# Patient Record
Sex: FEMALE | Race: BLACK OR AFRICAN AMERICAN | Employment: UNEMPLOYED | URBAN - METROPOLITAN AREA
[De-identification: names, ages, dates, MRNs, and addresses within clinical notes are randomized per-mention and may not be internally consistent; named-entity substitution may affect disease eponyms.]

---

## 2020-02-25 ENCOUNTER — HOSPITAL ENCOUNTER (EMERGENCY)
Age: 55
Discharge: HOME | End: 2020-02-26
Attending: EMERGENCY MEDICINE
Payer: MEDICARE

## 2020-02-25 VITALS
BODY MASS INDEX: 27.47 KG/M2 | WEIGHT: 175 LBS | DIASTOLIC BLOOD PRESSURE: 77 MMHG | OXYGEN SATURATION: 99 % | RESPIRATION RATE: 18 BRPM | HEIGHT: 67 IN | TEMPERATURE: 97.9 F | SYSTOLIC BLOOD PRESSURE: 108 MMHG | HEART RATE: 69 BPM

## 2020-02-25 DIAGNOSIS — K64.4 EXTERNAL HEMORRHOID: Primary | ICD-10-CM

## 2020-02-25 PROCEDURE — 99283 EMERGENCY DEPT VISIT LOW MDM: CPT

## 2020-02-25 RX ORDER — HYDROCORTISONE 25 MG/G
CREAM TOPICAL 4 TIMES DAILY
Qty: 30 G | Refills: 0 | Status: SHIPPED | OUTPATIENT
Start: 2020-02-25

## 2020-02-25 RX ORDER — DOCUSATE SODIUM 100 MG/1
100 CAPSULE, LIQUID FILLED ORAL 2 TIMES DAILY
Qty: 60 CAP | Refills: 2 | Status: SHIPPED | OUTPATIENT
Start: 2020-02-25 | End: 2020-05-25

## 2020-02-26 NOTE — DISCHARGE INSTRUCTIONS
Patient Education        Hemorrhoids: Care Instructions  Your Care Instructions    Hemorrhoids are enlarged veins that develop in the anal canal. Bleeding during bowel movements, itching, swelling, and rectal pain are the most common symptoms. They can be uncomfortable at times, but hemorrhoids rarely are a serious problem. You can treat most hemorrhoids with simple changes to your diet and bowel habits. These changes include eating more fiber and not straining to pass stools. Most hemorrhoids do not need surgery or other treatment unless they are very large and painful or bleed a lot. Follow-up care is a key part of your treatment and safety. Be sure to make and go to all appointments, and call your doctor if you are having problems. It's also a good idea to know your test results and keep a list of the medicines you take. How can you care for yourself at home? · Sit in a few inches of warm water (sitz bath) 3 times a day and after bowel movements. The warm water helps with pain and itching. · Put ice on your anal area several times a day for 10 minutes at a time. Put a thin cloth between the ice and your skin. Follow this by placing a warm, wet towel on the area for another 10 to 20 minutes. · Take pain medicines exactly as directed. ? If the doctor gave you a prescription medicine for pain, take it as prescribed. ? If you are not taking a prescription pain medicine, ask your doctor if you can take an over-the-counter medicine. · Keep the anal area clean, but be gentle. Use water and a fragrance-free soap, such as [place], or use baby wipes or medicated pads, such as Tucks. · Wear cotton underwear and loose clothing to decrease moisture in the anal area. · Eat more fiber. Include foods such as whole-grain breads and cereals, raw vegetables, raw and dried fruits, and beans. · Drink plenty of fluids, enough so that your urine is light yellow or clear like water.  If you have kidney, heart, or liver disease and have to limit fluids, talk with your doctor before you increase the amount of fluids you drink. · Use a stool softener that contains bran or psyllium. You can save money by buying bran or psyllium (available in bulk at most health food stores) and sprinkling it on foods or stirring it into fruit juice. Or you can use a product such as Metamucil or Hydrocil. · Practice healthy bowel habits. ? Go to the bathroom as soon as you have the urge. ? Avoid straining to pass stools. Relax and give yourself time to let things happen naturally. ? Do not hold your breath while passing stools. ? Do not read while sitting on the toilet. Get off the toilet as soon as you have finished. · Take your medicines exactly as prescribed. Call your doctor if you think you are having a problem with your medicine. When should you call for help? Call 911 anytime you think you may need emergency care. For example, call if:    · You pass maroon or very bloody stools.    Call your doctor now or seek immediate medical care if:    · You have increased pain.     · You have increased bleeding.    Watch closely for changes in your health, and be sure to contact your doctor if:    · Your symptoms have not improved after 3 or 4 days. Where can you learn more? Go to http://pawan-claribel.info/. Enter F228 in the search box to learn more about \"Hemorrhoids: Care Instructions. \"  Current as of: November 7, 2018  Content Version: 12.2  © 4129-8329 [employer]. Care instructions adapted under license by [employer] (which disclaims liability or warranty for this information). If you have questions about a medical condition or this instruction, always ask your healthcare professional. [place] [address] any warranty or liability for your use of this information.

## 2020-02-26 NOTE — ED NOTES
Patient (s) was given copy of dc instructions and two paper script(s) and no electronic scripts. Patient (s) has verbalized understanding of instructions and script (s). Patient was given a current medication reconciliation form and verbalized understanding of their medications. Patient (s) has verbalized understanding of the importance of discussing medications with  his or her physician or clinic they will be following up with. Patient alert and oriented and in no acute distress. Patient offered wheelchair from treatment area to hospital entrance, patient accepted wheelchair. Patient left ED with self.

## 2020-02-26 NOTE — ED PROVIDER NOTES
EMERGENCY DEPARTMENT HISTORY AND PHYSICAL EXAM      Date: 2/25/2020  Patient Name: Amber Buckner    History of Presenting Illness     Chief Complaint   Patient presents with    Rectal Bleeding       History Provided By: Patient    HPI: Amber Buckner, 54 y.o. female with PMHx significant for hemorrhoidectomy, polyp removal, seizures, stroke. Patient presents to the emergency department with rectal pain and bright red blood bleeding. She does state that she has had a history of hemorrhoids and she is tried to use hemorrhoid cream today and has not seemed to help. She does state that the pain had an acute onset today and she denies any constipation however did have some last week. She has not had to have surgery for hemorrhoid removal in greater than 5 years and does not see a specialist still. She denies any abdominal pain, flank pain or any  symptoms. She does state that the blood was red instead of black. Please note for examination and HPI were completed I did introduce myself as the physician assistant. Please note that this dictation was completed with [employer], the computer voice recognition software. Quite often unanticipated grammatical, syntax, homophones, and other interpretive errors are inadvertently transcribed by the computer software. Please disregard these errors. Please excuse any errors that have escaped final proofreading. For further clarification on any chart please contact myself. Thank you. There are no other complaints, changes, or physical findings at this time. PCP: Unknown, Provider    No current facility-administered medications on file prior to encounter. Current Outpatient Medications on File Prior to Encounter   Medication Sig Dispense Refill    OTHER Reports taking medicaion for seizures.  phenylephrine 0.25 % suppository Insert 1 suppository into rectum two (2) times a day.  12 suppository 0       Past History     Past Medical History:  Past Medical History: Diagnosis Date    Seizures ([address]     Stroke [facility])        Past Surgical History:  History reviewed. No pertinent surgical history. Family History:  History reviewed. No pertinent family history. Social History:  Social History     Tobacco Use    Smoking status: Current Every Day Smoker     Packs/day: 0.50    Smokeless tobacco: Never Used   Substance Use Topics    Alcohol use: No    Drug use: No       Allergies: Allergies   Allergen Reactions    Bee Venom Protein (Honey Bee) Swelling    Peanut Butter Flavor [Flavoring Agent] Swelling         Review of Systems   Review of Systems   Gastrointestinal: Positive for anal bleeding and rectal pain. All other systems reviewed and are negative. Physical Exam   Physical Exam  Vitals signs and nursing note reviewed. Exam conducted with a chaperone present. Constitutional:       Appearance: She is well-developed. HENT:      Head: Normocephalic and atraumatic. Eyes:      Conjunctiva/sclera: Conjunctivae normal.      Pupils: Pupils are equal, round, and reactive to light. Neck:      Musculoskeletal: Normal range of motion and neck supple. Thyroid: No thyromegaly. Cardiovascular:      Rate and Rhythm: Normal rate and regular rhythm. Heart sounds: Normal heart sounds. No murmur. Pulmonary:      Effort: Pulmonary effort is normal. No respiratory distress. Breath sounds: Normal breath sounds. No stridor. No wheezing. Abdominal:      General: Bowel sounds are normal.      Palpations: Abdomen is soft. Tenderness: There is no abdominal tenderness. Genitourinary:     Comments: Rectal exam completed with JASS King. Patient does have obvious rectal hemorrhoid to the external rectum. Hemorrhoid is nonthrombosed and there are 2 hemorrhoids appreciated also nonthrombosed. No evidence of any perirectal abscess. Musculoskeletal: Normal range of motion. General: No tenderness.    Lymphadenopathy:      Cervical: No cervical adenopathy. Skin:     General: Skin is warm. Neurological:      Mental Status: She is alert and oriented to person, place, and time. Deep Tendon Reflexes: Reflexes are normal and symmetric. Psychiatric:         Judgment: Judgment normal.         Diagnostic Study Results     Labs -   No results found for this or any previous visit (from the past 12 hour(s)). Radiologic Studies -   No orders to display     CT Results  (Last 48 hours)    None        CXR Results  (Last 48 hours)    None            Medical Decision Making   I am the first provider for this patient. I reviewed the vital signs, available nursing notes, past medical history, past surgical history, family history and social history. Vital Signs-Reviewed the patient's vital signs. Patient Vitals for the past 12 hrs:   Temp Pulse Resp BP SpO2   02/25/20 2156 97.9 °F (36.6 °C) 69 18 108/77 99 %       Records Reviewed: Nursing Notes    Provider Notes (Medical Decision Making): At this time patient does have external hemorrhoid as appreciated. We will go ahead and give patient some medications for her symptoms at this time discharge patient in stable condition to follow-up with general surgery. As there is no thrombosed hemorrhoid that was appreciated she did not have any procedural intervention here in the emergency department. Also there is no evidence of any perirectal abscess. Will be instructed on sitz bath's, Anusol, Colace and at this time discharged in stable condition. ED Course:   Initial assessment performed. The patients presenting problems have been discussed, and they are in agreement with the care plan formulated and outlined with them. I have encouraged them to ask questions as they arise throughout their visit. Critical Care Time: None    Disposition:  Dispo     PLAN:  1.    Current Discharge Medication List      START taking these medications    Details   hydrocortisone (ANUSOL-HC) 2.5 % rectal cream Insert into rectum four (4) times daily. Qty: 30 g, Refills: 0      docusate sodium (COLACE) 100 mg capsule Take 1 Cap by mouth two (2) times a day for 90 days. Qty: 60 Cap, Refills: 2         CONTINUE these medications which have NOT CHANGED    Details   OTHER Reports taking medicaion for seizures. phenylephrine 0.25 % suppository Insert 1 suppository into rectum two (2) times a day. Qty: 12 suppository, Refills: 0           2. Follow-up Information     Follow up With Specialties Details Why Contact Info    [facility]    [address] [facility] [address]  [phone]    GENERAL SURGERY [facility] [address]    [address] [facility] [address]   [phone] [id number] [address] [facility] [address] [facility] [address] [address] [facility] [address]  [phone]        Return to ED if worse     Diagnosis     Clinical Impression:   1. External hemorrhoid          Please note that this dictation was completed with [employer], the computer voice recognition software. Quite often unanticipated grammatical, syntax, homophones, and other interpretive errors are inadvertently transcribed by the computer software. Please disregards these errors. Please excuse any errors that have escaped final proofreading. This note will not be viewable in [address]

## 2020-02-26 NOTE — ED TRIAGE NOTES
Pt reports rectal bleeding, rectal pain, vag pain, vag itch w/ urination, and lower abd pain. Pt reports the pain woke her from sleep 1 hr ago. Pt report clots of blood in her commode.

## 2021-04-21 ENCOUNTER — HOSPITAL ENCOUNTER (EMERGENCY)
Age: 56
Discharge: HOME | End: 2021-04-21
Attending: EMERGENCY MEDICINE | Admitting: EMERGENCY MEDICINE
Payer: MEDICARE

## 2021-04-21 ENCOUNTER — APPOINTMENT (OUTPATIENT)
Dept: GENERAL RADIOLOGY | Age: 56
End: 2021-04-21
Attending: EMERGENCY MEDICINE
Payer: MEDICARE

## 2021-04-21 VITALS
SYSTOLIC BLOOD PRESSURE: 135 MMHG | HEART RATE: 56 BPM | OXYGEN SATURATION: 98 % | RESPIRATION RATE: 12 BRPM | TEMPERATURE: 97.2 F | DIASTOLIC BLOOD PRESSURE: 80 MMHG

## 2021-04-21 DIAGNOSIS — S62.102G CLOSED FRACTURE OF LEFT WRIST WITH DELAYED HEALING, SUBSEQUENT ENCOUNTER: ICD-10-CM

## 2021-04-21 DIAGNOSIS — M79.602 ARM PAIN, DIFFUSE, LEFT: Primary | ICD-10-CM

## 2021-04-21 LAB
ALBUMIN SERPL-MCNC: 3.6 G/DL (ref 3.5–5)
ALBUMIN/GLOB SERPL: 0.9 {RATIO} (ref 1.1–2.2)
ALP SERPL-CCNC: 105 U/L (ref 45–117)
ALT SERPL-CCNC: 18 U/L (ref 12–78)
ANION GAP SERPL CALC-SCNC: 7 MMOL/L (ref 5–15)
AST SERPL-CCNC: 10 U/L (ref 15–37)
BASOPHILS # BLD: 0 K/UL (ref 0–0.1)
BASOPHILS NFR BLD: 1 % (ref 0–1)
BILIRUB SERPL-MCNC: 0.2 MG/DL (ref 0.2–1)
BUN SERPL-MCNC: 12 MG/DL (ref 6–20)
BUN/CREAT SERPL: 16 (ref 12–20)
CALCIUM SERPL-MCNC: 8.8 MG/DL (ref 8.5–10.1)
CHLORIDE SERPL-SCNC: 108 MMOL/L (ref 97–108)
CO2 SERPL-SCNC: 21 MMOL/L (ref 21–32)
CREAT SERPL-MCNC: 0.73 MG/DL (ref 0.55–1.02)
DIFFERENTIAL METHOD BLD: ABNORMAL
EOSINOPHIL # BLD: 0.6 K/UL (ref 0–0.4)
EOSINOPHIL NFR BLD: 11 % (ref 0–7)
ERYTHROCYTE [DISTWIDTH] IN BLOOD BY AUTOMATED COUNT: 13.5 % (ref 11.5–14.5)
GLOBULIN SER CALC-MCNC: 3.9 G/DL (ref 2–4)
GLUCOSE SERPL-MCNC: 132 MG/DL (ref 65–100)
HCT VFR BLD AUTO: 35.5 % (ref 35–47)
HGB BLD-MCNC: 11.2 G/DL (ref 11.5–16)
IMM GRANULOCYTES # BLD AUTO: 0 K/UL (ref 0–0.04)
IMM GRANULOCYTES NFR BLD AUTO: 0 % (ref 0–0.5)
LYMPHOCYTES # BLD: 2.3 K/UL (ref 0.8–3.5)
LYMPHOCYTES NFR BLD: 46 % (ref 12–49)
MCH RBC QN AUTO: 30.1 PG (ref 26–34)
MCHC RBC AUTO-ENTMCNC: 31.5 G/DL (ref 30–36.5)
MCV RBC AUTO: 95.4 FL (ref 80–99)
MONOCYTES # BLD: 0.4 K/UL (ref 0–1)
MONOCYTES NFR BLD: 7 % (ref 5–13)
NEUTS SEG # BLD: 1.7 K/UL (ref 1.8–8)
NEUTS SEG NFR BLD: 35 % (ref 32–75)
NRBC # BLD: 0 K/UL (ref 0–0.01)
NRBC BLD-RTO: 0 PER 100 WBC
PLATELET # BLD AUTO: 279 K/UL (ref 150–400)
PMV BLD AUTO: 9.9 FL (ref 8.9–12.9)
POTASSIUM SERPL-SCNC: 3.6 MMOL/L (ref 3.5–5.1)
PROT SERPL-MCNC: 7.5 G/DL (ref 6.4–8.2)
RBC # BLD AUTO: 3.72 M/UL (ref 3.8–5.2)
SODIUM SERPL-SCNC: 136 MMOL/L (ref 136–145)
WBC # BLD AUTO: 5 K/UL (ref 3.6–11)

## 2021-04-21 PROCEDURE — 82962 GLUCOSE BLOOD TEST: CPT

## 2021-04-21 PROCEDURE — 74011250637 HC RX REV CODE- 250/637: Performed by: EMERGENCY MEDICINE

## 2021-04-21 PROCEDURE — 73090 X-RAY EXAM OF FOREARM: CPT

## 2021-04-21 PROCEDURE — 99283 EMERGENCY DEPT VISIT LOW MDM: CPT

## 2021-04-21 PROCEDURE — 36415 COLL VENOUS BLD VENIPUNCTURE: CPT

## 2021-04-21 PROCEDURE — 80053 COMPREHEN METABOLIC PANEL: CPT

## 2021-04-21 PROCEDURE — 85025 COMPLETE CBC W/AUTO DIFF WBC: CPT

## 2021-04-21 RX ORDER — OXYCODONE AND ACETAMINOPHEN 5; 325 MG/1; MG/1
1 TABLET ORAL
Qty: 20 TAB | Refills: 0 | Status: SHIPPED | OUTPATIENT
Start: 2021-04-21 | End: 2021-04-28

## 2021-04-21 RX ORDER — OXYCODONE AND ACETAMINOPHEN 5; 325 MG/1; MG/1
1 TABLET ORAL
Status: COMPLETED | OUTPATIENT
Start: 2021-04-21 | End: 2021-04-21

## 2021-04-21 RX ADMIN — OXYCODONE HYDROCHLORIDE AND ACETAMINOPHEN 1 TABLET: 5; 325 TABLET ORAL at 13:54

## 2021-04-21 NOTE — ED PROVIDER NOTES
HPI .  Patient has a history of seizures and strokes. She says about a month ago she was bitten by a pit bull or 2 pit bulls. She had surgery on her left forearm. Physical exam reveals a skin graft \"harvest\" site proximal volar forearm and a skin graft over the mid dorsal forearm. There is some scant drainage over the proximal graft which patient has covered with a Band-Aid. She reports that she has had consistent pain since the initial injury. She denies any increase in the slight drainage or mild swelling recently compared to the last month. She says she missed her plastic surgery appointment. She ran out of pain medicine several days ago. Since [facility] apparently is on diversion EMS brought the patient to our ER. Patient denies other complaints. -    Past Medical History:   Diagnosis Date    Seizures ([facility]     Stroke ([facility]        No past surgical history on file. No family history on file.     Social History     Socioeconomic History    Marital status: SINGLE     Spouse name: Not on file    Number of children: Not on file    Years of education: Not on file    Highest education level: Not on file   Occupational History    Not on file   Social Needs    Financial resource strain: Not on file    Food insecurity     Worry: Not on file     Inability: Not on file    Transportation needs     Medical: Not on file     Non-medical: Not on file   Tobacco Use    Smoking status: Current Every Day Smoker     Packs/day: 0.50    Smokeless tobacco: Never Used   Substance and Sexual Activity    Alcohol use: No    Drug use: No    Sexual activity: Not on file   Lifestyle    Physical activity     Days per week: Not on file     Minutes per session: Not on file    Stress: Not on file   Relationships    Social connections     Talks on phone: Not on file     Gets together: Not on file     Attends Mandaeism service: Not on file     Active member of club or organization: Not on file     Attends meetings of clubs or organizations: Not on file     Relationship status: Not on file    Intimate partner violence     Fear of current or ex partner: Not on file     Emotionally abused: Not on file     Physically abused: Not on file     Forced sexual activity: Not on file   Other Topics Concern    Not on file   Social History Narrative    Not on file         ALLERGIES: Bee venom protein (honey bee) and Peanut butter flavor [flavoring agent]    Review of Systems   All other systems reviewed and are negative. Vitals:    04/21/21 1248 04/21/21 1339   BP: 119/71 135/80   Pulse: (!) 53 (!) 56   Resp: 16 12   Temp: 97.2 °F (36.2 °C)    SpO2: 99% 98%            Physical Exam  Vitals signs and nursing note reviewed. Constitutional:       Appearance: She is well-developed. HENT:      Head: Normocephalic and atraumatic. Eyes:      Pupils: Pupils are equal, round, and reactive to light. Neck:      Musculoskeletal: Normal range of motion and neck supple. Cardiovascular:      Rate and Rhythm: Normal rate and regular rhythm. Heart sounds: Normal heart sounds. No murmur. No friction rub. No gallop. Pulmonary:      Effort: Pulmonary effort is normal. No respiratory distress. Breath sounds: No wheezing or rales. Abdominal:      Palpations: Abdomen is soft. Tenderness: There is no abdominal tenderness. There is no rebound. Musculoskeletal: Normal range of motion. General: No tenderness. Comments: Relatively square skin graft \"harvest\" proximal volar forearm and apparent skin graft site mid dorsal forearm; epithelial depth ulceration proximal portion of the graft with no obvious drainage but covered initially by a Band-Aid   Skin:     Findings: No erythema. Neurological:      Mental Status: She is alert. Cranial Nerves: No cranial nerve deficit.       Comments: Motor; symmetric   Psychiatric:         Behavior: Behavior normal.          MDM       Procedures          Note: Pain seems to be from a nonunion of the fracture. There is no overt signs of infection. Except for a small ulceration the skin graft seems to be healing. I will give the patient her plastic surgeons name and phone number and she will call for an appointment. She will be encouraged to return to [facility] if there is a problem in the meantime. She has run out of pain pills and I will give her 10 more Percocet.   Jake Hposon MD  4:03 PM

## 2021-04-21 NOTE — PROGRESS NOTES
4:22 PM  CM consulted for patient transport. Address verified. Informed patient is ambulatory. Ride request submitted to [facility]. ETA requested 4:40 pm.  Patient to be picked at ED Entrance.    to contact patient registration en-route and upon arrival.    Shanda Meigs, MSW/LINDA  Care Management

## 2021-04-21 NOTE — ED TRIAGE NOTES
Pt arrives to triage via squad , pt c/o left arm pain, pt had surgery on it a month ago, denies fevr but having hot and cold chills, pt ran out of her pain medication 2 days ago

## 2021-04-22 LAB
GLUCOSE BLD STRIP.AUTO-MCNC: 99 MG/DL (ref 65–100)
SERVICE CMNT-IMP: NORMAL

## 2022-04-06 ENCOUNTER — HOSPITAL ENCOUNTER (EMERGENCY)
Age: 57
Discharge: HOME | End: 2022-04-06
Attending: EMERGENCY MEDICINE
Payer: MEDICARE

## 2022-04-06 ENCOUNTER — APPOINTMENT (OUTPATIENT)
Dept: GENERAL RADIOLOGY | Age: 57
End: 2022-04-06
Attending: EMERGENCY MEDICINE
Payer: MEDICARE

## 2022-04-06 ENCOUNTER — APPOINTMENT (OUTPATIENT)
Dept: CT IMAGING | Age: 57
End: 2022-04-06
Attending: EMERGENCY MEDICINE
Payer: MEDICARE

## 2022-04-06 VITALS
TEMPERATURE: 98.7 F | HEIGHT: 67 IN | OXYGEN SATURATION: 98 % | HEART RATE: 77 BPM | SYSTOLIC BLOOD PRESSURE: 114 MMHG | RESPIRATION RATE: 14 BRPM | DIASTOLIC BLOOD PRESSURE: 66 MMHG | BODY MASS INDEX: 27.47 KG/M2 | WEIGHT: 175 LBS

## 2022-04-06 DIAGNOSIS — R07.9 ACUTE CHEST PAIN: Primary | ICD-10-CM

## 2022-04-06 DIAGNOSIS — F14.10 COCAINE ABUSE (HCC): ICD-10-CM

## 2022-04-06 DIAGNOSIS — R56.9 SEIZURE (HCC): ICD-10-CM

## 2022-04-06 LAB
ALBUMIN SERPL-MCNC: 4.1 G/DL (ref 3.5–5)
ALBUMIN/GLOB SERPL: 1.1 {RATIO} (ref 1.1–2.2)
ALP SERPL-CCNC: 91 U/L (ref 45–117)
ALT SERPL-CCNC: 24 U/L (ref 12–78)
AMPHET UR QL SCN: NEGATIVE
ANION GAP SERPL CALC-SCNC: 11 MMOL/L (ref 5–15)
APPEARANCE UR: CLEAR
AST SERPL-CCNC: 19 U/L (ref 15–37)
BACTERIA URNS QL MICRO: ABNORMAL /HPF
BARBITURATES UR QL SCN: NEGATIVE
BASOPHILS # BLD: 0 K/UL (ref 0–0.1)
BASOPHILS NFR BLD: 1 % (ref 0–1)
BENZODIAZ UR QL: NEGATIVE
BILIRUB SERPL-MCNC: 0.4 MG/DL (ref 0.2–1)
BILIRUB UR QL: NEGATIVE
BNP SERPL-MCNC: 83 PG/ML
BUN SERPL-MCNC: 13 MG/DL (ref 6–20)
BUN/CREAT SERPL: 14 (ref 12–20)
CALCIUM SERPL-MCNC: 9.1 MG/DL (ref 8.5–10.1)
CANNABINOIDS UR QL SCN: NEGATIVE
CHLORIDE SERPL-SCNC: 105 MMOL/L (ref 97–108)
CO2 SERPL-SCNC: 22 MMOL/L (ref 21–32)
COCAINE UR QL SCN: POSITIVE
COLOR UR: ABNORMAL
COMMENT, HOLDF: NORMAL
CREAT SERPL-MCNC: 0.93 MG/DL (ref 0.55–1.02)
DIFFERENTIAL METHOD BLD: NORMAL
DRUG SCRN COMMENT,DRGCM: ABNORMAL
EOSINOPHIL # BLD: 0.1 K/UL (ref 0–0.4)
EOSINOPHIL NFR BLD: 1 % (ref 0–7)
EPITH CASTS URNS QL MICRO: ABNORMAL /LPF
ERYTHROCYTE [DISTWIDTH] IN BLOOD BY AUTOMATED COUNT: 13.7 % (ref 11.5–14.5)
ETHANOL SERPL-MCNC: <10 MG/DL
GLOBULIN SER CALC-MCNC: 3.9 G/DL (ref 2–4)
GLUCOSE SERPL-MCNC: 97 MG/DL (ref 65–100)
GLUCOSE UR STRIP.AUTO-MCNC: NEGATIVE MG/DL
HCT VFR BLD AUTO: 37.3 % (ref 35–47)
HGB BLD-MCNC: 12.5 G/DL (ref 11.5–16)
HGB UR QL STRIP: NEGATIVE
IMM GRANULOCYTES # BLD AUTO: 0 K/UL (ref 0–0.04)
IMM GRANULOCYTES NFR BLD AUTO: 0 % (ref 0–0.5)
KETONES UR QL STRIP.AUTO: NEGATIVE MG/DL
LACTATE SERPL-SCNC: 1.4 MMOL/L (ref 0.4–2)
LACTATE SERPL-SCNC: 6 MMOL/L (ref 0.4–2)
LEUKOCYTE ESTERASE UR QL STRIP.AUTO: ABNORMAL
LIPASE SERPL-CCNC: 191 U/L (ref 73–393)
LYMPHOCYTES # BLD: 2.3 K/UL (ref 0.8–3.5)
LYMPHOCYTES NFR BLD: 31 % (ref 12–49)
MAGNESIUM SERPL-MCNC: 2.1 MG/DL (ref 1.6–2.4)
MCH RBC QN AUTO: 30.6 PG (ref 26–34)
MCHC RBC AUTO-ENTMCNC: 33.5 G/DL (ref 30–36.5)
MCV RBC AUTO: 91.2 FL (ref 80–99)
METHADONE UR QL: NEGATIVE
MONOCYTES # BLD: 0.4 K/UL (ref 0–1)
MONOCYTES NFR BLD: 6 % (ref 5–13)
NEUTS SEG # BLD: 4.7 K/UL (ref 1.8–8)
NEUTS SEG NFR BLD: 61 % (ref 32–75)
NITRITE UR QL STRIP.AUTO: NEGATIVE
NRBC # BLD: 0 K/UL (ref 0–0.01)
NRBC BLD-RTO: 0 PER 100 WBC
OPIATES UR QL: NEGATIVE
PCP UR QL: NEGATIVE
PH UR STRIP: 5.5 [PH] (ref 5–8)
PLATELET # BLD AUTO: 283 K/UL (ref 150–400)
PMV BLD AUTO: 9.4 FL (ref 8.9–12.9)
POTASSIUM SERPL-SCNC: 3.4 MMOL/L (ref 3.5–5.1)
PROT SERPL-MCNC: 8 G/DL (ref 6.4–8.2)
PROT UR STRIP-MCNC: NEGATIVE MG/DL
RBC # BLD AUTO: 4.09 M/UL (ref 3.8–5.2)
RBC #/AREA URNS HPF: ABNORMAL /HPF (ref 0–5)
SAMPLES BEING HELD,HOLD: NORMAL
SODIUM SERPL-SCNC: 138 MMOL/L (ref 136–145)
SP GR UR REFRACTOMETRY: 1.01 (ref 1–1.03)
TROPONIN-HIGH SENSITIVITY: 6 NG/L (ref 0–51)
TROPONIN-HIGH SENSITIVITY: 8 NG/L (ref 0–51)
UR CULT HOLD, URHOLD: NORMAL
UROBILINOGEN UR QL STRIP.AUTO: 0.2 EU/DL (ref 0.2–1)
WBC # BLD AUTO: 7.6 K/UL (ref 3.6–11)
WBC URNS QL MICRO: ABNORMAL /HPF (ref 0–4)

## 2022-04-06 PROCEDURE — 84484 ASSAY OF TROPONIN QUANT: CPT

## 2022-04-06 PROCEDURE — 36415 COLL VENOUS BLD VENIPUNCTURE: CPT

## 2022-04-06 PROCEDURE — 96374 THER/PROPH/DIAG INJ IV PUSH: CPT

## 2022-04-06 PROCEDURE — 81001 URINALYSIS AUTO W/SCOPE: CPT

## 2022-04-06 PROCEDURE — 83690 ASSAY OF LIPASE: CPT

## 2022-04-06 PROCEDURE — 85025 COMPLETE CBC W/AUTO DIFF WBC: CPT

## 2022-04-06 PROCEDURE — 80053 COMPREHEN METABOLIC PANEL: CPT

## 2022-04-06 PROCEDURE — 82077 ASSAY SPEC XCP UR&BREATH IA: CPT

## 2022-04-06 PROCEDURE — 96361 HYDRATE IV INFUSION ADD-ON: CPT

## 2022-04-06 PROCEDURE — 70450 CT HEAD/BRAIN W/O DYE: CPT

## 2022-04-06 PROCEDURE — 80307 DRUG TEST PRSMV CHEM ANLYZR: CPT

## 2022-04-06 PROCEDURE — 99284 EMERGENCY DEPT VISIT MOD MDM: CPT

## 2022-04-06 PROCEDURE — 71045 X-RAY EXAM CHEST 1 VIEW: CPT

## 2022-04-06 PROCEDURE — 93005 ELECTROCARDIOGRAM TRACING: CPT

## 2022-04-06 PROCEDURE — 74011250636 HC RX REV CODE- 250/636: Performed by: EMERGENCY MEDICINE

## 2022-04-06 PROCEDURE — 83605 ASSAY OF LACTIC ACID: CPT

## 2022-04-06 PROCEDURE — 83735 ASSAY OF MAGNESIUM: CPT

## 2022-04-06 PROCEDURE — 83880 ASSAY OF NATRIURETIC PEPTIDE: CPT

## 2022-04-06 PROCEDURE — 74011250636 HC RX REV CODE- 250/636

## 2022-04-06 RX ORDER — LORAZEPAM 2 MG/ML
2 INJECTION INTRAMUSCULAR
Status: COMPLETED | OUTPATIENT
Start: 2022-04-06 | End: 2022-04-06

## 2022-04-06 RX ORDER — ALBUTEROL SULFATE 90 UG/1
AEROSOL, METERED RESPIRATORY (INHALATION)
COMMUNITY
Start: 2022-02-03

## 2022-04-06 RX ORDER — ATORVASTATIN CALCIUM 40 MG/1
TABLET, FILM COATED ORAL
COMMUNITY

## 2022-04-06 RX ORDER — LEVETIRACETAM 1000 MG/1
1000 TABLET ORAL 2 TIMES DAILY
COMMUNITY
Start: 2022-02-03

## 2022-04-06 RX ORDER — TRAZODONE HYDROCHLORIDE 50 MG/1
TABLET ORAL
COMMUNITY
Start: 2022-01-31

## 2022-04-06 RX ORDER — TRAMADOL HYDROCHLORIDE 50 MG/1
TABLET ORAL
COMMUNITY
Start: 2022-01-26

## 2022-04-06 RX ORDER — LORAZEPAM 2 MG/ML
INJECTION INTRAMUSCULAR
Status: COMPLETED
Start: 2022-04-06 | End: 2022-04-06

## 2022-04-06 RX ADMIN — LORAZEPAM 2 MG: 2 INJECTION INTRAMUSCULAR at 03:57

## 2022-04-06 RX ADMIN — SODIUM CHLORIDE 1000 ML: 9 INJECTION, SOLUTION INTRAVENOUS at 04:35

## 2022-04-06 RX ADMIN — LORAZEPAM 2 MG: 2 INJECTION INTRAMUSCULAR; INTRAVENOUS at 03:57

## 2022-04-06 NOTE — ED NOTES
Spoke to patient's SO, Alexis Shrestha, with patient's permission. Updated on patient condition and informed that patient is stable at this time. Alecia Davis requested a call back at the number below when it is determined if the patient will be admitted or released. [phone].

## 2022-04-06 NOTE — ED PROVIDER NOTES
59-year-old female with a history of seizure disorder and prior CVA, presents to the emergency department by EMS from home with chief complaint of chest pain which started about 30 minutes prior to their arrival.  She states that she was at a party prior to their arrival and reportedly \"drank a lot of different alcohol. \"  On arrival she is tachycardic but awake alert and oriented x4. She appears intoxicated. She also notes some diarrhea recently, but no abdominal pain S OB, fever, URI symptoms, or any other medical concerns. Denies any cardiac history. Past Medical History:   Diagnosis Date    [facility]ures [address] [address] [facility]     Stroke [facility])        History reviewed. No pertinent surgical history. History reviewed. No pertinent family history. Social History     Socioeconomic History    Marital status: SINGLE     Spouse name: Not on file    Number of children: Not on file    Years of education: Not on file    Highest education level: Not on file   Occupational History    Not on file   Tobacco Use    Smoking status: Current Every Day Smoker     Packs/day: 0.50    Smokeless tobacco: Never Used   Substance and Sexual Activity    Alcohol use: No    Drug use: No    Sexual activity: Not on file   Other Topics Concern    Not on file   Social History Narrative    Not on file     Social Determinants of Health     Financial Resource Strain:     Difficulty of Paying Living Expenses: Not on file   Food Insecurity:     Worried About Running Out of Food in the Last Year: Not on file    Gal of Food in the Last Year: Not on file   Transportation Needs:     Lack of Transportation (Medical): Not on file    Lack of Transportation (Non-Medical):  Not on file   Physical Activity:     Days of Exercise per Week: Not on file    Minutes of Exercise per Session: Not on file   Stress:     Feeling of Stress : Not on file   Social Connections:     Frequency of Communication with Friends and Family: Not on file    Frequency of Social Gatherings with Friends and Family: Not on file    Attends Yazidi Services: Not on file    Active Member of Clubs or Organizations: Not on file    Attends Club or Organization Meetings: Not on file    Marital Status: Not on file   Intimate Partner Violence:     Fear of Current or Ex-Partner: Not on file    Emotionally Abused: Not on file    Physically Abused: Not on file    Sexually Abused: Not on file   Housing Stability:     Unable to Pay for Housing in the Last Year: Not on file    Number of [place] in the Last Year: Not on file    Unstable Housing in the Last Year: Not on file         ALLERGIES: Bee venom protein (honey bee) and Peanut butter flavor [flavoring agent]    Review of Systems   Constitutional: Positive for activity change. Negative for appetite change, chills and fever. HENT: Negative for congestion, rhinorrhea, sinus pressure, sneezing and sore throat. Eyes: Negative for photophobia and visual disturbance. Respiratory: Negative for cough and shortness of breath. Cardiovascular: Positive for chest pain. Gastrointestinal: Positive for diarrhea. Negative for abdominal pain, blood in stool, constipation, nausea and vomiting. Genitourinary: Negative for difficulty urinating, dysuria, flank pain, frequency, hematuria, menstrual problem, urgency, vaginal bleeding and vaginal discharge. Musculoskeletal: Negative for arthralgias, back pain, myalgias and neck pain. Skin: Negative for rash and wound. Neurological: Negative for syncope, weakness, numbness and headaches. Psychiatric/Behavioral: Negative for self-injury and suicidal ideas. All other systems reviewed and are negative. Vitals:    04/06/22 0344   BP: (!) 151/89   Pulse: 100   Resp: 24   Temp: 98.7 °F (37.1 °C)   SpO2: 98%   Weight: 79.4 kg (175 lb)   Height: 5' 7\" (1.702 m)            Physical Exam  Vitals and nursing note reviewed.    Constitutional:       General: She is not in acute distress. Appearance: Normal appearance. She is well-developed. She is not diaphoretic. Comments: Mildly disheveled appearing. Intoxicated. HENT:      Head: Normocephalic and atraumatic. Nose: Nose normal.   Eyes:      Extraocular Movements: Extraocular movements intact. Conjunctiva/sclera: Conjunctivae normal.      Pupils: Pupils are equal, round, and reactive to light. Cardiovascular:      Rate and Rhythm: Normal rate and regular rhythm. Heart sounds: Normal heart sounds. Pulmonary:      Effort: Pulmonary effort is normal.      Breath sounds: Normal breath sounds. Chest:      Chest wall: No tenderness. Abdominal:      General: There is no distension. Palpations: Abdomen is soft. Tenderness: There is no abdominal tenderness. Musculoskeletal:         General: No tenderness. Cervical back: Neck supple. Skin:     General: Skin is warm and dry. Neurological:      General: No focal deficit present. Mental Status: She is alert and oriented to person, place, and time. Cranial Nerves: No cranial nerve deficit. Sensory: No sensory deficit. Motor: No weakness. Coordination: Coordination normal.          MDM   49-year-old female presents with chest pain after going to a party. Afebrile with vital signs stable with mild tachycardia on arrival, satting 98% on room air. CXR viewed by myself and read by radiology showing no acute abnormalities. While awaiting further work-up in the ED she was seen to have a seizure which lasted for about 30 seconds. She was then postictal afterwards but then gradually returned to her neurologic baseline. Known history of seizures, reportedly taking Keppra. CT head was done which showed no acute intracranial abnormalities. Labs returned reassuringly showing no significant abnormalities. Negative troponin x2. EtOH interestingly negative.   Lactate elevated at 6.0 after seizure, but normalized after IV fluid bolus. UA negative. UDS positive for cocaine, feel this is likely the etiology for her chest pain and potentially also the seizure. She was strongly recommended cessation and was provided with community resources to help her with that. Recommended PCP follow-up for further evaluation and return precautions were given for worsening or concerns. Please note that this dictation was completed with [employer] the computer voice recognition software. Quite often unanticipated grammatical, syntax, homophones, and other interpretive errors are inadvertently transcribed by the computer software. Please disregard these errors. Please excuse any errors that have escaped final proofreading. Procedures    345 EKG shows sinus tachycardia with occasional PVCs with a rate of 106 bpm nonspecific ST depressions laterally but no ST elevation.

## 2022-04-06 NOTE — ED TRIAGE NOTES
Pt arrives to ER with EMS from home CC chest pain that started today. Pt stated that she was at a party and drank a lot of different alcohol. Pt endorses diarrhea recently as well.      A/0 x4, EMS reports sinus tach

## 2022-04-06 NOTE — ED NOTES
In triage patient has a seizure witnessed by this nurse and Luis Chambers RN. Patient's seizure lasted approx. 20 seconds, Dr. Pedro Frye notified.

## 2022-04-06 NOTE — ED NOTES
Patient called family member to pick her up; gave pt address and a snack. Pt understands discharge instructions. Pt given clothes to wear out and sent to lobby in wheelchair with belongings and discharge papers.

## 2022-04-06 NOTE — ED NOTES
Called Alexandria Terry to inform that patient will be discharged home, received no answer, left JACQUELYN requesting call back.

## 2022-04-07 LAB
ATRIAL RATE: 106 BPM
CALCULATED P AXIS, ECG09: 79 DEGREES
CALCULATED R AXIS, ECG10: 71 DEGREES
CALCULATED T AXIS, ECG11: 22 DEGREES
DIAGNOSIS, 93000: NORMAL
P-R INTERVAL, ECG05: 144 MS
Q-T INTERVAL, ECG07: 364 MS
QRS DURATION, ECG06: 86 MS
QTC CALCULATION (BEZET), ECG08: 483 MS
VENTRICULAR RATE, ECG03: 106 BPM

## 2022-08-08 ENCOUNTER — HOSPITAL ENCOUNTER (EMERGENCY)
Age: 57
Discharge: HOME | End: 2022-08-08
Attending: EMERGENCY MEDICINE
Payer: MEDICARE

## 2022-08-08 VITALS
TEMPERATURE: 99.1 F | SYSTOLIC BLOOD PRESSURE: 116 MMHG | BODY MASS INDEX: 30.82 KG/M2 | HEIGHT: 65 IN | DIASTOLIC BLOOD PRESSURE: 69 MMHG | HEART RATE: 68 BPM | WEIGHT: 185 LBS | RESPIRATION RATE: 16 BRPM | OXYGEN SATURATION: 98 %

## 2022-08-08 DIAGNOSIS — E86.0 DEHYDRATION: ICD-10-CM

## 2022-08-08 DIAGNOSIS — Z20.822 PERSON UNDER INVESTIGATION FOR COVID-19: Primary | ICD-10-CM

## 2022-08-08 DIAGNOSIS — R11.2 NAUSEA AND VOMITING, UNSPECIFIED VOMITING TYPE: ICD-10-CM

## 2022-08-08 DIAGNOSIS — R19.7 DIARRHEA OF PRESUMED INFECTIOUS ORIGIN: ICD-10-CM

## 2022-08-08 LAB
ALBUMIN SERPL-MCNC: 3.4 G/DL (ref 3.5–5)
ALBUMIN/GLOB SERPL: 1 {RATIO} (ref 1.1–2.2)
ALP SERPL-CCNC: 68 U/L (ref 45–117)
ALT SERPL-CCNC: 17 U/L (ref 12–78)
ANION GAP SERPL CALC-SCNC: 10 MMOL/L (ref 5–15)
AST SERPL-CCNC: 23 U/L (ref 15–37)
BASOPHILS # BLD: 0 K/UL (ref 0–0.1)
BASOPHILS NFR BLD: 0 % (ref 0–1)
BILIRUB SERPL-MCNC: 0.3 MG/DL (ref 0.2–1)
BUN SERPL-MCNC: 14 MG/DL (ref 6–20)
BUN/CREAT SERPL: 19 (ref 12–20)
CALCIUM SERPL-MCNC: 8.7 MG/DL (ref 8.5–10.1)
CHLORIDE SERPL-SCNC: 103 MMOL/L (ref 97–108)
CO2 SERPL-SCNC: 25 MMOL/L (ref 21–32)
CREAT SERPL-MCNC: 0.73 MG/DL (ref 0.55–1.02)
DIFFERENTIAL METHOD BLD: ABNORMAL
EOSINOPHIL # BLD: 0.2 K/UL (ref 0–0.4)
EOSINOPHIL NFR BLD: 4 % (ref 0–7)
ERYTHROCYTE [DISTWIDTH] IN BLOOD BY AUTOMATED COUNT: 12.8 % (ref 11.5–14.5)
GLOBULIN SER CALC-MCNC: 3.5 G/DL (ref 2–4)
GLUCOSE SERPL-MCNC: 74 MG/DL (ref 65–100)
HCT VFR BLD AUTO: 34.6 % (ref 35–47)
HGB BLD-MCNC: 11.4 G/DL (ref 11.5–16)
IMM GRANULOCYTES # BLD AUTO: 0.1 K/UL (ref 0–0.04)
IMM GRANULOCYTES NFR BLD AUTO: 1 % (ref 0–0.5)
LIPASE SERPL-CCNC: 161 U/L (ref 73–393)
LYMPHOCYTES # BLD: 0.7 K/UL (ref 0.8–3.5)
LYMPHOCYTES NFR BLD: 14 % (ref 12–49)
MCH RBC QN AUTO: 31.1 PG (ref 26–34)
MCHC RBC AUTO-ENTMCNC: 32.9 G/DL (ref 30–36.5)
MCV RBC AUTO: 94.5 FL (ref 80–99)
MONOCYTES # BLD: 0.5 K/UL (ref 0–1)
MONOCYTES NFR BLD: 11 % (ref 5–13)
NEUTS SEG # BLD: 3.4 K/UL (ref 1.8–8)
NEUTS SEG NFR BLD: 70 % (ref 32–75)
NRBC # BLD: 0 K/UL (ref 0–0.01)
NRBC BLD-RTO: 0 PER 100 WBC
PLATELET # BLD AUTO: 227 K/UL (ref 150–400)
PMV BLD AUTO: 9.4 FL (ref 8.9–12.9)
POTASSIUM SERPL-SCNC: 4.8 MMOL/L (ref 3.5–5.1)
PROT SERPL-MCNC: 6.9 G/DL (ref 6.4–8.2)
RBC # BLD AUTO: 3.66 M/UL (ref 3.8–5.2)
RBC MORPH BLD: ABNORMAL
SODIUM SERPL-SCNC: 138 MMOL/L (ref 136–145)
WBC # BLD AUTO: 4.9 K/UL (ref 3.6–11)

## 2022-08-08 PROCEDURE — 80053 COMPREHEN METABOLIC PANEL: CPT

## 2022-08-08 PROCEDURE — 36415 COLL VENOUS BLD VENIPUNCTURE: CPT

## 2022-08-08 PROCEDURE — 96361 HYDRATE IV INFUSION ADD-ON: CPT

## 2022-08-08 PROCEDURE — 74011250636 HC RX REV CODE- 250/636: Performed by: EMERGENCY MEDICINE

## 2022-08-08 PROCEDURE — 96374 THER/PROPH/DIAG INJ IV PUSH: CPT

## 2022-08-08 PROCEDURE — 99284 EMERGENCY DEPT VISIT MOD MDM: CPT

## 2022-08-08 PROCEDURE — 83690 ASSAY OF LIPASE: CPT

## 2022-08-08 PROCEDURE — 85025 COMPLETE CBC W/AUTO DIFF WBC: CPT

## 2022-08-08 PROCEDURE — 93005 ELECTROCARDIOGRAM TRACING: CPT

## 2022-08-08 PROCEDURE — U0005 INFEC AGEN DETEC AMPLI PROBE: HCPCS

## 2022-08-08 RX ORDER — ONDANSETRON 2 MG/ML
4 INJECTION INTRAMUSCULAR; INTRAVENOUS
Status: COMPLETED | OUTPATIENT
Start: 2022-08-08 | End: 2022-08-08

## 2022-08-08 RX ORDER — ONDANSETRON 4 MG/1
4 TABLET, FILM COATED ORAL
Qty: 20 TABLET | Refills: 0 | Status: SHIPPED | OUTPATIENT
Start: 2022-08-08

## 2022-08-08 RX ADMIN — ONDANSETRON 4 MG: 2 INJECTION INTRAMUSCULAR; INTRAVENOUS at 16:47

## 2022-08-08 RX ADMIN — SODIUM CHLORIDE 1000 ML: 9 INJECTION, SOLUTION INTRAVENOUS at 16:47

## 2022-08-08 NOTE — LETTER
8/12/2022      [address]      Dear MsAlejandro Dewayne        You were seen in the Emergency Department of [address] [place] [address] on 8/8/22 and had lab and/or radiology tests performed. The COVID 19 test from your Emergency Department visit on 8/8/22 was positive. You were treated appropriately during your visit. No further treatment is required. If you have not improved or are worsening, please follow up with your primary care doctor or Emergency department as soon as possible. If you have any questions please contact the Emergency Department at [phone].       Sincerely,    Aubrey Wilkerson PA-C    [place] [facility] [place] EMERGENCY DEPT  [address] [facility] [address] [phone]

## 2022-08-08 NOTE — ED PROVIDER NOTES
EMERGENCY DEPARTMENT HISTORY AND PHYSICAL EXAM      Date: 8/8/2022  Patient Name: Lillie Saxena    History of Presenting Illness     Chief Complaint   Patient presents with    Headache    Vomiting    Lethargy       History Provided By: Patient    HPI: Lillie Saxena, 62 y.o. female presents to the ED with cc of malaise. 12-year-old female with a past medical history of CVA (residual left-sided deficits), COPD and seizure disorder presents emergency department with chief complaint of malaise. Patient reports was in her normal state of health. She does report she has a constant cough due to COPD. Patient reports this morning she woke up. She ate and then subsequently developed an episode of nausea. She reports it is associate with diarrhea. Did have 1 episode of vomiting, 1 episode of reported hematemesis. Not on blood thinners. Patient reports that she felt \"lightheaded\" and seeing spots. These are nonpositional and occurred all the time. She reports she has not had much to \"eat or drink\" and this is prompting her to feel dehydrated. She reports feeling hot and cold as well as a mild headache as well. There are no other complaints, changes, or physical findings at this time. PCP: Other, None, PA    No current facility-administered medications on file prior to encounter. Current Outpatient Medications on File Prior to Encounter   Medication Sig Dispense Refill    levETIRAcetam 1,000 mg tablet Take 1,000 mg by mouth two (2) times a day. traZODone (DESYREL) 50 mg tablet take 1 tablet by mouth AT NIGHT IF NEEDED FOR SLEEP      atorvastatin (LIPITOR) 40 mg tablet 1 tab(s)      OTHER Reports taking medicaion for seizures.       albuterol (PROVENTIL HFA, VENTOLIN HFA, PROAIR HFA) 90 mcg/actuation inhaler inhale 2 puffs by mouth and INTO THE LUNGS every 6 hours if needed for cough or shortness of breath      traMADoL (ULTRAM) 50 mg tablet take 1 tablet by mouth every 4 hours if needed for SEVERE pain for up to 7 days (Patient not taking: Reported on 8/8/2022)      hydrocortisone (ANUSOL-HC) 2.5 % rectal cream Insert  into rectum four (4) times daily. 30 g 0    phenylephrine 0.25 % suppository Insert 1 suppository into rectum two (2) times a day. 12 suppository 0       Past History     Past Medical History:  Past Medical History:   Diagnosis Date    Seizures ([address]     Stroke [facility])        Past Surgical History:  History reviewed. No pertinent surgical history. Family History:  History reviewed. No pertinent family history. Social History:  Social History     Tobacco Use    Smoking status: Every Day     Packs/day: 0.50     Types: Cigarettes    Smokeless tobacco: Never   Substance Use Topics    Alcohol use: No    Drug use: No       Allergies: Allergies   Allergen Reactions    Bee Venom Protein (Honey Bee) Swelling    Peanut Butter Flavor [Flavoring Agent] Swelling         Review of Systems   Review of Systems   Constitutional:  Positive for chills. Negative for activity change and fever. HENT:  Negative for facial swelling and voice change. Eyes:  Negative for redness. Respiratory:  Positive for cough. Negative for shortness of breath and wheezing. Cardiovascular:  Negative for chest pain and leg swelling. Gastrointestinal:  Positive for diarrhea, nausea and vomiting. Negative for abdominal pain. Genitourinary:  Negative for decreased urine volume. Musculoskeletal:  Negative for gait problem. Skin:  Negative for pallor and rash. Neurological:  Positive for headaches. Negative for tremors and facial asymmetry. Psychiatric/Behavioral:  Negative for agitation. All other systems reviewed and are negative. Physical Exam   Physical Exam  Vitals and nursing note reviewed. Constitutional:       Comments: 79-year-old female, resting in bed, no acute distress, does not appear unwell   HENT:      Head: Normocephalic and atraumatic.    Cardiovascular:      Rate and Rhythm: Normal rate and regular rhythm. Heart sounds: No murmur heard. No friction rub. No gallop. Pulmonary:      Effort: Pulmonary effort is normal.      Breath sounds: Wheezing (Faint end expiratory wheezing) present. Comments: Not hypoxic on room air  Abdominal:      Palpations: Abdomen is soft. Tenderness: There is no abdominal tenderness. There is no guarding or rebound. Musculoskeletal:         General: No swelling. Normal range of motion. Cervical back: Normal range of motion. Skin:     General: Skin is warm. Capillary Refill: Capillary refill takes less than 2 seconds. Neurological:      General: No focal deficit present. Mental Status: She is alert. Motor: Weakness (Baseline left upper extremity and left lower extremity weakness) present. Psychiatric:         Mood and Affect: Mood normal.       Diagnostic Study Results     Labs -     Recent Results (from the past 12 hour(s))   EKG, 12 LEAD, INITIAL    Collection Time: 08/08/22  3:58 PM   Result Value Ref Range    Ventricular Rate 61 BPM    Atrial Rate 61 BPM    P-R Interval 138 ms    QRS Duration 76 ms    Q-T Interval 464 ms    QTC Calculation (Bezet) 467 ms    Calculated P Axis 69 degrees    Calculated R Axis 62 degrees    Calculated T Axis 57 degrees    Diagnosis       Normal sinus rhythm  Normal ECG  When compared with ECG of 06-APR-2022 03:44,  premature ventricular complexes are no longer present  Vent.  rate has decreased BY  45 BPM  ST no longer depressed in Inferior leads  Non-specific change in ST segment in Anterolateral leads  T wave inversion no longer evident in Inferior leads  Nonspecific T wave abnormality no longer evident in Anterior leads     CBC WITH AUTOMATED DIFF    Collection Time: 08/08/22  4:33 PM   Result Value Ref Range    WBC 4.9 3.6 - 11.0 K/uL    RBC 3.66 (L) 3.80 - 5.20 M/uL    HGB 11.4 (L) 11.5 - 16.0 g/dL    HCT 34.6 (L) 35.0 - 47.0 %    MCV 94.5 80.0 - 99.0 FL    MCH 31.1 26.0 - 34.0 PG    MCHC 32.9 30.0 - 36.5 g/dL    RDW 12.8 11.5 - 14.5 %    PLATELET 721 732 - 754 K/uL    MPV 9.4 8.9 - 12.9 FL    NRBC 0.0 0  WBC    ABSOLUTE NRBC 0.00 0.00 - 0.01 K/uL    NEUTROPHILS 70 32 - 75 %    LYMPHOCYTES 14 12 - 49 %    MONOCYTES 11 5 - 13 %    EOSINOPHILS 4 0 - 7 %    BASOPHILS 0 0 - 1 %    IMMATURE GRANULOCYTES 1 (H) 0.0 - 0.5 %    ABS. NEUTROPHILS 3.4 1.8 - 8.0 K/UL    ABS. LYMPHOCYTES 0.7 (L) 0.8 - 3.5 K/UL    ABS. MONOCYTES 0.5 0.0 - 1.0 K/UL    ABS. EOSINOPHILS 0.2 0.0 - 0.4 K/UL    ABS. BASOPHILS 0.0 0.0 - 0.1 K/UL    ABS. IMM. GRANS. 0.1 (H) 0.00 - 0.04 K/UL    DF SMEAR SCANNED      RBC COMMENTS NORMOCYTIC, NORMOCHROMIC     METABOLIC PANEL, COMPREHENSIVE    Collection Time: 08/08/22  4:33 PM   Result Value Ref Range    Sodium 138 136 - 145 mmol/L    Potassium 4.8 3.5 - 5.1 mmol/L    Chloride 103 97 - 108 mmol/L    CO2 25 21 - 32 mmol/L    Anion gap 10 5 - 15 mmol/L    Glucose 74 65 - 100 mg/dL    BUN 14 6 - 20 MG/DL    Creatinine 0.73 0.55 - 1.02 MG/DL    BUN/Creatinine ratio 19 12 - 20      GFR est AA >60 >60 ml/min/1.73m2    GFR est non-AA >60 >60 ml/min/1.73m2    Calcium 8.7 8.5 - 10.1 MG/DL    Bilirubin, total 0.3 0.2 - 1.0 MG/DL    ALT (SGPT) 17 12 - 78 U/L    AST (SGOT) 23 15 - 37 U/L    Alk. phosphatase 68 45 - 117 U/L    Protein, total 6.9 6.4 - 8.2 g/dL    Albumin 3.4 (L) 3.5 - 5.0 g/dL    Globulin 3.5 2.0 - 4.0 g/dL    A-G Ratio 1.0 (L) 1.1 - 2.2     LIPASE    Collection Time: 08/08/22  4:33 PM   Result Value Ref Range    Lipase 161 73 - 393 U/L       Radiologic Studies -   No orders to display     CT Results  (Last 48 hours)      None          CXR Results  (Last 48 hours)      None            Medical Decision Making   I am the first provider for this patient. I reviewed the vital signs, available nursing notes, past medical history, past surgical history, family history and social history. Vital Signs-Reviewed the patient's vital signs.   Patient Vitals for the past 12 hrs:   Temp Pulse Resp BP SpO2   08/08/22 1810 -- 68 16 116/69 98 %   08/08/22 1805 -- 70 21 116/69 --   08/08/22 1750 -- 85 18 105/64 --   08/08/22 1720 -- 67 20 137/85 99 %   08/08/22 1705 -- 77 20 (!) 105/91 97 %   08/08/22 1650 -- 69 19 (!) 127/94 100 %   08/08/22 1536 -- -- -- 133/73 98 %   08/08/22 1350 -- -- -- 115/65 --   08/08/22 1346 99.1 °F (37.3 °C) 73 16 -- 97 %     Records Reviewed: Nursing Notes and Old Medical Records    Provider Notes (Medical Decision Making):     51-year-old female presents emergency department with multiple complaints. Vitals are unremarkable. Appears well, not acutely ill. Suspect viral illness such as COVID-19, gastroenteritis clinically. Does report vomiting and diarrhea. No abdominal pain or tenderness on exam.  Does report episode of hematemesis, will check basic labs to exclude symptomatic anemia. We will also check EKG given near syncope, not vertiginous on history. History and exam do not support meningitis/encephalitis, especially with GI symptoms. Will medicate and reassess after basic labs. ED Course:   Initial assessment performed. The patients presenting problems have been discussed, and they are in agreement with the care plan formulated and outlined with them. I have encouraged them to ask questions as they arise throughout their visit. ED Course as of 08/08/22 2237   Mon Aug 08, 2022   1607 Preliminary EKG interpreted by me. Shows normal sinus rhythm with a HR of 60. No ST elevations or depressions concerning for ischemia. Normal intervals. [MB]   3329 Labs reviewed and unremarkable, mild anemia, CMP and lipase unremarkable. Discussed outstanding COVID test.  On reassessment, has a bag of food with her stating \"Can I get something to eat. \"  She appears in no distress. Discussed anticipatory guidance, supportive care and return precautions.  [MB]      ED Course User Index  [MB] MD Haroon Reyez MD      Disposition:    Discharged    DISCHARGE PLAN:  1. Discharge Medication List as of 8/8/2022  5:31 PM        START taking these medications    Details   ondansetron hcl (Zofran) 4 mg tablet Take 1 Tablet by mouth every eight (8) hours as needed for Nausea or Vomiting., Normal, Disp-20 Tablet, R-0           CONTINUE these medications which have NOT CHANGED    Details   levETIRAcetam 1,000 mg tablet Take 1,000 mg by mouth two (2) times a day., Historical Med      traZODone (DESYREL) 50 mg tablet take 1 tablet by mouth AT NIGHT IF NEEDED FOR SLEEP, Historical Med      atorvastatin (LIPITOR) 40 mg tablet 1 tab(s), Historical Med      OTHER Reports taking medicaion for seizures. , Historical Med      albuterol (PROVENTIL HFA, VENTOLIN HFA, PROAIR HFA) 90 mcg/actuation inhaler inhale 2 puffs by mouth and INTO THE LUNGS every 6 hours if needed for cough or shortness of breath, Historical Med      traMADoL (ULTRAM) 50 mg tablet take 1 tablet by mouth every 4 hours if needed for SEVERE pain for up to 7 days, Historical Med      hydrocortisone (ANUSOL-HC) 2.5 % rectal cream Insert  into rectum four (4) times daily. , Print, Disp-30 g, R-0      phenylephrine 0.25 % suppository Insert 1 suppository into rectum two (2) times a day., Print, Disp-12 suppository, R-0           2. Follow-up Information       Follow up With Specialties Details Why [address] [facility] EMERGENCY DEPT Emergency Medicine  If symptoms worsen [address]  In 1 week or your primary care doctor [address] [facility] [address]          3. Return to ED if worse     Diagnosis     Clinical Impression:   1. Person under investigation for COVID-19    2. Nausea and vomiting, unspecified vomiting type    3. Diarrhea of presumed infectious origin    4.  Dehydration        Attestations:    Winifred Warner MD        Please note that this dictation was completed with Dragon, the computer voice recognition software. Quite often unanticipated grammatical, syntax, homophones, and other interpretive errors are inadvertently transcribed by the computer software. Please disregard these errors. Please excuse any errors that have escaped final proofreading. Thank you.

## 2022-08-08 NOTE — LETTER
8/11/2022      [address]       Dear MsAlejandro Dewayne        You were seen in the Emergency Department of [address] [place] [address] on 8/8/2022 and had lab and/or radiology tests performed. The COVID test from your Emergency Department visit was positive. If you have not improved or are worsening, please follow up with your primary care doctor or Emergency department as soon as possible. If you have any questions please contact the Emergency Department at [phone].       Sincerely,        Long Quesada NP    [facility] [place] EMERGENCY DEPT  [address] [facility] [address] [phone]

## 2022-08-08 NOTE — ED NOTES
Pt arrives to the ED AAOX4 with a c/c of HA, vomiting and diarrhea onset yesterday. Pt denies any abd pain. Pt is noted in stable condition, now in ED room with side rail up, bed to lowest position, and call light within reach. Will continue to monitor. Emergency Department Nursing Plan of Care       The Nursing Plan of Care is developed from the Nursing assessment and Emergency Department Attending provider initial evaluation. The plan of care may be reviewed in the ED Provider note.     The Plan of Care was developed with the following considerations:   Patient / Family readiness to learn indicated by:verbalized understanding  Persons(s) to be included in education: patient  Barriers to Learning/Limitations:No    Signed     Leeann Calixto    8/8/2022   3:54 PM

## 2022-08-08 NOTE — DISCHARGE INSTRUCTIONS
You were seen in the ER for your symptoms. Please follow-up with your primary care doctor. Please use the Zofran for nausea and drink plenty fluids to stay hydrated. Please return for new or worsening symptoms anytime.

## 2022-08-08 NOTE — ED NOTES
Patient  given copy of dc instructions and 0 paper script(s) and 1 electronic scripts. Patient ( verbalized understanding of instructions and script (s). Patient given a current medication reconciliation form and verbalized understanding of their medications. Patient  verbalized understanding of the importance of discussing medications with  his or her physician or clinic they will be following up with. Patient alert and oriented and in no acute distress. Patient offered wheelchair from treatment area to hospital entrance, patient declined wheelchair. Home

## 2022-08-09 LAB
SARS-COV-2, XPLCVT: DETECTED
SOURCE, COVRS: ABNORMAL

## 2022-08-10 LAB
ATRIAL RATE: 61 BPM
CALCULATED P AXIS, ECG09: 69 DEGREES
CALCULATED R AXIS, ECG10: 62 DEGREES
CALCULATED T AXIS, ECG11: 57 DEGREES
DIAGNOSIS, 93000: NORMAL
P-R INTERVAL, ECG05: 138 MS
Q-T INTERVAL, ECG07: 464 MS
QRS DURATION, ECG06: 76 MS
QTC CALCULATION (BEZET), ECG08: 467 MS
VENTRICULAR RATE, ECG03: 61 BPM

## 2022-10-03 ENCOUNTER — HOSPITAL ENCOUNTER (EMERGENCY)
Age: 57
Discharge: HOME | End: 2022-10-03
Attending: EMERGENCY MEDICINE
Payer: MEDICARE

## 2022-10-03 VITALS
TEMPERATURE: 97.5 F | OXYGEN SATURATION: 96 % | SYSTOLIC BLOOD PRESSURE: 146 MMHG | HEART RATE: 65 BPM | RESPIRATION RATE: 16 BRPM | DIASTOLIC BLOOD PRESSURE: 76 MMHG

## 2022-10-03 DIAGNOSIS — B37.2 CANDIDAL DERMATITIS: Primary | ICD-10-CM

## 2022-10-03 PROCEDURE — 74011250637 HC RX REV CODE- 250/637: Performed by: EMERGENCY MEDICINE

## 2022-10-03 PROCEDURE — 99283 EMERGENCY DEPT VISIT LOW MDM: CPT

## 2022-10-03 RX ORDER — NYSTATIN AND TRIAMCINOLONE ACETONIDE 100000; 1 [USP'U]/G; MG/G
OINTMENT TOPICAL 2 TIMES DAILY
Qty: 30 G | Refills: 0 | Status: SHIPPED | OUTPATIENT
Start: 2022-10-03

## 2022-10-03 RX ORDER — NYSTATIN 100000 U/G
CREAM TOPICAL
Status: COMPLETED | OUTPATIENT
Start: 2022-10-03 | End: 2022-10-03

## 2022-10-03 RX ORDER — TRIAMCINOLONE ACETONIDE 1 MG/G
CREAM TOPICAL
Status: COMPLETED | OUTPATIENT
Start: 2022-10-03 | End: 2022-10-03

## 2022-10-03 RX ORDER — NYSTATIN AND TRIAMCINOLONE ACETONIDE 100000; 1 [USP'U]/G; MG/G
CREAM TOPICAL
Status: DISCONTINUED | OUTPATIENT
Start: 2022-10-03 | End: 2022-10-03

## 2022-10-03 RX ADMIN — NYSTATIN: 100000 CREAM TOPICAL at 17:38

## 2022-10-03 RX ADMIN — TRIAMCINOLONE ACETONIDE: 1 CREAM TOPICAL at 17:38

## 2022-10-03 NOTE — ED TRIAGE NOTES
Pt to ER with c/o rectum pain x1 week that worsened yesterday. Pt reports a hx of ulcers and hemorrhoids.  EMS administered 100mcg fentanyl IM prior to arrival.

## 2022-10-03 NOTE — ED PROVIDER NOTES
59-year-old female presents from home with complaint of anal pain. Symptoms of been going on for about the past week or so. Pain worsened with bowel movements. She has noticed some blood in her stool. She started having liquidy diarrhea couple days ago. No fever. No abdominal pain, nausea, vomiting. She does not use any medication for the symptoms. She is been told in the past that she has hemorrhoids and need to be removed but she has not followed up with colorectal surgery. No other complaints this time       Past Medical History:   Diagnosis Date    Seizures ([facility] [address]     Stroke ([facility]        No past surgical history on file. No family history on file. Social History     Socioeconomic History    Marital status: SINGLE     Spouse name: Not on file    Number of children: Not on file    Years of education: Not on file    Highest education level: Not on file   Occupational History    Not on file   Tobacco Use    Smoking status: Every Day     Packs/day: 0.50     Types: Cigarettes    Smokeless tobacco: Never   Substance and Sexual Activity    Alcohol use: No    Drug use: No    Sexual activity: Not on file   Other Topics Concern    Not on file   Social History Narrative    Not on file     Social Determinants of Health     Financial Resource Strain: Not on file   Food Insecurity: Not on file   Transportation Needs: Not on file   Physical Activity: Not on file   Stress: Not on file   Social Connections: Not on file   Intimate Partner Violence: Not on file   Housing Stability: Not on file         ALLERGIES: Bee venom protein (honey bee) and Peanut butter flavor [flavoring agent]    Review of Systems   Constitutional:  Negative for fever. HENT:  Negative for facial swelling. Eyes:  Negative for visual disturbance. Respiratory:  Negative for chest tightness. Cardiovascular:  Negative for chest pain. Gastrointestinal:  Positive for diarrhea. Negative for abdominal pain.    Genitourinary:  Negative for difficulty urinating and dysuria. Musculoskeletal:  Negative for arthralgias. Skin:  Negative for rash. Neurological:  Negative for headaches. Hematological:  Negative for adenopathy. Psychiatric/Behavioral:  Negative for suicidal ideas. Vitals:    10/03/22 1647   BP: (!) 146/76   Pulse: 65   Resp: 16   Temp: 97.5 °F (36.4 °C)   SpO2: 96%            Physical Exam  Vitals and nursing note reviewed. Constitutional:       General: She is not in acute distress. Appearance: She is well-developed. HENT:      Head: Normocephalic and atraumatic. Eyes:      General: No scleral icterus. Conjunctiva/sclera: Conjunctivae normal.      Pupils: Pupils are equal, round, and reactive to light. Cardiovascular:      Rate and Rhythm: Normal rate. Heart sounds: No murmur heard. Pulmonary:      Effort: Pulmonary effort is normal. No respiratory distress. Abdominal:      General: There is no distension. Genitourinary:     Comments: No evidence of any bleeding or thrombosed hemorrhoids. Skin around the rectum and perianal area appears excoriated with white adherent plaques. No bleeding at this time. Musculoskeletal:         General: Normal range of motion. Cervical back: Normal range of motion and neck supple. Skin:     General: Skin is warm and dry. Findings: No rash. Neurological:      Mental Status: She is alert and oriented to person, place, and time. MDM  Number of Diagnoses or Management Options  Diagnosis management comments: Assessment: I suspect the patient most likely has a yeast infection of her perineum. This would explain some of the burning excoriated pain around her anus and vulva. Do not see any evidence of any active bleeding at this time. She can be treated with topical nystatin cream.  I have also given her contact information to follow-up colorectal surgery in case needs further evaluation for hemorrhoids.            Procedures

## 2022-12-05 ENCOUNTER — HOSPITAL ENCOUNTER (EMERGENCY)
Age: 57
Discharge: HOME | End: 2022-12-05
Attending: EMERGENCY MEDICINE
Payer: MEDICARE

## 2022-12-05 VITALS
OXYGEN SATURATION: 100 % | HEIGHT: 67 IN | HEART RATE: 54 BPM | WEIGHT: 185 LBS | SYSTOLIC BLOOD PRESSURE: 153 MMHG | DIASTOLIC BLOOD PRESSURE: 86 MMHG | BODY MASS INDEX: 29.03 KG/M2 | TEMPERATURE: 97.6 F | RESPIRATION RATE: 18 BRPM

## 2022-12-05 DIAGNOSIS — R30.0 DYSURIA: ICD-10-CM

## 2022-12-05 DIAGNOSIS — R52 INTRACTABLE PAIN: ICD-10-CM

## 2022-12-05 DIAGNOSIS — K64.9 HEMORRHOIDS, UNSPECIFIED HEMORRHOID TYPE: Primary | ICD-10-CM

## 2022-12-05 DIAGNOSIS — R11.0 NAUSEA WITHOUT VOMITING: ICD-10-CM

## 2022-12-05 DIAGNOSIS — B37.2 CANDIDIASIS, INTERTRIGO: ICD-10-CM

## 2022-12-05 LAB
APPEARANCE UR: CLEAR
BACTERIA URNS QL MICRO: NEGATIVE /HPF
BILIRUB UR QL: NEGATIVE
COLOR UR: ABNORMAL
EPITH CASTS URNS QL MICRO: ABNORMAL /LPF
GLUCOSE UR STRIP.AUTO-MCNC: NEGATIVE MG/DL
HGB UR QL STRIP: NEGATIVE
KETONES UR QL STRIP.AUTO: NEGATIVE MG/DL
LEUKOCYTE ESTERASE UR QL STRIP.AUTO: ABNORMAL
NITRITE UR QL STRIP.AUTO: NEGATIVE
PH UR STRIP: 6 [PH] (ref 5–8)
PROT UR STRIP-MCNC: NEGATIVE MG/DL
RBC #/AREA URNS HPF: ABNORMAL /HPF (ref 0–5)
SP GR UR REFRACTOMETRY: 1.01
UA: UC IF INDICATED,UAUC: ABNORMAL
UROBILINOGEN UR QL STRIP.AUTO: 0.2 EU/DL (ref 0.2–1)
WBC URNS QL MICRO: ABNORMAL /HPF (ref 0–4)

## 2022-12-05 PROCEDURE — 74011250636 HC RX REV CODE- 250/636: Performed by: PHYSICIAN ASSISTANT

## 2022-12-05 PROCEDURE — 74011250637 HC RX REV CODE- 250/637: Performed by: PHYSICIAN ASSISTANT

## 2022-12-05 PROCEDURE — 99283 EMERGENCY DEPT VISIT LOW MDM: CPT

## 2022-12-05 PROCEDURE — 81001 URINALYSIS AUTO W/SCOPE: CPT

## 2022-12-05 RX ORDER — ONDANSETRON 4 MG/1
4 TABLET, ORALLY DISINTEGRATING ORAL
Status: COMPLETED | OUTPATIENT
Start: 2022-12-05 | End: 2022-12-05

## 2022-12-05 RX ORDER — ONDANSETRON 4 MG/1
4 TABLET, FILM COATED ORAL
Qty: 20 TABLET | Refills: 0 | Status: SHIPPED | OUTPATIENT
Start: 2022-12-05

## 2022-12-05 RX ORDER — NYSTATIN AND TRIAMCINOLONE ACETONIDE 100000; 1 [USP'U]/G; MG/G
OINTMENT TOPICAL 2 TIMES DAILY
Qty: 30 G | Refills: 0 | Status: SHIPPED | OUTPATIENT
Start: 2022-12-05

## 2022-12-05 RX ORDER — TRAMADOL HYDROCHLORIDE 50 MG/1
50 TABLET ORAL
Qty: 3 TABLET | Refills: 0 | Status: SHIPPED | OUTPATIENT
Start: 2022-12-05 | End: 2022-12-08

## 2022-12-05 RX ORDER — HYDROCORTISONE 25 MG/G
CREAM TOPICAL 4 TIMES DAILY
Qty: 30 G | Refills: 0 | Status: SHIPPED | OUTPATIENT
Start: 2022-12-05

## 2022-12-05 RX ORDER — OXYCODONE AND ACETAMINOPHEN 5; 325 MG/1; MG/1
1 TABLET ORAL
Status: COMPLETED | OUTPATIENT
Start: 2022-12-05 | End: 2022-12-05

## 2022-12-05 RX ORDER — ACETAMINOPHEN 325 MG/1
650 TABLET ORAL
Qty: 20 TABLET | Refills: 0 | Status: SHIPPED | OUTPATIENT
Start: 2022-12-05

## 2022-12-05 RX ADMIN — OXYCODONE HYDROCHLORIDE AND ACETAMINOPHEN 1 TABLET: 5; 325 TABLET ORAL at 17:08

## 2022-12-05 RX ADMIN — ONDANSETRON 4 MG: 4 TABLET, ORALLY DISINTEGRATING ORAL at 17:08

## 2022-12-05 NOTE — Clinical Note
[facility] EMERGENCY DEPT  [address] [facility] [address] [phone]    Work/School Note    Date: 12/5/2022    To Whom It May concern:    Miguelangel Abbott was seen and treated today in the emergency room by the following provider(s):  Attending Provider: Sanjeev Swartz MD  Physician Assistant: CAMI Hawkins. Miguelangel Abbott is excused from work/school on 12/05/22 and 12/06/22. She is medically clear to return to work/school on 12/7/2022.        Sincerely,          CAMI Vallecillo

## 2022-12-05 NOTE — ED NOTES
Patient (s)  given copy of dc instructions and 5 script(s). Patient (s)  verbalized understanding of instructions and script (s). Patient given a current medication reconciliation form and verbalized understanding of their medications. Patient (s) verbalized understanding of the importance of discussing medications with  his or her physician or clinic they will be following up with. Patient alert and oriented and in no acute distress. Patient discharged home ambulatory with self.      Transportation being set up for ride home

## 2022-12-05 NOTE — ED PROVIDER NOTES
[address] [place] [address] HISTORY AND PHYSICAL EXAM      Pt Name: Elinor Brown  MRN: [id number]  [place] 1965  Date of evaluation: 12/5/2022  Provider: Letty Nissen, PA   Attending: Samir Sanchez MD   PCP: Unknown, Provider, MD  Note Started: 5:10 PM     History of Presenting Illness     Chief Complaint   Patient presents with    Hemorrhoids       History Provided By: Patient    HPI: Elinor Brown, 62 y.o. female with past medical history as documented below, presents to the emergency department multiple complaints. Her first complaint is that her \"hemorrhoids are acting up \". For the past week she has felt swelling and pain that she states is consistent with her previous hemorrhoids. She has had 2 episodes of hematochezia. She has noticed an episode of black tarry stools. She has felt nauseous and says food \"passes right through\" but denies any persistent diarrhea, constipation or pain. She has also had itchy, burning rash groin extending to her butt cheeks. She reports history of rash in the previous area. She is also complaining of burning with urination. She denies any other symptoms to include fevers, headaches, dizziness, chest pain, shortness of breath, hematuria, abnormal vaginal discharge or bleeding. There are no other complaints, changes, or physical findings at this time. PCP: Unknown, Provider, MD    No current facility-administered medications on file prior to encounter. Current Outpatient Medications on File Prior to Encounter   Medication Sig Dispense Refill    nystatin-triamcinolone (MYCOLOG) 100,000-0.1 unit/gram-% ointment Apply  to affected area two (2) times a day. (Patient not taking: Reported on 12/5/2022) 30 g 0    ondansetron hcl (Zofran) 4 mg tablet Take 1 Tablet by mouth every eight (8) hours as needed for Nausea or Vomiting. 20 Tablet 0    levETIRAcetam 1,000 mg tablet Take 1,000 mg by mouth two (2) times a day.       albuterol (PROVENTIL HFA, VENTOLIN HFA, PROAIR HFA) 90 mcg/actuation inhaler inhale 2 puffs by mouth and INTO THE LUNGS every 6 hours if needed for cough or shortness of breath      traZODone (DESYREL) 50 mg tablet take 1 tablet by mouth AT NIGHT IF NEEDED FOR SLEEP      traMADoL (ULTRAM) 50 mg tablet take 1 tablet by mouth every 4 hours if needed for SEVERE pain for up to 7 days (Patient not taking: No sig reported)      atorvastatin (LIPITOR) 40 mg tablet 1 tab(s)      hydrocortisone (ANUSOL-HC) 2.5 % rectal cream Insert  into rectum four (4) times daily. (Patient not taking: Reported on 12/5/2022) 30 g 0    OTHER Reports taking medicaion for seizures. phenylephrine 0.25 % suppository Insert 1 suppository into rectum two (2) times a day. (Patient not taking: Reported on 12/5/2022) 12 suppository 0       Past History     Past Medical History:  Past Medical History:   Diagnosis Date    Ill-defined condition     hemorrhoids    Seizures ([facility] [address]     Stroke [facility])        Past Surgical History:  History reviewed. No pertinent surgical history. Family History:  History reviewed. No pertinent family history. Social History:  Social History     Tobacco Use    Smoking status: Every Day     Packs/day: 0.50     Types: Cigarettes    Smokeless tobacco: Never   Substance Use Topics    Alcohol use: Yes    Drug use: No       Allergies: Allergies   Allergen Reactions    Bee Venom Protein (Honey Bee) Swelling    Peanut Butter Flavor [Flavoring Agent] Swelling         Review of Systems   Review of Systems   Constitutional:  Negative for chills and fever. HENT:  Negative for congestion and sore throat. Respiratory:  Negative for cough and shortness of breath. Cardiovascular:  Negative for chest pain. Gastrointestinal:  Positive for blood in stool, nausea and rectal pain. Negative for abdominal pain, diarrhea and vomiting. Genitourinary:  Positive for dysuria. Negative for hematuria. Musculoskeletal:  Negative for myalgias. Skin:  Positive for rash. Neurological:  Negative for headaches. All other systems reviewed and are negative. Physical Exam   Patient Vitals for the past 4 hrs:   Temp Pulse Resp BP SpO2   12/05/22 1459 97.6 °F (36.4 °C) (!) 54 18 (!) 153/86 100 %        Physical Exam  Vitals and nursing note reviewed. Exam conducted with a chaperone present. Constitutional:       General: She is not in acute distress. Appearance: She is not toxic-appearing. Comments: Patient well-appearing, nontoxic. No acute distress   HENT:      Head: Atraumatic. Right Ear: External ear normal.      Left Ear: External ear normal.      Nose: Nose normal.      Mouth/Throat:      Mouth: Mucous membranes are moist.   Eyes:      Extraocular Movements: Extraocular movements intact. Conjunctiva/sclera: Conjunctivae normal.   Cardiovascular:      Rate and Rhythm: Normal rate and regular rhythm. Pulses: Normal pulses. Heart sounds: Normal heart sounds. No murmur heard. No friction rub. No gallop. Pulmonary:      Effort: Pulmonary effort is normal. No respiratory distress. Breath sounds: Normal breath sounds. No stridor. No wheezing, rhonchi or rales. Abdominal:      General: Abdomen is flat. There is no distension. Palpations: Abdomen is soft. Tenderness: There is no abdominal tenderness. There is no guarding or rebound. Comments: Abdomen soft, nontender. No guarding, rigidity or rebound tenderness   Genitourinary:     Comments: Nonthrombosed, external hemorrhoids the rectum. No fluctuance, fissure or abscess. Musculoskeletal:         General: No swelling. Cervical back: Neck supple. Skin:     General: Skin is warm. Findings: Rash present. Comments: Erythematous patch of intertriginous areas bilaterally with mild overlying white skin changes consistent with likely irritant intertriginous candidiasis   Neurological:      Mental Status: She is alert and oriented to person, place, and time. Psychiatric:         Mood and Affect: Mood normal.         Behavior: Behavior normal.         Thought Content: Thought content normal.         Judgment: Judgment normal.       Diagnostic Study Results     Labs -   No results found for this or any previous visit (from the past 12 hour(s)). Radiologic Studies -   No results found. Medical Decision Making   I am the first provider for this patient. I reviewed the vital signs, available nursing notes, past medical history, past surgical history, family history and social history. Vital Signs-Reviewed the patient's vital signs. Patient Vitals for the past 12 hrs:   Temp Pulse Resp BP SpO2   12/05/22 1459 97.6 °F (36.4 °C) (!) 54 18 (!) 153/86 100 %       Records Reviewed: Nursing Notes and Old Medical Records    Provider Notes (Medical Decision Making):   Patient evaluated for acute on chronic hemorrhoids. Though she did report 2 episodes of hematochezia and possible melena earlier this week, she has no signs concerning of hemodynamic instability or an acute emergent bleed. She is afebrile, well-appearing with a benign abdomen. She is tolerating p.o. Her exam shows moderate external hemorrhoids that were nonthrombosed. Skin changes are consistent with an irritant dermatitis/intertriginous candidiasis. Patient has been seen multiple times in the ED for previous similar complaints, though has not followed up. Patient would benefit greatly for follow-up with a gastroenterologist/colorectal specialist.  This was relayed to the patient and the importance of follow-up for definitive care. She was given return precautions for worsening bleeding, severe abdominal pain, persistent vomiting, or any other new concerning symptoms. Patient expressed understanding of the discharge instructions and treatment plan. ED Course:   Initial assessment performed.  The patients presenting problems have been discussed, and they are in agreement with the care plan formulated and outlined with them. I have encouraged them to ask questions as they arise throughout their visit. Critical Care Time: None    Disposition:  discharged    PLAN:  1. Current Discharge Medication List        2. Follow-up Information    None       Return to ED if worse     Diagnosis     Clinical Impression: No diagnosis found. CAMI Molina (Electronic Signature)          Please note that this dictation was completed with [employer], the computer voice recognition software. Quite often unanticipated grammatical, syntax, homophones, and other interpretive errors are inadvertently transcribed by the computer software. Please disregards these errors. Please excuse any errors that have escaped final proofreading.

## 2023-05-08 ENCOUNTER — HOSPITAL ENCOUNTER (EMERGENCY)
Facility: HOSPITAL | Age: 58
Discharge: HOME | End: 2023-05-08
Attending: EMERGENCY MEDICINE
Payer: MEDICARE

## 2023-05-08 VITALS
RESPIRATION RATE: 16 BRPM | BODY MASS INDEX: 28.56 KG/M2 | TEMPERATURE: 98 F | OXYGEN SATURATION: 99 % | HEIGHT: 67 IN | WEIGHT: 182 LBS | DIASTOLIC BLOOD PRESSURE: 88 MMHG | SYSTOLIC BLOOD PRESSURE: 144 MMHG | HEART RATE: 88 BPM

## 2023-05-08 DIAGNOSIS — R53.1 GENERALIZED WEAKNESS: Primary | ICD-10-CM

## 2023-05-08 DIAGNOSIS — N39.0 UTI (URINARY TRACT INFECTION), UNCOMPLICATED: ICD-10-CM

## 2023-05-08 LAB
ALBUMIN SERPL-MCNC: 4 G/DL (ref 3.5–5)
ALBUMIN/GLOB SERPL: 1 (ref 1.1–2.2)
ALP SERPL-CCNC: 74 U/L (ref 45–117)
ALT SERPL-CCNC: 27 U/L (ref 12–78)
AMPHET UR QL SCN: NEGATIVE
ANION GAP SERPL CALC-SCNC: 8 MMOL/L (ref 5–15)
APPEARANCE UR: ABNORMAL
AST SERPL W P-5'-P-CCNC: 29 U/L (ref 15–37)
BACTERIA URNS QL MICRO: NEGATIVE /HPF
BARBITURATES UR QL SCN: NEGATIVE
BASOPHILS # BLD: 0.1 K/UL (ref 0–0.1)
BASOPHILS NFR BLD: 1 % (ref 0–1)
BENZODIAZ UR QL: NEGATIVE
BILIRUB SERPL-MCNC: 0.4 MG/DL (ref 0.2–1)
BILIRUB UR QL: NEGATIVE
BUN SERPL-MCNC: 16 MG/DL (ref 6–20)
BUN/CREAT SERPL: 18 (ref 12–20)
CA-I BLD-MCNC: 8.6 MG/DL (ref 8.5–10.1)
CANNABINOIDS UR QL SCN: NEGATIVE
CHLORIDE SERPL-SCNC: 104 MMOL/L (ref 97–108)
CO2 SERPL-SCNC: 22 MMOL/L (ref 21–32)
COCAINE UR QL SCN: POSITIVE
COLOR UR: ABNORMAL
CREAT SERPL-MCNC: 0.9 MG/DL (ref 0.55–1.02)
DIFFERENTIAL METHOD BLD: ABNORMAL
EKG ATRIAL RATE: 57 BPM
EKG DIAGNOSIS: NORMAL
EKG P AXIS: 65 DEGREES
EKG P-R INTERVAL: 140 MS
EKG Q-T INTERVAL: 510 MS
EKG QRS DURATION: 78 MS
EKG QTC CALCULATION (BAZETT): 496 MS
EKG R AXIS: 48 DEGREES
EKG T AXIS: 50 DEGREES
EKG VENTRICULAR RATE: 57 BPM
EOSINOPHIL # BLD: 0.4 K/UL (ref 0–0.4)
EOSINOPHIL NFR BLD: 7 % (ref 0–7)
EPITH CASTS URNS QL MICRO: NORMAL /LPF
ERYTHROCYTE [DISTWIDTH] IN BLOOD BY AUTOMATED COUNT: 14.8 % (ref 11.5–14.5)
ETHANOL SERPL-MCNC: 15 MG/DL (ref 0–0.08)
GLOBULIN SER CALC-MCNC: 3.9 G/DL (ref 2–4)
GLUCOSE SERPL-MCNC: 71 MG/DL (ref 65–100)
GLUCOSE UR STRIP.AUTO-MCNC: NEGATIVE MG/DL
HCT VFR BLD AUTO: 37.6 % (ref 35–47)
HGB BLD-MCNC: 12.3 G/DL (ref 11.5–16)
HGB UR QL STRIP: NEGATIVE
IMM GRANULOCYTES # BLD AUTO: 0 K/UL (ref 0–0.04)
IMM GRANULOCYTES NFR BLD AUTO: 0 % (ref 0–0.5)
KETONES UR QL STRIP.AUTO: NEGATIVE MG/DL
LEUKOCYTE ESTERASE UR QL STRIP.AUTO: ABNORMAL
LYMPHOCYTES # BLD: 2.3 K/UL (ref 0.8–3.5)
LYMPHOCYTES NFR BLD: 44 % (ref 12–49)
Lab: ABNORMAL
MCH RBC QN AUTO: 29.3 PG (ref 26–34)
MCHC RBC AUTO-ENTMCNC: 32.7 G/DL (ref 30–36.5)
MCV RBC AUTO: 89.5 FL (ref 80–99)
METHADONE UR QL: NEGATIVE
MONOCYTES # BLD: 0.4 K/UL (ref 0–1)
MONOCYTES NFR BLD: 7 % (ref 5–13)
NEUTS SEG # BLD: 2.1 K/UL (ref 1.8–8)
NEUTS SEG NFR BLD: 41 % (ref 32–75)
NITRITE UR QL STRIP.AUTO: NEGATIVE
NRBC # BLD: 0 K/UL (ref 0–0.01)
NRBC BLD-RTO: 0 PER 100 WBC
OPIATES UR QL: NEGATIVE
PCP UR QL: NEGATIVE
PH UR STRIP: 8 (ref 5–8)
PLATELET # BLD AUTO: 326 K/UL (ref 150–400)
PMV BLD AUTO: 9.5 FL (ref 8.9–12.9)
POTASSIUM SERPL-SCNC: 4 MMOL/L (ref 3.5–5.1)
PROT SERPL-MCNC: 7.9 G/DL (ref 6.4–8.2)
PROT UR STRIP-MCNC: 30 MG/DL
RBC # BLD AUTO: 4.2 M/UL (ref 3.8–5.2)
RBC #/AREA URNS HPF: NORMAL /HPF (ref 0–5)
SODIUM SERPL-SCNC: 134 MMOL/L (ref 136–145)
SP GR UR REFRACTOMETRY: 1.02 (ref 1–1.03)
TROPONIN I SERPL HS-MCNC: 5 NG/L (ref 0–51)
UROBILINOGEN UR QL STRIP.AUTO: 0.1 EU/DL (ref 0.1–1)
WBC # BLD AUTO: 5.2 K/UL (ref 3.6–11)
WBC URNS QL MICRO: NORMAL /HPF (ref 0–4)

## 2023-05-08 PROCEDURE — 96374 THER/PROPH/DIAG INJ IV PUSH: CPT

## 2023-05-08 PROCEDURE — 36415 COLL VENOUS BLD VENIPUNCTURE: CPT

## 2023-05-08 PROCEDURE — 93005 ELECTROCARDIOGRAM TRACING: CPT | Performed by: EMERGENCY MEDICINE

## 2023-05-08 PROCEDURE — 84484 ASSAY OF TROPONIN QUANT: CPT

## 2023-05-08 PROCEDURE — 80307 DRUG TEST PRSMV CHEM ANLYZR: CPT

## 2023-05-08 PROCEDURE — 6360000002 HC RX W HCPCS: Performed by: EMERGENCY MEDICINE

## 2023-05-08 PROCEDURE — 80053 COMPREHEN METABOLIC PANEL: CPT

## 2023-05-08 PROCEDURE — 85025 COMPLETE CBC W/AUTO DIFF WBC: CPT

## 2023-05-08 PROCEDURE — 81001 URINALYSIS AUTO W/SCOPE: CPT

## 2023-05-08 PROCEDURE — 82077 ASSAY SPEC XCP UR&BREATH IA: CPT

## 2023-05-08 PROCEDURE — 99284 EMERGENCY DEPT VISIT MOD MDM: CPT

## 2023-05-08 PROCEDURE — 2580000003 HC RX 258: Performed by: EMERGENCY MEDICINE

## 2023-05-08 RX ORDER — CEPHALEXIN 500 MG/1
500 CAPSULE ORAL 2 TIMES DAILY
Qty: 14 CAPSULE | Refills: 0 | Status: SHIPPED | OUTPATIENT
Start: 2023-05-08 | End: 2023-05-08 | Stop reason: SDUPTHER

## 2023-05-08 RX ORDER — ONDANSETRON 4 MG/1
4 TABLET, ORALLY DISINTEGRATING ORAL EVERY 8 HOURS PRN
Qty: 20 TABLET | Refills: 0 | Status: SHIPPED | OUTPATIENT
Start: 2023-05-08 | End: 2023-05-08 | Stop reason: SDUPTHER

## 2023-05-08 RX ORDER — CEPHALEXIN 500 MG/1
500 CAPSULE ORAL 2 TIMES DAILY
Qty: 14 CAPSULE | Refills: 0 | Status: SHIPPED | OUTPATIENT
Start: 2023-05-08 | End: 2023-05-15

## 2023-05-08 RX ORDER — ONDANSETRON 4 MG/1
4 TABLET, ORALLY DISINTEGRATING ORAL EVERY 8 HOURS PRN
Qty: 20 TABLET | Refills: 0 | Status: SHIPPED | OUTPATIENT
Start: 2023-05-08

## 2023-05-08 RX ADMIN — CEFTRIAXONE SODIUM 1000 MG: 1 INJECTION, POWDER, FOR SOLUTION INTRAMUSCULAR; INTRAVENOUS at 13:53

## 2023-05-08 NOTE — ED NOTES
[facility] Medicare called. Reference number is [id number]. Insurance made aware pt does not have phone and to call nurses station when patient is ready to be picked up.       Elaine Winslow  05/08/23 7016

## 2023-05-08 NOTE — DISCHARGE INSTRUCTIONS
Thank you! Thank you for allowing me to care for you in the emergency department. It is my goal to provide you with excellent care. If you have not received excellent quality care, please ask to speak to the nurse manager. Please fill out the survey that will come to you by mail or email since we listen to your feedback! Below you will find a list of your tests from today's visit. Should you have any questions, please do not hesitate to call the emergency department.     Labs  Recent Results (from the past 12 hour(s))   CBC with Auto Differential    Collection Time: 05/08/23 12:14 PM   Result Value Ref Range    WBC 5.2 3.6 - 11.0 K/uL    RBC 4.20 3.80 - 5.20 M/uL    Hemoglobin 12.3 11.5 - 16.0 g/dL    Hematocrit 37.6 35.0 - 47.0 %    MCV 89.5 80.0 - 99.0 FL    MCH 29.3 26.0 - 34.0 PG    MCHC 32.7 30.0 - 36.5 g/dL    RDW 14.8 (H) 11.5 - 14.5 %    Platelets 753 691 - 835 K/uL    MPV 9.5 8.9 - 12.9 FL    Nucleated RBCs 0.0 0.0  WBC    nRBC 0.00 0.00 - 0.01 K/uL    Seg Neutrophils 41 32 - 75 %    Lymphocytes 44 12 - 49 %    Monocytes 7 5 - 13 %    Eosinophils % 7 0 - 7 %    Basophils 1 0 - 1 %    Immature Granulocytes 0 0 - 0.5 %    Segs Absolute 2.1 1.8 - 8.0 K/UL    Absolute Lymph # 2.3 0.8 - 3.5 K/UL    Absolute Mono # 0.4 0.0 - 1.0 K/UL    Absolute Eos # 0.4 0.0 - 0.4 K/UL    Basophils Absolute 0.1 0.0 - 0.1 K/UL    Absolute Immature Granulocyte 0.0 0.00 - 0.04 K/UL    Differential Type AUTOMATED     CMP    Collection Time: 05/08/23 12:14 PM   Result Value Ref Range    Sodium 134 (L) 136 - 145 mmol/L    Potassium 4.0 3.5 - 5.1 mmol/L    Chloride 104 97 - 108 mmol/L    CO2 22 21 - 32 mmol/L    Anion Gap 8 5 - 15 mmol/L    Glucose 71 65 - 100 mg/dL    BUN 16 6 - 20 mg/dL    Creatinine 0.90 0.55 - 1.02 mg/dL    Bun/Cre Ratio 18 12 - 20      Est, Glom Filt Rate >60 >60 ml/min/1.73m2    Calcium 8.6 8.5 - 10.1 mg/dL    Total Bilirubin 0.4 0.2 - 1.0 mg/dL    AST 29 15 - 37 U/L    ALT 27 12 - 78 U/L    Alk

## 2023-05-08 NOTE — ED PROVIDER NOTES
[facility] EMERGENCY DEPT  EMERGENCY DEPARTMENT HISTORY AND PHYSICAL EXAM      Date: 5/8/2023  Patient Name: Melita Mccormick  MRN: [id number]  [place]: 1965  Date of evaluation: 5/8/2023  Provider: Alison Vences MD   Note Started: 12:24 PM EDT 5/8/23    HISTORY OF PRESENT ILLNESS     Chief Complaint   Patient presents with    Fatigue    Extremity Weakness       History Provided By: Patient    HPI: Melita Mccormick is a 62 y.o. female presents to the emergency department with complaint of generalized weakness x2 days. Patient reports she was drinking heavily this weekend. Patient also reports noncompliance with her outpatient medication regimen. Patient denies chest pain, denies fevers or chills. PAST MEDICAL HISTORY   Past Medical History:  Past Medical History:   Diagnosis Date    Ill-defined condition     hemorrhoids    Seizures ([facility] [address]     Stroke [facility])        Past Surgical History:  No past surgical history on file. Family History:  No family history on file. Social History:  Social History     Tobacco Use    Smoking status: Every Day     Packs/day: 0.50     Types: Cigarettes    Smokeless tobacco: Never   Substance Use Topics    Alcohol use: Yes    Drug use: No       Allergies: Allergies   Allergen Reactions    Bee Venom Swelling    Flavoring Agent Swelling       PCP: No primary care provider on file. Current Meds:   No current facility-administered medications for this encounter.      Current Outpatient Medications   Medication Sig Dispense Refill    cephALEXin (KEFLEX) 500 MG capsule Take 1 capsule by mouth 2 times daily for 7 days 14 capsule 0    ondansetron (ZOFRAN-ODT) 4 MG disintegrating tablet Place 1 tablet under the tongue every 8 hours as needed for Nausea or Vomiting 20 tablet 0    acetaminophen (TYLENOL) 325 MG tablet Take 650 mg by mouth every 4 hours as needed      albuterol sulfate HFA (PROVENTIL;VENTOLIN;PROAIR) 108 (90 Base) MCG/ACT inhaler inhale 2 puffs by mouth and INTO THE

## 2023-05-08 NOTE — ED TRIAGE NOTES
From home with very vague co generalized weakness. Psych hx with noncompliance with meds. States that she has been drinking heavily this weekend. Walks with a cane secondary to effects of previous CVA.

## 2023-05-18 ENCOUNTER — APPOINTMENT (OUTPATIENT)
Age: 58
End: 2023-05-18
Payer: MEDICARE

## 2023-05-18 ENCOUNTER — HOSPITAL ENCOUNTER (EMERGENCY)
Age: 58
Discharge: HOME | End: 2023-05-18
Attending: EMERGENCY MEDICINE
Payer: MEDICARE

## 2023-05-18 DIAGNOSIS — F14.10 NONDEPENDENT COCAINE ABUSE (HCC): ICD-10-CM

## 2023-05-18 DIAGNOSIS — F10.930 ALCOHOL WITHDRAWAL SYNDROME WITHOUT COMPLICATION (HCC): Primary | ICD-10-CM

## 2023-05-18 DIAGNOSIS — G40.909 SEIZURE DISORDER (HCC): ICD-10-CM

## 2023-05-18 LAB
ALBUMIN SERPL-MCNC: 3.6 G/DL (ref 3.4–5)
ALBUMIN/GLOB SERPL: 0.9 (ref 0.8–1.7)
ALP SERPL-CCNC: 76 U/L (ref 45–117)
ALT SERPL-CCNC: 28 U/L (ref 13–56)
ANION GAP SERPL CALC-SCNC: 12 MMOL/L (ref 3–18)
AST SERPL W P-5'-P-CCNC: 29 U/L (ref 10–38)
BASOPHILS # BLD: 0 K/UL (ref 0–0.1)
BASOPHILS NFR BLD: 0 % (ref 0–2)
BILIRUB SERPL-MCNC: 0.2 MG/DL (ref 0.2–1)
BUN SERPL-MCNC: 11 MG/DL (ref 7–18)
BUN/CREAT SERPL: 15 (ref 12–20)
CA-I BLD-MCNC: 8.5 MG/DL (ref 8.5–10.1)
CHLORIDE SERPL-SCNC: 105 MMOL/L (ref 100–111)
CO2 SERPL-SCNC: 23 MMOL/L (ref 21–32)
CREAT SERPL-MCNC: 0.75 MG/DL (ref 0.6–1.3)
DIFFERENTIAL METHOD BLD: ABNORMAL
EOSINOPHIL # BLD: 0.4 K/UL (ref 0–0.4)
EOSINOPHIL NFR BLD: 8 % (ref 0–5)
ERYTHROCYTE [DISTWIDTH] IN BLOOD BY AUTOMATED COUNT: 14.1 % (ref 11.6–14.5)
ETHANOL SERPL-MCNC: <3 MG/DL (ref 0–3)
GLOBULIN SER CALC-MCNC: 3.9 G/DL (ref 2–4)
GLUCOSE BLD STRIP.AUTO-MCNC: 112 MG/DL (ref 70–110)
GLUCOSE SERPL-MCNC: 107 MG/DL (ref 74–99)
HCT VFR BLD AUTO: 36.9 % (ref 35–45)
HGB BLD-MCNC: 12.1 G/DL (ref 12–16)
IMM GRANULOCYTES # BLD AUTO: 0 K/UL
IMM GRANULOCYTES NFR BLD AUTO: 0 %
LYMPHOCYTES # BLD: 2.6 K/UL (ref 0.9–3.6)
LYMPHOCYTES NFR BLD: 59 % (ref 21–52)
MAGNESIUM SERPL-MCNC: 1.9 MG/DL (ref 1.6–2.6)
MCH RBC QN AUTO: 28.8 PG (ref 24–34)
MCHC RBC AUTO-ENTMCNC: 32.8 G/DL (ref 31–37)
MCV RBC AUTO: 87.9 FL (ref 78–100)
MONOCYTES # BLD: 0.4 K/UL (ref 0.05–1.2)
MONOCYTES NFR BLD: 9 % (ref 3–10)
NEUTS BAND NFR BLD MANUAL: 1 % (ref 0–5)
NEUTS SEG # BLD: 0.9 K/UL (ref 1.8–8)
NEUTS SEG NFR BLD: 20 % (ref 40–73)
NRBC # BLD: 0 K/UL (ref 0–0.01)
NRBC BLD-RTO: 0 PER 100 WBC
OTHER CELLS NFR BLD MANUAL: 3 %
PERFORMED BY:: ABNORMAL
PLATELET # BLD AUTO: 283 K/UL (ref 135–420)
PMV BLD AUTO: 9.8 FL (ref 9.2–11.8)
POTASSIUM SERPL-SCNC: 3.8 MMOL/L (ref 3.5–5.5)
PROT SERPL-MCNC: 7.5 G/DL (ref 6.4–8.2)
RBC # BLD AUTO: 4.2 M/UL (ref 4.2–5.3)
RBC MORPH BLD: ABNORMAL
SODIUM SERPL-SCNC: 140 MMOL/L (ref 136–145)
TROPONIN I SERPL HS-MCNC: 6 NG/L (ref 0–54)
WBC # BLD AUTO: 4.4 K/UL (ref 4.6–13.2)

## 2023-05-18 PROCEDURE — 70450 CT HEAD/BRAIN W/O DYE: CPT

## 2023-05-18 PROCEDURE — 82077 ASSAY SPEC XCP UR&BREATH IA: CPT

## 2023-05-18 PROCEDURE — 96365 THER/PROPH/DIAG IV INF INIT: CPT

## 2023-05-18 PROCEDURE — 82962 GLUCOSE BLOOD TEST: CPT

## 2023-05-18 PROCEDURE — 93005 ELECTROCARDIOGRAM TRACING: CPT | Performed by: EMERGENCY MEDICINE

## 2023-05-18 PROCEDURE — 99285 EMERGENCY DEPT VISIT HI MDM: CPT

## 2023-05-18 PROCEDURE — 96375 TX/PRO/DX INJ NEW DRUG ADDON: CPT

## 2023-05-18 PROCEDURE — 85025 COMPLETE CBC W/AUTO DIFF WBC: CPT

## 2023-05-18 PROCEDURE — 6360000002 HC RX W HCPCS: Performed by: EMERGENCY MEDICINE

## 2023-05-18 PROCEDURE — 2580000003 HC RX 258: Performed by: EMERGENCY MEDICINE

## 2023-05-18 PROCEDURE — 84484 ASSAY OF TROPONIN QUANT: CPT

## 2023-05-18 PROCEDURE — 83735 ASSAY OF MAGNESIUM: CPT

## 2023-05-18 PROCEDURE — 36415 COLL VENOUS BLD VENIPUNCTURE: CPT

## 2023-05-18 PROCEDURE — 80053 COMPREHEN METABOLIC PANEL: CPT

## 2023-05-18 PROCEDURE — 71045 X-RAY EXAM CHEST 1 VIEW: CPT

## 2023-05-18 RX ORDER — KETOROLAC TROMETHAMINE 30 MG/ML
15 INJECTION, SOLUTION INTRAMUSCULAR; INTRAVENOUS ONCE
Status: COMPLETED | OUTPATIENT
Start: 2023-05-18 | End: 2023-05-18

## 2023-05-18 RX ORDER — LORAZEPAM 2 MG/ML
1 INJECTION INTRAMUSCULAR ONCE
Status: COMPLETED | OUTPATIENT
Start: 2023-05-18 | End: 2023-05-18

## 2023-05-18 RX ORDER — CHLORDIAZEPOXIDE HYDROCHLORIDE 25 MG/1
25 CAPSULE, GELATIN COATED ORAL 3 TIMES DAILY PRN
Qty: 9 CAPSULE | Refills: 0 | Status: SHIPPED | OUTPATIENT
Start: 2023-05-18 | End: 2023-05-21

## 2023-05-18 RX ADMIN — LORAZEPAM 1 MG: 2 INJECTION INTRAMUSCULAR; INTRAVENOUS at 09:43

## 2023-05-18 RX ADMIN — KETOROLAC TROMETHAMINE 15 MG: 30 INJECTION, SOLUTION INTRAMUSCULAR at 12:59

## 2023-05-18 RX ADMIN — LEVETIRACETAM 1000 MG: 500 INJECTION INTRAVENOUS at 09:43

## 2023-05-18 ASSESSMENT — ENCOUNTER SYMPTOMS
SHORTNESS OF BREATH: 0
ABDOMINAL PAIN: 0
NAUSEA: 1

## 2023-05-18 ASSESSMENT — LIFESTYLE VARIABLES
HOW OFTEN DO YOU HAVE A DRINK CONTAINING ALCOHOL: 4 OR MORE TIMES A WEEK
HOW MANY STANDARD DRINKS CONTAINING ALCOHOL DO YOU HAVE ON A TYPICAL DAY: 7 TO 9

## 2023-05-18 NOTE — ED NOTES
Patient supine in bed. She is awake and alert but tearful. States that \" I dont know how I feel, just bad\". VSS.      Bobbi Baxter RN  05/18/23 0558

## 2023-05-18 NOTE — ED PROVIDER NOTES
prior to patient's discharge or admission. The physician has reviewed the patient's triage vital signs, monitored the electronic monitoring devices remotely for any significant vital sign abnormalities, and have reviewed vital signs prior to discharge. Some vital signs reviewed at bedside or remotely utilizing electronic monitoring devices may be different than the vital signs downloaded into the electronic medical record. Some vital signs may be erroneous and inaccurate since they are obtained by electronic monitoring devices, and not all vital signs are verified for accuracy by nursing staff prior to downloading into the patient's chart. There were no vitals filed for this visit. Records Reviewed: Nursing notes for today's visit have been reviewed. I have also reviewed most recent medical records pertinent to today's complaints, if available in our medical record system. I have also reviewed all labs and imaging results from previous results in comparison to results obtained today. If an EKG was obtained today, it has been compared to previous EKGs, if available. If arriving via EMS, the EMS report has been reviewed if made available to us within the patient's time in the emergency department. MDM  Number of Diagnoses or Management Options  Alcohol withdrawal syndrome without complication ([address]  Nondependent cocaine abuse ([facility] [address]  Seizure disorder ([facility] [address]  Diagnosis management comments: Patient presents with generalized feeling of unwellness, jitteriness, severe headache after several days of binge drinking and using illicit substances after Mother's Day. Patient did have a severe headache however head CT is unremarkable. No signs of head trauma and she has no intracerebral hemorrhage. Labs are unremarkable today with no evidence of infections. No electrolyte abnormalities. No LUÍS. No signs of liver disease.   I suspect the patient likely does have some degree of alcohol withdrawal syndrome

## 2023-05-18 NOTE — DISCHARGE INSTRUCTIONS
It was a pleasure taking care of you in our Emergency Department today. We know that when you come to [facility], you are entrusting us with your health, comfort, and safety. Our physicians and nurses honor that trust, and truly appreciate the opportunity to care for you and your loved ones. We also value your feedback. If you receive a survey about your Emergency Department experience today, please fill it out. We care about our patients' feedback, and we listen to what you have to say. Please read over your discharge instructions as these contain pertinent information to help you in the healing process. These instructions include a list of prescriptions you were given today. Follow-up information is also noted on your discharge papers. There are attached instructions and information pertaining to the reason why you were seen in the emergency department today. These discharge instructions may not be for exactly why you were here, but may be the closest available instructions that we have. These include important advice for things that you can do at home to feel better, and reasons to return to the emergency department. The evaluation and treatment you received in the emergency department is not always definitive care. If follow-up with your primary care doctor or specialist was recommended, it is important that you make these appointments for follow-up care. You may need further testing, procedures, and/or medications to help you feel better. Further tests may be required that are not available in the emergency department. Failure to make these follow-up appointments may jeopardize your health. The emergency department is here for emergent stabilization and evaluation of life and limb threatening illness and/or injuries. Further care through a specialist or primary care doctor may be required to assist in your healing and complete your treatment and/or evaluation.   We may not always be

## 2023-05-19 LAB
EKG ATRIAL RATE: 59 BPM
EKG DIAGNOSIS: NORMAL
EKG P AXIS: 67 DEGREES
EKG P-R INTERVAL: 150 MS
EKG Q-T INTERVAL: 518 MS
EKG QRS DURATION: 80 MS
EKG QTC CALCULATION (BAZETT): 512 MS
EKG R AXIS: 51 DEGREES
EKG T AXIS: 46 DEGREES
EKG VENTRICULAR RATE: 59 BPM

## 2023-05-28 ENCOUNTER — HOSPITAL ENCOUNTER (EMERGENCY)
Age: 58
Discharge: HOME | End: 2023-05-28
Attending: FAMILY MEDICINE
Payer: MEDICARE

## 2023-05-28 VITALS
DIASTOLIC BLOOD PRESSURE: 82 MMHG | HEART RATE: 60 BPM | HEIGHT: 66 IN | TEMPERATURE: 98.3 F | BODY MASS INDEX: 28.12 KG/M2 | RESPIRATION RATE: 15 BRPM | SYSTOLIC BLOOD PRESSURE: 174 MMHG | WEIGHT: 175 LBS | OXYGEN SATURATION: 100 %

## 2023-05-28 DIAGNOSIS — F32.9 MAJOR DEPRESSIVE DISORDER, REMISSION STATUS UNSPECIFIED, UNSPECIFIED WHETHER RECURRENT: Primary | ICD-10-CM

## 2023-05-28 DIAGNOSIS — Z87.898 HISTORY OF SEIZURE: ICD-10-CM

## 2023-05-28 LAB
ALBUMIN SERPL-MCNC: 3.5 G/DL (ref 3.4–5)
ALBUMIN/GLOB SERPL: 0.9 (ref 0.8–1.7)
ALP SERPL-CCNC: 78 U/L (ref 45–117)
ALT SERPL-CCNC: 20 U/L (ref 13–56)
ANION GAP SERPL CALC-SCNC: 5 MMOL/L (ref 3–18)
AST SERPL W P-5'-P-CCNC: 15 U/L (ref 10–38)
BASOPHILS # BLD: 0.1 K/UL (ref 0–0.1)
BASOPHILS NFR BLD: 1 % (ref 0–2)
BILIRUB SERPL-MCNC: 0.3 MG/DL (ref 0.2–1)
BUN SERPL-MCNC: 21 MG/DL (ref 7–18)
BUN/CREAT SERPL: 25 (ref 12–20)
CA-I BLD-MCNC: 8.2 MG/DL (ref 8.5–10.1)
CHLORIDE SERPL-SCNC: 106 MMOL/L (ref 100–111)
CO2 SERPL-SCNC: 27 MMOL/L (ref 21–32)
CREAT SERPL-MCNC: 0.85 MG/DL (ref 0.6–1.3)
DIFFERENTIAL METHOD BLD: ABNORMAL
EOSINOPHIL # BLD: 0.3 K/UL (ref 0–0.4)
EOSINOPHIL NFR BLD: 7 % (ref 0–5)
ERYTHROCYTE [DISTWIDTH] IN BLOOD BY AUTOMATED COUNT: 14.6 % (ref 11.6–14.5)
ETHANOL SERPL-MCNC: <3 MG/DL (ref 0–3)
GLOBULIN SER CALC-MCNC: 3.9 G/DL (ref 2–4)
GLUCOSE SERPL-MCNC: 113 MG/DL (ref 74–99)
HCT VFR BLD AUTO: 38.1 % (ref 35–45)
HGB BLD-MCNC: 12.2 G/DL (ref 12–16)
IMM GRANULOCYTES # BLD AUTO: 0 K/UL (ref 0–0.04)
IMM GRANULOCYTES NFR BLD AUTO: 0 % (ref 0–0.5)
LACTATE SERPL-SCNC: 1.4 MMOL/L (ref 0.4–2)
LYMPHOCYTES # BLD: 2.2 K/UL (ref 0.9–3.6)
LYMPHOCYTES NFR BLD: 44 % (ref 21–52)
MCH RBC QN AUTO: 28.8 PG (ref 24–34)
MCHC RBC AUTO-ENTMCNC: 32 G/DL (ref 31–37)
MCV RBC AUTO: 89.9 FL (ref 78–100)
MONOCYTES # BLD: 0.3 K/UL (ref 0.05–1.2)
MONOCYTES NFR BLD: 7 % (ref 3–10)
NEUTS SEG # BLD: 2.1 K/UL (ref 1.8–8)
NEUTS SEG NFR BLD: 41 % (ref 40–73)
NRBC # BLD: 0 K/UL (ref 0–0.01)
NRBC BLD-RTO: 0 PER 100 WBC
PLATELET # BLD AUTO: 323 K/UL (ref 135–420)
PMV BLD AUTO: 8.8 FL (ref 9.2–11.8)
POTASSIUM SERPL-SCNC: 3.8 MMOL/L (ref 3.5–5.5)
PROT SERPL-MCNC: 7.4 G/DL (ref 6.4–8.2)
RBC # BLD AUTO: 4.24 M/UL (ref 4.2–5.3)
SODIUM SERPL-SCNC: 138 MMOL/L (ref 136–145)
WBC # BLD AUTO: 5 K/UL (ref 4.6–13.2)

## 2023-05-28 PROCEDURE — 96360 HYDRATION IV INFUSION INIT: CPT

## 2023-05-28 PROCEDURE — 36415 COLL VENOUS BLD VENIPUNCTURE: CPT

## 2023-05-28 PROCEDURE — 2580000003 HC RX 258: Performed by: FAMILY MEDICINE

## 2023-05-28 PROCEDURE — 80053 COMPREHEN METABOLIC PANEL: CPT

## 2023-05-28 PROCEDURE — 83605 ASSAY OF LACTIC ACID: CPT

## 2023-05-28 PROCEDURE — 6370000000 HC RX 637 (ALT 250 FOR IP): Performed by: FAMILY MEDICINE

## 2023-05-28 PROCEDURE — 80177 DRUG SCRN QUAN LEVETIRACETAM: CPT

## 2023-05-28 PROCEDURE — 85025 COMPLETE CBC W/AUTO DIFF WBC: CPT

## 2023-05-28 PROCEDURE — 99284 EMERGENCY DEPT VISIT MOD MDM: CPT

## 2023-05-28 PROCEDURE — 82077 ASSAY SPEC XCP UR&BREATH IA: CPT

## 2023-05-28 RX ORDER — LORATADINE 10 MG/1
10 CAPSULE, LIQUID FILLED ORAL DAILY
COMMUNITY

## 2023-05-28 RX ORDER — ERGOCALCIFEROL 1.25 MG/1
50000 CAPSULE ORAL WEEKLY
COMMUNITY

## 2023-05-28 RX ORDER — FLUOXETINE HYDROCHLORIDE 20 MG/1
20 CAPSULE ORAL DAILY
COMMUNITY

## 2023-05-28 RX ORDER — LEVETIRACETAM 250 MG/1
1000 TABLET ORAL
Status: COMPLETED | OUTPATIENT
Start: 2023-05-28 | End: 2023-05-28

## 2023-05-28 RX ORDER — PANTOPRAZOLE SODIUM 40 MG/1
40 TABLET, DELAYED RELEASE ORAL DAILY
COMMUNITY

## 2023-05-28 RX ORDER — CHLORDIAZEPOXIDE HYDROCHLORIDE 25 MG/1
25 CAPSULE, GELATIN COATED ORAL 3 TIMES DAILY PRN
COMMUNITY

## 2023-05-28 RX ORDER — 0.9 % SODIUM CHLORIDE 0.9 %
1000 INTRAVENOUS SOLUTION INTRAVENOUS ONCE
Status: COMPLETED | OUTPATIENT
Start: 2023-05-28 | End: 2023-05-28

## 2023-05-28 RX ORDER — AMOXICILLIN 500 MG/1
500 CAPSULE ORAL EVERY 6 HOURS
COMMUNITY

## 2023-05-28 RX ORDER — LOPERAMIDE HYDROCHLORIDE 2 MG/1
2 CAPSULE ORAL 2 TIMES DAILY PRN
COMMUNITY

## 2023-05-28 RX ADMIN — LEVETIRACETAM 1000 MG: 250 TABLET, FILM COATED ORAL at 12:39

## 2023-05-28 RX ADMIN — SODIUM CHLORIDE 1000 ML: 9 INJECTION, SOLUTION INTRAVENOUS at 11:51

## 2023-05-28 ASSESSMENT — LIFESTYLE VARIABLES
HOW MANY STANDARD DRINKS CONTAINING ALCOHOL DO YOU HAVE ON A TYPICAL DAY: 10 OR MORE
HOW OFTEN DO YOU HAVE A DRINK CONTAINING ALCOHOL: 2-4 TIMES A MONTH

## 2023-05-28 ASSESSMENT — PAIN SCALES - GENERAL
PAINLEVEL_OUTOF10: 5
PAINLEVEL_OUTOF10: 0

## 2023-05-28 ASSESSMENT — PAIN - FUNCTIONAL ASSESSMENT
PAIN_FUNCTIONAL_ASSESSMENT: 0-10
PAIN_FUNCTIONAL_ASSESSMENT: 0-10

## 2023-05-28 ASSESSMENT — PAIN DESCRIPTION - LOCATION: LOCATION: HEAD

## 2023-05-31 LAB — LEVETIRACETAM SERPL-MCNC: <2 UG/ML (ref 10–40)

## 2023-07-13 ENCOUNTER — APPOINTMENT (OUTPATIENT)
Facility: HOSPITAL | Age: 58
DRG: 191 | End: 2023-07-13
Payer: MEDICARE

## 2023-07-13 ENCOUNTER — HOSPITAL ENCOUNTER (INPATIENT)
Facility: HOSPITAL | Age: 58
LOS: 5 days | Discharge: NURSING, REHAB OR LONG-TERM CARE | DRG: 191 | End: 2023-07-19
Attending: STUDENT IN AN ORGANIZED HEALTH CARE EDUCATION/TRAINING PROGRAM | Admitting: FAMILY MEDICINE
Payer: MEDICARE

## 2023-07-13 DIAGNOSIS — J44.1 COPD EXACERBATION (HCC): Primary | ICD-10-CM

## 2023-07-13 PROCEDURE — 94762 N-INVAS EAR/PLS OXIMTRY CONT: CPT

## 2023-07-13 PROCEDURE — 93005 ELECTROCARDIOGRAM TRACING: CPT | Performed by: STUDENT IN AN ORGANIZED HEALTH CARE EDUCATION/TRAINING PROGRAM

## 2023-07-13 PROCEDURE — 71045 X-RAY EXAM CHEST 1 VIEW: CPT

## 2023-07-13 PROCEDURE — 99285 EMERGENCY DEPT VISIT HI MDM: CPT

## 2023-07-13 RX ORDER — AZITHROMYCIN 500 MG/1
500 TABLET, FILM COATED ORAL
Status: COMPLETED | OUTPATIENT
Start: 2023-07-13 | End: 2023-07-14

## 2023-07-13 RX ORDER — IPRATROPIUM BROMIDE AND ALBUTEROL SULFATE 2.5; .5 MG/3ML; MG/3ML
1 SOLUTION RESPIRATORY (INHALATION)
Status: COMPLETED | OUTPATIENT
Start: 2023-07-13 | End: 2023-07-14

## 2023-07-13 RX ORDER — METHYLPREDNISOLONE SODIUM SUCCINATE 125 MG/2ML
125 INJECTION, POWDER, LYOPHILIZED, FOR SOLUTION INTRAMUSCULAR; INTRAVENOUS ONCE
Status: COMPLETED | OUTPATIENT
Start: 2023-07-13 | End: 2023-07-14

## 2023-07-13 ASSESSMENT — PAIN SCALES - GENERAL: PAINLEVEL_OUTOF10: 10

## 2023-07-13 ASSESSMENT — PAIN DESCRIPTION - LOCATION: LOCATION: HEAD

## 2023-07-13 ASSESSMENT — PAIN - FUNCTIONAL ASSESSMENT: PAIN_FUNCTIONAL_ASSESSMENT: 0-10

## 2023-07-14 PROBLEM — J44.1 COPD EXACERBATION (HCC): Status: ACTIVE | Noted: 2023-07-14

## 2023-07-14 PROBLEM — J44.9 COPD (CHRONIC OBSTRUCTIVE PULMONARY DISEASE) (HCC): Status: ACTIVE | Noted: 2023-07-14

## 2023-07-14 LAB
ALBUMIN SERPL-MCNC: 3.5 G/DL (ref 3.5–5)
ALBUMIN/GLOB SERPL: 1.1 (ref 1.1–2.2)
ALP SERPL-CCNC: 67 U/L (ref 45–117)
ALT SERPL-CCNC: 20 U/L (ref 12–78)
ANION GAP SERPL CALC-SCNC: 5 MMOL/L (ref 5–15)
ARTERIAL PATENCY WRIST A: YES
AST SERPL W P-5'-P-CCNC: 13 U/L (ref 15–37)
BASE DEFICIT BLDA-SCNC: 1.9 MMOL/L
BASOPHILS # BLD: 0 K/UL (ref 0–0.1)
BASOPHILS NFR BLD: 1 % (ref 0–1)
BDY SITE: ABNORMAL
BILIRUB SERPL-MCNC: 0.2 MG/DL (ref 0.2–1)
BODY TEMPERATURE: 97.7
BUN SERPL-MCNC: 13 MG/DL (ref 6–20)
BUN/CREAT SERPL: 17 (ref 12–20)
CA-I BLD-MCNC: 8.3 MG/DL (ref 8.5–10.1)
CHLORIDE SERPL-SCNC: 110 MMOL/L (ref 97–108)
CO2 SERPL-SCNC: 28 MMOL/L (ref 21–32)
COHGB MFR BLD: 0 % (ref 1–2)
CREAT SERPL-MCNC: 0.77 MG/DL (ref 0.55–1.02)
DIFFERENTIAL METHOD BLD: ABNORMAL
EKG ATRIAL RATE: 59 BPM
EKG DIAGNOSIS: NORMAL
EKG P AXIS: 60 DEGREES
EKG P-R INTERVAL: 142 MS
EKG Q-T INTERVAL: 492 MS
EKG QRS DURATION: 76 MS
EKG QTC CALCULATION (BAZETT): 487 MS
EKG R AXIS: 50 DEGREES
EKG T AXIS: 63 DEGREES
EKG VENTRICULAR RATE: 59 BPM
EOSINOPHIL # BLD: 0.5 K/UL (ref 0–0.4)
EOSINOPHIL NFR BLD: 9 % (ref 0–7)
ERYTHROCYTE [DISTWIDTH] IN BLOOD BY AUTOMATED COUNT: 17.2 % (ref 11.5–14.5)
FIO2 ON VENT: 28 %
GAS FLOW.O2 O2 DELIVERY SYS: 2 L/MIN
GLOBULIN SER CALC-MCNC: 3.2 G/DL (ref 2–4)
GLUCOSE BLD STRIP.AUTO-MCNC: 185 MG/DL (ref 65–100)
GLUCOSE BLD STRIP.AUTO-MCNC: 224 MG/DL (ref 65–100)
GLUCOSE BLD STRIP.AUTO-MCNC: 277 MG/DL (ref 65–100)
GLUCOSE BLD STRIP.AUTO-MCNC: 337 MG/DL (ref 65–100)
GLUCOSE SERPL-MCNC: 102 MG/DL (ref 65–100)
HCO3 BLDA-SCNC: 23 MMOL/L (ref 22–26)
HCT VFR BLD AUTO: 32.6 % (ref 35–47)
HGB BLD-MCNC: 10.7 G/DL (ref 11.5–16)
IMM GRANULOCYTES # BLD AUTO: 0 K/UL (ref 0–0.04)
IMM GRANULOCYTES NFR BLD AUTO: 0 % (ref 0–0.5)
LYMPHOCYTES # BLD: 2.5 K/UL (ref 0.8–3.5)
LYMPHOCYTES NFR BLD: 43 % (ref 12–49)
MCH RBC QN AUTO: 29.5 PG (ref 26–34)
MCHC RBC AUTO-ENTMCNC: 32.8 G/DL (ref 30–36.5)
MCV RBC AUTO: 89.8 FL (ref 80–99)
METHGB MFR BLD: 0.4 % (ref 0–1.4)
MONOCYTES # BLD: 0.4 K/UL (ref 0–1)
MONOCYTES NFR BLD: 6 % (ref 5–13)
NEUTS SEG # BLD: 2.4 K/UL (ref 1.8–8)
NEUTS SEG NFR BLD: 41 % (ref 32–75)
NRBC # BLD: 0 K/UL (ref 0–0.01)
NRBC BLD-RTO: 0 PER 100 WBC
OXYHGB MFR BLD: 95.7 % (ref 95–99)
PCO2 BLDA: 40 MMHG (ref 35–45)
PERFORMED BY:: ABNORMAL
PH BLDA: 7.38 (ref 7.35–7.45)
PLATELET # BLD AUTO: 264 K/UL (ref 150–400)
PMV BLD AUTO: 9.4 FL (ref 8.9–12.9)
PO2 BLDA: 86 MMHG (ref 80–100)
POTASSIUM SERPL-SCNC: 3.7 MMOL/L (ref 3.5–5.1)
PROT SERPL-MCNC: 6.7 G/DL (ref 6.4–8.2)
RBC # BLD AUTO: 3.63 M/UL (ref 3.8–5.2)
SAO2 % BLD: 96 % (ref 95–99)
SAO2% DEVICE SAO2% SENSOR NAME: ABNORMAL
SODIUM SERPL-SCNC: 143 MMOL/L (ref 136–145)
SPECIMEN SITE: ABNORMAL
TROPONIN I SERPL HS-MCNC: 4 NG/L (ref 0–51)
TROPONIN I SERPL HS-MCNC: 4 NG/L (ref 0–51)
WBC # BLD AUTO: 5.9 K/UL (ref 3.6–11)

## 2023-07-14 PROCEDURE — 94640 AIRWAY INHALATION TREATMENT: CPT

## 2023-07-14 PROCEDURE — 2580000003 HC RX 258: Performed by: FAMILY MEDICINE

## 2023-07-14 PROCEDURE — 82803 BLOOD GASES ANY COMBINATION: CPT

## 2023-07-14 PROCEDURE — 82962 GLUCOSE BLOOD TEST: CPT

## 2023-07-14 PROCEDURE — 94761 N-INVAS EAR/PLS OXIMETRY MLT: CPT

## 2023-07-14 PROCEDURE — 85025 COMPLETE CBC W/AUTO DIFF WBC: CPT

## 2023-07-14 PROCEDURE — 80053 COMPREHEN METABOLIC PANEL: CPT

## 2023-07-14 PROCEDURE — 6370000000 HC RX 637 (ALT 250 FOR IP): Performed by: STUDENT IN AN ORGANIZED HEALTH CARE EDUCATION/TRAINING PROGRAM

## 2023-07-14 PROCEDURE — 36600 WITHDRAWAL OF ARTERIAL BLOOD: CPT

## 2023-07-14 PROCEDURE — 1100000000 HC RM PRIVATE

## 2023-07-14 PROCEDURE — 84484 ASSAY OF TROPONIN QUANT: CPT

## 2023-07-14 PROCEDURE — 6370000000 HC RX 637 (ALT 250 FOR IP): Performed by: FAMILY MEDICINE

## 2023-07-14 PROCEDURE — 6360000002 HC RX W HCPCS: Performed by: STUDENT IN AN ORGANIZED HEALTH CARE EDUCATION/TRAINING PROGRAM

## 2023-07-14 PROCEDURE — 2700000000 HC OXYGEN THERAPY PER DAY

## 2023-07-14 PROCEDURE — 87086 URINE CULTURE/COLONY COUNT: CPT

## 2023-07-14 PROCEDURE — 6360000002 HC RX W HCPCS: Performed by: FAMILY MEDICINE

## 2023-07-14 PROCEDURE — 96374 THER/PROPH/DIAG INJ IV PUSH: CPT

## 2023-07-14 RX ORDER — LEVETIRACETAM 500 MG/1
1000 TABLET ORAL EVERY 12 HOURS SCHEDULED
Status: DISCONTINUED | OUTPATIENT
Start: 2023-07-14 | End: 2023-07-19 | Stop reason: HOSPADM

## 2023-07-14 RX ORDER — POLYETHYLENE GLYCOL 3350 17 G/17G
17 POWDER, FOR SOLUTION ORAL DAILY PRN
Status: DISCONTINUED | OUTPATIENT
Start: 2023-07-14 | End: 2023-07-19 | Stop reason: HOSPADM

## 2023-07-14 RX ORDER — IPRATROPIUM BROMIDE AND ALBUTEROL SULFATE 2.5; .5 MG/3ML; MG/3ML
1 SOLUTION RESPIRATORY (INHALATION) EVERY 6 HOURS
Status: DISCONTINUED | OUTPATIENT
Start: 2023-07-14 | End: 2023-07-19 | Stop reason: HOSPADM

## 2023-07-14 RX ORDER — ATORVASTATIN CALCIUM 40 MG/1
40 TABLET, FILM COATED ORAL DAILY
Status: DISCONTINUED | OUTPATIENT
Start: 2023-07-14 | End: 2023-07-19 | Stop reason: HOSPADM

## 2023-07-14 RX ORDER — TRAZODONE HYDROCHLORIDE 50 MG/1
50 TABLET ORAL NIGHTLY
Status: DISCONTINUED | OUTPATIENT
Start: 2023-07-14 | End: 2023-07-19 | Stop reason: HOSPADM

## 2023-07-14 RX ORDER — SODIUM CHLORIDE 0.9 % (FLUSH) 0.9 %
5-40 SYRINGE (ML) INJECTION PRN
Status: DISCONTINUED | OUTPATIENT
Start: 2023-07-14 | End: 2023-07-19 | Stop reason: HOSPADM

## 2023-07-14 RX ORDER — ONDANSETRON 4 MG/1
4 TABLET, ORALLY DISINTEGRATING ORAL EVERY 8 HOURS PRN
Status: DISCONTINUED | OUTPATIENT
Start: 2023-07-14 | End: 2023-07-19 | Stop reason: HOSPADM

## 2023-07-14 RX ORDER — SODIUM CHLORIDE 0.9 % (FLUSH) 0.9 %
5-40 SYRINGE (ML) INJECTION EVERY 12 HOURS SCHEDULED
Status: DISCONTINUED | OUTPATIENT
Start: 2023-07-14 | End: 2023-07-19 | Stop reason: HOSPADM

## 2023-07-14 RX ORDER — ACETAMINOPHEN 325 MG/1
650 TABLET ORAL EVERY 6 HOURS PRN
Status: DISCONTINUED | OUTPATIENT
Start: 2023-07-14 | End: 2023-07-19 | Stop reason: HOSPADM

## 2023-07-14 RX ORDER — SODIUM CHLORIDE 9 MG/ML
INJECTION, SOLUTION INTRAVENOUS PRN
Status: DISCONTINUED | OUTPATIENT
Start: 2023-07-14 | End: 2023-07-19 | Stop reason: HOSPADM

## 2023-07-14 RX ORDER — FLUOXETINE HYDROCHLORIDE 20 MG/1
20 CAPSULE ORAL DAILY
Status: DISCONTINUED | OUTPATIENT
Start: 2023-07-14 | End: 2023-07-19 | Stop reason: HOSPADM

## 2023-07-14 RX ORDER — DEXTROSE MONOHYDRATE 100 MG/ML
INJECTION, SOLUTION INTRAVENOUS CONTINUOUS PRN
Status: DISCONTINUED | OUTPATIENT
Start: 2023-07-14 | End: 2023-07-19 | Stop reason: HOSPADM

## 2023-07-14 RX ORDER — LISINOPRIL 10 MG/1
10 TABLET ORAL DAILY
Status: DISCONTINUED | OUTPATIENT
Start: 2023-07-14 | End: 2023-07-19 | Stop reason: HOSPADM

## 2023-07-14 RX ORDER — PANTOPRAZOLE SODIUM 40 MG/1
40 TABLET, DELAYED RELEASE ORAL
Status: DISCONTINUED | OUTPATIENT
Start: 2023-07-14 | End: 2023-07-19 | Stop reason: HOSPADM

## 2023-07-14 RX ORDER — ERGOCALCIFEROL 1.25 MG/1
50000 CAPSULE ORAL WEEKLY
Status: DISCONTINUED | OUTPATIENT
Start: 2023-07-23 | End: 2023-07-19 | Stop reason: HOSPADM

## 2023-07-14 RX ORDER — ONDANSETRON 2 MG/ML
4 INJECTION INTRAMUSCULAR; INTRAVENOUS EVERY 6 HOURS PRN
Status: DISCONTINUED | OUTPATIENT
Start: 2023-07-14 | End: 2023-07-19 | Stop reason: HOSPADM

## 2023-07-14 RX ORDER — LORATADINE 10 MG/1
10 TABLET ORAL DAILY
Status: ON HOLD | COMMUNITY
Start: 2023-05-19 | End: 2023-07-14

## 2023-07-14 RX ORDER — ACETAMINOPHEN 650 MG/1
650 SUPPOSITORY RECTAL EVERY 6 HOURS PRN
Status: DISCONTINUED | OUTPATIENT
Start: 2023-07-14 | End: 2023-07-19 | Stop reason: HOSPADM

## 2023-07-14 RX ORDER — INSULIN LISPRO 100 [IU]/ML
0-4 INJECTION, SOLUTION INTRAVENOUS; SUBCUTANEOUS NIGHTLY
Status: DISCONTINUED | OUTPATIENT
Start: 2023-07-14 | End: 2023-07-19 | Stop reason: HOSPADM

## 2023-07-14 RX ORDER — PANTOPRAZOLE SODIUM 40 MG/1
40 TABLET, DELAYED RELEASE ORAL
Status: ON HOLD | COMMUNITY
End: 2023-07-14

## 2023-07-14 RX ORDER — CETIRIZINE HYDROCHLORIDE 10 MG/1
5 TABLET ORAL DAILY
Status: DISCONTINUED | OUTPATIENT
Start: 2023-07-14 | End: 2023-07-19 | Stop reason: HOSPADM

## 2023-07-14 RX ORDER — ENOXAPARIN SODIUM 100 MG/ML
40 INJECTION SUBCUTANEOUS DAILY
Status: DISCONTINUED | OUTPATIENT
Start: 2023-07-14 | End: 2023-07-19 | Stop reason: HOSPADM

## 2023-07-14 RX ORDER — INSULIN LISPRO 100 [IU]/ML
0-16 INJECTION, SOLUTION INTRAVENOUS; SUBCUTANEOUS
Status: DISCONTINUED | OUTPATIENT
Start: 2023-07-14 | End: 2023-07-19 | Stop reason: HOSPADM

## 2023-07-14 RX ADMIN — IPRATROPIUM BROMIDE AND ALBUTEROL SULFATE 1 DOSE: 2.5; .5 SOLUTION RESPIRATORY (INHALATION) at 00:01

## 2023-07-14 RX ADMIN — PANTOPRAZOLE SODIUM 40 MG: 40 TABLET, DELAYED RELEASE ORAL at 11:44

## 2023-07-14 RX ADMIN — TRAZODONE HYDROCHLORIDE 50 MG: 50 TABLET ORAL at 21:11

## 2023-07-14 RX ADMIN — ENOXAPARIN SODIUM 40 MG: 100 INJECTION SUBCUTANEOUS at 11:45

## 2023-07-14 RX ADMIN — IPRATROPIUM BROMIDE AND ALBUTEROL SULFATE 1 DOSE: 2.5; .5 SOLUTION RESPIRATORY (INHALATION) at 20:16

## 2023-07-14 RX ADMIN — IPRATROPIUM BROMIDE AND ALBUTEROL SULFATE 1 DOSE: 2.5; .5 SOLUTION RESPIRATORY (INHALATION) at 00:03

## 2023-07-14 RX ADMIN — CETIRIZINE HYDROCHLORIDE 5 MG: 10 TABLET, FILM COATED ORAL at 11:44

## 2023-07-14 RX ADMIN — IPRATROPIUM BROMIDE AND ALBUTEROL SULFATE 1 DOSE: 2.5; .5 SOLUTION RESPIRATORY (INHALATION) at 13:35

## 2023-07-14 RX ADMIN — FLUOXETINE 20 MG: 20 CAPSULE ORAL at 11:44

## 2023-07-14 RX ADMIN — METHYLPREDNISOLONE SODIUM SUCCINATE 40 MG: 40 INJECTION, POWDER, FOR SOLUTION INTRAMUSCULAR; INTRAVENOUS at 09:10

## 2023-07-14 RX ADMIN — SODIUM CHLORIDE, PRESERVATIVE FREE 10 ML: 5 INJECTION INTRAVENOUS at 11:46

## 2023-07-14 RX ADMIN — INSULIN LISPRO 4 UNITS: 100 INJECTION, SOLUTION INTRAVENOUS; SUBCUTANEOUS at 16:46

## 2023-07-14 RX ADMIN — METHYLPREDNISOLONE SODIUM SUCCINATE 40 MG: 40 INJECTION, POWDER, FOR SOLUTION INTRAMUSCULAR; INTRAVENOUS at 14:27

## 2023-07-14 RX ADMIN — LEVETIRACETAM 1000 MG: 500 TABLET, FILM COATED ORAL at 21:11

## 2023-07-14 RX ADMIN — LEVETIRACETAM 1000 MG: 500 TABLET, FILM COATED ORAL at 11:44

## 2023-07-14 RX ADMIN — METHYLPREDNISOLONE SODIUM SUCCINATE 125 MG: 125 INJECTION, POWDER, FOR SOLUTION INTRAMUSCULAR; INTRAVENOUS at 00:27

## 2023-07-14 RX ADMIN — IPRATROPIUM BROMIDE AND ALBUTEROL SULFATE 1 DOSE: 2.5; .5 SOLUTION RESPIRATORY (INHALATION) at 00:00

## 2023-07-14 RX ADMIN — INSULIN LISPRO 12 UNITS: 100 INJECTION, SOLUTION INTRAVENOUS; SUBCUTANEOUS at 11:43

## 2023-07-14 RX ADMIN — AZITHROMYCIN MONOHYDRATE 500 MG: 500 TABLET ORAL at 00:28

## 2023-07-14 RX ADMIN — CEFTRIAXONE SODIUM 1000 MG: 1 INJECTION, POWDER, FOR SOLUTION INTRAMUSCULAR; INTRAVENOUS at 11:45

## 2023-07-14 RX ADMIN — IPRATROPIUM BROMIDE AND ALBUTEROL SULFATE 1 DOSE: 2.5; .5 SOLUTION RESPIRATORY (INHALATION) at 08:02

## 2023-07-14 RX ADMIN — SODIUM CHLORIDE, PRESERVATIVE FREE 10 ML: 5 INJECTION INTRAVENOUS at 21:12

## 2023-07-14 RX ADMIN — ATORVASTATIN CALCIUM 40 MG: 40 TABLET, FILM COATED ORAL at 11:44

## 2023-07-14 RX ADMIN — ACETAMINOPHEN 650 MG: 325 TABLET ORAL at 19:17

## 2023-07-14 RX ADMIN — METHYLPREDNISOLONE SODIUM SUCCINATE 40 MG: 40 INJECTION, POWDER, FOR SOLUTION INTRAMUSCULAR; INTRAVENOUS at 19:17

## 2023-07-14 RX ADMIN — LISINOPRIL 10 MG: 10 TABLET ORAL at 11:44

## 2023-07-14 ASSESSMENT — ENCOUNTER SYMPTOMS
WHEEZING: 1
SHORTNESS OF BREATH: 1
GASTROINTESTINAL NEGATIVE: 1

## 2023-07-14 ASSESSMENT — PAIN SCALES - GENERAL
PAINLEVEL_OUTOF10: 3
PAINLEVEL_OUTOF10: 8

## 2023-07-14 ASSESSMENT — PAIN DESCRIPTION - DESCRIPTORS: DESCRIPTORS: ACHING

## 2023-07-14 ASSESSMENT — PAIN DESCRIPTION - LOCATION: LOCATION: HEAD

## 2023-07-14 ASSESSMENT — PAIN - FUNCTIONAL ASSESSMENT
PAIN_FUNCTIONAL_ASSESSMENT: NONE - DENIES PAIN
PAIN_FUNCTIONAL_ASSESSMENT: NONE - DENIES PAIN

## 2023-07-14 ASSESSMENT — PAIN DESCRIPTION - ORIENTATION: ORIENTATION: OTHER (COMMENT)

## 2023-07-15 LAB
ALBUMIN SERPL-MCNC: 3.4 G/DL (ref 3.5–5)
ALBUMIN/GLOB SERPL: 0.8 (ref 1.1–2.2)
ALP SERPL-CCNC: 69 U/L (ref 45–117)
ALT SERPL-CCNC: 19 U/L (ref 12–78)
ANION GAP SERPL CALC-SCNC: 6 MMOL/L (ref 5–15)
AST SERPL W P-5'-P-CCNC: ABNORMAL U/L (ref 15–37)
BASOPHILS # BLD: 0 K/UL (ref 0–0.1)
BASOPHILS NFR BLD: 0 % (ref 0–1)
BILIRUB SERPL-MCNC: 0.2 MG/DL (ref 0.2–1)
BUN SERPL-MCNC: 14 MG/DL (ref 6–20)
BUN/CREAT SERPL: 19 (ref 12–20)
CA-I BLD-MCNC: 8.9 MG/DL (ref 8.5–10.1)
CHLORIDE SERPL-SCNC: 109 MMOL/L (ref 97–108)
CO2 SERPL-SCNC: 21 MMOL/L (ref 21–32)
CREAT SERPL-MCNC: 0.75 MG/DL (ref 0.55–1.02)
DIFFERENTIAL METHOD BLD: ABNORMAL
EOSINOPHIL # BLD: 0 K/UL (ref 0–0.4)
EOSINOPHIL NFR BLD: 0 % (ref 0–7)
ERYTHROCYTE [DISTWIDTH] IN BLOOD BY AUTOMATED COUNT: 16.6 % (ref 11.5–14.5)
GLOBULIN SER CALC-MCNC: 4.3 G/DL (ref 2–4)
GLUCOSE BLD STRIP.AUTO-MCNC: 134 MG/DL (ref 65–100)
GLUCOSE BLD STRIP.AUTO-MCNC: 235 MG/DL (ref 65–100)
GLUCOSE BLD STRIP.AUTO-MCNC: 244 MG/DL (ref 65–100)
GLUCOSE BLD STRIP.AUTO-MCNC: 250 MG/DL (ref 65–100)
GLUCOSE SERPL-MCNC: 211 MG/DL (ref 65–100)
HCT VFR BLD AUTO: 36 % (ref 35–47)
HGB BLD-MCNC: 11.4 G/DL (ref 11.5–16)
IMM GRANULOCYTES # BLD AUTO: 0.1 K/UL (ref 0–0.04)
IMM GRANULOCYTES NFR BLD AUTO: 0 % (ref 0–0.5)
LYMPHOCYTES # BLD: 0.7 K/UL (ref 0.8–3.5)
LYMPHOCYTES NFR BLD: 5 % (ref 12–49)
MCH RBC QN AUTO: 28.2 PG (ref 26–34)
MCHC RBC AUTO-ENTMCNC: 31.7 G/DL (ref 30–36.5)
MCV RBC AUTO: 89.1 FL (ref 80–99)
MONOCYTES # BLD: 0.5 K/UL (ref 0–1)
MONOCYTES NFR BLD: 4 % (ref 5–13)
NEUTS SEG # BLD: 12.6 K/UL (ref 1.8–8)
NEUTS SEG NFR BLD: 91 % (ref 32–75)
NRBC # BLD: 0 K/UL (ref 0–0.01)
NRBC BLD-RTO: 0 PER 100 WBC
PERFORMED BY:: ABNORMAL
PLATELET # BLD AUTO: 298 K/UL (ref 150–400)
PMV BLD AUTO: 9.3 FL (ref 8.9–12.9)
POTASSIUM SERPL-SCNC: ABNORMAL MMOL/L (ref 3.5–5.1)
PROT SERPL-MCNC: 7.7 G/DL (ref 6.4–8.2)
RBC # BLD AUTO: 4.04 M/UL (ref 3.8–5.2)
SODIUM SERPL-SCNC: 136 MMOL/L (ref 136–145)
WBC # BLD AUTO: 13.8 K/UL (ref 3.6–11)

## 2023-07-15 PROCEDURE — 94640 AIRWAY INHALATION TREATMENT: CPT

## 2023-07-15 PROCEDURE — 2580000003 HC RX 258: Performed by: FAMILY MEDICINE

## 2023-07-15 PROCEDURE — 82962 GLUCOSE BLOOD TEST: CPT

## 2023-07-15 PROCEDURE — 36415 COLL VENOUS BLD VENIPUNCTURE: CPT

## 2023-07-15 PROCEDURE — 6370000000 HC RX 637 (ALT 250 FOR IP): Performed by: INTERNAL MEDICINE

## 2023-07-15 PROCEDURE — 94761 N-INVAS EAR/PLS OXIMETRY MLT: CPT

## 2023-07-15 PROCEDURE — 6360000002 HC RX W HCPCS: Performed by: FAMILY MEDICINE

## 2023-07-15 PROCEDURE — 6370000000 HC RX 637 (ALT 250 FOR IP): Performed by: FAMILY MEDICINE

## 2023-07-15 PROCEDURE — 85025 COMPLETE CBC W/AUTO DIFF WBC: CPT

## 2023-07-15 PROCEDURE — 1100000000 HC RM PRIVATE

## 2023-07-15 PROCEDURE — 80053 COMPREHEN METABOLIC PANEL: CPT

## 2023-07-15 RX ORDER — PREDNISONE 20 MG/1
20 TABLET ORAL 2 TIMES DAILY
Status: DISCONTINUED | OUTPATIENT
Start: 2023-07-15 | End: 2023-07-19 | Stop reason: HOSPADM

## 2023-07-15 RX ADMIN — ATORVASTATIN CALCIUM 40 MG: 40 TABLET, FILM COATED ORAL at 08:50

## 2023-07-15 RX ADMIN — TRAZODONE HYDROCHLORIDE 50 MG: 50 TABLET ORAL at 21:06

## 2023-07-15 RX ADMIN — PREDNISONE 20 MG: 20 TABLET ORAL at 21:07

## 2023-07-15 RX ADMIN — CETIRIZINE HYDROCHLORIDE 5 MG: 10 TABLET, FILM COATED ORAL at 08:53

## 2023-07-15 RX ADMIN — LEVETIRACETAM 1000 MG: 500 TABLET, FILM COATED ORAL at 08:50

## 2023-07-15 RX ADMIN — IPRATROPIUM BROMIDE AND ALBUTEROL SULFATE 1 DOSE: 2.5; .5 SOLUTION RESPIRATORY (INHALATION) at 07:35

## 2023-07-15 RX ADMIN — LISINOPRIL 10 MG: 10 TABLET ORAL at 08:50

## 2023-07-15 RX ADMIN — IPRATROPIUM BROMIDE AND ALBUTEROL SULFATE 1 DOSE: 2.5; .5 SOLUTION RESPIRATORY (INHALATION) at 21:33

## 2023-07-15 RX ADMIN — INSULIN LISPRO 8 UNITS: 100 INJECTION, SOLUTION INTRAVENOUS; SUBCUTANEOUS at 08:50

## 2023-07-15 RX ADMIN — SODIUM CHLORIDE, PRESERVATIVE FREE 10 ML: 5 INJECTION INTRAVENOUS at 08:51

## 2023-07-15 RX ADMIN — INSULIN LISPRO 4 UNITS: 100 INJECTION, SOLUTION INTRAVENOUS; SUBCUTANEOUS at 17:14

## 2023-07-15 RX ADMIN — METHYLPREDNISOLONE SODIUM SUCCINATE 40 MG: 40 INJECTION, POWDER, FOR SOLUTION INTRAMUSCULAR; INTRAVENOUS at 01:27

## 2023-07-15 RX ADMIN — PREDNISONE 20 MG: 20 TABLET ORAL at 10:37

## 2023-07-15 RX ADMIN — METHYLPREDNISOLONE SODIUM SUCCINATE 40 MG: 40 INJECTION, POWDER, FOR SOLUTION INTRAMUSCULAR; INTRAVENOUS at 07:29

## 2023-07-15 RX ADMIN — FLUOXETINE 20 MG: 20 CAPSULE ORAL at 08:50

## 2023-07-15 RX ADMIN — ENOXAPARIN SODIUM 40 MG: 100 INJECTION SUBCUTANEOUS at 08:51

## 2023-07-15 RX ADMIN — IPRATROPIUM BROMIDE AND ALBUTEROL SULFATE 1 DOSE: 2.5; .5 SOLUTION RESPIRATORY (INHALATION) at 13:47

## 2023-07-15 RX ADMIN — CEFTRIAXONE SODIUM 1000 MG: 1 INJECTION, POWDER, FOR SOLUTION INTRAMUSCULAR; INTRAVENOUS at 10:45

## 2023-07-15 RX ADMIN — PANTOPRAZOLE SODIUM 40 MG: 40 TABLET, DELAYED RELEASE ORAL at 07:29

## 2023-07-15 RX ADMIN — LEVETIRACETAM 1000 MG: 500 TABLET, FILM COATED ORAL at 21:06

## 2023-07-15 RX ADMIN — SODIUM CHLORIDE, PRESERVATIVE FREE 10 ML: 5 INJECTION INTRAVENOUS at 21:07

## 2023-07-15 ASSESSMENT — ENCOUNTER SYMPTOMS
SHORTNESS OF BREATH: 1
GASTROINTESTINAL NEGATIVE: 1
WHEEZING: 1

## 2023-07-15 ASSESSMENT — PAIN SCALES - GENERAL
PAINLEVEL_OUTOF10: 0
PAINLEVEL_OUTOF10: 0

## 2023-07-16 LAB
BACTERIA SPEC CULT: NORMAL
GLUCOSE BLD STRIP.AUTO-MCNC: 168 MG/DL (ref 65–100)
GLUCOSE BLD STRIP.AUTO-MCNC: 171 MG/DL (ref 65–100)
GLUCOSE BLD STRIP.AUTO-MCNC: 178 MG/DL (ref 65–100)
GLUCOSE BLD STRIP.AUTO-MCNC: 256 MG/DL (ref 65–100)
Lab: NORMAL
PERFORMED BY:: ABNORMAL

## 2023-07-16 PROCEDURE — 6370000000 HC RX 637 (ALT 250 FOR IP): Performed by: FAMILY MEDICINE

## 2023-07-16 PROCEDURE — 05HD33Z INSERTION OF INFUSION DEVICE INTO RIGHT CEPHALIC VEIN, PERCUTANEOUS APPROACH: ICD-10-PCS | Performed by: FAMILY MEDICINE

## 2023-07-16 PROCEDURE — 6360000002 HC RX W HCPCS: Performed by: FAMILY MEDICINE

## 2023-07-16 PROCEDURE — 6370000000 HC RX 637 (ALT 250 FOR IP): Performed by: INTERNAL MEDICINE

## 2023-07-16 PROCEDURE — 94640 AIRWAY INHALATION TREATMENT: CPT

## 2023-07-16 PROCEDURE — 1100000000 HC RM PRIVATE

## 2023-07-16 PROCEDURE — 2580000003 HC RX 258: Performed by: FAMILY MEDICINE

## 2023-07-16 PROCEDURE — 82962 GLUCOSE BLOOD TEST: CPT

## 2023-07-16 RX ADMIN — LEVETIRACETAM 1000 MG: 500 TABLET, FILM COATED ORAL at 09:09

## 2023-07-16 RX ADMIN — PREDNISONE 20 MG: 20 TABLET ORAL at 20:53

## 2023-07-16 RX ADMIN — IPRATROPIUM BROMIDE AND ALBUTEROL SULFATE 1 DOSE: 2.5; .5 SOLUTION RESPIRATORY (INHALATION) at 07:20

## 2023-07-16 RX ADMIN — TRAZODONE HYDROCHLORIDE 50 MG: 50 TABLET ORAL at 20:53

## 2023-07-16 RX ADMIN — LEVETIRACETAM 1000 MG: 500 TABLET, FILM COATED ORAL at 20:53

## 2023-07-16 RX ADMIN — CETIRIZINE HYDROCHLORIDE 5 MG: 10 TABLET, FILM COATED ORAL at 09:10

## 2023-07-16 RX ADMIN — IPRATROPIUM BROMIDE AND ALBUTEROL SULFATE 1 DOSE: 2.5; .5 SOLUTION RESPIRATORY (INHALATION) at 01:19

## 2023-07-16 RX ADMIN — IPRATROPIUM BROMIDE AND ALBUTEROL SULFATE 1 DOSE: 2.5; .5 SOLUTION RESPIRATORY (INHALATION) at 21:14

## 2023-07-16 RX ADMIN — PANTOPRAZOLE SODIUM 40 MG: 40 TABLET, DELAYED RELEASE ORAL at 06:16

## 2023-07-16 RX ADMIN — PREDNISONE 20 MG: 20 TABLET ORAL at 09:09

## 2023-07-16 RX ADMIN — LISINOPRIL 10 MG: 10 TABLET ORAL at 09:09

## 2023-07-16 RX ADMIN — SODIUM CHLORIDE, PRESERVATIVE FREE 10 ML: 5 INJECTION INTRAVENOUS at 20:54

## 2023-07-16 RX ADMIN — CEFTRIAXONE SODIUM 1000 MG: 1 INJECTION, POWDER, FOR SOLUTION INTRAMUSCULAR; INTRAVENOUS at 11:28

## 2023-07-16 RX ADMIN — ENOXAPARIN SODIUM 40 MG: 100 INJECTION SUBCUTANEOUS at 09:10

## 2023-07-16 RX ADMIN — ATORVASTATIN CALCIUM 40 MG: 40 TABLET, FILM COATED ORAL at 09:09

## 2023-07-16 RX ADMIN — FLUOXETINE 20 MG: 20 CAPSULE ORAL at 09:09

## 2023-07-16 RX ADMIN — IPRATROPIUM BROMIDE AND ALBUTEROL SULFATE 1 DOSE: 2.5; .5 SOLUTION RESPIRATORY (INHALATION) at 13:13

## 2023-07-16 RX ADMIN — SODIUM CHLORIDE, PRESERVATIVE FREE 10 ML: 5 INJECTION INTRAVENOUS at 09:17

## 2023-07-16 ASSESSMENT — ENCOUNTER SYMPTOMS
WHEEZING: 1
GASTROINTESTINAL NEGATIVE: 1
SHORTNESS OF BREATH: 1

## 2023-07-17 PROBLEM — J44.9 COPD (CHRONIC OBSTRUCTIVE PULMONARY DISEASE) (HCC): Status: RESOLVED | Noted: 2023-07-14 | Resolved: 2023-07-17

## 2023-07-17 PROBLEM — J44.1 COPD EXACERBATION (HCC): Status: RESOLVED | Noted: 2023-07-14 | Resolved: 2023-07-17

## 2023-07-17 LAB
GLUCOSE BLD STRIP.AUTO-MCNC: 139 MG/DL (ref 65–100)
GLUCOSE BLD STRIP.AUTO-MCNC: 163 MG/DL (ref 65–100)
GLUCOSE BLD STRIP.AUTO-MCNC: 169 MG/DL (ref 65–100)
GLUCOSE BLD STRIP.AUTO-MCNC: 253 MG/DL (ref 65–100)
PERFORMED BY:: ABNORMAL

## 2023-07-17 PROCEDURE — 94761 N-INVAS EAR/PLS OXIMETRY MLT: CPT

## 2023-07-17 PROCEDURE — 6370000000 HC RX 637 (ALT 250 FOR IP): Performed by: FAMILY MEDICINE

## 2023-07-17 PROCEDURE — 1100000000 HC RM PRIVATE

## 2023-07-17 PROCEDURE — 97530 THERAPEUTIC ACTIVITIES: CPT

## 2023-07-17 PROCEDURE — 97161 PT EVAL LOW COMPLEX 20 MIN: CPT

## 2023-07-17 PROCEDURE — 97165 OT EVAL LOW COMPLEX 30 MIN: CPT

## 2023-07-17 PROCEDURE — 82962 GLUCOSE BLOOD TEST: CPT

## 2023-07-17 PROCEDURE — 6370000000 HC RX 637 (ALT 250 FOR IP): Performed by: INTERNAL MEDICINE

## 2023-07-17 PROCEDURE — 94640 AIRWAY INHALATION TREATMENT: CPT

## 2023-07-17 PROCEDURE — 6360000002 HC RX W HCPCS: Performed by: FAMILY MEDICINE

## 2023-07-17 PROCEDURE — 2580000003 HC RX 258: Performed by: FAMILY MEDICINE

## 2023-07-17 RX ORDER — IPRATROPIUM BROMIDE AND ALBUTEROL SULFATE 2.5; .5 MG/3ML; MG/3ML
3 SOLUTION RESPIRATORY (INHALATION) EVERY 6 HOURS
Qty: 360 ML | Refills: 2 | Status: SHIPPED | OUTPATIENT
Start: 2023-07-17

## 2023-07-17 RX ORDER — BUDESONIDE AND FORMOTEROL FUMARATE DIHYDRATE 160; 4.5 UG/1; UG/1
2 AEROSOL RESPIRATORY (INHALATION) 2 TIMES DAILY
Qty: 10.2 G | Refills: 3 | Status: SHIPPED | OUTPATIENT
Start: 2023-07-17

## 2023-07-17 RX ORDER — LISINOPRIL 10 MG/1
10 TABLET ORAL DAILY
Qty: 30 TABLET | Refills: 3 | Status: SHIPPED | OUTPATIENT
Start: 2023-07-18

## 2023-07-17 RX ORDER — BUDESONIDE AND FORMOTEROL FUMARATE DIHYDRATE 160; 4.5 UG/1; UG/1
2 AEROSOL RESPIRATORY (INHALATION) 2 TIMES DAILY
Status: DISCONTINUED | OUTPATIENT
Start: 2023-07-17 | End: 2023-07-19 | Stop reason: HOSPADM

## 2023-07-17 RX ORDER — PREDNISONE 20 MG/1
20 TABLET ORAL 2 TIMES DAILY
Qty: 20 TABLET | Refills: 0 | Status: SHIPPED | OUTPATIENT
Start: 2023-07-17 | End: 2023-07-27

## 2023-07-17 RX ADMIN — BUDESONIDE AND FORMOTEROL FUMARATE DIHYDRATE 2 PUFF: 160; 4.5 AEROSOL RESPIRATORY (INHALATION) at 19:52

## 2023-07-17 RX ADMIN — ENOXAPARIN SODIUM 40 MG: 100 INJECTION SUBCUTANEOUS at 09:30

## 2023-07-17 RX ADMIN — ATORVASTATIN CALCIUM 40 MG: 40 TABLET, FILM COATED ORAL at 08:47

## 2023-07-17 RX ADMIN — INSULIN LISPRO 8 UNITS: 100 INJECTION, SOLUTION INTRAVENOUS; SUBCUTANEOUS at 18:08

## 2023-07-17 RX ADMIN — LEVETIRACETAM 1000 MG: 500 TABLET, FILM COATED ORAL at 08:46

## 2023-07-17 RX ADMIN — IPRATROPIUM BROMIDE AND ALBUTEROL SULFATE 1 DOSE: 2.5; .5 SOLUTION RESPIRATORY (INHALATION) at 19:52

## 2023-07-17 RX ADMIN — FLUOXETINE 20 MG: 20 CAPSULE ORAL at 08:47

## 2023-07-17 RX ADMIN — SODIUM CHLORIDE, PRESERVATIVE FREE 10 ML: 5 INJECTION INTRAVENOUS at 21:32

## 2023-07-17 RX ADMIN — CETIRIZINE HYDROCHLORIDE 5 MG: 10 TABLET, FILM COATED ORAL at 08:47

## 2023-07-17 RX ADMIN — SODIUM CHLORIDE, PRESERVATIVE FREE 10 ML: 5 INJECTION INTRAVENOUS at 08:47

## 2023-07-17 RX ADMIN — IPRATROPIUM BROMIDE AND ALBUTEROL SULFATE 1 DOSE: 2.5; .5 SOLUTION RESPIRATORY (INHALATION) at 01:51

## 2023-07-17 RX ADMIN — TRAZODONE HYDROCHLORIDE 50 MG: 50 TABLET ORAL at 21:32

## 2023-07-17 RX ADMIN — PANTOPRAZOLE SODIUM 40 MG: 40 TABLET, DELAYED RELEASE ORAL at 06:20

## 2023-07-17 RX ADMIN — CEFTRIAXONE SODIUM 1000 MG: 1 INJECTION, POWDER, FOR SOLUTION INTRAMUSCULAR; INTRAVENOUS at 12:56

## 2023-07-17 RX ADMIN — PREDNISONE 20 MG: 20 TABLET ORAL at 21:32

## 2023-07-17 RX ADMIN — IPRATROPIUM BROMIDE AND ALBUTEROL SULFATE 1 DOSE: 2.5; .5 SOLUTION RESPIRATORY (INHALATION) at 09:15

## 2023-07-17 RX ADMIN — LISINOPRIL 10 MG: 10 TABLET ORAL at 08:47

## 2023-07-17 RX ADMIN — ACETAMINOPHEN 650 MG: 325 TABLET ORAL at 10:27

## 2023-07-17 RX ADMIN — PREDNISONE 20 MG: 20 TABLET ORAL at 08:47

## 2023-07-17 RX ADMIN — LEVETIRACETAM 1000 MG: 500 TABLET, FILM COATED ORAL at 21:32

## 2023-07-17 RX ADMIN — IPRATROPIUM BROMIDE AND ALBUTEROL SULFATE 1 DOSE: 2.5; .5 SOLUTION RESPIRATORY (INHALATION) at 14:00

## 2023-07-17 ASSESSMENT — PAIN SCALES - WONG BAKER: WONGBAKER_NUMERICALRESPONSE: 2

## 2023-07-17 ASSESSMENT — PAIN SCALES - GENERAL
PAINLEVEL_OUTOF10: 3
PAINLEVEL_OUTOF10: 4

## 2023-07-17 ASSESSMENT — ENCOUNTER SYMPTOMS
SHORTNESS OF BREATH: 1
GASTROINTESTINAL NEGATIVE: 1
WHEEZING: 1

## 2023-07-17 NOTE — DISCHARGE INSTRUCTIONS
Discharge Instructions       PATIENT ID: Kevin Duenas  MRN: [id number]   YOB: 1965    DATE OF ADMISSION: 7/13/2023  DATE OF DISCHARGE: 7/17/2023    PRIMARY CARE PROVIDER: [unfilled]     ATTENDING PHYSICIAN: Dominga Wiggins MD   DISCHARGING PROVIDER: Dominga Wiggins MD    To contact this individual call [phone] and ask the  to page. If unavailable, ask to be transferred the Adult Hospitalist Department. DISCHARGE DIAGNOSES COPD    CONSULTATIONS: @[facility]@      PROCEDURES/SURGERIES: * No surgery found *    PENDING TEST RESULTS:   At the time of discharge the following test results are still pending: None    FOLLOW UP APPOINTMENTS:   None None    Follow up      None None    Follow up      Dominga Wiggins MD  [address] [place] [address]  [phone]    Schedule an appointment as soon as possible for a visit in 1 week(s)         ADDITIONAL CARE RECOMMENDATIONS: Discharge skilled care rehab    DIET: cardiac diet      ACTIVITY: activity as tolerated    Wound care: Wound Care Order:  submitted to Case Management. Please view [email or web address]     EQUIPMENT needed: ***      DISCHARGE MEDICATIONS:   See Medication Reconciliation Form    It is important that you take the medication exactly as they are prescribed. Keep your medication in the bottles provided by the pharmacist and keep a list of the medication names, dosages, and times to be taken in your wallet. Do not take other medications without consulting your doctor. NOTIFY YOUR PHYSICIAN FOR ANY OF THE FOLLOWING:   Fever over 101 degrees for 24 hours. Chest pain, shortness of breath, fever, chills, nausea, vomiting, diarrhea, change in mentation, falling, weakness, bleeding. Severe pain or pain not relieved by medications. Or, any other signs or symptoms that you may have questions about.       DISPOSITION:    Home With:   OT  PT  [facility]  RN       SNF/Inpatient Rehab/LTAC

## 2023-07-17 NOTE — PLAN OF CARE
PHYSICAL THERAPY EVALUATION  Patient: Marisela Fields (62 y.o. female)  Date: 7/17/2023  Primary Diagnosis: COPD exacerbation ([facility]) [J44.1]  COPD (chronic obstructive pulmonary disease) ([facility]) [J44.9]       Precautions: Fall Risk, General Precautions, Bed Alarm                In place during session: EKG/telemetry   ASSESSMENT  Pt is a 62 y.o. female admitted on 7/13/2023 for  c/o SOB, coughing, and vomiting x2 days, admitted 7/13/23 and currently being treated for COPD exacerbation, asthma, COPD, HTN, DM type I, and possible UTI. . Pt semi supine  upon PT arrival, agreeable to evaluation. Pt A&O x 4. Patient lives with SO and hx of stroke x 3 as per patient. Last one was 2/21/23. patient reported went to rehab for 2 weeks and no HH. Patient stood at eob, feeling dizzy. OT was called to assist due to unsafe sit to stand and dizziness. Patient with decreased HR,BP WFL. Patient able to take 4 side steps to the [facility]. Patient is at baseline function,but due to dizziness unable to go further. RN reported she can ambulate to bathroom with assist.Patient is unsteady but that is her baseline. She will benefit from cont of therapy but recs OP [facility] and family care . Based on the objective data described below, the patient currently presents with impaired strength, poor body mechanics, decreased activity tolerance, poor safety awareness, impaired balance, and impaired posture. (See below for objective details and assist levels). Overall pt tolerated session fair today with PT. Pt will benefit from continued skilled PT to address above deficits and return to PLOF. Current PT DC recommendation Home with Home Health Therapy and family assist once medically appropriate.      Start of Session End of Session   SPO2 (%) 100 100   Heart Rate (BPM) 49 56       Current Level of Function Impacting Discharge (mobility/balance): decreased gen strength and endurance     Other factors to consider for discharge: poor safety awareness, high risk for

## 2023-07-17 NOTE — PLAN OF CARE
OCCUPATIONAL THERAPY EVALUATION  Patient: Torie Enriquez (62 y.o. female)  Date: 7/17/2023  Primary Diagnosis: COPD exacerbation ([facility]) [J44.1]  COPD (chronic obstructive pulmonary disease) ([address]) [J44.9]       Precautions: Bed Alarm, Fall Risk                In place during session:EKG/telemetry     ASSESSMENT  Pt is a 62 y.o. female presenting to [facility] with c/o SOB, coughing, and vomiting x2 days, admitted 7/13/23 and currently being treated for COPD exacerbation, asthma, COPD, HTN, DM type I, and possible UTI. Pt received seated EOB with PT upon arrival, AXO x4, and agreeable to OT evaluation. Based on current observations, pt presents with decreased  functional mobility, independence in ADLs, high-level IADLs, ROM, strength, body mechanics, activity tolerance, endurance, balance (see below for objective details and assist levels). Overall, pt tolerates session fair with c/o dizziness with standing. Bed mobility and sit<>stand transfers completed twice with SBA and additional time overall. Socks adjusted while seated EOB with SBA using bending forward method. Pt appears to demo decreased LUE strength/AROM compared to RUE. VS checked post sit>stand transfer (/98, 46 HR ,and 100% O2). Further functional mobility deferred d/t pt's reports of dizziness and low HR. Once returned supine in bed, pt able to comb hair and wash face with set up A to provide items req'd to pt. Pt will benefit from continued skilled OT services to address current impairments and improve IND and safety with self cares and functional transfers/mobility. Current OT d/c recommendation Home with Home Health Therapy and family assist vs OPOT once medically appropriate. Other factors to consider for discharge: family/social support, DME, time since onset, severity of deficits, functional baseline     Patient will benefit from skilled therapy intervention to address the above noted impairments.        PLAN :  Recommendations and Planned

## 2023-07-17 NOTE — CARE COORDINATION
CM noted discharge order for SNF. CM met with patient to get choice for SNF. Patient gave choice for [facility]. Choice letter completed and placed on chart. Referral sent via [employer]. Patient's insurance requires authorization for SNF. Discharge is pending SNF acceptance and insurance authorization.

## 2023-07-18 LAB
GLUCOSE BLD STRIP.AUTO-MCNC: 146 MG/DL (ref 65–100)
GLUCOSE BLD STRIP.AUTO-MCNC: 171 MG/DL (ref 65–100)
GLUCOSE BLD STRIP.AUTO-MCNC: 275 MG/DL (ref 65–100)
GLUCOSE BLD STRIP.AUTO-MCNC: 314 MG/DL (ref 65–100)
PERFORMED BY:: ABNORMAL

## 2023-07-18 PROCEDURE — 94640 AIRWAY INHALATION TREATMENT: CPT

## 2023-07-18 PROCEDURE — 6370000000 HC RX 637 (ALT 250 FOR IP): Performed by: FAMILY MEDICINE

## 2023-07-18 PROCEDURE — 82962 GLUCOSE BLOOD TEST: CPT

## 2023-07-18 PROCEDURE — 94761 N-INVAS EAR/PLS OXIMETRY MLT: CPT

## 2023-07-18 PROCEDURE — 6360000002 HC RX W HCPCS: Performed by: FAMILY MEDICINE

## 2023-07-18 PROCEDURE — 6370000000 HC RX 637 (ALT 250 FOR IP): Performed by: INTERNAL MEDICINE

## 2023-07-18 PROCEDURE — 2580000003 HC RX 258: Performed by: FAMILY MEDICINE

## 2023-07-18 PROCEDURE — 1100000000 HC RM PRIVATE

## 2023-07-18 RX ADMIN — ATORVASTATIN CALCIUM 40 MG: 40 TABLET, FILM COATED ORAL at 08:11

## 2023-07-18 RX ADMIN — LISINOPRIL 10 MG: 10 TABLET ORAL at 08:12

## 2023-07-18 RX ADMIN — SODIUM CHLORIDE, PRESERVATIVE FREE 10 ML: 5 INJECTION INTRAVENOUS at 08:13

## 2023-07-18 RX ADMIN — INSULIN LISPRO 8 UNITS: 100 INJECTION, SOLUTION INTRAVENOUS; SUBCUTANEOUS at 08:13

## 2023-07-18 RX ADMIN — PREDNISONE 20 MG: 20 TABLET ORAL at 20:52

## 2023-07-18 RX ADMIN — PANTOPRAZOLE SODIUM 40 MG: 40 TABLET, DELAYED RELEASE ORAL at 06:29

## 2023-07-18 RX ADMIN — PREDNISONE 20 MG: 20 TABLET ORAL at 08:12

## 2023-07-18 RX ADMIN — TRAZODONE HYDROCHLORIDE 50 MG: 50 TABLET ORAL at 20:52

## 2023-07-18 RX ADMIN — ENOXAPARIN SODIUM 40 MG: 100 INJECTION SUBCUTANEOUS at 08:12

## 2023-07-18 RX ADMIN — CEFTRIAXONE SODIUM 1000 MG: 1 INJECTION, POWDER, FOR SOLUTION INTRAMUSCULAR; INTRAVENOUS at 11:03

## 2023-07-18 RX ADMIN — BUDESONIDE AND FORMOTEROL FUMARATE DIHYDRATE 2 PUFF: 160; 4.5 AEROSOL RESPIRATORY (INHALATION) at 19:38

## 2023-07-18 RX ADMIN — IPRATROPIUM BROMIDE AND ALBUTEROL SULFATE 1 DOSE: 2.5; .5 SOLUTION RESPIRATORY (INHALATION) at 08:07

## 2023-07-18 RX ADMIN — SODIUM CHLORIDE, PRESERVATIVE FREE 10 ML: 5 INJECTION INTRAVENOUS at 20:52

## 2023-07-18 RX ADMIN — FLUOXETINE 20 MG: 20 CAPSULE ORAL at 08:11

## 2023-07-18 RX ADMIN — CETIRIZINE HYDROCHLORIDE 5 MG: 10 TABLET, FILM COATED ORAL at 08:19

## 2023-07-18 RX ADMIN — LEVETIRACETAM 1000 MG: 500 TABLET, FILM COATED ORAL at 20:57

## 2023-07-18 RX ADMIN — IPRATROPIUM BROMIDE AND ALBUTEROL SULFATE 1 DOSE: 2.5; .5 SOLUTION RESPIRATORY (INHALATION) at 19:33

## 2023-07-18 RX ADMIN — IPRATROPIUM BROMIDE AND ALBUTEROL SULFATE 1 DOSE: 2.5; .5 SOLUTION RESPIRATORY (INHALATION) at 13:59

## 2023-07-18 RX ADMIN — INSULIN LISPRO 12 UNITS: 100 INJECTION, SOLUTION INTRAVENOUS; SUBCUTANEOUS at 17:59

## 2023-07-18 RX ADMIN — IPRATROPIUM BROMIDE AND ALBUTEROL SULFATE 1 DOSE: 2.5; .5 SOLUTION RESPIRATORY (INHALATION) at 01:13

## 2023-07-18 RX ADMIN — LEVETIRACETAM 1000 MG: 500 TABLET, FILM COATED ORAL at 08:12

## 2023-07-18 RX ADMIN — BUDESONIDE AND FORMOTEROL FUMARATE DIHYDRATE 2 PUFF: 160; 4.5 AEROSOL RESPIRATORY (INHALATION) at 08:29

## 2023-07-18 ASSESSMENT — ENCOUNTER SYMPTOMS
GASTROINTESTINAL NEGATIVE: 1
SHORTNESS OF BREATH: 1
WHEEZING: 1

## 2023-07-18 NOTE — CARE COORDINATION
Therapy notes sent to [address] [place] [address] and asked to start insurance authorization. Patient will discharge to [address] [place] [address] today. CM is waiting for Rm#. Nurse can call report to [phone]. Patient will require medical transport at discharge. Primary nurse is aware. Transition of Care Plan:    RUR: 11%  Prior Level of Functioning: Home selfcare  Disposition: SNF  If SNF or IPR: Date FOC offered: 7/17/2023  Date FOC received: 7/17/2023  Accepting facility: [address]  Date authorization started with reference number: 7/18/2023  Date authorization received and expires: 7/18/2023  Follow up appointments: Set by facility  DME needed: None  Transportation at discharge:Medical   IM/IMM Medicare/ letter given: Yes  Is patient a [place] and connected with [facility]? No If yes, was [employer] transfer form completed and VA notified? Caregiver Contact: Tavo Ang  Discharge Caregiver contacted prior to discharge? Yes  Care Conference needed?  No  Barriers to discharge: None

## 2023-07-19 VITALS
OXYGEN SATURATION: 100 % | BODY MASS INDEX: 30.19 KG/M2 | HEART RATE: 61 BPM | RESPIRATION RATE: 20 BRPM | SYSTOLIC BLOOD PRESSURE: 120 MMHG | TEMPERATURE: 97.7 F | HEIGHT: 66 IN | WEIGHT: 187.83 LBS | DIASTOLIC BLOOD PRESSURE: 72 MMHG

## 2023-07-19 LAB
GLUCOSE BLD STRIP.AUTO-MCNC: 117 MG/DL (ref 65–100)
GLUCOSE BLD STRIP.AUTO-MCNC: 260 MG/DL (ref 65–100)
GLUCOSE BLD STRIP.AUTO-MCNC: 402 MG/DL (ref 65–100)
PERFORMED BY:: ABNORMAL

## 2023-07-19 PROCEDURE — 2580000003 HC RX 258: Performed by: FAMILY MEDICINE

## 2023-07-19 PROCEDURE — 6360000002 HC RX W HCPCS: Performed by: FAMILY MEDICINE

## 2023-07-19 PROCEDURE — 6370000000 HC RX 637 (ALT 250 FOR IP): Performed by: FAMILY MEDICINE

## 2023-07-19 PROCEDURE — 94761 N-INVAS EAR/PLS OXIMETRY MLT: CPT

## 2023-07-19 PROCEDURE — 6370000000 HC RX 637 (ALT 250 FOR IP): Performed by: INTERNAL MEDICINE

## 2023-07-19 PROCEDURE — 82962 GLUCOSE BLOOD TEST: CPT

## 2023-07-19 PROCEDURE — 94640 AIRWAY INHALATION TREATMENT: CPT

## 2023-07-19 RX ADMIN — IPRATROPIUM BROMIDE AND ALBUTEROL SULFATE 1 DOSE: 2.5; .5 SOLUTION RESPIRATORY (INHALATION) at 02:09

## 2023-07-19 RX ADMIN — SODIUM CHLORIDE, PRESERVATIVE FREE 10 ML: 5 INJECTION INTRAVENOUS at 08:29

## 2023-07-19 RX ADMIN — LISINOPRIL 10 MG: 10 TABLET ORAL at 08:28

## 2023-07-19 RX ADMIN — IPRATROPIUM BROMIDE AND ALBUTEROL SULFATE 1 DOSE: 2.5; .5 SOLUTION RESPIRATORY (INHALATION) at 08:46

## 2023-07-19 RX ADMIN — ATORVASTATIN CALCIUM 40 MG: 40 TABLET, FILM COATED ORAL at 08:28

## 2023-07-19 RX ADMIN — ENOXAPARIN SODIUM 40 MG: 100 INJECTION SUBCUTANEOUS at 08:27

## 2023-07-19 RX ADMIN — LEVETIRACETAM 1000 MG: 500 TABLET, FILM COATED ORAL at 08:28

## 2023-07-19 RX ADMIN — FLUOXETINE 20 MG: 20 CAPSULE ORAL at 08:28

## 2023-07-19 RX ADMIN — CETIRIZINE HYDROCHLORIDE 5 MG: 10 TABLET, FILM COATED ORAL at 08:31

## 2023-07-19 RX ADMIN — BUDESONIDE AND FORMOTEROL FUMARATE DIHYDRATE 2 PUFF: 160; 4.5 AEROSOL RESPIRATORY (INHALATION) at 08:46

## 2023-07-19 RX ADMIN — PANTOPRAZOLE SODIUM 40 MG: 40 TABLET, DELAYED RELEASE ORAL at 07:10

## 2023-07-19 RX ADMIN — PREDNISONE 20 MG: 20 TABLET ORAL at 08:28

## 2023-07-19 RX ADMIN — INSULIN LISPRO 15 UNITS: 100 INJECTION, SOLUTION INTRAVENOUS; SUBCUTANEOUS at 08:27

## 2023-07-19 ASSESSMENT — ENCOUNTER SYMPTOMS
GASTROINTESTINAL NEGATIVE: 1
SHORTNESS OF BREATH: 1
WHEEZING: 1

## 2023-08-16 ENCOUNTER — HOME HEALTH ADMISSION (OUTPATIENT)
Age: 58
End: 2023-08-16
Payer: MEDICARE

## 2023-08-17 ENCOUNTER — HOME CARE VISIT (OUTPATIENT)
Age: 58
End: 2023-08-17

## 2023-08-18 ENCOUNTER — HOME CARE VISIT (OUTPATIENT)
Age: 58
End: 2023-08-18

## 2023-08-21 ENCOUNTER — HOME CARE VISIT (OUTPATIENT)
Age: 58
End: 2023-08-21
Payer: MEDICARE

## 2023-08-21 PROCEDURE — G0151 HHCP-SERV OF PT,EA 15 MIN: HCPCS

## 2023-08-21 ASSESSMENT — ENCOUNTER SYMPTOMS: DYSPNEA ACTIVITY LEVEL: AFTER AMBULATING MORE THAN 20 FT

## 2023-08-24 VITALS
TEMPERATURE: 97.1 F | RESPIRATION RATE: 14 BRPM | SYSTOLIC BLOOD PRESSURE: 134 MMHG | HEART RATE: 65 BPM | OXYGEN SATURATION: 99 % | DIASTOLIC BLOOD PRESSURE: 82 MMHG

## 2023-08-25 ENCOUNTER — HOME CARE VISIT (OUTPATIENT)
Age: 58
End: 2023-08-25
Payer: MEDICARE

## 2023-08-25 PROCEDURE — G0157 HHC PT ASSISTANT EA 15: HCPCS

## 2023-08-27 ENCOUNTER — HOSPITAL ENCOUNTER (EMERGENCY)
Facility: HOSPITAL | Age: 58
Discharge: HOME | End: 2023-08-27
Payer: MEDICARE

## 2023-08-27 ENCOUNTER — APPOINTMENT (OUTPATIENT)
Facility: HOSPITAL | Age: 58
End: 2023-08-27
Payer: MEDICARE

## 2023-08-27 VITALS
WEIGHT: 225 LBS | HEART RATE: 73 BPM | TEMPERATURE: 98.1 F | DIASTOLIC BLOOD PRESSURE: 91 MMHG | SYSTOLIC BLOOD PRESSURE: 165 MMHG | BODY MASS INDEX: 36.16 KG/M2 | RESPIRATION RATE: 19 BRPM | OXYGEN SATURATION: 99 % | HEIGHT: 66 IN

## 2023-08-27 DIAGNOSIS — K62.5 RECTAL BLEEDING: ICD-10-CM

## 2023-08-27 DIAGNOSIS — R10.2 PELVIC PAIN: ICD-10-CM

## 2023-08-27 DIAGNOSIS — K64.9 HEMORRHOIDS, UNSPECIFIED HEMORRHOID TYPE: Primary | ICD-10-CM

## 2023-08-27 LAB
ABO + RH BLD: NORMAL
ALBUMIN SERPL-MCNC: 3.2 G/DL (ref 3.5–5)
ALBUMIN/GLOB SERPL: 0.9 (ref 1.1–2.2)
ALP SERPL-CCNC: 66 U/L (ref 45–117)
ALT SERPL-CCNC: 22 U/L (ref 12–78)
ANION GAP SERPL CALC-SCNC: 7 MMOL/L (ref 5–15)
APPEARANCE UR: CLEAR
AST SERPL W P-5'-P-CCNC: 13 U/L (ref 15–37)
BACTERIA URNS QL MICRO: NEGATIVE /HPF
BASOPHILS # BLD: 0 K/UL (ref 0–0.1)
BASOPHILS NFR BLD: 1 % (ref 0–1)
BILIRUB SERPL-MCNC: 0.1 MG/DL (ref 0.2–1)
BILIRUB UR QL: NEGATIVE
BLOOD GROUP ANTIBODIES SERPL: NEGATIVE
BUN SERPL-MCNC: 11 MG/DL (ref 6–20)
BUN/CREAT SERPL: 12 (ref 12–20)
CA-I BLD-MCNC: 8.1 MG/DL (ref 8.5–10.1)
CHLORIDE SERPL-SCNC: 112 MMOL/L (ref 97–108)
CO2 SERPL-SCNC: 25 MMOL/L (ref 21–32)
COLLECT DATE STL: 924
COLOR UR: NORMAL
CREAT SERPL-MCNC: 0.9 MG/DL (ref 0.55–1.02)
DIFFERENTIAL METHOD BLD: ABNORMAL
EOSINOPHIL # BLD: 0.1 K/UL (ref 0–0.4)
EOSINOPHIL NFR BLD: 2 % (ref 0–7)
EPITH CASTS URNS QL MICRO: NORMAL /LPF
ERYTHROCYTE [DISTWIDTH] IN BLOOD BY AUTOMATED COUNT: 14.8 % (ref 11.5–14.5)
GLOBULIN SER CALC-MCNC: 3.4 G/DL (ref 2–4)
GLUCOSE SERPL-MCNC: 114 MG/DL (ref 65–100)
GLUCOSE UR STRIP.AUTO-MCNC: NEGATIVE MG/DL
HCT VFR BLD AUTO: 33.8 % (ref 35–47)
HEMOCCULT SP1 STL QL: POSITIVE
HGB BLD-MCNC: 10.9 G/DL (ref 11.5–16)
HGB UR QL STRIP: NEGATIVE
IMM GRANULOCYTES # BLD AUTO: 0 K/UL (ref 0–0.04)
IMM GRANULOCYTES NFR BLD AUTO: 0 % (ref 0–0.5)
INR PPP: 0.9 (ref 0.9–1.1)
KETONES UR QL STRIP.AUTO: NEGATIVE MG/DL
LEUKOCYTE ESTERASE UR QL STRIP.AUTO: NEGATIVE
LYMPHOCYTES # BLD: 2.4 K/UL (ref 0.8–3.5)
LYMPHOCYTES NFR BLD: 58 % (ref 12–49)
MCH RBC QN AUTO: 28.9 PG (ref 26–34)
MCHC RBC AUTO-ENTMCNC: 32.2 G/DL (ref 30–36.5)
MCV RBC AUTO: 89.7 FL (ref 80–99)
MONOCYTES # BLD: 0.3 K/UL (ref 0–1)
MONOCYTES NFR BLD: 6 % (ref 5–13)
NEUTS SEG # BLD: 1.3 K/UL (ref 1.8–8)
NEUTS SEG NFR BLD: 33 % (ref 32–75)
NITRITE UR QL STRIP.AUTO: NEGATIVE
NRBC # BLD: 0 K/UL (ref 0–0.01)
NRBC BLD-RTO: 0 PER 100 WBC
PH UR STRIP: 7 (ref 5–8)
PLATELET # BLD AUTO: 262 K/UL (ref 150–400)
PMV BLD AUTO: 10.2 FL (ref 8.9–12.9)
POTASSIUM SERPL-SCNC: 3.8 MMOL/L (ref 3.5–5.1)
PROT SERPL-MCNC: 6.6 G/DL (ref 6.4–8.2)
PROT UR STRIP-MCNC: NEGATIVE MG/DL
PROTHROMBIN TIME: 13 SEC (ref 11.9–14.6)
RBC # BLD AUTO: 3.77 M/UL (ref 3.8–5.2)
RBC #/AREA URNS HPF: NORMAL /HPF (ref 0–5)
SODIUM SERPL-SCNC: 144 MMOL/L (ref 136–145)
SP GR UR REFRACTOMETRY: 1.01 (ref 1–1.03)
SPECIMEN EXP DATE BLD: NORMAL
URINE CULTURE IF INDICATED: NORMAL
UROBILINOGEN UR QL STRIP.AUTO: 0.1 EU/DL (ref 0.1–1)
WBC # BLD AUTO: 4.1 K/UL (ref 3.6–11)
WBC URNS QL MICRO: NORMAL /HPF (ref 0–4)

## 2023-08-27 PROCEDURE — 6360000002 HC RX W HCPCS

## 2023-08-27 PROCEDURE — 6360000004 HC RX CONTRAST MEDICATION

## 2023-08-27 PROCEDURE — 96374 THER/PROPH/DIAG INJ IV PUSH: CPT

## 2023-08-27 PROCEDURE — 81001 URINALYSIS AUTO W/SCOPE: CPT

## 2023-08-27 PROCEDURE — 6370000000 HC RX 637 (ALT 250 FOR IP)

## 2023-08-27 PROCEDURE — 82272 OCCULT BLD FECES 1-3 TESTS: CPT

## 2023-08-27 PROCEDURE — 85025 COMPLETE CBC W/AUTO DIFF WBC: CPT

## 2023-08-27 PROCEDURE — 85610 PROTHROMBIN TIME: CPT

## 2023-08-27 PROCEDURE — 86900 BLOOD TYPING SEROLOGIC ABO: CPT

## 2023-08-27 PROCEDURE — 86850 RBC ANTIBODY SCREEN: CPT

## 2023-08-27 PROCEDURE — 36415 COLL VENOUS BLD VENIPUNCTURE: CPT

## 2023-08-27 PROCEDURE — 86901 BLOOD TYPING SEROLOGIC RH(D): CPT

## 2023-08-27 PROCEDURE — 99285 EMERGENCY DEPT VISIT HI MDM: CPT

## 2023-08-27 PROCEDURE — 80053 COMPREHEN METABOLIC PANEL: CPT

## 2023-08-27 PROCEDURE — 74177 CT ABD & PELVIS W/CONTRAST: CPT

## 2023-08-27 RX ORDER — LIDOCAINE HYDROCHLORIDE 20 MG/ML
JELLY TOPICAL PRN
Status: DISCONTINUED | OUTPATIENT
Start: 2023-08-27 | End: 2023-08-28 | Stop reason: HOSPADM

## 2023-08-27 RX ORDER — MORPHINE SULFATE 4 MG/ML
4 INJECTION, SOLUTION INTRAMUSCULAR; INTRAVENOUS
Status: COMPLETED | OUTPATIENT
Start: 2023-08-27 | End: 2023-08-27

## 2023-08-27 RX ORDER — B-SURE TRAVELER 0.5 G/G
SOLUTION RECTAL; TOPICAL PRN
Qty: 10 EACH | Refills: 0 | Status: SHIPPED | OUTPATIENT
Start: 2023-08-27

## 2023-08-27 RX ADMIN — IOPAMIDOL 100 ML: 755 INJECTION, SOLUTION INTRAVENOUS at 20:47

## 2023-08-27 RX ADMIN — MORPHINE SULFATE 4 MG: 4 INJECTION INTRAVENOUS at 21:41

## 2023-08-27 RX ADMIN — LIDOCAINE HYDROCHLORIDE: 20 JELLY TOPICAL at 20:23

## 2023-08-27 ASSESSMENT — LIFESTYLE VARIABLES
HOW OFTEN DO YOU HAVE A DRINK CONTAINING ALCOHOL: MONTHLY OR LESS
HOW MANY STANDARD DRINKS CONTAINING ALCOHOL DO YOU HAVE ON A TYPICAL DAY: PATIENT DOES NOT DRINK

## 2023-08-27 ASSESSMENT — PAIN SCALES - GENERAL
PAINLEVEL_OUTOF10: 6
PAINLEVEL_OUTOF10: 5
PAINLEVEL_OUTOF10: 0

## 2023-08-27 ASSESSMENT — PAIN - FUNCTIONAL ASSESSMENT: PAIN_FUNCTIONAL_ASSESSMENT: 0-10

## 2023-08-27 NOTE — ED TRIAGE NOTES
Patient arrived via EMS. Patient is from home, coming in for dark stools, abdominal pain, and visual blood in stool. Patient has a hx of hemorrhoids and stomach ulcers.

## 2023-08-27 NOTE — ED PROVIDER NOTES
[facility] EMERGENCY DEPT  EMERGENCY DEPARTMENT HISTORY AND PHYSICAL EXAM      Date: 8/27/2023  Patient Name: Norberto Colon  MRN: [id number]  [address] [facility] 1965  Date of evaluation: 8/27/2023  Provider: DRAKE Hall NP   Note Started: 7:54 PM EDT 8/27/23    HISTORY OF PRESENT ILLNESS     Chief Complaint   Patient presents with    Rectal Bleeding       History Provided By: Patient    HPI: Norberto Colon is a 62 y.o. female with a history as below presents with rectal bleeding. Patient states she was eating dinner around 4 PM today when she passed gas and felt wetness. That is when she noticed bleeding from her rectum. She endorses pain at her rectum, and her vagina, and suprapubic region since that time. Pain is sharp and consistent with previous hemorrhoid pain. She denies fevers, nausea, vomiting, chest pain, difficulty breathing, back pain, swelling. She endorses one brief episode of lightheadedness after eating dinner, while sitting down. No OTC treatment PTA. PAST MEDICAL HISTORY   Past Medical History:  Past Medical History:   Diagnosis Date    Alcohol abuse     Anxiety attack     Cocaine abuse ([address])     COVID-19     Depression     Fracture of left wrist     Hemorrhoids     HTN (hypertension)     Ill-defined condition     hemorrhoids    Orthostatic hypotension     Perineal rash     Rectal bleeding     Rib contusion, left, initial encounter     Seizures ([address])     Stroke ([address])     Syncope     Toe fracture     great toe    Trichomonas infection     UTI (urinary tract infection)        Past Surgical History:  No past surgical history on file. Family History:  No family history on file.     Social History:  Social History     Tobacco Use    Smoking status: Every Day     Packs/day: 0.50     Types: Cigarettes    Smokeless tobacco: Never   Substance Use Topics    Alcohol use: Not Currently     Comment: 12 pack per day    Drug use: Not Currently     Types: Marijuana Gerhardt Salle), Cocaine     Comment: socially

## 2023-08-28 NOTE — DISCHARGE INSTRUCTIONS
follow-up with a doctor, nurse practitioner, or physician assistant to:  (1) confirm your diagnosis,  (2) re-evaluation of changes in your illness and treatment, and  (3) for ongoing care. Please take your discharge instructions with you when you go to your follow-up appointment. If you have any problem arranging a follow-up appointment, contact the Emergency Department. If your symptoms become worse or you do not improve as expected and you are unable to reach your health care provider, please return to the Emergency Department. We are available 24 hours a day. If a prescription has been provided, please have it filled as soon as possible to prevent a delay in treatment. If you have any questions or reservations about taking the medication due to side effects or interactions with other medications, please call your primary care provider or contact the ER.

## 2023-08-28 NOTE — ED NOTES
Ptis a/o times 4- pt is ambulatory w/ steady gait. Pt medicad cab called for transportation. Pt to wait in the waiting area for transportation.  Pt given dci     Cezar Cote RN  08/27/23 4889

## 2023-08-29 ENCOUNTER — HOME CARE VISIT (OUTPATIENT)
Age: 58
End: 2023-08-29
Payer: MEDICARE

## 2023-08-29 VITALS
HEART RATE: 82 BPM | TEMPERATURE: 97.2 F | RESPIRATION RATE: 17 BRPM | DIASTOLIC BLOOD PRESSURE: 80 MMHG | TEMPERATURE: 98.6 F | DIASTOLIC BLOOD PRESSURE: 78 MMHG | RESPIRATION RATE: 17 BRPM | OXYGEN SATURATION: 96 % | HEART RATE: 76 BPM | OXYGEN SATURATION: 98 % | SYSTOLIC BLOOD PRESSURE: 128 MMHG | SYSTOLIC BLOOD PRESSURE: 120 MMHG

## 2023-08-29 PROCEDURE — G0157 HHC PT ASSISTANT EA 15: HCPCS

## 2023-08-29 ASSESSMENT — ENCOUNTER SYMPTOMS: PAIN LOCATION - PAIN QUALITY: ACHE

## 2023-08-29 NOTE — HOME HEALTH
SUBJECTIVE: Patient states that she is doing fair today, states that her low back is acting up today. When asked about pain patient relates it to 10/10 pain. Patient reports pain is not abnormal and that it comes and goes. She reports that pain can last weeks, months, or days. CAREGIVER INVOLVEMENT/ASSISTANCE NEEDED FOR: Patient is currently reliant on assistance for completion for most to all ADL's such as cooking, cleaning, bathing and dressing. OBJECTIVE:  See interventions. PATIENT RESPONSE TO TREATMENT: Patient verbalized lower pain levels post PT today. Stating that she felt better after completion of therex and gait today. Patient in visualy much better spirits post PT. PATIENT LEVEL OF UNDERSTANDING OF EDUCATION PROVIDED: Patient given education on use of cold therapy and use of back brace to help with overall pain and soreness. Patient given advice and  to not use icy hot patch in combo with heat pack. Educated on importance of exercises to help with overall discomfort and how bridges and importance of gluteal strength can help with this. Patient verbalized understanind of all education provided during session today. ASSESSMENT OF PROGRESS TOWARD GOALS: Patient was seen for PT follow up session for LE strengthening, gait training, and transfer training. Patient completed therex today for 20 reps of most to all exercises, patient demosntrating weakness throught L LE and ataxic patterns with many exercises today. Sit to stands from lower couch surface today, patient needed MIN A x1, when moved couch cushion to elevate patient she was able to complete with CGA. Gait completed today with patient completing short distances of ~60ft before needing rest break. She lacks heel strike and presents with LoB and required CGA to complete. Despite increased pain patient was able to complete most to all of session today without complaint of low back pain.      HOME EXERCISE PROGRAM: Patient given

## 2023-08-30 NOTE — HOME HEALTH
SUBJECTIVE: Patient states that she is doing well today, notes that she has no new complaints or concerns. Denies any falls or changes in medication at this time. CAREGIVER INVOLVEMENT/ASSISTANCE NEEDED FOR: Patient is currently reliant on assistance for completion for most to all ADL's such as cooking, cleaning, bathing and dressing. OBJECTIVE:  See interventions. PATIENT RESPONSE TO TREATMENT: Pt encouraged by her tolerance to PT today, she was able to participate more in PT than she had previously thought possible. Reported improved confidence following PT today. PATIENT LEVEL OF UNDERSTANDING OF EDUCATION PROVIDED: Pt provided education on importance of HEP and how often to complete to increase strength and decrease overall stiffness. Educated on signs and sx of infection and steps to take if one were to arise. Educated on importance of decreasing chance and bed sore and to make sure she is performing weight shifting every 1-2 hours. ASSESSMENT OF PROGRESS TOWARD GOALS: Patient was seen for PT follow up session for LE strengthening, gait training, and transfer training. Gait completed without use of AD on even and uneven surfaces outside today. Patient needing constant guarding as she was unsteady. Advised patient on importance of use of AD when ambulating inside of home and while outdoors. Patients front door was locked so she had to walk around her home to let me in. HOME EXERCISE PROGRAM: Patient given written and verbal instructions on HEP and how often to complete to maintain compliance. THE FOLLOWING DISCHARGE PLANNING WAS DISCUSSED WITH THE PATIENT/CAREGIVER: Patient to be D/C from [facility] PT when all goals have been met or progressed well towards. PLAN FOR NEXT VISIT: Cont PT PoC with focus on progression of LE strengthening and gait training to help patient progress with functional independence.

## 2023-08-31 ENCOUNTER — HOME CARE VISIT (OUTPATIENT)
Age: 58
End: 2023-08-31
Payer: MEDICARE

## 2023-08-31 PROCEDURE — G0157 HHC PT ASSISTANT EA 15: HCPCS

## 2023-09-01 VITALS
SYSTOLIC BLOOD PRESSURE: 124 MMHG | OXYGEN SATURATION: 97 % | TEMPERATURE: 98.4 F | HEART RATE: 76 BPM | DIASTOLIC BLOOD PRESSURE: 88 MMHG | RESPIRATION RATE: 17 BRPM

## 2023-09-01 ASSESSMENT — ENCOUNTER SYMPTOMS: PAIN LOCATION - PAIN QUALITY: ACHE

## 2023-09-01 NOTE — HOME HEALTH
SUBJECTIVE: Patient states that she is doing fair today, states that her low back is acting up today. When asked about pain patient relates it to 7/10 pain. Reports it feeling better today overall but still prevalent compared to the previous session. CAREGIVER INVOLVEMENT/ASSISTANCE NEEDED FOR: Patient is currently reliant on assistance for completion for most to all ADL's such as cooking, cleaning, bathing and dressing. OBJECTIVE:  See interventions. PATIENT RESPONSE TO TREATMENT: Patient verbalized lower pain levels post PT today. Stating that she felt better after completion of therex and gait today. Patient in visualy much better spirits post PT. PATIENT LEVEL OF UNDERSTANDING OF EDUCATION PROVIDED: Patient verbalized understanding of all education provided during session today. ASSESSMENT OF PROGRESS TOWARD GOALS: Patient was seen for PT follow up session for LE strengthening, gait training, and transfer training. Patient completed therex today for 20 reps of most to all exercises, patient demosntrating weakness throught L LE and ataxic patterns with many exercises today. She was able to tolerate some therex in seated and should progress to standing therex next session. Sit to stands from edge of couch with use of B UE pushoff and less assistance needed today. Previous session required MIN A x1 and today she required CGA. HOME EXERCISE PROGRAM: Patient given written and verbal instructions on HEP and how often to complete to maintain compliance. THE FOLLOWING DISCHARGE PLANNING WAS DISCUSSED WITH THE PATIENT/CAREGIVER: Patient to be D/C from [facility] PT when all goals have been met or progressed well towards. PLAN FOR NEXT VISIT: Cont PT PoC with focus on progression of LE strengthening and gait training to help patient progress with functional independence.

## 2023-09-06 ENCOUNTER — HOME CARE VISIT (OUTPATIENT)
Age: 58
End: 2023-09-06
Payer: MEDICARE

## 2023-09-08 ENCOUNTER — HOME CARE VISIT (OUTPATIENT)
Age: 58
End: 2023-09-08
Payer: MEDICARE

## 2023-09-08 PROCEDURE — G0157 HHC PT ASSISTANT EA 15: HCPCS

## 2023-09-12 ENCOUNTER — HOME CARE VISIT (OUTPATIENT)
Age: 58
End: 2023-09-12
Payer: MEDICARE

## 2023-09-12 PROCEDURE — G0157 HHC PT ASSISTANT EA 15: HCPCS

## 2023-09-13 VITALS
TEMPERATURE: 97.7 F | HEART RATE: 76 BPM | OXYGEN SATURATION: 98 % | SYSTOLIC BLOOD PRESSURE: 142 MMHG | RESPIRATION RATE: 17 BRPM | RESPIRATION RATE: 17 BRPM | TEMPERATURE: 98.4 F | SYSTOLIC BLOOD PRESSURE: 124 MMHG | OXYGEN SATURATION: 98 % | HEART RATE: 76 BPM | DIASTOLIC BLOOD PRESSURE: 78 MMHG | DIASTOLIC BLOOD PRESSURE: 88 MMHG

## 2023-09-13 NOTE — HOME HEALTH
SUBJECTIVE: Patient states that she is doing fair today, states that she is in no current pain just has a lack of motivation today. Denies any pain and reports that she has been completing HEP with assistance from her . Denies any falls or changes in medication at this time. CAREGIVER INVOLVEMENT/ASSISTANCE NEEDED FOR: Patient is currently reliant on assistance for completion for most to all ADL's such as cooking, cleaning, bathing and dressing. OBJECTIVE:  See interventions. PATIENT RESPONSE TO TREATMENT: Patient verbalized lower pain levels post PT today. Stating that she felt better after completion of therex and gait today. Patient in visualy much better spirits post PT. PATIENT LEVEL OF UNDERSTANDING OF EDUCATION PROVIDED: Patient educated on safety awarness with completion of star training today. Patient educated on safety of ascent and descent with use of SPC. Patient and caregiver both verbalized understanding of all education provided today. ASSESSMENT OF PROGRESS TOWARD GOALS: Patient was seen for PT follow up session for LE strengthening, gait training, and transfer training. Patient completed all therex asked of her today despite lack of motivation upon arrival to session today. She completed 20 reps of all therex asked of her, but still demosntrates increased weakness thorugh L LE and UE. Stairs were the main focus of todays transfer training. Stairs completed with use of SPC on level surfaces 4 steps completed with SBA needed for safety. Patient completing with step to pattenr and cuing needed for proper sequencing for safety and ease of transfer. HOME EXERCISE PROGRAM: Patient given written and verbal instructions on HEP and how often to complete to maintain compliance. THE FOLLOWING DISCHARGE PLANNING WAS DISCUSSED WITH THE PATIENT/CAREGIVER: Patient to be D/C from [facility] PT when all goals have been met or progressed well towards.      PLAN FOR NEXT VISIT: Cont PT PoC with

## 2023-09-13 NOTE — HOME HEALTH
SUBJECTIVE: Patient states that she is doing fair today, states that her low back is acting up today. When asked about pain patient relates it to 7/10 pain. Reports it feeling better today overall but still prevalent compared to the previous session. CAREGIVER INVOLVEMENT/ASSISTANCE NEEDED FOR: Patient is currently reliant on assistance for completion for most to all ADL's such as cooking, cleaning, bathing and dressing. OBJECTIVE:  See interventions. PATIENT RESPONSE TO TREATMENT: Patient verbalized lower pain levels post PT today. Stating that she felt better after completion of therex and gait today. Patient in visualy much better spirits post PT. PATIENT LEVEL OF UNDERSTANDING OF EDUCATION PROVIDED: Patient verbalized understanding of all education provided during session today. ASSESSMENT OF PROGRESS TOWARD GOALS: Patient was seen for PT follow up session for LE strengthening, gait training, and transfer training. Patient completed therex today but was unable to complete most reps on the left side today, upon arrival she noted that she was not feeling her normal self but vitals were within normal limits. Patient completed gait training for further distances today compared to previous sessions. >200ft completed showing improvement as most previous distance ambulated was 100ft. HOME EXERCISE PROGRAM: Patient given written and verbal instructions on HEP and how often to complete to maintain compliance. THE FOLLOWING DISCHARGE PLANNING WAS DISCUSSED WITH THE PATIENT/CAREGIVER: Patient to be D/C from [address] [place] [address],[address] PT when all goals have been met or progressed well towards. PLAN FOR NEXT VISIT: Cont PT PoC with focus on progression of LE strengthening and gait training to help patient progress with functional independence.

## 2023-09-14 ENCOUNTER — HOME CARE VISIT (OUTPATIENT)
Age: 58
End: 2023-09-14
Payer: MEDICARE

## 2023-09-14 PROCEDURE — G0157 HHC PT ASSISTANT EA 15: HCPCS

## 2023-09-15 VITALS
OXYGEN SATURATION: 97 % | SYSTOLIC BLOOD PRESSURE: 142 MMHG | DIASTOLIC BLOOD PRESSURE: 74 MMHG | RESPIRATION RATE: 17 BRPM | HEART RATE: 76 BPM | TEMPERATURE: 97.8 F

## 2023-09-15 NOTE — HOME HEALTH
SUBJECTIVE: Patient states that she is doing fair today, states that her low back is acting up today. When asked about pain patient relates it to 7/10 pain. Reports it feeling better today overall but still prevalent compared to the previous session. CAREGIVER INVOLVEMENT/ASSISTANCE NEEDED FOR: Patient is currently reliant on assistance for completion for most to all ADL's such as cooking, cleaning, bathing and dressing. OBJECTIVE:  See interventions. PATIENT RESPONSE TO TREATMENT: Patient verbalized lower pain levels post PT today. Stating that she felt better after completion of therex and gait today. Patient in visualy much better spirits post PT. PATIENT LEVEL OF UNDERSTANDING OF EDUCATION PROVIDED: Patient verbalized understanding of all education provided during session today. ASSESSMENT OF PROGRESS TOWARD GOALS: Gait completed outdoors with use of SPC on level and unlevel surfaces for a total distance >300ft outdoors today. Ataxic pattern demosntrated but patient showing better tolerance and safety awareness than any previous session today. Patient completing stairs to enter and exit home with use of SPC with MOD I demosntrated with completion of three steps. No cuing needed for prper sequencing of cane and LE's for ease of the three steps. Showing improvement in overall function as she previously required SBA with cuing needed for safety awareness and cuing for proper sequenxing. MOD I demosntrated with ability to complete sit to stand transfer today. Able to complete with B UE pushoff and 10 reps completed. Showing improvement in overall tolerance as she previously required SBA for safety of completion. HOME EXERCISE PROGRAM: Patient given written and verbal instructions on HEP and how often to complete to maintain compliance.      THE FOLLOWING DISCHARGE PLANNING WAS DISCUSSED WITH THE PATIENT/CAREGIVER: Patient to be D/C from [facility] PT when all goals have been met or progressed well

## 2023-09-19 ENCOUNTER — HOME CARE VISIT (OUTPATIENT)
Age: 58
End: 2023-09-19
Payer: MEDICARE

## 2023-09-22 ENCOUNTER — HOME CARE VISIT (OUTPATIENT)
Age: 58
End: 2023-09-22
Payer: MEDICARE

## 2023-09-22 VITALS
HEART RATE: 64 BPM | TEMPERATURE: 97.3 F | RESPIRATION RATE: 14 BRPM | OXYGEN SATURATION: 99 % | SYSTOLIC BLOOD PRESSURE: 140 MMHG | DIASTOLIC BLOOD PRESSURE: 72 MMHG

## 2023-09-22 PROCEDURE — G0151 HHCP-SERV OF PT,EA 15 MIN: HCPCS

## 2023-11-27 ENCOUNTER — HOSPITAL ENCOUNTER (EMERGENCY)
Facility: HOSPITAL | Age: 58
Discharge: HOME | End: 2023-11-27
Attending: EMERGENCY MEDICINE
Payer: MEDICARE

## 2023-11-27 VITALS
WEIGHT: 225 LBS | HEIGHT: 66 IN | BODY MASS INDEX: 36.16 KG/M2 | RESPIRATION RATE: 16 BRPM | SYSTOLIC BLOOD PRESSURE: 154 MMHG | OXYGEN SATURATION: 98 % | HEART RATE: 68 BPM | TEMPERATURE: 97.9 F | DIASTOLIC BLOOD PRESSURE: 97 MMHG

## 2023-11-27 DIAGNOSIS — M54.32 SCIATICA OF LEFT SIDE: Primary | ICD-10-CM

## 2023-11-27 PROCEDURE — 99283 EMERGENCY DEPT VISIT LOW MDM: CPT

## 2023-11-27 PROCEDURE — 6370000000 HC RX 637 (ALT 250 FOR IP): Performed by: EMERGENCY MEDICINE

## 2023-11-27 RX ORDER — METHOCARBAMOL 750 MG/1
750 TABLET, FILM COATED ORAL ONCE
Status: COMPLETED | OUTPATIENT
Start: 2023-11-27 | End: 2023-11-27

## 2023-11-27 RX ORDER — OXYCODONE HYDROCHLORIDE AND ACETAMINOPHEN 5; 325 MG/1; MG/1
2 TABLET ORAL
Status: COMPLETED | OUTPATIENT
Start: 2023-11-27 | End: 2023-11-27

## 2023-11-27 RX ORDER — METHOCARBAMOL 750 MG/1
750 TABLET, FILM COATED ORAL 3 TIMES DAILY PRN
Qty: 20 TABLET | Refills: 0 | Status: SHIPPED | OUTPATIENT
Start: 2023-11-27 | End: 2023-11-28 | Stop reason: SDUPTHER

## 2023-11-27 RX ORDER — HYDROCODONE BITARTRATE AND ACETAMINOPHEN 10; 325 MG/1; MG/1
1 TABLET ORAL EVERY 6 HOURS PRN
Qty: 9 TABLET | Refills: 0 | Status: SHIPPED | OUTPATIENT
Start: 2023-11-27 | End: 2023-11-28 | Stop reason: SDUPTHER

## 2023-11-27 RX ORDER — METHOCARBAMOL 750 MG/1
750 TABLET, FILM COATED ORAL 3 TIMES DAILY PRN
Qty: 20 TABLET | Refills: 0 | Status: SHIPPED | OUTPATIENT
Start: 2023-11-27 | End: 2023-11-27 | Stop reason: SDUPTHER

## 2023-11-27 RX ORDER — HYDROCODONE BITARTRATE AND ACETAMINOPHEN 10; 325 MG/1; MG/1
1 TABLET ORAL EVERY 6 HOURS PRN
Qty: 9 TABLET | Refills: 0 | Status: SHIPPED | OUTPATIENT
Start: 2023-11-27 | End: 2023-11-27 | Stop reason: SDUPTHER

## 2023-11-27 RX ADMIN — OXYCODONE HYDROCHLORIDE AND ACETAMINOPHEN 2 TABLET: 5; 325 TABLET ORAL at 14:22

## 2023-11-27 RX ADMIN — METHOCARBAMOL 750 MG: 750 TABLET ORAL at 14:23

## 2023-11-27 ASSESSMENT — PAIN DESCRIPTION - DESCRIPTORS
DESCRIPTORS: BURNING
DESCRIPTORS: BURNING

## 2023-11-27 ASSESSMENT — PAIN - FUNCTIONAL ASSESSMENT
PAIN_FUNCTIONAL_ASSESSMENT: ACTIVITIES ARE NOT PREVENTED
PAIN_FUNCTIONAL_ASSESSMENT: 0-10

## 2023-11-27 ASSESSMENT — PAIN SCALES - GENERAL
PAINLEVEL_OUTOF10: 10

## 2023-11-27 ASSESSMENT — PAIN DESCRIPTION - LOCATION
LOCATION: BACK;LEG
LOCATION: BACK
LOCATION: BACK;LEG

## 2023-11-27 ASSESSMENT — PAIN DESCRIPTION - ORIENTATION
ORIENTATION: POSTERIOR;LEFT
ORIENTATION: POSTERIOR

## 2023-11-27 ASSESSMENT — LIFESTYLE VARIABLES
HOW MANY STANDARD DRINKS CONTAINING ALCOHOL DO YOU HAVE ON A TYPICAL DAY: PATIENT DOES NOT DRINK
HOW OFTEN DO YOU HAVE A DRINK CONTAINING ALCOHOL: NEVER

## 2023-11-27 NOTE — ED NOTES
Verbal report received from Severiano Cordova, [address] [place] [address], [address] [place] [address]  11/27/23 4293

## 2023-11-27 NOTE — ED TRIAGE NOTES
Pt complaining of lower back pain that radiates down into her lower extremities. No fall or injury to that area.     Hx: stroke

## 2023-11-28 RX ORDER — METHOCARBAMOL 750 MG/1
750 TABLET, FILM COATED ORAL 3 TIMES DAILY PRN
Qty: 20 TABLET | Refills: 0 | Status: SHIPPED | OUTPATIENT
Start: 2023-11-28 | End: 2023-12-05

## 2023-11-28 RX ORDER — HYDROCODONE BITARTRATE AND ACETAMINOPHEN 10; 325 MG/1; MG/1
1 TABLET ORAL EVERY 6 HOURS PRN
Qty: 9 TABLET | Refills: 0 | Status: SHIPPED | OUTPATIENT
Start: 2023-11-28 | End: 2023-12-01

## 2023-12-12 ENCOUNTER — HOSPITAL ENCOUNTER (EMERGENCY)
Age: 58
Discharge: HOME | End: 2023-12-12
Attending: FAMILY MEDICINE
Payer: MEDICARE

## 2023-12-12 VITALS
SYSTOLIC BLOOD PRESSURE: 161 MMHG | BODY MASS INDEX: 34.55 KG/M2 | TEMPERATURE: 98 F | HEIGHT: 66 IN | OXYGEN SATURATION: 96 % | WEIGHT: 215 LBS | RESPIRATION RATE: 14 BRPM | DIASTOLIC BLOOD PRESSURE: 101 MMHG | HEART RATE: 54 BPM

## 2023-12-12 DIAGNOSIS — M54.31 BILATERAL SCIATICA: Primary | ICD-10-CM

## 2023-12-12 DIAGNOSIS — M54.32 BILATERAL SCIATICA: Primary | ICD-10-CM

## 2023-12-12 DIAGNOSIS — M62.838 SPASM OF MUSCLE: ICD-10-CM

## 2023-12-12 LAB
ALBUMIN SERPL-MCNC: 3.3 G/DL (ref 3.4–5)
ALBUMIN/GLOB SERPL: 0.8 (ref 0.8–1.7)
ALP SERPL-CCNC: 81 U/L (ref 45–117)
ALT SERPL-CCNC: 17 U/L (ref 13–56)
ANION GAP SERPL CALC-SCNC: 7 MMOL/L (ref 3–18)
APPEARANCE UR: CLEAR
AST SERPL W P-5'-P-CCNC: 13 U/L (ref 10–38)
BASOPHILS # BLD: 0 K/UL (ref 0–0.1)
BASOPHILS NFR BLD: 0 % (ref 0–2)
BILIRUB SERPL-MCNC: 0.1 MG/DL (ref 0.2–1)
BILIRUB UR QL: NEGATIVE
BUN SERPL-MCNC: 16 MG/DL (ref 7–18)
BUN/CREAT SERPL: 19 (ref 12–20)
CA-I BLD-MCNC: 8.6 MG/DL (ref 8.5–10.1)
CHLORIDE SERPL-SCNC: 110 MMOL/L (ref 100–111)
CO2 SERPL-SCNC: 24 MMOL/L (ref 21–32)
COLOR UR: YELLOW
CREAT SERPL-MCNC: 0.85 MG/DL (ref 0.6–1.3)
DIFFERENTIAL METHOD BLD: ABNORMAL
EOSINOPHIL # BLD: 0.1 K/UL (ref 0–0.4)
EOSINOPHIL NFR BLD: 1 % (ref 0–5)
ERYTHROCYTE [DISTWIDTH] IN BLOOD BY AUTOMATED COUNT: 16.1 % (ref 11.6–14.5)
GLOBULIN SER CALC-MCNC: 4 G/DL (ref 2–4)
GLUCOSE SERPL-MCNC: 129 MG/DL (ref 74–99)
GLUCOSE UR STRIP.AUTO-MCNC: NEGATIVE MG/DL
HCT VFR BLD AUTO: 38.8 % (ref 35–45)
HGB BLD-MCNC: 12.8 G/DL (ref 12–16)
HGB UR QL STRIP: NEGATIVE
IMM GRANULOCYTES # BLD AUTO: 0.1 K/UL (ref 0–0.04)
IMM GRANULOCYTES NFR BLD AUTO: 1 % (ref 0–0.5)
KETONES UR QL STRIP.AUTO: NEGATIVE MG/DL
LEUKOCYTE ESTERASE UR QL STRIP.AUTO: NEGATIVE
LYMPHOCYTES # BLD: 1.2 K/UL (ref 0.9–3.6)
LYMPHOCYTES NFR BLD: 15 % (ref 21–52)
MCH RBC QN AUTO: 28.5 PG (ref 24–34)
MCHC RBC AUTO-ENTMCNC: 33 G/DL (ref 31–37)
MCV RBC AUTO: 86.4 FL (ref 78–100)
MONOCYTES # BLD: 0.1 K/UL (ref 0.05–1.2)
MONOCYTES NFR BLD: 1 % (ref 3–10)
NEUTS SEG # BLD: 6.4 K/UL (ref 1.8–8)
NEUTS SEG NFR BLD: 82 % (ref 40–73)
NITRITE UR QL STRIP.AUTO: NEGATIVE
NRBC # BLD: 0 K/UL (ref 0–0.01)
NRBC BLD-RTO: 0 PER 100 WBC
PH UR STRIP: 6 (ref 5–8)
PLATELET # BLD AUTO: 314 K/UL (ref 135–420)
PMV BLD AUTO: 9.4 FL (ref 9.2–11.8)
POTASSIUM SERPL-SCNC: 4.1 MMOL/L (ref 3.5–5.5)
PROT SERPL-MCNC: 7.3 G/DL (ref 6.4–8.2)
PROT UR STRIP-MCNC: NEGATIVE MG/DL
RBC # BLD AUTO: 4.49 M/UL (ref 4.2–5.3)
SODIUM SERPL-SCNC: 141 MMOL/L (ref 136–145)
SP GR UR REFRACTOMETRY: 1.01 (ref 1–1.03)
UROBILINOGEN UR QL STRIP.AUTO: 0.2 EU/DL (ref 0.2–1)
WBC # BLD AUTO: 7.8 K/UL (ref 4.6–13.2)

## 2023-12-12 PROCEDURE — 99283 EMERGENCY DEPT VISIT LOW MDM: CPT

## 2023-12-12 PROCEDURE — 85025 COMPLETE CBC W/AUTO DIFF WBC: CPT

## 2023-12-12 PROCEDURE — 80053 COMPREHEN METABOLIC PANEL: CPT

## 2023-12-12 PROCEDURE — 81003 URINALYSIS AUTO W/O SCOPE: CPT

## 2023-12-12 RX ORDER — KETOROLAC TROMETHAMINE 30 MG/ML
15 INJECTION, SOLUTION INTRAMUSCULAR; INTRAVENOUS
Status: DISCONTINUED | OUTPATIENT
Start: 2023-12-12 | End: 2023-12-12

## 2023-12-12 RX ORDER — KETOROLAC TROMETHAMINE 30 MG/ML
30 INJECTION, SOLUTION INTRAMUSCULAR; INTRAVENOUS
Status: DISCONTINUED | OUTPATIENT
Start: 2023-12-12 | End: 2023-12-12 | Stop reason: HOSPADM

## 2023-12-12 ASSESSMENT — ENCOUNTER SYMPTOMS: BACK PAIN: 1

## 2023-12-12 NOTE — DISCHARGE INSTRUCTIONS
As we spoke, your primary care doctor has called you in some medication. I recommend that you use some Tylenol for any additional pain. Follow the orders of your primary care doctor. Nothing stands out on your blood work or your urine to suggest that there is anything other than a lumbar sacral muscle spasm. At this point physical therapy may be in order. Return to the emergency department if you have any numbness or tingling difficulties with urinating or stooling on yourself.

## 2023-12-12 NOTE — ED TRIAGE NOTES
Reports that she just left her MD office and the MD office ordered testing, an MRI and back xrays. Reports that she was told to come to ED to get her testing done.

## 2023-12-12 NOTE — ED NOTES
Patient states that she just finished her course of steroids today.       Augusto Gusman RN  12/12/23 1346

## 2023-12-12 NOTE — ED PROVIDER NOTES
[facility] EMERGENCY DEPT  EMERGENCY DEPARTMENT ENCOUNTER      Pt Name: Monty Kate  MRN: [id number]  [address] [facility] 1965  Date of evaluation: 12/12/2023  Provider: Kamini Lunsford       Chief Complaint   Patient presents with    Back Pain         HISTORY OF PRESENT ILLNESS   (Location/Symptom, Timing/Onset, Context/Setting, Quality, Duration, Modifying Factors, Severity)  Note limiting factors. Monty Kate is a 62 y.o. female who presents to the emergency department patient comes in stating that she has been having back pain is progressively getting worse since Thanksgiving. She states it is in her both of her sides of her back. States she can barely walk. Rates her pain as a 10 out of 10 just lying there. Walking makes it worse was seen at 1 emergency department they did not give her anything but some muscle relaxers was seen by her primary care doctor today. And she tells me she was told to come here for an MRI x-rays as well as some blood work. Patient denies any numbness around her rectum any loss of stool denies any problems with urinating. She states it is her bilateral back    HPI    Nursing Notes were reviewed. REVIEW OF SYSTEMS    (2-9 systems for level 4, 10 or more for level 5)     Review of Systems   Musculoskeletal:  Positive for back pain. All other systems reviewed and are negative. Except as noted above the remainder of the review of systems was reviewed and negative.        PAST MEDICAL HISTORY     Past Medical History:   Diagnosis Date    Alcohol abuse     Anxiety attack     Cocaine abuse ([address])     COVID-19     Depression     Fracture of left wrist     Hemorrhoids     HTN (hypertension)     Ill-defined condition     hemorrhoids    Orthostatic hypotension     Perineal rash     Rectal bleeding     Rib contusion, left, initial encounter     Seizures (HCC)     Stroke (HCC)     Syncope     Toe fracture     great toe    Trichomonas infection     UTI (urinary tract

## 2024-02-02 ENCOUNTER — HOSPITAL ENCOUNTER (EMERGENCY)
Facility: HOSPITAL | Age: 59
Discharge: HOME | End: 2024-02-02
Attending: EMERGENCY MEDICINE
Payer: MEDICARE

## 2024-02-02 VITALS
HEART RATE: 75 BPM | SYSTOLIC BLOOD PRESSURE: 142 MMHG | WEIGHT: 160 LBS | HEIGHT: 66 IN | TEMPERATURE: 98.1 F | OXYGEN SATURATION: 99 % | BODY MASS INDEX: 25.71 KG/M2 | RESPIRATION RATE: 15 BRPM | DIASTOLIC BLOOD PRESSURE: 78 MMHG

## 2024-02-02 DIAGNOSIS — F10.10 ALCOHOL ABUSE: ICD-10-CM

## 2024-02-02 DIAGNOSIS — R56.9 SEIZURE (HCC): Primary | ICD-10-CM

## 2024-02-02 LAB
ALBUMIN SERPL-MCNC: 3.8 G/DL (ref 3.5–5)
ALBUMIN/GLOB SERPL: 0.8 (ref 1.1–2.2)
ALP SERPL-CCNC: 102 U/L (ref 45–117)
ALT SERPL-CCNC: 39 U/L (ref 12–78)
ANION GAP SERPL CALC-SCNC: 8 MMOL/L (ref 5–15)
AST SERPL W P-5'-P-CCNC: ABNORMAL U/L (ref 15–37)
BASOPHILS # BLD: 0 K/UL (ref 0–0.1)
BASOPHILS NFR BLD: 1 % (ref 0–1)
BILIRUB SERPL-MCNC: 0.3 MG/DL (ref 0.2–1)
BUN SERPL-MCNC: 6 MG/DL (ref 6–20)
BUN/CREAT SERPL: 8 (ref 12–20)
CA-I BLD-MCNC: 8.8 MG/DL (ref 8.5–10.1)
CHLORIDE SERPL-SCNC: 105 MMOL/L (ref 97–108)
CO2 SERPL-SCNC: 21 MMOL/L (ref 21–32)
CREAT SERPL-MCNC: 0.73 MG/DL (ref 0.55–1.02)
DIFFERENTIAL METHOD BLD: ABNORMAL
EKG ATRIAL RATE: 81 BPM
EKG DIAGNOSIS: NORMAL
EKG P AXIS: 59 DEGREES
EKG P-R INTERVAL: 146 MS
EKG Q-T INTERVAL: 418 MS
EKG QRS DURATION: 86 MS
EKG QTC CALCULATION (BAZETT): 485 MS
EKG R AXIS: 22 DEGREES
EKG T AXIS: 8 DEGREES
EKG VENTRICULAR RATE: 81 BPM
EOSINOPHIL # BLD: 0.2 K/UL (ref 0–0.4)
EOSINOPHIL NFR BLD: 3 % (ref 0–7)
ERYTHROCYTE [DISTWIDTH] IN BLOOD BY AUTOMATED COUNT: 14.5 % (ref 11.5–14.5)
ETHANOL SERPL-MCNC: 45 MG/DL (ref 0–0.08)
GLOBULIN SER CALC-MCNC: 4.6 G/DL (ref 2–4)
GLUCOSE SERPL-MCNC: 92 MG/DL (ref 65–100)
HCT VFR BLD AUTO: 38.6 % (ref 35–47)
HGB BLD-MCNC: 12.6 G/DL (ref 11.5–16)
IMM GRANULOCYTES # BLD AUTO: 0 K/UL (ref 0–0.04)
IMM GRANULOCYTES NFR BLD AUTO: 0 % (ref 0–0.5)
LYMPHOCYTES # BLD: 1.6 K/UL (ref 0.8–3.5)
LYMPHOCYTES NFR BLD: 32 % (ref 12–49)
MAGNESIUM SERPL-MCNC: NORMAL MG/DL (ref 1.6–2.4)
MCH RBC QN AUTO: 28.2 PG (ref 26–34)
MCHC RBC AUTO-ENTMCNC: 32.6 G/DL (ref 30–36.5)
MCV RBC AUTO: 86.4 FL (ref 80–99)
MONOCYTES # BLD: 0.5 K/UL (ref 0–1)
MONOCYTES NFR BLD: 9 % (ref 5–13)
NEUTS SEG # BLD: 2.7 K/UL (ref 1.8–8)
NEUTS SEG NFR BLD: 55 % (ref 32–75)
NRBC # BLD: 0 K/UL (ref 0–0.01)
NRBC BLD-RTO: 0 PER 100 WBC
PLATELET # BLD AUTO: 271 K/UL (ref 150–400)
PMV BLD AUTO: 8.8 FL (ref 8.9–12.9)
POTASSIUM SERPL-SCNC: ABNORMAL MMOL/L (ref 3.5–5.1)
POTASSIUM SERPL-SCNC: NORMAL MMOL/L (ref 3.5–5.1)
PROT SERPL-MCNC: 8.4 G/DL (ref 6.4–8.2)
RBC # BLD AUTO: 4.47 M/UL (ref 3.8–5.2)
SODIUM SERPL-SCNC: 134 MMOL/L (ref 136–145)
WBC # BLD AUTO: 5 K/UL (ref 3.6–11)

## 2024-02-02 PROCEDURE — 84132 ASSAY OF SERUM POTASSIUM: CPT

## 2024-02-02 PROCEDURE — 85025 COMPLETE CBC W/AUTO DIFF WBC: CPT

## 2024-02-02 PROCEDURE — 36415 COLL VENOUS BLD VENIPUNCTURE: CPT

## 2024-02-02 PROCEDURE — 94761 N-INVAS EAR/PLS OXIMETRY MLT: CPT

## 2024-02-02 PROCEDURE — 83735 ASSAY OF MAGNESIUM: CPT

## 2024-02-02 PROCEDURE — 99284 EMERGENCY DEPT VISIT MOD MDM: CPT

## 2024-02-02 PROCEDURE — 6370000000 HC RX 637 (ALT 250 FOR IP): Performed by: EMERGENCY MEDICINE

## 2024-02-02 PROCEDURE — 80053 COMPREHEN METABOLIC PANEL: CPT

## 2024-02-02 PROCEDURE — 93005 ELECTROCARDIOGRAM TRACING: CPT | Performed by: EMERGENCY MEDICINE

## 2024-02-02 PROCEDURE — 82077 ASSAY SPEC XCP UR&BREATH IA: CPT

## 2024-02-02 RX ORDER — ACETAMINOPHEN 500 MG
1000 TABLET ORAL
Status: COMPLETED | OUTPATIENT
Start: 2024-02-02 | End: 2024-02-02

## 2024-02-02 RX ADMIN — ACETAMINOPHEN 1000 MG: 500 TABLET ORAL at 04:36

## 2024-02-02 ASSESSMENT — PAIN DESCRIPTION - LOCATION: LOCATION: HEAD

## 2024-02-02 ASSESSMENT — PAIN DESCRIPTION - DESCRIPTORS: DESCRIPTORS: ACHING

## 2024-02-02 ASSESSMENT — PAIN SCALES - GENERAL
PAINLEVEL_OUTOF10: 8
PAINLEVEL_OUTOF10: 0
PAINLEVEL_OUTOF10: 8

## 2024-02-02 ASSESSMENT — PAIN - FUNCTIONAL ASSESSMENT: PAIN_FUNCTIONAL_ASSESSMENT: 0-10

## 2024-02-02 NOTE — DISCHARGE INSTRUCTIONS
Thank you!  Thank you for allowing me to care for you in the emergency department. It is my goal to provide you with excellent care.  Please fill out the survey that will come to you by mail or email since we listen to your feedback!     Below you will find a list of your tests from today's visit.  Should you have any questions, please do not hesitate to call the emergency department.    Labs  Recent Results (from the past 12 hour(s))   CBC with Auto Differential    Collection Time: 02/02/24  4:15 AM   Result Value Ref Range    WBC 5.0 3.6 - 11.0 K/uL    RBC 4.47 3.80 - 5.20 M/uL    Hemoglobin 12.6 11.5 - 16.0 g/dL    Hematocrit 38.6 35.0 - 47.0 %    MCV 86.4 80.0 - 99.0 FL    MCH 28.2 26.0 - 34.0 PG    MCHC 32.6 30.0 - 36.5 g/dL    RDW 14.5 11.5 - 14.5 %    Platelets 271 150 - 400 K/uL    MPV 8.8 (L) 8.9 - 12.9 FL    Nucleated RBCs 0.0 0.0  WBC    nRBC 0.00 0.00 - 0.01 K/uL    Neutrophils % 55 32 - 75 %    Lymphocytes % 32 12 - 49 %    Monocytes % 9 5 - 13 %    Eosinophils % 3 0 - 7 %    Basophils % 1 0 - 1 %    Immature Granulocytes 0 0 - 0.5 %    Neutrophils Absolute 2.7 1.8 - 8.0 K/UL    Lymphocytes Absolute 1.6 0.8 - 3.5 K/UL    Monocytes Absolute 0.5 0.0 - 1.0 K/UL    Eosinophils Absolute 0.2 0.0 - 0.4 K/UL    Basophils Absolute 0.0 0.0 - 0.1 K/UL    Absolute Immature Granulocyte 0.0 0.00 - 0.04 K/UL    Differential Type AUTOMATED     CMP    Collection Time: 02/02/24  4:15 AM   Result Value Ref Range    Sodium 134 (L) 136 - 145 mmol/L    Potassium Hemolyzed, Recollection Recommended 3.5 - 5.1 mmol/L    Chloride 105 97 - 108 mmol/L    CO2 21 21 - 32 mmol/L    Anion Gap 8 5 - 15 mmol/L    Glucose 92 65 - 100 mg/dL    BUN 6 6 - 20 mg/dL    Creatinine 0.73 0.55 - 1.02 mg/dL    Bun/Cre Ratio 8 (L) 12 - 20      Est, Glom Filt Rate >60 >60 ml/min/1.73m2    Calcium 8.8 8.5 - 10.1 mg/dL    Total Bilirubin 0.3 0.2 - 1.0 mg/dL    AST Hemolyzed, Recollection Recommended 15 - 37 U/L    ALT 39 12 - 78 U/L    Alk

## 2024-02-02 NOTE — ED TRIAGE NOTES
Pt presents for witnessed seizure after mixing medications and drinking alcohol with medications. No headache, incontinence, N/V, CP.  Aox4 on arrival. 20g in hand.     Meds taken together include: Trazadone, Abilify, Prozac    , Melecio, called writer to provide information that patient had been drinking since noon, and has a history of not being compliant with medications. He has asked to be called with updates: [phone].

## 2024-02-02 NOTE — ED NOTES
Pt discharged home with all personal belongings and medications; pt verbalized understanding of discharge instructions. Pt wheeled out to waiting room for medical taxi ride home

## 2024-02-02 NOTE — ED PROVIDER NOTES
fluticasone-salmeterol 250-50 MCG/ACT Aepb diskus inhaler  Commonly known as: ADVAIR     Hydrocort-Pramoxine (Perianal) 1-1 % rectal foam  Commonly known as: PROCTOFOAM-HC  Apply 3-4 times a day.     ipratropium 0.5 mg-albuterol 2.5 mg 0.5-2.5 (3) MG/3ML Soln nebulizer solution  Commonly known as: DUONEB  Inhale 3 mLs into the lungs every 6 hours     levETIRAcetam 1000 MG tablet  Commonly known as: KEPPRA     lisinopril 10 MG tablet  Commonly known as: PRINIVIL;ZESTRIL  Take 1 tablet by mouth daily     pantoprazole 40 MG tablet  Commonly known as: PROTONIX     potassium chloride 10 MEQ extended release tablet  Commonly known as: KLOR-CON     QUEtiapine 50 MG tablet  Commonly known as: SEROQUEL     traZODone 50 MG tablet  Commonly known as: DESYREL     vitamin D 1.25 MG (40973 UT) Caps capsule  Commonly known as: ERGOCALCIFEROL                DISCONTINUED MEDICATIONS:  Current Discharge Medication List          I am the Primary Clinician of Record: Irlanda Diaz MD (electronically signed)    (Please note that parts of this dictation were completed with voice recognition software. Quite often unanticipated grammatical, syntax, homophones, and other interpretive errors are inadvertently transcribed by the computer software. Please disregards these errors. Please excuse any errors that have escaped final proofreading.)     Irlanda Diaz MD  02/03/24 0030

## 2024-02-14 ENCOUNTER — HOSPITAL ENCOUNTER (EMERGENCY)
Facility: HOSPITAL | Age: 59
Discharge: HOME | End: 2024-02-15
Attending: EMERGENCY MEDICINE
Payer: MEDICARE

## 2024-02-14 ENCOUNTER — APPOINTMENT (OUTPATIENT)
Facility: HOSPITAL | Age: 59
End: 2024-02-14
Payer: MEDICARE

## 2024-02-14 DIAGNOSIS — M50.30 DEGENERATIVE DISC DISEASE, CERVICAL: ICD-10-CM

## 2024-02-14 DIAGNOSIS — M54.32 SCIATICA OF LEFT SIDE: Primary | ICD-10-CM

## 2024-02-14 DIAGNOSIS — M51.36 DEGENERATIVE DISC DISEASE, LUMBAR: ICD-10-CM

## 2024-02-14 PROCEDURE — 94761 N-INVAS EAR/PLS OXIMETRY MLT: CPT

## 2024-02-14 PROCEDURE — 99284 EMERGENCY DEPT VISIT MOD MDM: CPT

## 2024-02-14 PROCEDURE — 96372 THER/PROPH/DIAG INJ SC/IM: CPT

## 2024-02-14 PROCEDURE — 6360000002 HC RX W HCPCS: Performed by: EMERGENCY MEDICINE

## 2024-02-14 PROCEDURE — 72100 X-RAY EXAM L-S SPINE 2/3 VWS: CPT

## 2024-02-14 PROCEDURE — 6370000000 HC RX 637 (ALT 250 FOR IP): Performed by: EMERGENCY MEDICINE

## 2024-02-14 PROCEDURE — 72125 CT NECK SPINE W/O DYE: CPT

## 2024-02-14 PROCEDURE — 93005 ELECTROCARDIOGRAM TRACING: CPT | Performed by: EMERGENCY MEDICINE

## 2024-02-14 RX ORDER — PREDNISONE 20 MG/1
40 TABLET ORAL DAILY
Qty: 10 TABLET | Refills: 0 | Status: SHIPPED | OUTPATIENT
Start: 2024-02-14 | End: 2024-02-19

## 2024-02-14 RX ORDER — DEXAMETHASONE SODIUM PHOSPHATE 10 MG/ML
10 INJECTION, SOLUTION INTRAMUSCULAR; INTRAVENOUS ONCE
Status: COMPLETED | OUTPATIENT
Start: 2024-02-14 | End: 2024-02-14

## 2024-02-14 RX ORDER — IBUPROFEN 600 MG/1
600 TABLET ORAL 3 TIMES DAILY PRN
Qty: 30 TABLET | Refills: 0 | Status: SHIPPED | OUTPATIENT
Start: 2024-02-14

## 2024-02-14 RX ORDER — OXYCODONE HYDROCHLORIDE 5 MG/1
5 TABLET ORAL
Status: COMPLETED | OUTPATIENT
Start: 2024-02-14 | End: 2024-02-14

## 2024-02-14 RX ORDER — IBUPROFEN 600 MG/1
600 TABLET ORAL
Status: COMPLETED | OUTPATIENT
Start: 2024-02-14 | End: 2024-02-14

## 2024-02-14 RX ORDER — LIDOCAINE 4 G/G
1 PATCH TOPICAL DAILY
Qty: 14 PATCH | Refills: 0 | Status: SHIPPED | OUTPATIENT
Start: 2024-02-14 | End: 2024-02-28

## 2024-02-14 RX ADMIN — IBUPROFEN 600 MG: 600 TABLET, FILM COATED ORAL at 16:46

## 2024-02-14 RX ADMIN — OXYCODONE 5 MG: 5 TABLET ORAL at 16:46

## 2024-02-14 RX ADMIN — DEXAMETHASONE SODIUM PHOSPHATE 10 MG: 10 INJECTION INTRAMUSCULAR; INTRAVENOUS at 16:48

## 2024-02-14 NOTE — ED TRIAGE NOTES
Pt arrives via EMS from home with a primary complaint of lower left back pain that is shooting down left leg, also reports that she has a shooting pain in her left arm. Hx CVA with left sided deficits, reports increasing weakness.  reports that she is non compliant with medications

## 2024-02-14 NOTE — ED PROVIDER NOTES
Heart sounds: Normal heart sounds.   Pulmonary:      Effort: Pulmonary effort is normal.      Breath sounds: Normal breath sounds.   Abdominal:      General: Abdomen is flat.      Palpations: Abdomen is soft.   Musculoskeletal:      Cervical back: Normal range of motion.      Lumbar back: Positive left straight leg raise test.      Comments: 4/5 strength in left hip flexion as well as left  strength.  Baseline per patient   Skin:     General: Skin is warm.   Neurological:      General: No focal deficit present.      Mental Status: She is alert and oriented to person, place, and time.   Psychiatric:         Mood and Affect: Mood normal.         Behavior: Behavior normal.           SCREENINGS                  LAB, EKG AND DIAGNOSTIC RESULTS   Labs:  No results found for this or any previous visit (from the past 12 hour(s)).    EKG: EKG interpreted by me. Shows Sinus Bradycardia with a HR of 55. No STEMI.    Radiologic Studies:  Non-plain film images such as CT, Ultrasound and MRI are read by the radiologist. Plain radiographic images are visualized and preliminarily interpreted by the ED Physician with the following findings: Not Applicable.    Interpretation per the Radiologist below, if available at the time of this note:  CT CERVICAL SPINE WO CONTRAST   Final Result   No acute finding. Cervical degenerative disc disease         XR LUMBAR SPINE (2-3 VIEWS)   Final Result   Degenerative disc disease. No acute finding.                    ED COURSE and DIFFERENTIAL DIAGNOSIS/MDM   10:57 PM DDx, ED Course, and Reassessment: 50-year-old female presenting to the emergency department for evaluation of neck pain, low back pain with radiation into the left leg.  Patient has history of CVA with residual left-sided deficits.  There is no change in sensation or motor strength from baseline per patient.  On exam, patient does have C-spine tenderness.  She reports pain radiating into the left arm.  He has midline low

## 2024-02-14 NOTE — DISCHARGE INSTRUCTIONS
Thank you!  Thank you for allowing me to care for you in the emergency department. It is my goal to provide you with excellent care.  Please fill out the survey that will come to you by mail or email since we listen to your feedback!     Below you will find a list of your tests from today's visit.  Should you have any questions, please do not hesitate to call the emergency department.    Labs  No results found for this or any previous visit (from the past 12 hour(s)).    Radiologic Studies  CT CERVICAL SPINE WO CONTRAST   Final Result   No acute finding. Cervical degenerative disc disease         XR LUMBAR SPINE (2-3 VIEWS)   Final Result   Degenerative disc disease. No acute finding.                 ------------------------------------------------------------------------------------------------------------  The exam and treatment you received in the Emergency Department were for an urgent problem and are not intended as complete care. It is important that you follow-up with a doctor, nurse practitioner, or physician assistant to:  (1) confirm your diagnosis,  (2) re-evaluation of changes in your illness and treatment, and (3) for ongoing care. Please take your discharge instructions with you when you go to your follow-up appointment.     If you have any problem arranging a follow-up appointment, contact the Emergency Department.  If your symptoms become worse or you do not improve as expected and you are unable to reach your health care provider, please return to the Emergency Department. We are available 24 hours a day.     If a prescription has been provided, please have it filled as soon as possible to prevent a delay in treatment. If you have any questions or reservations about taking the medication due to side effects or interactions with other medications, please call your primary care provider or contact the ER.

## 2024-02-15 VITALS
WEIGHT: 245 LBS | HEART RATE: 61 BPM | BODY MASS INDEX: 36.29 KG/M2 | HEIGHT: 69 IN | TEMPERATURE: 97.6 F | DIASTOLIC BLOOD PRESSURE: 82 MMHG | OXYGEN SATURATION: 94 % | RESPIRATION RATE: 16 BRPM | SYSTOLIC BLOOD PRESSURE: 147 MMHG

## 2024-02-15 LAB
EKG ATRIAL RATE: 55 BPM
EKG DIAGNOSIS: NORMAL
EKG P AXIS: 57 DEGREES
EKG P-R INTERVAL: 154 MS
EKG Q-T INTERVAL: 494 MS
EKG QRS DURATION: 78 MS
EKG QTC CALCULATION (BAZETT): 472 MS
EKG R AXIS: 36 DEGREES
EKG T AXIS: 58 DEGREES
EKG VENTRICULAR RATE: 55 BPM

## 2024-02-15 NOTE — ED NOTES
Called pt  per pt req and made aware that pt is being discharged and transport has been arranged awaiting ETA at this time. Pt reports he is at home to accept pt

## 2024-02-15 NOTE — ED NOTES
Ravi departed with pt taking her back home.  is waiting to unlock door for them. Report given to ravi prior to departure. Pt is stable, A&Ox4 on departure.

## 2024-03-10 ENCOUNTER — APPOINTMENT (OUTPATIENT)
Facility: HOSPITAL | Age: 59
End: 2024-03-10
Payer: MEDICARE

## 2024-03-10 ENCOUNTER — HOSPITAL ENCOUNTER (EMERGENCY)
Facility: HOSPITAL | Age: 59
Discharge: NURSING, REHAB OR LONG-TERM CARE | End: 2024-03-13
Attending: STUDENT IN AN ORGANIZED HEALTH CARE EDUCATION/TRAINING PROGRAM
Payer: MEDICARE

## 2024-03-10 DIAGNOSIS — R29.818: ICD-10-CM
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
## 2024-03-10 DIAGNOSIS — S09.90XA INJURY OF HEAD, INITIAL ENCOUNTER: Primary | ICD-10-CM

## 2024-03-10 LAB
ALBUMIN SERPL-MCNC: 3.3 G/DL (ref 3.5–5)
ALBUMIN/GLOB SERPL: 1 (ref 1.1–2.2)
ALP SERPL-CCNC: 81 U/L (ref 45–117)
ALT SERPL-CCNC: 17 U/L (ref 12–78)
ANION GAP SERPL CALC-SCNC: 4 MMOL/L (ref 5–15)
AST SERPL W P-5'-P-CCNC: 12 U/L (ref 15–37)
BASOPHILS # BLD: 0 K/UL (ref 0–0.1)
BASOPHILS NFR BLD: 1 % (ref 0–1)
BILIRUB SERPL-MCNC: 0.2 MG/DL (ref 0.2–1)
BUN SERPL-MCNC: 12 MG/DL (ref 6–20)
BUN/CREAT SERPL: 13 (ref 12–20)
CA-I BLD-MCNC: 8.4 MG/DL (ref 8.5–10.1)
CHLORIDE SERPL-SCNC: 111 MMOL/L (ref 97–108)
CO2 SERPL-SCNC: 27 MMOL/L (ref 21–32)
CREAT SERPL-MCNC: 0.9 MG/DL (ref 0.55–1.02)
DIFFERENTIAL METHOD BLD: ABNORMAL
EKG ATRIAL RATE: 60 BPM
EKG DIAGNOSIS: NORMAL
EKG P AXIS: 56 DEGREES
EKG P-R INTERVAL: 142 MS
EKG Q-T INTERVAL: 474 MS
EKG QRS DURATION: 72 MS
EKG QTC CALCULATION (BAZETT): 474 MS
EKG R AXIS: 40 DEGREES
EKG T AXIS: 60 DEGREES
EKG VENTRICULAR RATE: 60 BPM
EOSINOPHIL # BLD: 0.2 K/UL (ref 0–0.4)
EOSINOPHIL NFR BLD: 4 % (ref 0–7)
ERYTHROCYTE [DISTWIDTH] IN BLOOD BY AUTOMATED COUNT: 14.8 % (ref 11.5–14.5)
GLOBULIN SER CALC-MCNC: 3.3 G/DL (ref 2–4)
GLUCOSE SERPL-MCNC: 100 MG/DL (ref 65–100)
HCT VFR BLD AUTO: 34.9 % (ref 35–47)
HGB BLD-MCNC: 11.6 G/DL (ref 11.5–16)
IMM GRANULOCYTES # BLD AUTO: 0 K/UL (ref 0–0.04)
IMM GRANULOCYTES NFR BLD AUTO: 0 % (ref 0–0.5)
LYMPHOCYTES # BLD: 2 K/UL (ref 0.8–3.5)
LYMPHOCYTES NFR BLD: 40 % (ref 12–49)
MAGNESIUM SERPL-MCNC: 2 MG/DL (ref 1.6–2.4)
MCH RBC QN AUTO: 28.8 PG (ref 26–34)
MCHC RBC AUTO-ENTMCNC: 33.2 G/DL (ref 30–36.5)
MCV RBC AUTO: 86.6 FL (ref 80–99)
MONOCYTES # BLD: 0.4 K/UL (ref 0–1)
MONOCYTES NFR BLD: 7 % (ref 5–13)
NEUTS SEG # BLD: 2.4 K/UL (ref 1.8–8)
NEUTS SEG NFR BLD: 48 % (ref 32–75)
NRBC # BLD: 0 K/UL (ref 0–0.01)
NRBC BLD-RTO: 0 PER 100 WBC
PLATELET # BLD AUTO: 294 K/UL (ref 150–400)
PMV BLD AUTO: 9 FL (ref 8.9–12.9)
POTASSIUM SERPL-SCNC: 3.8 MMOL/L (ref 3.5–5.1)
PROT SERPL-MCNC: 6.6 G/DL (ref 6.4–8.2)
RBC # BLD AUTO: 4.03 M/UL (ref 3.8–5.2)
SODIUM SERPL-SCNC: 142 MMOL/L (ref 136–145)
TROPONIN I SERPL HS-MCNC: 4 NG/L (ref 0–51)
WBC # BLD AUTO: 5 K/UL (ref 3.6–11)

## 2024-03-10 PROCEDURE — 83735 ASSAY OF MAGNESIUM: CPT

## 2024-03-10 PROCEDURE — 70450 CT HEAD/BRAIN W/O DYE: CPT

## 2024-03-10 PROCEDURE — 85025 COMPLETE CBC W/AUTO DIFF WBC: CPT

## 2024-03-10 PROCEDURE — 80053 COMPREHEN METABOLIC PANEL: CPT

## 2024-03-10 PROCEDURE — 84484 ASSAY OF TROPONIN QUANT: CPT

## 2024-03-10 PROCEDURE — 93005 ELECTROCARDIOGRAM TRACING: CPT | Performed by: STUDENT IN AN ORGANIZED HEALTH CARE EDUCATION/TRAINING PROGRAM

## 2024-03-10 PROCEDURE — 72170 X-RAY EXAM OF PELVIS: CPT

## 2024-03-10 PROCEDURE — 72125 CT NECK SPINE W/O DYE: CPT

## 2024-03-10 PROCEDURE — 36415 COLL VENOUS BLD VENIPUNCTURE: CPT

## 2024-03-10 PROCEDURE — 71045 X-RAY EXAM CHEST 1 VIEW: CPT

## 2024-03-10 PROCEDURE — 6360000002 HC RX W HCPCS: Performed by: STUDENT IN AN ORGANIZED HEALTH CARE EDUCATION/TRAINING PROGRAM

## 2024-03-10 PROCEDURE — 99285 EMERGENCY DEPT VISIT HI MDM: CPT

## 2024-03-10 PROCEDURE — 96374 THER/PROPH/DIAG INJ IV PUSH: CPT

## 2024-03-10 RX ORDER — MORPHINE SULFATE 4 MG/ML
4 INJECTION, SOLUTION INTRAMUSCULAR; INTRAVENOUS
Status: COMPLETED | OUTPATIENT
Start: 2024-03-10 | End: 2024-03-10

## 2024-03-10 RX ADMIN — MORPHINE SULFATE 4 MG: 4 INJECTION, SOLUTION INTRAMUSCULAR; INTRAVENOUS at 19:31

## 2024-03-10 ASSESSMENT — PAIN SCALES - GENERAL: PAINLEVEL_OUTOF10: 10

## 2024-03-10 ASSESSMENT — PAIN - FUNCTIONAL ASSESSMENT: PAIN_FUNCTIONAL_ASSESSMENT: 0-10
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
## 2024-03-11 LAB
APPEARANCE UR: CLEAR
BACTERIA URNS QL MICRO: NEGATIVE /HPF
BILIRUB UR QL: NEGATIVE
COLOR UR: ABNORMAL
EPITH CASTS URNS QL MICRO: ABNORMAL /LPF
GLUCOSE UR STRIP.AUTO-MCNC: NEGATIVE MG/DL
HGB UR QL STRIP: NEGATIVE
KETONES UR QL STRIP.AUTO: NEGATIVE MG/DL
LEUKOCYTE ESTERASE UR QL STRIP.AUTO: ABNORMAL
NITRITE UR QL STRIP.AUTO: NEGATIVE
PH UR STRIP: 6 (ref 5–8)
PROT UR STRIP-MCNC: NEGATIVE MG/DL
RBC #/AREA URNS HPF: ABNORMAL /HPF (ref 0–5)
SP GR UR REFRACTOMETRY: 1.01 (ref 1–1.03)
URINE CULTURE IF INDICATED: ABNORMAL
UROBILINOGEN UR QL STRIP.AUTO: 0.1 EU/DL (ref 0.1–1)
WBC URNS QL MICRO: ABNORMAL /HPF (ref 0–4)

## 2024-03-11 PROCEDURE — 6370000000 HC RX 637 (ALT 250 FOR IP): Performed by: INTERNAL MEDICINE

## 2024-03-11 PROCEDURE — 81001 URINALYSIS AUTO W/SCOPE: CPT

## 2024-03-11 RX ORDER — IBUPROFEN 600 MG/1
600 TABLET ORAL EVERY 6 HOURS PRN
Status: DISPENSED | OUTPATIENT
Start: 2024-03-11 | End: 2024-03-13

## 2024-03-11 RX ADMIN — IBUPROFEN 600 MG: 600 TABLET, FILM COATED ORAL at 11:58

## 2024-03-11 ASSESSMENT — PAIN SCALES - GENERAL
PAINLEVEL_OUTOF10: 10
PAINLEVEL_OUTOF10: 8

## 2024-03-11 ASSESSMENT — PAIN DESCRIPTION - LOCATION
LOCATION: BACK;HEAD
LOCATION: HEAD;BACK

## 2024-03-11 NOTE — PROGRESS NOTES
Spiritual Care Assessment/Progress Note  [facility] [place]    Name: María Amos MRN: [id number]    Age: 59 y.o.     Sex: female   Language: English     Date: 3/11/2024            Total Time Calculated: 15 min              Spiritual Assessment begun in [facility] EMERGENCY DEPT  Service Provided For:: Patient  Referral/Consult From:: Rounding  Encounter Overview/Reason : Initial Encounter    Spiritual beliefs:      [x] Involved in a guy tradition/spiritual practice: Yazidi guy     [] Supported by a guy community:      [] Claims no spiritual orientation:      [] Seeking spiritual identity:           [] Adheres to an individual form of spirituality:      [] Not able to assess:                Identified resources for coping and support system:           [x] Prayer                  [] Devotional reading               [] Music                  [] Guided Imagery     [] Pet visits                                        [] Other: (COMMENT)     Specific area/focus of visit   Encounter:    Crisis:    Spiritual/Emotional needs: Type: Spiritual Support  Ritual, Rites and Sacraments: Type: Blessings  Grief, Loss, and Adjustments:    Ethics/Mediation:    Behavioral Health:    Palliative Care:    Advance Care Planning:      Plan/Referrals: Other (Comment) ( is available if needed)    Narrative: SA- Rounding visit in ED. During the rounding visit in ER,  saw that the patient was tearful.  stopped and assessed the patient's emotional/spiritual wellness. Patient expressed her extreme discomfort feeling due to her current medical condition.  provided a comforting presence with an empathetic listening.  offered a comforting and healing prayer at the patient's request. The patient expressed her appreciation for 's support and care with a prayer.  informed the patient of  availability. If there are any needs, please let the spiritual health team know.     RevLasha Lane  Layton Polo.  Chaplain Resident  Prabha a [facility]: [phone]

## 2024-03-11 NOTE — CARE COORDINATION
CM consult acknowledged for Skilled Nursing Facility placement.  CM met with patient at bedside in [facility].  Choice obtained for [facility].  Referral has been sent, PT and OT orders are in. Patient will require insurance authorization to transition to SNF.

## 2024-03-11 NOTE — ED NOTES
called and patient handed writer the phone to update him. He is aware she is going to be placed elsewhere and actually stated that she has been getting worked up for [facility] on [address], states we can call them and they have some of her info.    Patient is resting comfortably on the stretcher at the nurse's station and chatting with staff.

## 2024-03-12 PROCEDURE — 97165 OT EVAL LOW COMPLEX 30 MIN: CPT

## 2024-03-12 PROCEDURE — 97161 PT EVAL LOW COMPLEX 20 MIN: CPT

## 2024-03-12 PROCEDURE — 97530 THERAPEUTIC ACTIVITIES: CPT

## 2024-03-12 PROCEDURE — 6370000000 HC RX 637 (ALT 250 FOR IP): Performed by: STUDENT IN AN ORGANIZED HEALTH CARE EDUCATION/TRAINING PROGRAM

## 2024-03-12 RX ORDER — TRAZODONE HYDROCHLORIDE 50 MG/1
50 TABLET ORAL NIGHTLY
Status: DISCONTINUED | OUTPATIENT
Start: 2024-03-12 | End: 2024-03-13

## 2024-03-12 RX ADMIN — TRAZODONE HYDROCHLORIDE 50 MG: 50 TABLET ORAL at 21:30

## 2024-03-12 RX ADMIN — TRAZODONE HYDROCHLORIDE 50 MG: 50 TABLET ORAL at 00:17

## 2024-03-12 ASSESSMENT — PAIN SCALES - GENERAL: PAINLEVEL_OUTOF10: 0

## 2024-03-12 ASSESSMENT — PAIN - FUNCTIONAL ASSESSMENT
PAIN_FUNCTIONAL_ASSESSMENT: 0-10
PAIN_FUNCTIONAL_ASSESSMENT: NONE - DENIES PAIN

## 2024-03-12 NOTE — PLAN OF CARE
PHYSICAL THERAPY EVALUATION  Patient: María Amos (59 y.o. female)  Date: 3/12/2024  Primary Diagnosis: No admission diagnoses are documented for this encounter.       Precautions: Fall Risk                Recommendations for nursing mobility: Out of bed to chair for meals, Use of bed/chair alarm for safety, AD and gt belt for bed to chair , and Assist x1    In place during session:     ASSESSMENT  Pt is a 59 y.o. female admitted on 3/10/2024 for GLF at home, no acute fx noted from recent imaging, head CT neg; currently presents to PT with decreased bed mob, transfers, LE strength, gt, balance, activity tolerance, and overall functional mobility . Pt semi supine on ED stretcher upon PT arrival, agreeable to evaluation. Pt A&O to self/place/birthday, confused at times to overall situation.  Pt does have hx L side weakness from multiple previous CVAs per pt report, also reports recent falls at home.    Based on the objective data described below, the patient currently presents with impaired functional mobility, decreased ROM, impaired strength, decreased activity tolerance, poor safety awareness, poor attention/concentration, and impaired balance. (See below for objective details and assist levels).     Overall pt tolerated session fair today with overall min A with bed mob and min/mod A with OOB transfers.  Pt req inc assistance with mobility of L LE when getting on/off stretcher in ED.  Pt unsteady in standing and used RW for UE support.  Pt able to take steps at EOB with min A x1 and inc verbal cuing for technique.  Pt demos wide JESUS and ataxic gt pattern due to decrease in overall ROM on L LE.  Pt states she normally wears AFO in L shoe, but does not currently have with her.  Pt totaled approx 4ft amb at bedside with min Ax1 and use of RW.  Pt overall mod A to return to supine on stretcher upon PT departure.  Pt will benefit from continued skilled PT to address above deficits and return to PLOF.Current PT DC  Fair;Constant support  Standing - Dynamic: Constant support;Poor    Ambulation/Gait Training:     Gait Training: Yes    Gait  Gait Training: Yes  Overall Level of Assistance: Minimum assistance  Distance (ft): 4 Feet  Assistive Device: Gait belt;Walker, rolling  Base of Support: Widened  Gait Abnormalities: Ataxic;Decreased step clearance       Functional Measure:  [facility] AM-PAC™ “6 Clicks”         Basic Mobility Inpatient Short Form  How much difficulty does the patient currently have... Unable A Lot A Little None   1.  Turning over in bed (including adjusting bedclothes, sheets and blankets)?   [] 1   [] 2   [x] 3   [] 4   2.  Sitting down on and standing up from a chair with arms ( e.g., wheelchair, bedside commode, etc.)   [] 1   [x] 2   [] 3   [] 4   3.  Moving from lying on back to sitting on the side of the bed?   [] 1   [] 2   [x] 3   [] 4          How much help from another person does the patient currently need... Total A Lot A Little None   4.  Moving to and from a bed to a chair (including a wheelchair)?   [] 1   [x] 2   [] 3   [] 4   5.  Need to walk in hospital room?   [] 1   [x] 2   [] 3   [] 4   6.  Climbing 3-5 steps with a railing?   [] 1   [x] 2   [] 3   [] 4   © 2007, Trustees of [facility], under license to [employer]. All rights reserved     Score:  Initial: 14/24 Most Recent: X (Date: 3/12/2024 )   Interpretation of Tool:  Represents activities that are increasingly more difficult (i.e. Bed mobility, Transfers, Gait).  Score 24 23 22-20 19-15 14-10 9-7 6   Modifier CH CI CJ CK CL CM CN         Physical Therapy Evaluation Charge Determination   History Examination Presentation Decision-Making   MEDIUM  Complexity : 1-2 comorbidities / personal factors will impact the outcome/ POC  MEDIUM Complexity : 3 Standardized tests and measures addressing body structure, function, activity limitation and / or participation in recreation  LOW Complexity : Stable, uncomplicated  Other

## 2024-03-12 NOTE — ED NOTES
Pt placed on bed pan, had a large bowel movement. Purwick in place. Pt declining to wear any clothes while in the bed.

## 2024-03-12 NOTE — CARE COORDINATION
PT and OT has worked with patient.  CM sent notes to the [facility] and have asked them to start insurance authorization today.

## 2024-03-12 NOTE — PROGRESS NOTES
OCCUPATIONAL THERAPY EVALUATION  Patient: María Amos (59 y.o. female)  Date: 3/12/2024  Primary Diagnosis: No admission diagnoses are documented for this encounter.       Precautions: Fall Risk, Bed Alarm                Recommendations for nursing mobility: Use of BSC for toileting , AD and gt belt for bed to chair , and Assist x1    In place during session:None  ASSESSMENT  Pt is a 59 y.o. female presenting to [facility] on 3/10 s/p GLF. No findings in x-rays. Pt received semi-supine in bed upon arrival, AXO x3, and agreeable to OT evaluation. Pt w/ h/o CVA w/ L sided deficits.      Based on current observations, pt presents with decreased  functional mobility, independence in ADLs, ROM, strength, activity tolerance, endurance, balance (see below for objective details and assist levels).     Overall, pt tolerates session fair w/out c/o pain, dizziness, or SOB. Pt w/ limited R shoulder flexion and lateral abduction; ~90 degrees actively. Increased pain reported when therapist attempted additional ROM; no subluxation noted. Unable to fully flex L fingers; increased tension noted. Pt req'd mod A to achieve full stand; additional time to come fully erect. Pt able to take forward steps w/ CGA but increased A req'd when attempting to take steps backward and taking steps toward to L side; slight unsteadiness noted. Pt unable to complete LB dressing req'ing A to adjust B socks. Pt reports impaired sensation in L hand; able to  cup to when taking sips. Pt will benefit from continued skilled OT services to address current impairments and improve IND and safety with self cares and functional transfers/mobility. Potential barriers for safe discharge: pt is a high fall risk. Current OT d/c recommendation Skilled Nursing Facilityonce medically appropriate.     Start of Session   SPO2 (%) 97   Heart Rate (BPM) 67     GOALS:    Problem: Occupational Therapy - Adult  Goal: By Discharge: Performs self-care activities at highest level  4/5)      Tone & Sensation:      Sensation: Intact      Functional Mobility and Transfers for ADLs:  Bed Mobility:  Bed Mobility Training  Bed Mobility Training: No  Rolling: Contact-guard assistance;Assist X1  Supine to Sit: Minimum assistance;Assist X1  Sit to Supine: Moderate assistance;Assist X1    Transfers:  Transfer Training  Transfer Training: Yes  Interventions: Verbal cues;Safety awareness training  Sit to Stand: Moderate assistance;Assist X1;Additional time  Stand to Sit: Minimum assistance;Assist X1;Additional time      Balance:  Balance  Sitting: Intact  Sitting - Dynamic: Fair (occasional)  Standing: Impaired  Standing - Static: Fair;Constant support  Standing - Dynamic: Fair;Constant support      ADL Assessment:          Feeding: Independent         LE Dressing: Dependent/Total  LE Dressing Skilled Clinical Factors: donning/doffing B socks at EOB                       Functional Measure:    [facility] AM-PAC \"6 Clicks\"                                                       Daily Activity Inpatient Short Form  How much help from another person does the patient currently need... Total; A Lot A Little None   1.  Putting on and taking off regular lower body clothing? [x]  1 []  2 []  3 []  4   2.  Bathing (including washing, rinsing, drying)? []  1 [x]  2 []  3 []  4   3.  Toileting, which includes using toilet, bedpan or urinal? [] 1 [x]  2 []  3 []  4   4.  Putting on and taking off regular upper body clothing? []  1 []  2 [x]  3 []  4   5.  Taking care of personal grooming such as brushing teeth? []  1 []  2 [x]  3 []  4   6.  Eating meals? []  1 []  2 [x]  3 []  4   © 2007, Trustees of [facility], under license to [employer]. All rights reserved     Score: 14/24     Interpretation of Tool:  Represents clinically-significant functional categories (i.e. Activities of daily living).  Percentage of Impairment CH    0%   CI    1-19% CJ    20-39% CK    40-59% CL    60-79% CM    80-99% CN

## 2024-03-12 NOTE — CARE COORDINATION
[facility] has insurance authorization for the patient.  The auth starts on 3/13/24, patient can discharge tomorrow morning.  CM has asked for a room assignment for tomorrow.  CM notified primary nurse. CM notified patient as well.

## 2024-03-13 VITALS
SYSTOLIC BLOOD PRESSURE: 145 MMHG | RESPIRATION RATE: 18 BRPM | OXYGEN SATURATION: 99 % | HEART RATE: 65 BPM | HEIGHT: 68 IN | DIASTOLIC BLOOD PRESSURE: 85 MMHG | WEIGHT: 240 LBS | BODY MASS INDEX: 36.37 KG/M2 | TEMPERATURE: 98.6 F

## 2024-03-13 LAB
GLUCOSE BLD STRIP.AUTO-MCNC: 83 MG/DL (ref 65–100)
PERFORMED BY:: NORMAL

## 2024-03-13 PROCEDURE — 82962 GLUCOSE BLOOD TEST: CPT

## 2024-03-13 PROCEDURE — 6360000002 HC RX W HCPCS: Performed by: STUDENT IN AN ORGANIZED HEALTH CARE EDUCATION/TRAINING PROGRAM

## 2024-03-13 PROCEDURE — 6360000002 HC RX W HCPCS

## 2024-03-13 PROCEDURE — 6370000000 HC RX 637 (ALT 250 FOR IP): Performed by: STUDENT IN AN ORGANIZED HEALTH CARE EDUCATION/TRAINING PROGRAM

## 2024-03-13 PROCEDURE — 96372 THER/PROPH/DIAG INJ SC/IM: CPT

## 2024-03-13 RX ORDER — LEVETIRACETAM 250 MG/1
1000 TABLET ORAL 2 TIMES DAILY
Status: DISCONTINUED | OUTPATIENT
Start: 2024-03-13 | End: 2024-03-13 | Stop reason: HOSPADM

## 2024-03-13 RX ORDER — HALOPERIDOL 5 MG/ML
INJECTION INTRAMUSCULAR
Status: COMPLETED
Start: 2024-03-13 | End: 2024-03-13

## 2024-03-13 RX ORDER — QUETIAPINE FUMARATE 25 MG/1
100 TABLET, FILM COATED ORAL ONCE
Status: COMPLETED | OUTPATIENT
Start: 2024-03-13 | End: 2024-03-13

## 2024-03-13 RX ORDER — FLUOXETINE 10 MG/1
20 CAPSULE ORAL DAILY
Status: DISCONTINUED | OUTPATIENT
Start: 2024-03-13 | End: 2024-03-13 | Stop reason: HOSPADM

## 2024-03-13 RX ORDER — CHLORDIAZEPOXIDE HYDROCHLORIDE 25 MG/1
25 CAPSULE, GELATIN COATED ORAL DAILY
Status: DISCONTINUED | OUTPATIENT
Start: 2024-03-13 | End: 2024-03-13 | Stop reason: HOSPADM

## 2024-03-13 RX ORDER — LORAZEPAM 2 MG/ML
2 INJECTION INTRAMUSCULAR ONCE
Status: COMPLETED | OUTPATIENT
Start: 2024-03-13 | End: 2024-03-13

## 2024-03-13 RX ORDER — TRAZODONE HYDROCHLORIDE 50 MG/1
100 TABLET ORAL NIGHTLY
Status: DISCONTINUED | OUTPATIENT
Start: 2024-03-13 | End: 2024-03-13 | Stop reason: HOSPADM

## 2024-03-13 RX ORDER — ATORVASTATIN CALCIUM 40 MG/1
40 TABLET, FILM COATED ORAL DAILY
Status: DISCONTINUED | OUTPATIENT
Start: 2024-03-13 | End: 2024-03-13 | Stop reason: HOSPADM

## 2024-03-13 RX ADMIN — ATORVASTATIN CALCIUM 40 MG: 40 TABLET, FILM COATED ORAL at 08:15

## 2024-03-13 RX ADMIN — LEVETIRACETAM 1000 MG: 250 TABLET, FILM COATED ORAL at 08:14

## 2024-03-13 RX ADMIN — LORAZEPAM 2 MG: 2 INJECTION INTRAMUSCULAR; INTRAVENOUS at 05:25

## 2024-03-13 RX ADMIN — FLUOXETINE 20 MG: 10 CAPSULE ORAL at 02:41

## 2024-03-13 RX ADMIN — HALOPERIDOL LACTATE: 5 INJECTION, SOLUTION INTRAMUSCULAR at 04:37

## 2024-03-13 RX ADMIN — QUETIAPINE FUMARATE 100 MG: 25 TABLET ORAL at 04:26

## 2024-03-13 RX ADMIN — LEVETIRACETAM 1000 MG: 250 TABLET, FILM COATED ORAL at 02:41

## 2024-03-13 RX ADMIN — CHLORDIAZEPOXIDE HYDROCHLORIDE 25 MG: 25 CAPSULE ORAL at 02:41

## 2024-03-13 ASSESSMENT — PAIN - FUNCTIONAL ASSESSMENT: PAIN_FUNCTIONAL_ASSESSMENT: 0-10

## 2024-03-13 ASSESSMENT — PAIN SCALES - GENERAL: PAINLEVEL_OUTOF10: 0

## 2024-03-13 NOTE — CARE COORDINATION
Patient has been accepted to admit to [facility] located at [address] [place] [address].  Patient can go to room 403B. Nurse to call report to [phone]. Patient will need transportation to SNF.
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
## 2024-03-13 NOTE — ED NOTES
[facility] care completed.  Patient placed in gown.  Bed alarm on, bed in lowest height and call bell in reach

## 2024-05-26 ENCOUNTER — HOSPITAL ENCOUNTER (EMERGENCY)
Facility: HOSPITAL | Age: 59
Discharge: HOME | End: 2024-05-26
Attending: STUDENT IN AN ORGANIZED HEALTH CARE EDUCATION/TRAINING PROGRAM
Payer: MEDICARE

## 2024-05-26 ENCOUNTER — APPOINTMENT (OUTPATIENT)
Facility: HOSPITAL | Age: 59
End: 2024-05-26
Payer: MEDICARE

## 2024-05-26 VITALS
HEART RATE: 68 BPM | OXYGEN SATURATION: 96 % | DIASTOLIC BLOOD PRESSURE: 98 MMHG | SYSTOLIC BLOOD PRESSURE: 144 MMHG | TEMPERATURE: 98.2 F | RESPIRATION RATE: 17 BRPM

## 2024-05-26 DIAGNOSIS — K21.9 GASTROESOPHAGEAL REFLUX DISEASE WITHOUT ESOPHAGITIS: Primary | ICD-10-CM

## 2024-05-26 LAB
ALBUMIN SERPL-MCNC: 3.4 G/DL (ref 3.5–5)
ALBUMIN/GLOB SERPL: 0.9 (ref 1.1–2.2)
ALP SERPL-CCNC: 100 U/L (ref 45–117)
ALT SERPL-CCNC: 15 U/L (ref 12–78)
ANION GAP SERPL CALC-SCNC: 7 MMOL/L (ref 5–15)
AST SERPL W P-5'-P-CCNC: 12 U/L (ref 15–37)
BASOPHILS # BLD: 0 K/UL (ref 0–0.1)
BASOPHILS NFR BLD: 1 % (ref 0–1)
BILIRUB SERPL-MCNC: 0.3 MG/DL (ref 0.2–1)
BUN SERPL-MCNC: 13 MG/DL (ref 6–20)
BUN/CREAT SERPL: 15 (ref 12–20)
CA-I BLD-MCNC: 9.2 MG/DL (ref 8.5–10.1)
CHLORIDE SERPL-SCNC: 107 MMOL/L (ref 97–108)
CO2 SERPL-SCNC: 23 MMOL/L (ref 21–32)
CREAT SERPL-MCNC: 0.85 MG/DL (ref 0.55–1.02)
DIFFERENTIAL METHOD BLD: ABNORMAL
EOSINOPHIL # BLD: 0.2 K/UL (ref 0–0.4)
EOSINOPHIL NFR BLD: 3 % (ref 0–7)
ERYTHROCYTE [DISTWIDTH] IN BLOOD BY AUTOMATED COUNT: 13.9 % (ref 11.5–14.5)
GLOBULIN SER CALC-MCNC: 4 G/DL (ref 2–4)
GLUCOSE SERPL-MCNC: 142 MG/DL (ref 65–100)
HCT VFR BLD AUTO: 41 % (ref 35–47)
HGB BLD-MCNC: 13.5 G/DL (ref 11.5–16)
IMM GRANULOCYTES # BLD AUTO: 0 K/UL (ref 0–0.04)
IMM GRANULOCYTES NFR BLD AUTO: 0 % (ref 0–0.5)
LIPASE SERPL-CCNC: 27 U/L (ref 13–75)
LYMPHOCYTES # BLD: 1.2 K/UL (ref 0.8–3.5)
LYMPHOCYTES NFR BLD: 21 % (ref 12–49)
MCH RBC QN AUTO: 28.6 PG (ref 26–34)
MCHC RBC AUTO-ENTMCNC: 32.9 G/DL (ref 30–36.5)
MCV RBC AUTO: 86.9 FL (ref 80–99)
MONOCYTES # BLD: 0.3 K/UL (ref 0–1)
MONOCYTES NFR BLD: 4 % (ref 5–13)
NEUTS SEG # BLD: 4.1 K/UL (ref 1.8–8)
NEUTS SEG NFR BLD: 71 % (ref 32–75)
NRBC # BLD: 0 K/UL (ref 0–0.01)
NRBC BLD-RTO: 0 PER 100 WBC
PLATELET # BLD AUTO: 270 K/UL (ref 150–400)
PMV BLD AUTO: 10 FL (ref 8.9–12.9)
POTASSIUM SERPL-SCNC: 4.1 MMOL/L (ref 3.5–5.1)
PROT SERPL-MCNC: 7.4 G/DL (ref 6.4–8.2)
RBC # BLD AUTO: 4.72 M/UL (ref 3.8–5.2)
SODIUM SERPL-SCNC: 137 MMOL/L (ref 136–145)
TROPONIN I SERPL HS-MCNC: <4 NG/L (ref 0–51)
WBC # BLD AUTO: 5.8 K/UL (ref 3.6–11)

## 2024-05-26 PROCEDURE — 83690 ASSAY OF LIPASE: CPT

## 2024-05-26 PROCEDURE — 85025 COMPLETE CBC W/AUTO DIFF WBC: CPT

## 2024-05-26 PROCEDURE — 80053 COMPREHEN METABOLIC PANEL: CPT

## 2024-05-26 PROCEDURE — 74177 CT ABD & PELVIS W/CONTRAST: CPT

## 2024-05-26 PROCEDURE — 2500000003 HC RX 250 WO HCPCS: Performed by: STUDENT IN AN ORGANIZED HEALTH CARE EDUCATION/TRAINING PROGRAM

## 2024-05-26 PROCEDURE — 2580000003 HC RX 258: Performed by: STUDENT IN AN ORGANIZED HEALTH CARE EDUCATION/TRAINING PROGRAM

## 2024-05-26 PROCEDURE — 36415 COLL VENOUS BLD VENIPUNCTURE: CPT

## 2024-05-26 PROCEDURE — 99285 EMERGENCY DEPT VISIT HI MDM: CPT

## 2024-05-26 PROCEDURE — 84484 ASSAY OF TROPONIN QUANT: CPT

## 2024-05-26 PROCEDURE — 6370000000 HC RX 637 (ALT 250 FOR IP): Performed by: STUDENT IN AN ORGANIZED HEALTH CARE EDUCATION/TRAINING PROGRAM

## 2024-05-26 PROCEDURE — 6360000004 HC RX CONTRAST MEDICATION: Performed by: STUDENT IN AN ORGANIZED HEALTH CARE EDUCATION/TRAINING PROGRAM

## 2024-05-26 PROCEDURE — 96374 THER/PROPH/DIAG INJ IV PUSH: CPT

## 2024-05-26 RX ADMIN — Medication 30 ML: at 12:31

## 2024-05-26 RX ADMIN — IOPAMIDOL 100 ML: 755 INJECTION, SOLUTION INTRAVENOUS at 13:17

## 2024-05-26 RX ADMIN — SODIUM CHLORIDE, PRESERVATIVE FREE 20 MG: 5 INJECTION INTRAVENOUS at 12:25

## 2024-05-26 NOTE — DISCHARGE INSTRUCTIONS
Thank you!    Thank you for allowing me to care for you in the emergency department.  I sincerely hope that you are satisfied with your visit today.  It is my goal to provide you with excellent care.    Below you will find a list of your labs and imaging from your visit today if applicable. Should you have any questions regarding these results please do not hesitate to call the emergency department. Please review [facility] for a more detailed result list since the below list may not be comprehensive. Instructions on how to sign up to [facility] should be provided in this packet.    Labs -     Recent Results (from the past 12 hour(s))   CBC with Auto Differential    Collection Time: 05/26/24 11:45 AM   Result Value Ref Range    WBC 5.8 3.6 - 11.0 K/uL    RBC 4.72 3.80 - 5.20 M/uL    Hemoglobin 13.5 11.5 - 16.0 g/dL    Hematocrit 41.0 35.0 - 47.0 %    MCV 86.9 80.0 - 99.0 FL    MCH 28.6 26.0 - 34.0 PG    MCHC 32.9 30.0 - 36.5 g/dL    RDW 13.9 11.5 - 14.5 %    Platelets 270 150 - 400 K/uL    MPV 10.0 8.9 - 12.9 FL    Nucleated RBCs 0.0 0.0  WBC    nRBC 0.00 0.00 - 0.01 K/uL    Neutrophils % 71 32 - 75 %    Lymphocytes % 21 12 - 49 %    Monocytes % 4 (L) 5 - 13 %    Eosinophils % 3 0 - 7 %    Basophils % 1 0 - 1 %    Immature Granulocytes % 0 0 - 0.5 %    Neutrophils Absolute 4.1 1.8 - 8.0 K/UL    Lymphocytes Absolute 1.2 0.8 - 3.5 K/UL    Monocytes Absolute 0.3 0.0 - 1.0 K/UL    Eosinophils Absolute 0.2 0.0 - 0.4 K/UL    Basophils Absolute 0.0 0.0 - 0.1 K/UL    Immature Granulocytes Absolute 0.0 0.00 - 0.04 K/UL    Differential Type AUTOMATED     Comprehensive Metabolic Panel    Collection Time: 05/26/24 11:45 AM   Result Value Ref Range    Sodium 137 136 - 145 mmol/L    Potassium 4.1 3.5 - 5.1 mmol/L    Chloride 107 97 - 108 mmol/L    CO2 23 21 - 32 mmol/L    Anion Gap 7 5 - 15 mmol/L    Glucose 142 (H) 65 - 100 mg/dL    BUN 13 6 - 20 mg/dL    Creatinine 0.85 0.55 - 1.02 mg/dL    BUN/Creatinine Ratio 15 12 - 20

## 2024-05-26 NOTE — ED PROVIDER NOTES
EMERGENCY DEPARTMENT HISTORY AND PHYSICAL EXAM      Date: 5/26/2024  Patient Name: María Amos  MRN: [id number]  YOB: 1965  Date of evaluation: 5/26/2024  Provider: Alexander Lloyd MD     History of Present Illness     Chief Complaint   Patient presents with    Abdominal Pain       History Provided By: Patient    HPI: María Amos, 59 y.o. female with past medical history as listed and reviewed below presenting to the ED for episode of abdominal pain.  She notes it was in epigastric region, did radiate once into the right lower quadrant.  She notes that symptoms came on after eating and were sharp, did not radiate to the chest.  She had 1 episode of nausea and vomiting.  She has not had any dysuria.  She does report a history of reflux.  Does not have any back pain.     Medical History     Past Medical History:  Past Medical History:   Diagnosis Date    Alcohol abuse     Anxiety attack     Cocaine abuse ([facility])     COVID-19     Depression     Fracture of left wrist     Hemorrhoids     HTN (hypertension)     Ill-defined condition     hemorrhoids    Orthostatic hypotension     Perineal rash     Rectal bleeding     Rib contusion, left, initial encounter     Seizures ([facility])     Stroke ([facility])     Syncope     Toe fracture     great toe    Trichomonas infection     UTI (urinary tract infection)        Past Surgical History:  No past surgical history on file.    Family History:  No family history on file.    Social History:  Social History     Tobacco Use    Smoking status: Every Day     Current packs/day: 0.50     Types: Cigarettes    Smokeless tobacco: Never   Substance Use Topics    Alcohol use: Yes     Comment: 12 pack per day    Drug use: Yes     Types: Marijuana (Weed), Cocaine     Comment: socially       Allergies:  Allergies   Allergen Reactions    Peanut Oil Anaphylaxis     Per  Melecio Toth    Bee Venom Swelling       PCP: Zhane Cain APRN - NP    Current Medications:   No current

## 2024-05-26 NOTE — ED TRIAGE NOTES
[facility] pt called EMS for ABD pain; pt stated that after she ate cereal she started to have ABD; vomited once and started to c/o nausea; EMS administered Zofran 4 SL

## 2024-06-10 ENCOUNTER — HOSPITAL ENCOUNTER (EMERGENCY)
Facility: HOSPITAL | Age: 59
Discharge: HOME | End: 2024-06-10
Attending: EMERGENCY MEDICINE
Payer: MEDICARE

## 2024-06-10 ENCOUNTER — APPOINTMENT (OUTPATIENT)
Facility: HOSPITAL | Age: 59
End: 2024-06-10
Payer: MEDICARE

## 2024-06-10 VITALS
OXYGEN SATURATION: 97 % | HEIGHT: 68 IN | WEIGHT: 272 LBS | BODY MASS INDEX: 41.22 KG/M2 | RESPIRATION RATE: 14 BRPM | DIASTOLIC BLOOD PRESSURE: 75 MMHG | HEART RATE: 66 BPM | TEMPERATURE: 98.1 F | SYSTOLIC BLOOD PRESSURE: 135 MMHG

## 2024-06-10 DIAGNOSIS — G89.29 ACUTE EXACERBATION OF CHRONIC LOW BACK PAIN: Primary | ICD-10-CM

## 2024-06-10 DIAGNOSIS — M54.50 ACUTE EXACERBATION OF CHRONIC LOW BACK PAIN: Primary | ICD-10-CM

## 2024-06-10 PROCEDURE — 96372 THER/PROPH/DIAG INJ SC/IM: CPT

## 2024-06-10 PROCEDURE — 99284 EMERGENCY DEPT VISIT MOD MDM: CPT

## 2024-06-10 PROCEDURE — 6370000000 HC RX 637 (ALT 250 FOR IP): Performed by: EMERGENCY MEDICINE

## 2024-06-10 PROCEDURE — 72100 X-RAY EXAM L-S SPINE 2/3 VWS: CPT

## 2024-06-10 PROCEDURE — 6360000002 HC RX W HCPCS: Performed by: EMERGENCY MEDICINE

## 2024-06-10 RX ORDER — KETOROLAC TROMETHAMINE 10 MG/1
10 TABLET, FILM COATED ORAL EVERY 6 HOURS PRN
Qty: 20 TABLET | Refills: 0 | Status: SHIPPED | OUTPATIENT
Start: 2024-06-10

## 2024-06-10 RX ORDER — LIDOCAINE 4 G/G
1 PATCH TOPICAL
Status: DISCONTINUED | OUTPATIENT
Start: 2024-06-10 | End: 2024-06-11 | Stop reason: HOSPADM

## 2024-06-10 RX ORDER — LIDOCAINE 50 MG/G
1 PATCH TOPICAL DAILY
Qty: 10 PATCH | Refills: 0 | Status: SHIPPED | OUTPATIENT
Start: 2024-06-10 | End: 2024-06-20

## 2024-06-10 RX ORDER — KETOROLAC TROMETHAMINE 30 MG/ML
30 INJECTION, SOLUTION INTRAMUSCULAR; INTRAVENOUS
Status: COMPLETED | OUTPATIENT
Start: 2024-06-10 | End: 2024-06-10

## 2024-06-10 RX ORDER — METHOCARBAMOL 750 MG/1
750 TABLET, FILM COATED ORAL
Status: COMPLETED | OUTPATIENT
Start: 2024-06-10 | End: 2024-06-10

## 2024-06-10 RX ORDER — METHOCARBAMOL 750 MG/1
750 TABLET, FILM COATED ORAL EVERY 6 HOURS PRN
Qty: 20 TABLET | Refills: 0 | Status: SHIPPED | OUTPATIENT
Start: 2024-06-10 | End: 2024-06-15

## 2024-06-10 RX ADMIN — METHOCARBAMOL 750 MG: 750 TABLET ORAL at 21:49

## 2024-06-10 RX ADMIN — KETOROLAC TROMETHAMINE 30 MG: 30 INJECTION, SOLUTION INTRAMUSCULAR at 21:51

## 2024-06-10 ASSESSMENT — PAIN DESCRIPTION - LOCATION
LOCATION: BACK
LOCATION: BACK;KNEE

## 2024-06-10 ASSESSMENT — PAIN DESCRIPTION - ORIENTATION
ORIENTATION: LOWER

## 2024-06-10 ASSESSMENT — LIFESTYLE VARIABLES
HOW OFTEN DO YOU HAVE A DRINK CONTAINING ALCOHOL: MONTHLY OR LESS
HOW MANY STANDARD DRINKS CONTAINING ALCOHOL DO YOU HAVE ON A TYPICAL DAY: 3 OR 4

## 2024-06-10 ASSESSMENT — PAIN SCALES - GENERAL
PAINLEVEL_OUTOF10: 8
PAINLEVEL_OUTOF10: 3
PAINLEVEL_OUTOF10: 9

## 2024-06-10 ASSESSMENT — PAIN - FUNCTIONAL ASSESSMENT
PAIN_FUNCTIONAL_ASSESSMENT: 0-10
PAIN_FUNCTIONAL_ASSESSMENT: 0-10

## 2024-06-11 NOTE — DISCHARGE INSTRUCTIONS
Thank you for choosing our Emergency Department for your care.  It is our privilege to care for you in your time of need.  In the next several days, you may receive a survey via email or mailed to your home about your experience with our team.  We would greatly appreciate you taking a few minutes to complete the survey, as we use this information to learn what we have done well and what we could be doing better. Thank you for trusting us with your care!    Below you will find a list of your tests from today's visit.   Labs  No results found for this or any previous visit (from the past 12 hour(s)).    Radiologic Studies  XR LUMBAR SPINE (2-3 VIEWS)   Final Result   Normal lumbar spine views.                  Electronically signed by AMAYA JASMINE        ------------------------------------------------------------------------------------------------------------  The evaluation and treatment you received in the Emergency Department were for an urgent problem. It is important that you follow-up with a doctor, nurse practitioner, or physician assistant to:  (1) confirm your diagnosis,  (2) re-evaluation of changes in your illness and treatment, and (3) for ongoing care. Please take your discharge instructions with you when you go to your follow-up appointment.     If you have any problem arranging a follow-up appointment, contact us!  If your symptoms become worse or you do not improve as expected, please return to us. We are available 24 hours a day.     If a prescription has been provided, please fill it as soon as possible to prevent a delay in treatment. If you have any questions or reservations about taking the medication due to side effects or interactions with other medications, please call your primary care provider or contact us directly.  Again, THANK YOU for choosing us to care for YOU!

## 2024-06-11 NOTE — ED TRIAGE NOTES
Pt arrives via EMS, called for lower back pain shooting down her legs and up to her neck. Pt states back pain started this morning and has gotten worse throughout the day, unable to walk. Hx of sciatica nerve pain.

## 2024-06-20 ENCOUNTER — HOSPITAL ENCOUNTER (EMERGENCY)
Facility: HOSPITAL | Age: 59
Discharge: HOME | End: 2024-06-20
Attending: EMERGENCY MEDICINE
Payer: MEDICARE

## 2024-06-20 ENCOUNTER — APPOINTMENT (OUTPATIENT)
Facility: HOSPITAL | Age: 59
End: 2024-06-20
Payer: MEDICARE

## 2024-06-20 VITALS
BODY MASS INDEX: 27.58 KG/M2 | WEIGHT: 182 LBS | HEART RATE: 68 BPM | TEMPERATURE: 98.1 F | DIASTOLIC BLOOD PRESSURE: 72 MMHG | HEIGHT: 68 IN | RESPIRATION RATE: 18 BRPM | SYSTOLIC BLOOD PRESSURE: 130 MMHG | OXYGEN SATURATION: 100 %

## 2024-06-20 DIAGNOSIS — J44.1 COPD EXACERBATION (HCC): Primary | ICD-10-CM

## 2024-06-20 LAB
ANION GAP SERPL CALC-SCNC: 4 MMOL/L (ref 5–15)
BASOPHILS # BLD: 0.1 K/UL (ref 0–0.1)
BASOPHILS NFR BLD: 1 % (ref 0–1)
BNP SERPL-MCNC: 58 PG/ML
BUN SERPL-MCNC: 12 MG/DL (ref 6–20)
BUN/CREAT SERPL: 15 (ref 12–20)
CA-I BLD-MCNC: 9.1 MG/DL (ref 8.5–10.1)
CHLORIDE SERPL-SCNC: 107 MMOL/L (ref 97–108)
CO2 SERPL-SCNC: 29 MMOL/L (ref 21–32)
CREAT SERPL-MCNC: 0.79 MG/DL (ref 0.55–1.02)
DIFFERENTIAL METHOD BLD: ABNORMAL
EKG ATRIAL RATE: 71 BPM
EKG DIAGNOSIS: NORMAL
EKG P AXIS: 61 DEGREES
EKG P-R INTERVAL: 154 MS
EKG Q-T INTERVAL: 430 MS
EKG QRS DURATION: 78 MS
EKG QTC CALCULATION (BAZETT): 467 MS
EKG R AXIS: 28 DEGREES
EKG T AXIS: 76 DEGREES
EKG VENTRICULAR RATE: 71 BPM
EOSINOPHIL # BLD: 0.5 K/UL (ref 0–0.4)
EOSINOPHIL NFR BLD: 11 % (ref 0–7)
ERYTHROCYTE [DISTWIDTH] IN BLOOD BY AUTOMATED COUNT: 13.5 % (ref 11.5–14.5)
GLUCOSE SERPL-MCNC: 101 MG/DL (ref 65–100)
HCT VFR BLD AUTO: 37.9 % (ref 35–47)
HGB BLD-MCNC: 12.3 G/DL (ref 11.5–16)
IMM GRANULOCYTES # BLD AUTO: 0 K/UL (ref 0–0.04)
IMM GRANULOCYTES NFR BLD AUTO: 0 % (ref 0–0.5)
LYMPHOCYTES # BLD: 1.9 K/UL (ref 0.8–3.5)
LYMPHOCYTES NFR BLD: 42 % (ref 12–49)
MAGNESIUM SERPL-MCNC: 2.1 MG/DL (ref 1.6–2.4)
MCH RBC QN AUTO: 28.5 PG (ref 26–34)
MCHC RBC AUTO-ENTMCNC: 32.5 G/DL (ref 30–36.5)
MCV RBC AUTO: 87.7 FL (ref 80–99)
MONOCYTES # BLD: 0.3 K/UL (ref 0–1)
MONOCYTES NFR BLD: 6 % (ref 5–13)
NEUTS SEG # BLD: 1.8 K/UL (ref 1.8–8)
NEUTS SEG NFR BLD: 40 % (ref 32–75)
NRBC # BLD: 0 K/UL (ref 0–0.01)
NRBC BLD-RTO: 0 PER 100 WBC
PLATELET # BLD AUTO: 284 K/UL (ref 150–400)
PMV BLD AUTO: 9 FL (ref 8.9–12.9)
POTASSIUM SERPL-SCNC: 3.6 MMOL/L (ref 3.5–5.1)
RBC # BLD AUTO: 4.32 M/UL (ref 3.8–5.2)
SODIUM SERPL-SCNC: 140 MMOL/L (ref 136–145)
TROPONIN I SERPL HS-MCNC: <4 NG/L (ref 0–51)
WBC # BLD AUTO: 4.6 K/UL (ref 3.6–11)

## 2024-06-20 PROCEDURE — 99285 EMERGENCY DEPT VISIT HI MDM: CPT

## 2024-06-20 PROCEDURE — 83880 ASSAY OF NATRIURETIC PEPTIDE: CPT

## 2024-06-20 PROCEDURE — 6370000000 HC RX 637 (ALT 250 FOR IP): Performed by: EMERGENCY MEDICINE

## 2024-06-20 PROCEDURE — 83735 ASSAY OF MAGNESIUM: CPT

## 2024-06-20 PROCEDURE — 93005 ELECTROCARDIOGRAM TRACING: CPT | Performed by: EMERGENCY MEDICINE

## 2024-06-20 PROCEDURE — 71045 X-RAY EXAM CHEST 1 VIEW: CPT

## 2024-06-20 PROCEDURE — 6360000002 HC RX W HCPCS: Performed by: EMERGENCY MEDICINE

## 2024-06-20 PROCEDURE — 80048 BASIC METABOLIC PNL TOTAL CA: CPT

## 2024-06-20 PROCEDURE — 36415 COLL VENOUS BLD VENIPUNCTURE: CPT

## 2024-06-20 PROCEDURE — 85025 COMPLETE CBC W/AUTO DIFF WBC: CPT

## 2024-06-20 PROCEDURE — 96374 THER/PROPH/DIAG INJ IV PUSH: CPT

## 2024-06-20 PROCEDURE — 84484 ASSAY OF TROPONIN QUANT: CPT

## 2024-06-20 PROCEDURE — 94761 N-INVAS EAR/PLS OXIMETRY MLT: CPT

## 2024-06-20 PROCEDURE — 94640 AIRWAY INHALATION TREATMENT: CPT

## 2024-06-20 RX ORDER — IPRATROPIUM BROMIDE AND ALBUTEROL SULFATE 2.5; .5 MG/3ML; MG/3ML
1 SOLUTION RESPIRATORY (INHALATION) EVERY 20 MIN
Status: COMPLETED | OUTPATIENT
Start: 2024-06-20 | End: 2024-06-20

## 2024-06-20 RX ORDER — PREDNISONE 50 MG/1
50 TABLET ORAL DAILY
Qty: 5 TABLET | Refills: 0 | Status: SHIPPED | OUTPATIENT
Start: 2024-06-20 | End: 2024-06-25

## 2024-06-20 RX ORDER — METHYLPREDNISOLONE SODIUM SUCCINATE 125 MG/2ML
125 INJECTION, POWDER, LYOPHILIZED, FOR SOLUTION INTRAMUSCULAR; INTRAVENOUS
Status: COMPLETED | OUTPATIENT
Start: 2024-06-20 | End: 2024-06-20

## 2024-06-20 RX ORDER — ALBUTEROL SULFATE 2.5 MG/3ML
2.5 SOLUTION RESPIRATORY (INHALATION) EVERY 6 HOURS PRN
Qty: 120 EACH | Refills: 3 | Status: SHIPPED | OUTPATIENT
Start: 2024-06-20

## 2024-06-20 RX ADMIN — IPRATROPIUM BROMIDE AND ALBUTEROL SULFATE 1 DOSE: 2.5; .5 SOLUTION RESPIRATORY (INHALATION) at 09:40

## 2024-06-20 RX ADMIN — IPRATROPIUM BROMIDE AND ALBUTEROL SULFATE 1 DOSE: 2.5; .5 SOLUTION RESPIRATORY (INHALATION) at 09:42

## 2024-06-20 RX ADMIN — IPRATROPIUM BROMIDE AND ALBUTEROL SULFATE 1 DOSE: 2.5; .5 SOLUTION RESPIRATORY (INHALATION) at 09:43

## 2024-06-20 RX ADMIN — METHYLPREDNISOLONE SODIUM SUCCINATE 125 MG: 125 INJECTION INTRAMUSCULAR; INTRAVENOUS at 11:13

## 2024-06-20 ASSESSMENT — PAIN - FUNCTIONAL ASSESSMENT: PAIN_FUNCTIONAL_ASSESSMENT: 0-10

## 2024-06-20 ASSESSMENT — LIFESTYLE VARIABLES
HOW OFTEN DO YOU HAVE A DRINK CONTAINING ALCOHOL: NEVER
HOW MANY STANDARD DRINKS CONTAINING ALCOHOL DO YOU HAVE ON A TYPICAL DAY: PATIENT DOES NOT DRINK

## 2024-06-20 ASSESSMENT — PAIN SCALES - GENERAL
PAINLEVEL_OUTOF10: 0
PAINLEVEL_OUTOF10: 10

## 2024-06-20 ASSESSMENT — PAIN DESCRIPTION - LOCATION: LOCATION: HEAD

## 2024-06-20 NOTE — ED TRIAGE NOTES
Per ems patient complains of sob since last night. States she took 2 neb tx with no relief. EMS gave duoneb in route.

## 2024-06-20 NOTE — ED PROVIDER NOTES
[facility] EMERGENCY DEPT  EMERGENCY DEPARTMENT HISTORY AND PHYSICAL EXAM      Date: 6/20/2024  Patient Name: María Amos  MRN: [id number]  Birthdate 1965  Date of evaluation: 6/20/2024  Provider: Alexis Gordon MD   Note Started: 10:17 AM EDT 6/20/24    HISTORY OF PRESENT ILLNESS     Chief Complaint   Patient presents with    Shortness of Breath       History Provided By: Patient and EMS    HPI: María Amos is a 59 y.o. female presents via EMS with complaint of shortness of breath that started last night.  Patient ports history of COPD, so she took 2 treatments at home without resolution.  EMS states they gave a DuoNeb and route.  Patient denies chest pain, denies fevers or chills, denies nausea or vomiting.    PAST MEDICAL HISTORY   Past Medical History:  Past Medical History:   Diagnosis Date    Alcohol abuse     Anxiety attack     Cocaine abuse (HCC)     COVID-19     Depression     Fracture of left wrist     Hemorrhoids     HTN (hypertension)     Ill-defined condition     hemorrhoids    Orthostatic hypotension     Perineal rash     Rectal bleeding     Rib contusion, left, initial encounter     Seizures ([facility])     Stroke ([facility])     Syncope     Toe fracture     great toe    Trichomonas infection     UTI (urinary tract infection)        Past Surgical History:  No past surgical history on file.    Family History:  No family history on file.    Social History:  Social History     Tobacco Use    Smoking status: Every Day     Current packs/day: 0.50     Types: Cigarettes    Smokeless tobacco: Never   Substance Use Topics    Alcohol use: Yes     Comment: 12 pack per day    Drug use: Yes     Types: Marijuana (Weed), Cocaine     Comment: socially       Allergies:  Allergies   Allergen Reactions    Peanut Oil Anaphylaxis     Per  Melecio Toth    Bee Venom Swelling       PCP: Patricia Wood NP-C    Current Meds:   No current facility-administered medications for this encounter.     Current Outpatient  external notes): Prior medical records and Nursing notes.    Vitals:    Vitals:    06/20/24 0923 06/20/24 0947   BP: (!) 133/91    Pulse: 73 65   Resp: 16 20   Temp: 97.9 °F (36.6 °C)    SpO2: 97% 98%   Weight: 82.6 kg (182 lb)    Height: 1.727 m (5' 8\")         ED COURSE  ED Course as of 06/20/24 1417   Thu Jun 20, 2024   1413 Patient was ambulated in the department      Tolerated without hypoxia or shortness of breath.  Discussed same with the patient the unremarkable workup, will treat at home as COVID exacerbation.  Discussed the need to have close follow-up, states he has a follow-up appoint with pulmonology in 4 days.  Return precautions discussed [CS]      ED Course User Index  [CS] Alexis Gordon MD       SEPSIS Reassessment: Sepsis reassessment not applicable    Clinical Management Tools:  Not Applicable    Patient was given the following medications:  Medications   ipratropium 0.5 mg-albuterol 2.5 mg (DUONEB) nebulizer solution 1 Dose (1 Dose Inhalation Given 6/20/24 0943)   methylPREDNISolone sodium succ (SOLU-MEDROL) injection 125 mg (125 mg IntraVENous Given 6/20/24 1113)       CONSULTS: See ED Course/MDM for further details.  None     Social Determinants affecting Diagnosis/Treatment: None    Smoking Cessation: Not Applicable    PROCEDURES   Unless otherwise noted above, none  Procedures      CRITICAL CARE TIME   Patient does not meet Critical Care Time, 0 minutes    ED IMPRESSION     1. COPD exacerbation (HCC)          DISPOSITION/PLAN   DISPOSITION Decision To Discharge 06/20/2024 02:14:41 PM    Discharge Note: The patient is stable for discharge home. The signs, symptoms, diagnosis, and discharge instructions have been discussed, understanding conveyed, and agreed upon. The patient is to follow up as recommended or return to ER should their symptoms worsen.      PATIENT REFERRED TO:  Patricia Wood NP-C  [address] [place] [id number] [phone]    In 2 days          DISCHARGE

## 2024-06-20 NOTE — DISCHARGE INSTRUCTIONS
Thank you for choosing our Emergency Department for your care.  It is our privilege to care for you in your time of need.  In the next several days, you may receive a survey via email or mailed to your home about your experience with our team.  We would greatly appreciate you taking a few minutes to complete the survey, as we use this information to learn what we have done well and what we could be doing better. Thank you for trusting us with your care!    Below you will find a list of your tests from today's visit.   Labs      Radiologic Studies  XR CHEST PORTABLE   Final Result      No acute process on portable chest.         Electronically signed by RALEIGH WIGGINS        ------------------------------------------------------------------------------------------------------------  The evaluation and treatment you received in the Emergency Department were for an urgent problem. It is important that you follow-up with a doctor, nurse practitioner, or physician assistant to:  (1) confirm your diagnosis,  (2) re-evaluation of changes in your illness and treatment, and (3) for ongoing care. Please take your discharge instructions with you when you go to your follow-up appointment.     If you have any problem arranging a follow-up appointment, contact us!  If your symptoms become worse or you do not improve as expected, please return to us. We are available 24 hours a day.     If a prescription has been provided, please fill it as soon as possible to prevent a delay in treatment. If you have any questions or reservations about taking the medication due to side effects or interactions with other medications, please call your primary care provider or contact us directly.  Again, THANK YOU for choosing us to care for YOU!

## 2024-06-30 ENCOUNTER — HOSPITAL ENCOUNTER (EMERGENCY)
Facility: HOSPITAL | Age: 59
Discharge: HOME | End: 2024-06-30
Attending: STUDENT IN AN ORGANIZED HEALTH CARE EDUCATION/TRAINING PROGRAM
Payer: MEDICARE

## 2024-06-30 ENCOUNTER — APPOINTMENT (OUTPATIENT)
Facility: HOSPITAL | Age: 59
End: 2024-06-30
Payer: MEDICARE

## 2024-06-30 VITALS
WEIGHT: 214 LBS | TEMPERATURE: 97.8 F | OXYGEN SATURATION: 96 % | SYSTOLIC BLOOD PRESSURE: 124 MMHG | HEIGHT: 68 IN | RESPIRATION RATE: 19 BRPM | BODY MASS INDEX: 32.43 KG/M2 | HEART RATE: 80 BPM | DIASTOLIC BLOOD PRESSURE: 70 MMHG

## 2024-06-30 DIAGNOSIS — J44.1 COPD EXACERBATION (HCC): Primary | ICD-10-CM

## 2024-06-30 LAB
ALBUMIN SERPL-MCNC: 3.2 G/DL (ref 3.5–5)
ALBUMIN/GLOB SERPL: 0.9 (ref 1.1–2.2)
ALP SERPL-CCNC: 94 U/L (ref 45–117)
ALT SERPL-CCNC: 35 U/L (ref 12–78)
ANION GAP SERPL CALC-SCNC: 6 MMOL/L (ref 5–15)
AST SERPL W P-5'-P-CCNC: 19 U/L (ref 15–37)
BASOPHILS # BLD: 0.1 K/UL (ref 0–0.1)
BASOPHILS NFR BLD: 1 % (ref 0–1)
BILIRUB SERPL-MCNC: 0.3 MG/DL (ref 0.2–1)
BUN SERPL-MCNC: 8 MG/DL (ref 6–20)
BUN/CREAT SERPL: 10 (ref 12–20)
CA-I BLD-MCNC: 8.7 MG/DL (ref 8.5–10.1)
CHLORIDE SERPL-SCNC: 109 MMOL/L (ref 97–108)
CO2 SERPL-SCNC: 25 MMOL/L (ref 21–32)
CREAT SERPL-MCNC: 0.79 MG/DL (ref 0.55–1.02)
DIFFERENTIAL METHOD BLD: ABNORMAL
EKG ATRIAL RATE: 80 BPM
EKG DIAGNOSIS: NORMAL
EKG P AXIS: 57 DEGREES
EKG P-R INTERVAL: 144 MS
EKG Q-T INTERVAL: 412 MS
EKG QRS DURATION: 84 MS
EKG QTC CALCULATION (BAZETT): 475 MS
EKG R AXIS: 43 DEGREES
EKG T AXIS: 65 DEGREES
EKG VENTRICULAR RATE: 80 BPM
EOSINOPHIL # BLD: 0.7 K/UL (ref 0–0.4)
EOSINOPHIL NFR BLD: 11 % (ref 0–7)
ERYTHROCYTE [DISTWIDTH] IN BLOOD BY AUTOMATED COUNT: 14 % (ref 11.5–14.5)
GLOBULIN SER CALC-MCNC: 3.6 G/DL (ref 2–4)
GLUCOSE SERPL-MCNC: 98 MG/DL (ref 65–100)
HCT VFR BLD AUTO: 36.4 % (ref 35–47)
HGB BLD-MCNC: 11.7 G/DL (ref 11.5–16)
IMM GRANULOCYTES # BLD AUTO: 0 K/UL (ref 0–0.04)
IMM GRANULOCYTES NFR BLD AUTO: 0 % (ref 0–0.5)
LYMPHOCYTES # BLD: 2.2 K/UL (ref 0.8–3.5)
LYMPHOCYTES NFR BLD: 34 % (ref 12–49)
MCH RBC QN AUTO: 28.3 PG (ref 26–34)
MCHC RBC AUTO-ENTMCNC: 32.1 G/DL (ref 30–36.5)
MCV RBC AUTO: 88.1 FL (ref 80–99)
MONOCYTES # BLD: 0.4 K/UL (ref 0–1)
MONOCYTES NFR BLD: 7 % (ref 5–13)
NEUTS SEG # BLD: 3.1 K/UL (ref 1.8–8)
NEUTS SEG NFR BLD: 47 % (ref 32–75)
NRBC # BLD: 0 K/UL (ref 0–0.01)
NRBC BLD-RTO: 0 PER 100 WBC
PLATELET # BLD AUTO: 289 K/UL (ref 150–400)
PMV BLD AUTO: 9.1 FL (ref 8.9–12.9)
POTASSIUM SERPL-SCNC: 3.6 MMOL/L (ref 3.5–5.1)
PROT SERPL-MCNC: 6.8 G/DL (ref 6.4–8.2)
RBC # BLD AUTO: 4.13 M/UL (ref 3.8–5.2)
SODIUM SERPL-SCNC: 140 MMOL/L (ref 136–145)
WBC # BLD AUTO: 6.5 K/UL (ref 3.6–11)

## 2024-06-30 PROCEDURE — 99285 EMERGENCY DEPT VISIT HI MDM: CPT

## 2024-06-30 PROCEDURE — 85025 COMPLETE CBC W/AUTO DIFF WBC: CPT

## 2024-06-30 PROCEDURE — 6370000000 HC RX 637 (ALT 250 FOR IP): Performed by: STUDENT IN AN ORGANIZED HEALTH CARE EDUCATION/TRAINING PROGRAM

## 2024-06-30 PROCEDURE — 71045 X-RAY EXAM CHEST 1 VIEW: CPT

## 2024-06-30 PROCEDURE — 36415 COLL VENOUS BLD VENIPUNCTURE: CPT

## 2024-06-30 PROCEDURE — 80053 COMPREHEN METABOLIC PANEL: CPT

## 2024-06-30 PROCEDURE — 93005 ELECTROCARDIOGRAM TRACING: CPT | Performed by: STUDENT IN AN ORGANIZED HEALTH CARE EDUCATION/TRAINING PROGRAM

## 2024-06-30 RX ORDER — AZITHROMYCIN 250 MG/1
250 TABLET, FILM COATED ORAL DAILY
Qty: 4 TABLET | Refills: 0 | Status: SHIPPED | OUTPATIENT
Start: 2024-07-01 | End: 2024-07-05

## 2024-06-30 RX ORDER — PREDNISONE 20 MG/1
60 TABLET ORAL ONCE
Status: COMPLETED | OUTPATIENT
Start: 2024-06-30 | End: 2024-06-30

## 2024-06-30 RX ORDER — IPRATROPIUM BROMIDE AND ALBUTEROL SULFATE 2.5; .5 MG/3ML; MG/3ML
1 SOLUTION RESPIRATORY (INHALATION)
Status: DISPENSED | OUTPATIENT
Start: 2024-06-30 | End: 2024-06-30

## 2024-06-30 RX ORDER — PREDNISONE 50 MG/1
50 TABLET ORAL DAILY
Qty: 5 TABLET | Refills: 0 | Status: SHIPPED | OUTPATIENT
Start: 2024-06-30 | End: 2024-07-05

## 2024-06-30 RX ORDER — AZITHROMYCIN 500 MG/1
500 TABLET, FILM COATED ORAL
Status: COMPLETED | OUTPATIENT
Start: 2024-06-30 | End: 2024-06-30

## 2024-06-30 RX ADMIN — PREDNISONE 60 MG: 20 TABLET ORAL at 10:58

## 2024-06-30 RX ADMIN — IPRATROPIUM BROMIDE AND ALBUTEROL SULFATE 1 DOSE: .5; 3 SOLUTION RESPIRATORY (INHALATION) at 10:30

## 2024-06-30 RX ADMIN — AZITHROMYCIN DIHYDRATE 500 MG: 500 TABLET ORAL at 10:58

## 2024-06-30 RX ADMIN — IPRATROPIUM BROMIDE AND ALBUTEROL SULFATE 1 DOSE: .5; 3 SOLUTION RESPIRATORY (INHALATION) at 10:11

## 2024-06-30 RX ADMIN — IPRATROPIUM BROMIDE AND ALBUTEROL SULFATE 1 DOSE: .5; 3 SOLUTION RESPIRATORY (INHALATION) at 10:00

## 2024-06-30 ASSESSMENT — PAIN - FUNCTIONAL ASSESSMENT
PAIN_FUNCTIONAL_ASSESSMENT: NONE - DENIES PAIN
PAIN_FUNCTIONAL_ASSESSMENT: NONE - DENIES PAIN

## 2024-06-30 ASSESSMENT — PAIN SCALES - GENERAL: PAINLEVEL_OUTOF10: 0

## 2024-06-30 NOTE — ED PROVIDER NOTES
prescribed for you. Read the directions carefully, and ask your doctor or other care provider to review them with you.                   Where to Get Your Medications        These medications were sent to [facility] - [place], [place] - [address] -  [phone] F [phone] [address] [address]      Phone: [phone]   azithromycin 250 MG tablet  predniSONE 50 MG tablet           DISCONTINUED MEDICATIONS:  Current Discharge Medication List          I am the Primary Clinician of Record. Luis Stover MD (electronically signed)    (Please note that parts of this dictation were completed with voice recognition software. Quite often unanticipated grammatical, syntax, homophones, and other interpretive errors are inadvertently transcribed by the computer software. Please disregards these errors. Please excuse any errors that have escaped final proofreading.)       Luis Stover MD  06/30/24 0960

## 2024-07-21 ENCOUNTER — APPOINTMENT (OUTPATIENT)
Facility: HOSPITAL | Age: 59
End: 2024-07-21
Payer: MEDICARE

## 2024-07-21 ENCOUNTER — HOSPITAL ENCOUNTER (EMERGENCY)
Facility: HOSPITAL | Age: 59
Discharge: HOME | End: 2024-07-22
Attending: EMERGENCY MEDICINE
Payer: MEDICARE

## 2024-07-21 DIAGNOSIS — J45.901 EXACERBATION OF ASTHMA, UNSPECIFIED ASTHMA SEVERITY, UNSPECIFIED WHETHER PERSISTENT: Primary | ICD-10-CM

## 2024-07-21 LAB
ALBUMIN SERPL-MCNC: 3.2 G/DL (ref 3.5–5)
ALBUMIN/GLOB SERPL: 1 (ref 1.1–2.2)
ALP SERPL-CCNC: 82 U/L (ref 45–117)
ALT SERPL-CCNC: 19 U/L (ref 12–78)
ANION GAP SERPL CALC-SCNC: 6 MMOL/L (ref 5–15)
AST SERPL W P-5'-P-CCNC: 10 U/L (ref 15–37)
BASOPHILS # BLD: 0 K/UL (ref 0–0.1)
BASOPHILS NFR BLD: 1 % (ref 0–1)
BILIRUB SERPL-MCNC: 0.2 MG/DL (ref 0.2–1)
BUN SERPL-MCNC: 15 MG/DL (ref 6–20)
BUN/CREAT SERPL: 17 (ref 12–20)
CA-I BLD-MCNC: 8.2 MG/DL (ref 8.5–10.1)
CHLORIDE SERPL-SCNC: 111 MMOL/L (ref 97–108)
CO2 SERPL-SCNC: 24 MMOL/L (ref 21–32)
CREAT SERPL-MCNC: 0.89 MG/DL (ref 0.55–1.02)
DIFFERENTIAL METHOD BLD: ABNORMAL
EOSINOPHIL # BLD: 0.4 K/UL (ref 0–0.4)
EOSINOPHIL NFR BLD: 10 % (ref 0–7)
ERYTHROCYTE [DISTWIDTH] IN BLOOD BY AUTOMATED COUNT: 14 % (ref 11.5–14.5)
GLOBULIN SER CALC-MCNC: 3.1 G/DL (ref 2–4)
GLUCOSE SERPL-MCNC: 121 MG/DL (ref 65–100)
HCT VFR BLD AUTO: 35.4 % (ref 35–47)
HGB BLD-MCNC: 11.5 G/DL (ref 11.5–16)
IMM GRANULOCYTES # BLD AUTO: 0 K/UL (ref 0–0.04)
IMM GRANULOCYTES NFR BLD AUTO: 0 % (ref 0–0.5)
LYMPHOCYTES # BLD: 2.2 K/UL (ref 0.8–3.5)
LYMPHOCYTES NFR BLD: 48 % (ref 12–49)
MAGNESIUM SERPL-MCNC: 2 MG/DL (ref 1.6–2.4)
MCH RBC QN AUTO: 28.4 PG (ref 26–34)
MCHC RBC AUTO-ENTMCNC: 32.5 G/DL (ref 30–36.5)
MCV RBC AUTO: 87.4 FL (ref 80–99)
MONOCYTES # BLD: 0.3 K/UL (ref 0–1)
MONOCYTES NFR BLD: 5 % (ref 5–13)
NEUTS SEG # BLD: 1.6 K/UL (ref 1.8–8)
NEUTS SEG NFR BLD: 36 % (ref 32–75)
NRBC # BLD: 0 K/UL (ref 0–0.01)
NRBC BLD-RTO: 0 PER 100 WBC
PLATELET # BLD AUTO: 278 K/UL (ref 150–400)
PMV BLD AUTO: 8.7 FL (ref 8.9–12.9)
POTASSIUM SERPL-SCNC: 3.7 MMOL/L (ref 3.5–5.1)
PROT SERPL-MCNC: 6.3 G/DL (ref 6.4–8.2)
RBC # BLD AUTO: 4.05 M/UL (ref 3.8–5.2)
SODIUM SERPL-SCNC: 141 MMOL/L (ref 136–145)
TROPONIN I SERPL HS-MCNC: <4 NG/L (ref 0–51)
TROPONIN I SERPL HS-MCNC: <4 NG/L (ref 0–51)
WBC # BLD AUTO: 4.6 K/UL (ref 3.6–11)

## 2024-07-21 PROCEDURE — 6370000000 HC RX 637 (ALT 250 FOR IP): Performed by: EMERGENCY MEDICINE

## 2024-07-21 PROCEDURE — 94761 N-INVAS EAR/PLS OXIMETRY MLT: CPT

## 2024-07-21 PROCEDURE — 99285 EMERGENCY DEPT VISIT HI MDM: CPT

## 2024-07-21 PROCEDURE — 6360000004 HC RX CONTRAST MEDICATION: Performed by: EMERGENCY MEDICINE

## 2024-07-21 PROCEDURE — 2580000003 HC RX 258: Performed by: EMERGENCY MEDICINE

## 2024-07-21 PROCEDURE — 85025 COMPLETE CBC W/AUTO DIFF WBC: CPT

## 2024-07-21 PROCEDURE — 96374 THER/PROPH/DIAG INJ IV PUSH: CPT

## 2024-07-21 PROCEDURE — 93005 ELECTROCARDIOGRAM TRACING: CPT | Performed by: EMERGENCY MEDICINE

## 2024-07-21 PROCEDURE — 6360000002 HC RX W HCPCS: Performed by: EMERGENCY MEDICINE

## 2024-07-21 PROCEDURE — 80053 COMPREHEN METABOLIC PANEL: CPT

## 2024-07-21 PROCEDURE — 94640 AIRWAY INHALATION TREATMENT: CPT

## 2024-07-21 PROCEDURE — 84484 ASSAY OF TROPONIN QUANT: CPT

## 2024-07-21 PROCEDURE — 71045 X-RAY EXAM CHEST 1 VIEW: CPT

## 2024-07-21 PROCEDURE — 36415 COLL VENOUS BLD VENIPUNCTURE: CPT

## 2024-07-21 PROCEDURE — 71275 CT ANGIOGRAPHY CHEST: CPT

## 2024-07-21 PROCEDURE — 83735 ASSAY OF MAGNESIUM: CPT

## 2024-07-21 RX ORDER — 0.9 % SODIUM CHLORIDE 0.9 %
1000 INTRAVENOUS SOLUTION INTRAVENOUS ONCE
Status: COMPLETED | OUTPATIENT
Start: 2024-07-21 | End: 2024-07-22

## 2024-07-21 RX ORDER — ACETAMINOPHEN 500 MG
1000 TABLET ORAL
Status: COMPLETED | OUTPATIENT
Start: 2024-07-21 | End: 2024-07-21

## 2024-07-21 RX ORDER — IPRATROPIUM BROMIDE AND ALBUTEROL SULFATE 2.5; .5 MG/3ML; MG/3ML
1 SOLUTION RESPIRATORY (INHALATION)
Status: COMPLETED | OUTPATIENT
Start: 2024-07-21 | End: 2024-07-21

## 2024-07-21 RX ADMIN — IPRATROPIUM BROMIDE AND ALBUTEROL SULFATE 1 DOSE: .5; 3 SOLUTION RESPIRATORY (INHALATION) at 22:23

## 2024-07-21 RX ADMIN — SODIUM CHLORIDE 1000 ML: 9 INJECTION, SOLUTION INTRAVENOUS at 22:12

## 2024-07-21 RX ADMIN — IOPAMIDOL 100 ML: 755 INJECTION, SOLUTION INTRAVENOUS at 23:19

## 2024-07-21 RX ADMIN — IPRATROPIUM BROMIDE AND ALBUTEROL SULFATE 1 DOSE: .5; 3 SOLUTION RESPIRATORY (INHALATION) at 22:16

## 2024-07-21 RX ADMIN — ACETAMINOPHEN 1000 MG: 500 TABLET ORAL at 21:26

## 2024-07-21 RX ADMIN — METHYLPREDNISOLONE SODIUM SUCCINATE 125 MG: 125 INJECTION INTRAMUSCULAR; INTRAVENOUS at 22:13

## 2024-07-21 ASSESSMENT — PAIN DESCRIPTION - LOCATION: LOCATION: BACK

## 2024-07-21 ASSESSMENT — PAIN SCALES - GENERAL: PAINLEVEL_OUTOF10: 8

## 2024-07-21 NOTE — ED TRIAGE NOTES
Patient says that she has been sitting at home today with shortness of breath, has been using her breathing treatments with no relief

## 2024-07-22 VITALS
DIASTOLIC BLOOD PRESSURE: 72 MMHG | SYSTOLIC BLOOD PRESSURE: 110 MMHG | TEMPERATURE: 98.4 F | HEART RATE: 68 BPM | WEIGHT: 230 LBS | HEIGHT: 68 IN | BODY MASS INDEX: 34.86 KG/M2 | OXYGEN SATURATION: 96 % | RESPIRATION RATE: 17 BRPM

## 2024-07-22 LAB
EKG ATRIAL RATE: 70 BPM
EKG DIAGNOSIS: NORMAL
EKG P AXIS: 50 DEGREES
EKG P-R INTERVAL: 146 MS
EKG Q-T INTERVAL: 456 MS
EKG QRS DURATION: 80 MS
EKG QTC CALCULATION (BAZETT): 492 MS
EKG R AXIS: 33 DEGREES
EKG T AXIS: 72 DEGREES
EKG VENTRICULAR RATE: 70 BPM

## 2024-07-22 RX ORDER — ALBUTEROL SULFATE 90 UG/1
2 AEROSOL, METERED RESPIRATORY (INHALATION) 4 TIMES DAILY PRN
Qty: 18 G | Refills: 0 | Status: ON HOLD | OUTPATIENT
Start: 2024-07-22 | End: 2024-07-25 | Stop reason: HOSPADM

## 2024-07-22 RX ORDER — GUAIFENESIN/DEXTROMETHORPHAN 100-10MG/5
5 SYRUP ORAL 3 TIMES DAILY PRN
Qty: 120 ML | Refills: 0 | Status: SHIPPED | OUTPATIENT
Start: 2024-07-22 | End: 2024-08-01

## 2024-07-22 RX ORDER — AZITHROMYCIN 250 MG/1
250 TABLET, FILM COATED ORAL SEE ADMIN INSTRUCTIONS
Qty: 6 TABLET | Refills: 0 | Status: ON HOLD | OUTPATIENT
Start: 2024-07-22 | End: 2024-07-25 | Stop reason: HOSPADM

## 2024-07-22 RX ORDER — PREDNISONE 20 MG/1
60 TABLET ORAL DAILY
Qty: 15 TABLET | Refills: 0 | Status: ON HOLD | OUTPATIENT
Start: 2024-07-22 | End: 2024-07-25 | Stop reason: HOSPADM

## 2024-07-22 NOTE — ED PROVIDER NOTES
[facility] EMERGENCY DEPT  EMERGENCY DEPARTMENT HISTORY AND PHYSICAL EXAM      Date: 7/21/2024  Patient Name: María Amos  MRN: [id number]  Birthdate 1965  Date of evaluation: 7/21/2024  Provider: Royce Hernández DO   Note Started: 9:16 PM EDT 7/21/24    HISTORY OF PRESENT ILLNESS     Chief Complaint   Patient presents with    Shortness of Breath     History Provided By: Patient    HPI: María Amos is a 59 y.o. female with past medical history of alcohol abuse, cocaine abuse, depression, diabetes, hypertension, stroke, seizures  who presents with back pain, cough, difficulty breathing.    PAST MEDICAL HISTORY   Past Medical History:  Past Medical History:   Diagnosis Date    Alcohol abuse     Anxiety attack     Cocaine abuse (HCC)     COVID-19     Depression     Diabetes mellitus (HCC)     Fracture of left wrist     Hemorrhoids     HTN (hypertension)     Ill-defined condition     hemorrhoids    Orthostatic hypotension     Perineal rash     Rectal bleeding     Rib contusion, left, initial encounter     Seizures (HCC)     Stroke (HCC)     Syncope     Toe fracture     great toe    Trichomonas infection     UTI (urinary tract infection)        Past Surgical History:  History reviewed. No pertinent surgical history.    Family History:  History reviewed. No pertinent family history.    Social History:  Social History     Tobacco Use    Smoking status: Former     Current packs/day: 0.50     Types: Cigarettes    Smokeless tobacco: Former   Substance Use Topics    Alcohol use: Not Currently     Comment: 12 pack per day    Drug use: Not Currently     Types: Marijuana (Weed), Cocaine     Comment: socially       Allergies:  Allergies   Allergen Reactions    Peanut Oil Anaphylaxis     Per  Melecio Toth    Bee Venom Swelling    Peanut Butter Flavor        PCP: Patricia Wood NP-C    Current Meds:   No current facility-administered medications for this encounter.     Current Outpatient Medications   Medication Sig  x-ray shows no signs of pneumonia.  Labs reassuring  I did do a CTA which is ordered and pending to rule out PE since patient does not have any relief of her symptoms  Care transitioned to Dr. Hitchcock at shift change    Additional ED Course  ED Course as of 07/22/24 0020   Sun Jul 21, 2024   2117 Labs, EKG, and chest x-ray are reassuring. [SW]   Mon Jul 22, 2024   0007 Troponin, High Sensitivity: <4  Lab work interpreted independently by ER physician as NORMAL   [HP]   0007 CTA CHEST W WO CONTRAST  TECHNIQUE: Helical thin section chest CT following intravenous administration of  nonionic contrast 100 mL of isovue 370 according to departmental PE protocol.  Coronal and sagittal reformats were performed. 3D post processing was performed.   CT dose reduction was achieved through the use of a standardized protocol  tailored for this examination and automatic exposure control for dose  modulation.     FINDINGS: This is a good quality study for the evaluation of pulmonary embolism  to the first subsegmental arterial level. There is no pulmonary embolism to this  level.        MEDIASTINUM: No mass or lymphadenopathy.  CUCA: No mass or lymphadenopathy.  THORACIC AORTA: No aneurysm or dissection.  HEART: Normal in size.  ESOPHAGUS: No wall thickening or dilatation.  TRACHEA/BRONCHI: Patent.  PLEURA: No effusion or pneumothorax.  LUNGS: No nodule, mass, or airspace disease.  UPPER ABDOMEN: Partially imaged. No acute pathology.  BONES: Degenerative changes are seen in the thoracic spine.     IMPRESSION:  No acute process or evidence of pulmonary embolism.        Electronically signed by LEX TAFOYA   [HP]   0008 Received signout pending repeat troponin and CTA to rule out a pulmonary emboli.  Troponin is less than 4 negative.  CTA is also negative acute.  Will discharge home with asthma exacerbation. [HP]      ED Course User Index  [HP] Carsisa Hitchcock MD  [SW] Royce Hernández, DO       Clinical Management Tools:  Not

## 2024-07-23 ENCOUNTER — APPOINTMENT (OUTPATIENT)
Facility: HOSPITAL | Age: 59
DRG: 101 | End: 2024-07-23
Payer: MEDICARE

## 2024-07-23 ENCOUNTER — HOSPITAL ENCOUNTER (INPATIENT)
Facility: HOSPITAL | Age: 59
LOS: 2 days | Discharge: HOME | DRG: 101 | End: 2024-07-25
Attending: STUDENT IN AN ORGANIZED HEALTH CARE EDUCATION/TRAINING PROGRAM | Admitting: INTERNAL MEDICINE
Payer: MEDICARE

## 2024-07-23 DIAGNOSIS — R53.1 LEFT-SIDED WEAKNESS: ICD-10-CM

## 2024-07-23 DIAGNOSIS — G40.919 BREAKTHROUGH SEIZURE (HCC): Primary | ICD-10-CM

## 2024-07-23 LAB
ALBUMIN SERPL-MCNC: 3.4 G/DL (ref 3.5–5)
ALBUMIN/GLOB SERPL: 0.9 (ref 1.1–2.2)
ALP SERPL-CCNC: 74 U/L (ref 45–117)
ALT SERPL-CCNC: 22 U/L (ref 12–78)
AMPHET UR QL SCN: NEGATIVE
ANION GAP SERPL CALC-SCNC: 5 MMOL/L (ref 5–15)
APPEARANCE UR: CLEAR
AST SERPL-CCNC: 10 U/L (ref 15–37)
BACTERIA URNS QL MICRO: NEGATIVE /HPF
BARBITURATES UR QL SCN: POSITIVE
BASOPHILS # BLD: 0 K/UL (ref 0–0.1)
BASOPHILS NFR BLD: 0 % (ref 0–1)
BENZODIAZ UR QL: POSITIVE
BILIRUB SERPL-MCNC: 0.2 MG/DL (ref 0.2–1)
BILIRUB UR QL: NEGATIVE
BUN SERPL-MCNC: 11 MG/DL (ref 6–20)
BUN/CREAT SERPL: 13 (ref 12–20)
CALCIUM SERPL-MCNC: 8.4 MG/DL (ref 8.5–10.1)
CANNABINOIDS UR QL SCN: NEGATIVE
CHLORIDE SERPL-SCNC: 107 MMOL/L (ref 97–108)
CO2 SERPL-SCNC: 27 MMOL/L (ref 21–32)
COCAINE UR QL SCN: POSITIVE
COLOR UR: NORMAL
CREAT SERPL-MCNC: 0.84 MG/DL (ref 0.55–1.02)
DIFFERENTIAL METHOD BLD: NORMAL
EOSINOPHIL # BLD: 0.1 K/UL (ref 0–0.4)
EOSINOPHIL NFR BLD: 2 % (ref 0–7)
EPITH CASTS URNS QL MICRO: NORMAL /LPF
ERYTHROCYTE [DISTWIDTH] IN BLOOD BY AUTOMATED COUNT: 14.4 % (ref 11.5–14.5)
ETHANOL SERPL-MCNC: <10 MG/DL (ref 0–0.08)
GLOBULIN SER CALC-MCNC: 3.6 G/DL (ref 2–4)
GLUCOSE BLD STRIP.AUTO-MCNC: 116 MG/DL (ref 65–117)
GLUCOSE SERPL-MCNC: 123 MG/DL (ref 65–100)
GLUCOSE UR STRIP.AUTO-MCNC: NEGATIVE MG/DL
HCT VFR BLD AUTO: 38.4 % (ref 35–47)
HGB BLD-MCNC: 12.1 G/DL (ref 11.5–16)
HGB UR QL STRIP: NEGATIVE
HYALINE CASTS URNS QL MICRO: NORMAL /LPF (ref 0–2)
IMM GRANULOCYTES # BLD AUTO: 0 K/UL (ref 0–0.04)
IMM GRANULOCYTES NFR BLD AUTO: 0 % (ref 0–0.5)
INR PPP: 1.1 (ref 0.9–1.1)
KETONES UR QL STRIP.AUTO: NEGATIVE MG/DL
LEUKOCYTE ESTERASE UR QL STRIP.AUTO: NEGATIVE
LYMPHOCYTES # BLD: 2.8 K/UL (ref 0.8–3.5)
LYMPHOCYTES NFR BLD: 42 % (ref 12–49)
Lab: ABNORMAL
MAGNESIUM SERPL-MCNC: 2.2 MG/DL (ref 1.6–2.4)
MCH RBC QN AUTO: 27.8 PG (ref 26–34)
MCHC RBC AUTO-ENTMCNC: 31.5 G/DL (ref 30–36.5)
MCV RBC AUTO: 88.3 FL (ref 80–99)
METHADONE UR QL: NEGATIVE
MONOCYTES # BLD: 0.4 K/UL (ref 0–1)
MONOCYTES NFR BLD: 6 % (ref 5–13)
NEUTS SEG # BLD: 3.4 K/UL (ref 1.8–8)
NEUTS SEG NFR BLD: 50 % (ref 32–75)
NITRITE UR QL STRIP.AUTO: NEGATIVE
NRBC # BLD: 0 K/UL (ref 0–0.01)
NRBC BLD-RTO: 0 PER 100 WBC
OPIATES UR QL: NEGATIVE
PCP UR QL: NEGATIVE
PH UR STRIP: 6 (ref 5–8)
PLATELET # BLD AUTO: 277 K/UL (ref 150–400)
PMV BLD AUTO: 9 FL (ref 8.9–12.9)
POTASSIUM SERPL-SCNC: 3.5 MMOL/L (ref 3.5–5.1)
PROT SERPL-MCNC: 7 G/DL (ref 6.4–8.2)
PROT UR STRIP-MCNC: NEGATIVE MG/DL
PROTHROMBIN TIME: 11.3 SEC (ref 9–11.1)
RBC # BLD AUTO: 4.35 M/UL (ref 3.8–5.2)
RBC #/AREA URNS HPF: NORMAL /HPF (ref 0–5)
SERVICE CMNT-IMP: NORMAL
SODIUM SERPL-SCNC: 139 MMOL/L (ref 136–145)
SP GR UR REFRACTOMETRY: 1.01 (ref 1–1.03)
TROPONIN I SERPL HS-MCNC: <4 NG/L (ref 0–51)
URINE CULTURE IF INDICATED: NORMAL
UROBILINOGEN UR QL STRIP.AUTO: 0.2 EU/DL (ref 0.2–1)
WBC # BLD AUTO: 6.8 K/UL (ref 3.6–11)
WBC URNS QL MICRO: NORMAL /HPF (ref 0–4)

## 2024-07-23 PROCEDURE — 83735 ASSAY OF MAGNESIUM: CPT

## 2024-07-23 PROCEDURE — 93005 ELECTROCARDIOGRAM TRACING: CPT | Performed by: STUDENT IN AN ORGANIZED HEALTH CARE EDUCATION/TRAINING PROGRAM

## 2024-07-23 PROCEDURE — 51798 US URINE CAPACITY MEASURE: CPT

## 2024-07-23 PROCEDURE — 4A03X5D MEASUREMENT OF ARTERIAL FLOW, INTRACRANIAL, EXTERNAL APPROACH: ICD-10-PCS | Performed by: STUDENT IN AN ORGANIZED HEALTH CARE EDUCATION/TRAINING PROGRAM

## 2024-07-23 PROCEDURE — 6360000002 HC RX W HCPCS: Performed by: STUDENT IN AN ORGANIZED HEALTH CARE EDUCATION/TRAINING PROGRAM

## 2024-07-23 PROCEDURE — 6360000002 HC RX W HCPCS: Performed by: INTERNAL MEDICINE

## 2024-07-23 PROCEDURE — 80053 COMPREHEN METABOLIC PANEL: CPT

## 2024-07-23 PROCEDURE — 36415 COLL VENOUS BLD VENIPUNCTURE: CPT

## 2024-07-23 PROCEDURE — 6370000000 HC RX 637 (ALT 250 FOR IP): Performed by: STUDENT IN AN ORGANIZED HEALTH CARE EDUCATION/TRAINING PROGRAM

## 2024-07-23 PROCEDURE — 96375 TX/PRO/DX INJ NEW DRUG ADDON: CPT

## 2024-07-23 PROCEDURE — 85025 COMPLETE CBC W/AUTO DIFF WBC: CPT

## 2024-07-23 PROCEDURE — 99285 EMERGENCY DEPT VISIT HI MDM: CPT

## 2024-07-23 PROCEDURE — 6370000000 HC RX 637 (ALT 250 FOR IP): Performed by: INTERNAL MEDICINE

## 2024-07-23 PROCEDURE — 80307 DRUG TEST PRSMV CHEM ANLYZR: CPT

## 2024-07-23 PROCEDURE — 70498 CT ANGIOGRAPHY NECK: CPT

## 2024-07-23 PROCEDURE — 6360000002 HC RX W HCPCS

## 2024-07-23 PROCEDURE — 84484 ASSAY OF TROPONIN QUANT: CPT

## 2024-07-23 PROCEDURE — 94761 N-INVAS EAR/PLS OXIMETRY MLT: CPT

## 2024-07-23 PROCEDURE — 85610 PROTHROMBIN TIME: CPT

## 2024-07-23 PROCEDURE — 82962 GLUCOSE BLOOD TEST: CPT

## 2024-07-23 PROCEDURE — 81001 URINALYSIS AUTO W/SCOPE: CPT

## 2024-07-23 PROCEDURE — 0042T CT BRAIN PERFUSION: CPT

## 2024-07-23 PROCEDURE — 94640 AIRWAY INHALATION TREATMENT: CPT

## 2024-07-23 PROCEDURE — 96374 THER/PROPH/DIAG INJ IV PUSH: CPT

## 2024-07-23 PROCEDURE — 1100000003 HC PRIVATE W/ TELEMETRY

## 2024-07-23 PROCEDURE — 2580000003 HC RX 258: Performed by: INTERNAL MEDICINE

## 2024-07-23 PROCEDURE — 82077 ASSAY SPEC XCP UR&BREATH IA: CPT

## 2024-07-23 PROCEDURE — 6360000004 HC RX CONTRAST MEDICATION: Performed by: STUDENT IN AN ORGANIZED HEALTH CARE EDUCATION/TRAINING PROGRAM

## 2024-07-23 PROCEDURE — 70450 CT HEAD/BRAIN W/O DYE: CPT

## 2024-07-23 RX ORDER — LORAZEPAM 1 MG/1
2 TABLET ORAL
Status: DISCONTINUED | OUTPATIENT
Start: 2024-07-23 | End: 2024-07-25 | Stop reason: HOSPADM

## 2024-07-23 RX ORDER — LEVETIRACETAM 500 MG/5ML
INJECTION, SOLUTION, CONCENTRATE INTRAVENOUS
Status: COMPLETED
Start: 2024-07-23 | End: 2024-07-23

## 2024-07-23 RX ORDER — QUETIAPINE FUMARATE 100 MG/1
100 TABLET, FILM COATED ORAL
Status: DISCONTINUED | OUTPATIENT
Start: 2024-07-23 | End: 2024-07-25 | Stop reason: HOSPADM

## 2024-07-23 RX ORDER — LEVETIRACETAM 500 MG/5ML
2000 INJECTION, SOLUTION, CONCENTRATE INTRAVENOUS
Status: COMPLETED | OUTPATIENT
Start: 2024-07-23 | End: 2024-07-23

## 2024-07-23 RX ORDER — POTASSIUM CHLORIDE 750 MG/1
10 TABLET, FILM COATED, EXTENDED RELEASE ORAL DAILY
Status: DISCONTINUED | OUTPATIENT
Start: 2024-07-23 | End: 2024-07-25 | Stop reason: HOSPADM

## 2024-07-23 RX ORDER — LORAZEPAM 1 MG/1
3 TABLET ORAL
Status: DISCONTINUED | OUTPATIENT
Start: 2024-07-23 | End: 2024-07-25 | Stop reason: HOSPADM

## 2024-07-23 RX ORDER — ATORVASTATIN CALCIUM 40 MG/1
40 TABLET, FILM COATED ORAL NIGHTLY
Status: DISCONTINUED | OUTPATIENT
Start: 2024-07-23 | End: 2024-07-25 | Stop reason: HOSPADM

## 2024-07-23 RX ORDER — BUTALBITAL, ACETAMINOPHEN AND CAFFEINE 50; 325; 40 MG/1; MG/1; MG/1
2 TABLET ORAL
Status: COMPLETED | OUTPATIENT
Start: 2024-07-23 | End: 2024-07-23

## 2024-07-23 RX ORDER — ONDANSETRON 2 MG/ML
4 INJECTION INTRAMUSCULAR; INTRAVENOUS ONCE
Status: COMPLETED | OUTPATIENT
Start: 2024-07-23 | End: 2024-07-23

## 2024-07-23 RX ORDER — SODIUM CHLORIDE 0.9 % (FLUSH) 0.9 %
5-40 SYRINGE (ML) INJECTION EVERY 12 HOURS SCHEDULED
Status: DISCONTINUED | OUTPATIENT
Start: 2024-07-23 | End: 2024-07-25 | Stop reason: HOSPADM

## 2024-07-23 RX ORDER — ALBUTEROL SULFATE 90 UG/1
2 AEROSOL, METERED RESPIRATORY (INHALATION) EVERY 6 HOURS PRN
Status: DISCONTINUED | OUTPATIENT
Start: 2024-07-23 | End: 2024-07-23

## 2024-07-23 RX ORDER — LISINOPRIL 5 MG/1
10 TABLET ORAL DAILY
Status: DISCONTINUED | OUTPATIENT
Start: 2024-07-24 | End: 2024-07-25 | Stop reason: HOSPADM

## 2024-07-23 RX ORDER — ALBUTEROL SULFATE 2.5 MG/3ML
2.5 SOLUTION RESPIRATORY (INHALATION) EVERY 6 HOURS PRN
Status: DISCONTINUED | OUTPATIENT
Start: 2024-07-23 | End: 2024-07-25 | Stop reason: HOSPADM

## 2024-07-23 RX ORDER — ONDANSETRON 2 MG/ML
4 INJECTION INTRAMUSCULAR; INTRAVENOUS EVERY 4 HOURS PRN
Status: DISCONTINUED | OUTPATIENT
Start: 2024-07-23 | End: 2024-07-23

## 2024-07-23 RX ORDER — LORAZEPAM 2 MG/ML
1 INJECTION INTRAMUSCULAR
Status: DISCONTINUED | OUTPATIENT
Start: 2024-07-23 | End: 2024-07-25 | Stop reason: HOSPADM

## 2024-07-23 RX ORDER — LORAZEPAM 1 MG/1
4 TABLET ORAL
Status: DISCONTINUED | OUTPATIENT
Start: 2024-07-23 | End: 2024-07-25 | Stop reason: HOSPADM

## 2024-07-23 RX ORDER — PANTOPRAZOLE SODIUM 40 MG/1
40 TABLET, DELAYED RELEASE ORAL
Status: DISCONTINUED | OUTPATIENT
Start: 2024-07-24 | End: 2024-07-25 | Stop reason: HOSPADM

## 2024-07-23 RX ORDER — POLYETHYLENE GLYCOL 3350 17 G/17G
17 POWDER, FOR SOLUTION ORAL DAILY PRN
Status: DISCONTINUED | OUTPATIENT
Start: 2024-07-23 | End: 2024-07-25 | Stop reason: HOSPADM

## 2024-07-23 RX ORDER — SODIUM CHLORIDE 9 MG/ML
INJECTION, SOLUTION INTRAVENOUS PRN
Status: DISCONTINUED | OUTPATIENT
Start: 2024-07-23 | End: 2024-07-25 | Stop reason: HOSPADM

## 2024-07-23 RX ORDER — FOLIC ACID 1 MG/1
1 TABLET ORAL DAILY
Status: DISCONTINUED | OUTPATIENT
Start: 2024-07-23 | End: 2024-07-25 | Stop reason: HOSPADM

## 2024-07-23 RX ORDER — GAUZE BANDAGE 2" X 2"
100 BANDAGE TOPICAL DAILY
Status: DISCONTINUED | OUTPATIENT
Start: 2024-07-23 | End: 2024-07-25 | Stop reason: HOSPADM

## 2024-07-23 RX ORDER — ACETAMINOPHEN 325 MG/1
650 TABLET ORAL EVERY 4 HOURS PRN
Status: DISCONTINUED | OUTPATIENT
Start: 2024-07-23 | End: 2024-07-23

## 2024-07-23 RX ORDER — IPRATROPIUM BROMIDE AND ALBUTEROL SULFATE 2.5; .5 MG/3ML; MG/3ML
1 SOLUTION RESPIRATORY (INHALATION) EVERY 4 HOURS PRN
Status: DISCONTINUED | OUTPATIENT
Start: 2024-07-23 | End: 2024-07-25 | Stop reason: HOSPADM

## 2024-07-23 RX ORDER — ONDANSETRON 4 MG/1
4 TABLET, ORALLY DISINTEGRATING ORAL EVERY 8 HOURS PRN
Status: DISCONTINUED | OUTPATIENT
Start: 2024-07-23 | End: 2024-07-25 | Stop reason: HOSPADM

## 2024-07-23 RX ORDER — FLUOXETINE HYDROCHLORIDE 20 MG/1
40 CAPSULE ORAL DAILY
Status: DISCONTINUED | OUTPATIENT
Start: 2024-07-23 | End: 2024-07-25 | Stop reason: HOSPADM

## 2024-07-23 RX ORDER — LORAZEPAM 2 MG/ML
4 INJECTION INTRAMUSCULAR
Status: DISCONTINUED | OUTPATIENT
Start: 2024-07-23 | End: 2024-07-25 | Stop reason: HOSPADM

## 2024-07-23 RX ORDER — LORAZEPAM 2 MG/ML
3 INJECTION INTRAMUSCULAR
Status: DISCONTINUED | OUTPATIENT
Start: 2024-07-23 | End: 2024-07-25 | Stop reason: HOSPADM

## 2024-07-23 RX ORDER — SODIUM CHLORIDE 0.9 % (FLUSH) 0.9 %
5-40 SYRINGE (ML) INJECTION PRN
Status: DISCONTINUED | OUTPATIENT
Start: 2024-07-23 | End: 2024-07-25 | Stop reason: HOSPADM

## 2024-07-23 RX ORDER — ASPIRIN 325 MG
325 TABLET ORAL
Status: COMPLETED | OUTPATIENT
Start: 2024-07-23 | End: 2024-07-23

## 2024-07-23 RX ORDER — ACETAMINOPHEN 325 MG/1
650 TABLET ORAL EVERY 6 HOURS PRN
Status: DISCONTINUED | OUTPATIENT
Start: 2024-07-23 | End: 2024-07-25 | Stop reason: HOSPADM

## 2024-07-23 RX ORDER — LEVETIRACETAM 500 MG/1
2000 TABLET ORAL DAILY
Status: DISCONTINUED | OUTPATIENT
Start: 2024-07-24 | End: 2024-07-25

## 2024-07-23 RX ORDER — ONDANSETRON 2 MG/ML
4 INJECTION INTRAMUSCULAR; INTRAVENOUS EVERY 6 HOURS PRN
Status: DISCONTINUED | OUTPATIENT
Start: 2024-07-23 | End: 2024-07-25 | Stop reason: HOSPADM

## 2024-07-23 RX ORDER — ACETAMINOPHEN 650 MG/1
650 SUPPOSITORY RECTAL EVERY 6 HOURS PRN
Status: DISCONTINUED | OUTPATIENT
Start: 2024-07-23 | End: 2024-07-25 | Stop reason: HOSPADM

## 2024-07-23 RX ORDER — ENOXAPARIN SODIUM 100 MG/ML
30 INJECTION SUBCUTANEOUS 2 TIMES DAILY
Status: DISCONTINUED | OUTPATIENT
Start: 2024-07-23 | End: 2024-07-25 | Stop reason: HOSPADM

## 2024-07-23 RX ORDER — LORAZEPAM 1 MG/1
1 TABLET ORAL
Status: DISCONTINUED | OUTPATIENT
Start: 2024-07-23 | End: 2024-07-25 | Stop reason: HOSPADM

## 2024-07-23 RX ORDER — LORAZEPAM 2 MG/ML
2 INJECTION INTRAMUSCULAR
Status: DISCONTINUED | OUTPATIENT
Start: 2024-07-23 | End: 2024-07-25 | Stop reason: HOSPADM

## 2024-07-23 RX ADMIN — Medication 100 MG: at 17:50

## 2024-07-23 RX ADMIN — LEVETIRACETAM 2000 MG: 500 INJECTION, SOLUTION, CONCENTRATE INTRAVENOUS at 13:25

## 2024-07-23 RX ADMIN — SODIUM CHLORIDE, PRESERVATIVE FREE 10 ML: 5 INJECTION INTRAVENOUS at 20:24

## 2024-07-23 RX ADMIN — QUETIAPINE FUMARATE 100 MG: 100 TABLET ORAL at 20:23

## 2024-07-23 RX ADMIN — SODIUM CHLORIDE, PRESERVATIVE FREE 10 ML: 5 INJECTION INTRAVENOUS at 20:23

## 2024-07-23 RX ADMIN — FLUOXETINE HYDROCHLORIDE 40 MG: 20 CAPSULE ORAL at 17:50

## 2024-07-23 RX ADMIN — ENOXAPARIN SODIUM 30 MG: 100 INJECTION SUBCUTANEOUS at 20:23

## 2024-07-23 RX ADMIN — FOLIC ACID 1 MG: 1 TABLET ORAL at 17:50

## 2024-07-23 RX ADMIN — ONDANSETRON 4 MG: 2 INJECTION INTRAMUSCULAR; INTRAVENOUS at 14:34

## 2024-07-23 RX ADMIN — IOPAMIDOL 100 ML: 755 INJECTION, SOLUTION INTRAVENOUS at 13:43

## 2024-07-23 RX ADMIN — ARFORMOTEROL TARTRATE: 15 SOLUTION RESPIRATORY (INHALATION) at 20:43

## 2024-07-23 RX ADMIN — ATORVASTATIN CALCIUM 40 MG: 40 TABLET, FILM COATED ORAL at 20:23

## 2024-07-23 RX ADMIN — ASPIRIN 325 MG: 325 TABLET ORAL at 14:34

## 2024-07-23 RX ADMIN — POTASSIUM CHLORIDE 10 MEQ: 750 TABLET, FILM COATED, EXTENDED RELEASE ORAL at 17:50

## 2024-07-23 RX ADMIN — BUTALBITAL, ACETAMINOPHEN, AND CAFFEINE 2 TABLET: 325; 50; 40 TABLET ORAL at 14:34

## 2024-07-23 NOTE — ED NOTES
Pt is A&Ox4, place purewick at this time. Pt can now use L side. Pt still has L sided facial droop. Pt is still having L sided numbness.

## 2024-07-23 NOTE — H&P
Hospitalist Admission Note    NAME:   María Amos   : 1965   MRN: [id number]     Date/Time: 2024 4:35 PM    Patient PCP: Patricia Wood NP-C    ______________________________________________________________________  Given the patient's current clinical presentation, I have a high level of concern for decompensation if discharged from the emergency department.  Complex decision making was performed, which includes reviewing the patient's available past medical records, laboratory results, and x-ray films.       My assessment of this patient's clinical condition and my plan of care is as follows.    Assessment / Plan:    Seizure relapse  -Check MRI of the brain.  Neurology consulted.  He was loaded with Keppra in the emergency department.  Will start Ativan as needed for seizures.  Seizure precautions.  Aspiration precautions.  Telemetry monitoring.  -Consider starting on Vimpat    Chronic alcoholism  -Could most likely be the trigger for seizures.  Will start alcohol withdrawal protocol with Ativan and also multivitamins    Hypertension  History of CVA  COPD  Depression  Dyslipidemia  -Continue PTA lisinopril  -Start aspirin.  Continue statin  Continue home inhalers  -Continue PTA Prozac and also on Lipitor              Medical Decision Making:   I personally reviewed labs: CBC, BMP  I personally reviewed imaging: CT head  I personally reviewed EKG:  Toxic drug monitoring: Monitor Keppra levels  Discussed case with: ED provider. After discussion I am in agreement that acuity of patient's medical condition necessitates hospital stay.      Code Status: Full code  DVT Prophylaxis: Lovenox  Baseline:     Subjective:   CHIEF COMPLAINT: Seizures    HISTORY OF PRESENT ILLNESS:     María Amos is a 59 y.o.  female with PMHx significant for seizures, hypertension, CVA, COPD, depression is coming the hospital complaint of seizures.  Patient had an episode of seizure while at lunch and subsequently was also

## 2024-07-23 NOTE — ED NOTES
TRANSFER - OUT REPORT:    Verbal report given to ELISE Martinez on María Amos  being transferred to [facility] for routine progression of patient care       Report consisted of patient's Situation, Background, Assessment and   Recommendations(SBAR).     Information from the following report(s) Nurse Handoff Report and Recent Results was reviewed with the receiving nurse.    Dolores Fall Assessment:    Presents to emergency department  because of falls (Syncope, seizure, or loss of consciousness): No  Age > 70: No  Altered Mental Status, Intoxication with alcohol or substance confusion (Disorientation, impaired judgment, poor safety awaremess, or inability to follow instructions): Yes  Impaired Mobility: Ambulates or transfers with assistive devices or assistance; Unable to ambulate or transer.: Yes  Nursing Judgement: Yes          Lines:   Peripheral IV 07/23/24 Left Antecubital (Active)   Site Assessment Clean, dry & intact 07/23/24 1300   Line Status Blood return noted 07/23/24 1300   Line Care Cap changed 07/23/24 1300   Phlebitis Assessment No symptoms 07/23/24 1300        Opportunity for questions and clarification was provided.      Patient transported with:  Tech

## 2024-07-23 NOTE — ED NOTES
Bedside shift change report given to ELISE Hamm (oncoming nurse) by ELISE Dunaway (offgoing nurse). Report included the following information Nurse Handoff Report and Recent Results.

## 2024-07-24 ENCOUNTER — APPOINTMENT (OUTPATIENT)
Facility: HOSPITAL | Age: 59
DRG: 101 | End: 2024-07-24
Payer: MEDICARE

## 2024-07-24 PROBLEM — R53.1 LEFT-SIDED WEAKNESS: Status: ACTIVE | Noted: 2024-07-24

## 2024-07-24 PROBLEM — I67.9 CEREBRAL VASCULAR DISEASE: Status: ACTIVE | Noted: 2024-07-24

## 2024-07-24 LAB
ANION GAP SERPL CALC-SCNC: 5 MMOL/L (ref 5–15)
BASOPHILS # BLD: 0 K/UL (ref 0–0.1)
BASOPHILS NFR BLD: 1 % (ref 0–1)
BUN SERPL-MCNC: 12 MG/DL (ref 6–20)
BUN/CREAT SERPL: 15 (ref 12–20)
CALCIUM SERPL-MCNC: 8.2 MG/DL (ref 8.5–10.1)
CHLORIDE SERPL-SCNC: 108 MMOL/L (ref 97–108)
CO2 SERPL-SCNC: 28 MMOL/L (ref 21–32)
CREAT SERPL-MCNC: 0.82 MG/DL (ref 0.55–1.02)
DIFFERENTIAL METHOD BLD: ABNORMAL
EKG ATRIAL RATE: 61 BPM
EKG DIAGNOSIS: NORMAL
EKG P AXIS: 65 DEGREES
EKG P-R INTERVAL: 154 MS
EKG Q-T INTERVAL: 488 MS
EKG QRS DURATION: 72 MS
EKG QTC CALCULATION (BAZETT): 491 MS
EKG R AXIS: 41 DEGREES
EKG T AXIS: 76 DEGREES
EKG VENTRICULAR RATE: 61 BPM
EOSINOPHIL # BLD: 0.2 K/UL (ref 0–0.4)
EOSINOPHIL NFR BLD: 4 % (ref 0–7)
ERYTHROCYTE [DISTWIDTH] IN BLOOD BY AUTOMATED COUNT: 14.4 % (ref 11.5–14.5)
GLUCOSE SERPL-MCNC: 128 MG/DL (ref 65–100)
HCT VFR BLD AUTO: 37.3 % (ref 35–47)
HGB BLD-MCNC: 11.6 G/DL (ref 11.5–16)
IMM GRANULOCYTES # BLD AUTO: 0 K/UL (ref 0–0.04)
IMM GRANULOCYTES NFR BLD AUTO: 1 % (ref 0–0.5)
LYMPHOCYTES # BLD: 2.8 K/UL (ref 0.8–3.5)
LYMPHOCYTES NFR BLD: 59 % (ref 12–49)
MCH RBC QN AUTO: 27.3 PG (ref 26–34)
MCHC RBC AUTO-ENTMCNC: 31.1 G/DL (ref 30–36.5)
MCV RBC AUTO: 87.8 FL (ref 80–99)
MONOCYTES # BLD: 0.3 K/UL (ref 0–1)
MONOCYTES NFR BLD: 5 % (ref 5–13)
NEUTS SEG # BLD: 1.4 K/UL (ref 1.8–8)
NEUTS SEG NFR BLD: 30 % (ref 32–75)
NRBC # BLD: 0 K/UL (ref 0–0.01)
NRBC BLD-RTO: 0 PER 100 WBC
PLATELET # BLD AUTO: 258 K/UL (ref 150–400)
PMV BLD AUTO: 8.9 FL (ref 8.9–12.9)
POTASSIUM SERPL-SCNC: 3.7 MMOL/L (ref 3.5–5.1)
RBC # BLD AUTO: 4.25 M/UL (ref 3.8–5.2)
SODIUM SERPL-SCNC: 141 MMOL/L (ref 136–145)
WBC # BLD AUTO: 4.7 K/UL (ref 3.6–11)

## 2024-07-24 PROCEDURE — 95819 EEG AWAKE AND ASLEEP: CPT

## 2024-07-24 PROCEDURE — 70553 MRI BRAIN STEM W/O & W/DYE: CPT

## 2024-07-24 PROCEDURE — 97535 SELF CARE MNGMENT TRAINING: CPT

## 2024-07-24 PROCEDURE — 6370000000 HC RX 637 (ALT 250 FOR IP): Performed by: INTERNAL MEDICINE

## 2024-07-24 PROCEDURE — 97530 THERAPEUTIC ACTIVITIES: CPT

## 2024-07-24 PROCEDURE — 36415 COLL VENOUS BLD VENIPUNCTURE: CPT

## 2024-07-24 PROCEDURE — 95816 EEG AWAKE AND DROWSY: CPT | Performed by: PSYCHIATRY & NEUROLOGY

## 2024-07-24 PROCEDURE — 94761 N-INVAS EAR/PLS OXIMETRY MLT: CPT

## 2024-07-24 PROCEDURE — 6360000002 HC RX W HCPCS: Performed by: INTERNAL MEDICINE

## 2024-07-24 PROCEDURE — 6360000004 HC RX CONTRAST MEDICATION: Performed by: INTERNAL MEDICINE

## 2024-07-24 PROCEDURE — 97165 OT EVAL LOW COMPLEX 30 MIN: CPT

## 2024-07-24 PROCEDURE — 51798 US URINE CAPACITY MEASURE: CPT

## 2024-07-24 PROCEDURE — 99223 1ST HOSP IP/OBS HIGH 75: CPT | Performed by: PSYCHIATRY & NEUROLOGY

## 2024-07-24 PROCEDURE — 80177 DRUG SCRN QUAN LEVETIRACETAM: CPT

## 2024-07-24 PROCEDURE — A9579 GAD-BASE MR CONTRAST NOS,1ML: HCPCS | Performed by: INTERNAL MEDICINE

## 2024-07-24 PROCEDURE — 97161 PT EVAL LOW COMPLEX 20 MIN: CPT

## 2024-07-24 PROCEDURE — 2580000003 HC RX 258: Performed by: INTERNAL MEDICINE

## 2024-07-24 PROCEDURE — 51701 INSERT BLADDER CATHETER: CPT

## 2024-07-24 PROCEDURE — 85025 COMPLETE CBC W/AUTO DIFF WBC: CPT

## 2024-07-24 PROCEDURE — 97116 GAIT TRAINING THERAPY: CPT

## 2024-07-24 PROCEDURE — 1100000003 HC PRIVATE W/ TELEMETRY

## 2024-07-24 PROCEDURE — 80048 BASIC METABOLIC PNL TOTAL CA: CPT

## 2024-07-24 PROCEDURE — 94640 AIRWAY INHALATION TREATMENT: CPT

## 2024-07-24 RX ADMIN — ARFORMOTEROL TARTRATE: 15 SOLUTION RESPIRATORY (INHALATION) at 21:40

## 2024-07-24 RX ADMIN — LORAZEPAM 1 MG: 2 INJECTION INTRAMUSCULAR; INTRAVENOUS at 17:50

## 2024-07-24 RX ADMIN — ENOXAPARIN SODIUM 30 MG: 100 INJECTION SUBCUTANEOUS at 08:17

## 2024-07-24 RX ADMIN — POLYETHYLENE GLYCOL 3350 17 G: 17 POWDER, FOR SOLUTION ORAL at 16:38

## 2024-07-24 RX ADMIN — SODIUM CHLORIDE, PRESERVATIVE FREE 10 ML: 5 INJECTION INTRAVENOUS at 08:18

## 2024-07-24 RX ADMIN — ATORVASTATIN CALCIUM 40 MG: 40 TABLET, FILM COATED ORAL at 21:00

## 2024-07-24 RX ADMIN — SODIUM CHLORIDE, PRESERVATIVE FREE 10 ML: 5 INJECTION INTRAVENOUS at 21:05

## 2024-07-24 RX ADMIN — FLUOXETINE HYDROCHLORIDE 40 MG: 20 CAPSULE ORAL at 08:17

## 2024-07-24 RX ADMIN — FOLIC ACID 1 MG: 1 TABLET ORAL at 08:17

## 2024-07-24 RX ADMIN — PANTOPRAZOLE SODIUM 40 MG: 40 TABLET, DELAYED RELEASE ORAL at 06:13

## 2024-07-24 RX ADMIN — LEVETIRACETAM 2000 MG: 500 TABLET, FILM COATED ORAL at 08:17

## 2024-07-24 RX ADMIN — POTASSIUM CHLORIDE 10 MEQ: 750 TABLET, FILM COATED, EXTENDED RELEASE ORAL at 08:17

## 2024-07-24 RX ADMIN — GADOTERIDOL 20 ML: 279.3 INJECTION, SOLUTION INTRAVENOUS at 18:26

## 2024-07-24 RX ADMIN — ENOXAPARIN SODIUM 30 MG: 100 INJECTION SUBCUTANEOUS at 21:00

## 2024-07-24 RX ADMIN — ARFORMOTEROL TARTRATE: 15 SOLUTION RESPIRATORY (INHALATION) at 13:18

## 2024-07-24 RX ADMIN — QUETIAPINE FUMARATE 100 MG: 100 TABLET ORAL at 21:00

## 2024-07-24 RX ADMIN — Medication 100 MG: at 08:17

## 2024-07-24 NOTE — PROCEDURES
EEG REPORT    Patient Name: María Amos  : 1965  Age: 59 y.o.    Ordering physician: Dr. Britt  Date of EE2024  7:28 - 7:51  Diagnosis: seizures  Interpreting physician: Stuart Maravilla D.O. FAAN    Procedure: EEG    CLINICAL INDICATION: The patient is a 59 y.o. female who is being evaluated for baseline electro cerebral activities and to rule out seizure focus.      Current Facility-Administered Medications   Medication Dose Route Frequency    albuterol (PROVENTIL) (2.5 MG/3ML) 0.083% nebulizer solution 2.5 mg  2.5 mg Nebulization Q6H PRN    atorvastatin (LIPITOR) tablet 40 mg  40 mg Oral Nightly    FLUoxetine (PROZAC) capsule 40 mg  40 mg Oral Daily    arformoterol 15 mcg-budesonide 0.25 mg neb solution   Nebulization BID RT    ipratropium 0.5 mg-albuterol 2.5 mg (DUONEB) nebulizer solution 1 Dose  1 Dose Inhalation Q4H PRN    levETIRAcetam (KEPPRA) tablet 2,000 mg  2,000 mg Oral Daily    lisinopril (PRINIVIL;ZESTRIL) tablet 10 mg  10 mg Oral Daily    pantoprazole (PROTONIX) tablet 40 mg  40 mg Oral QAM AC    potassium chloride (KLOR-CON) extended release tablet 10 mEq  10 mEq Oral Daily    QUEtiapine (SEROQUEL) tablet 100 mg  100 mg Oral QHS    sodium chloride flush 0.9 % injection 5-40 mL  5-40 mL IntraVENous 2 times per day    sodium chloride flush 0.9 % injection 5-40 mL  5-40 mL IntraVENous PRN    0.9 % sodium chloride infusion   IntraVENous PRN    enoxaparin Sodium (LOVENOX) injection 30 mg  30 mg SubCUTAneous BID    ondansetron (ZOFRAN-ODT) disintegrating tablet 4 mg  4 mg Oral Q8H PRN    Or    ondansetron (ZOFRAN) injection 4 mg  4 mg IntraVENous Q6H PRN    polyethylene glycol (GLYCOLAX) packet 17 g  17 g Oral Daily PRN    acetaminophen (TYLENOL) tablet 650 mg  650 mg Oral Q6H PRN    Or    acetaminophen (TYLENOL) suppository 650 mg  650 mg Rectal Q6H PRN    LORazepam (ATIVAN) injection 1 mg  1 mg IntraVENous Q2H PRN    sodium chloride flush 0.9 % injection 5-40 mL  5-40 mL IntraVENous 2

## 2024-07-24 NOTE — CONSULTS
Neurology Note    Patient ID:  María Amos  [id number]  59 y.o.  1965      Date of Consultation:  July 24, 2024    Referring Physician: Dr. Riggs    Reason for Consultation:  seizure      Assessment and Plan:    Patient is a pleasant 59-year-old female with reported breakthrough seizure.  Her current examination reveals mild left-sided hemiparesis which is reportedly her baseline.    Reported longstanding history of seizure with breakthrough seizure leading to admission:  Etiology for seizure can include medication noncompliance (patient states she does occasionally miss doses of medication), alcohol use/withdrawal, cocaine use.  There was a question from [facility] records of potential nonepileptic events as well  Head CT with no acute abnormality  EEG ordered  Continue current dosing of Keppra 1500 mg twice a day.  Side effects and toxicity of medication were reviewed  Brain MRI ordered  Discussed the importance of alcohol cessation.  Monitor for signs and symptoms of withdrawal  Correct any underlying metabolic derangements  Evaluate for any underlying systemic infection    Cerebrovascular disease/history of prior stroke  Patient does have history of hypertension and dyslipidemia  Continue aspirin  Hypertension: Aggressive control with goal blood pressure less than 140/90  Dyslipidemia: Continue home statin therapy  If brain MRI shows evidence of an acute stroke, will need additional testing    COPD  Depression  Cocaine use:  Resources for rehabilitation should be provided at discharge    Neurology will continue to follow closely in this complicated patient with ongoing neurological symptoms necessitating additional testing. Updated orders placed.  Care plan discussed with primary team, Dr. Riggs           Subjective: I had a seizure       History of Present Illness:   María Amos is a 59 y.o. female with a history of seizures, hypertension, prior stroke, COPD and depression who presented to the

## 2024-07-24 NOTE — PLAN OF CARE
Problem: Physical Therapy - Adult  Goal: By Discharge: Performs mobility at highest level of function for planned discharge setting.  See evaluation for individualized goals.  Description: FUNCTIONAL STATUS PRIOR TO ADMISSION: Patient was modified independent using a rolling walker for functional mobility.    HOME SUPPORT PRIOR TO ADMISSION: The patient lived alone with her  to provide assistance prn.    Physical Therapy Goals  Initiated 7/24/2024  1.  Patient will move from supine to sit and sit to supine, scoot up and down, and roll side to side in bed with modified independence within 7 day(s).    2.  Patient will perform sit to stand with modified independence within 7 day(s).  3.  Patient will transfer from bed to chair and chair to bed with contact guard assist using the least restrictive device within 7 day(s).  4.  Patient will ambulate with contact guard assist for 100 feet with the least restrictive device within 7 day(s).   5.  Patient will ascend/descend 2 stairs with handrail(s) with minimal assistance within 7 day(s).   Outcome: Progressing   PHYSICAL THERAPY EVALUATION    Patient: María Amos (59 y.o. female)  Date: 7/25/2024  Primary Diagnosis: Left-sided weakness [R53.1]  Breakthrough seizure (HCC) [G40.919]       Precautions:                        ASSESSMENT :   DEFICITS/IMPAIRMENTS:   The patient is limited by decreased functional mobility, independence in ADLs, ROM, strength, body mechanics, activity tolerance, endurance, coordination, balance, and noted BUE tremors when scooting in bed following her admission for breakthrough seizure.    CT head shows no acute process. CT brain perfusion shows no acute process. CTA shows no acute process.  MRI still pending.      Based on the impairments listed above pt reports she is functioning close to her baseline, just tires more easily at this time.  Pt in bed, agreeable to PT evaluation.  Noted BUE tremors when scooting eob, that pt reports are

## 2024-07-24 NOTE — ED PROVIDER NOTES
she was noted to have left facial droop as well as unequal strength in her left upper and left lower extremity compared to right.  EMS was also at bedside.  They report that she was not having these findings when they arrived to pick her up today.  We decided to proceed with a stroke evaluation given these new symptoms.  Level One stroke was called.  Patient does not take blood thinners per chart review of her medications and discussion with pharmacy.  Repeat blood sugar within normal limits, vital signs reassuring.  Elevated blood pressure.  Will proceed with stat head CT as well as consultation with teleneuro.  Differential includes stroke, intracranial bleeding, seizure, breakthrough seizure, postictal state, headache, migraine, encephalopathy, arrhythmia.  Will proceed with medical evaluation as well as imaging.  Symptoms may also be due to her seizure and her previous strokes.        ED Course as of 07/24/24 0856   Tue Jul 23, 2024   1308 During our assessment the patient is weak on her left arm, left leg and appears to have a slight left facial droop.  Given this and her medical history and previous history of stroke, we decided to stroke alert the patient.  EMS also at bedside who reports that she was not having the symptoms when they first arrived on scene to pick her up for her seizure. [DT]   1318 Spoke with neurology.  They will evaluate the patient.  After discussion about her symptoms he recommends we give 2 g of Keppra, IV fluids, permissive hypertension.  He recommends loading her with aspirin.  Head CT negative for bleed.  Will proceed with vessel imaging.  At this time given her history, clinical picture lytics were not recommended.  Suspect the symptoms are a result of her seizure.  If patient does not improve clinically then we will revisit systemic anticoagulation [DT]   1433 EKG  Normal sinus rhythm, heart rate 61 bpm, normal axis, normal intervals, non-STEMI, QTc 491, EKG interpreted by me.

## 2024-07-24 NOTE — PLAN OF CARE
Problem: Occupational Therapy - Adult  Goal: By Discharge: Performs self-care activities at highest level of function for planned discharge setting.  See evaluation for individualized goals.  Description: FUNCTIONAL STATUS PRIOR TO ADMISSION:  Patient lives with  in 1 level home and required min A from  for bathing and dressing at baseline. Patient uses RW at baseline and participates in an adult day program 3 days/week.     Occupational Therapy Goals:  Initiated 7/24/2024  1.  Patient will perform grooming in sitting with Set-up within 7 day(s).  2.  Patient will perform bathing in sitting with Minimal Assist within 7 day(s).  3.  Patient will perform upper body dressing in sitting with Set-up within 7 day(s).  4.  Patient will perform toilet transfers with Contact Guard Assist within 7 day(s).  5.  Patient will perform all aspects of toileting with Contact Guard Assist within 7 day(s).  6.  Patient will participate in upper extremity therapeutic exercise/activities with Supervision for 5 minutes within 7 day(s).    7.  Patient will utilize energy conservation techniques during functional activities with verbal cues within 7 day(s).    Outcome: Progressing    OCCUPATIONAL THERAPY EVALUATION    Patient: María Amos (59 y.o. female)  Date: 7/24/2024  Primary Diagnosis: Left-sided weakness [R53.1]  Breakthrough seizure (HCC) [G40.919]         Precautions: falls, seizures    ASSESSMENT :  The patient is limited by decreased functional mobility, independence in ADLs, strength, body mechanics, activity tolerance, endurance, coordination, balance, posture s/p admission for L sided weakness and breakthrough seizures. Patient lives with  in 1 level home and required min A from  for bathing and dressing at baseline. Patient uses RW at baseline and participates in an adult day program 3 days/week. Today, patient agreeable to therapy. Noted to have quick onset fatigue but made good effort to

## 2024-07-25 ENCOUNTER — TELEPHONE (OUTPATIENT)
Age: 59
End: 2024-07-25

## 2024-07-25 VITALS
SYSTOLIC BLOOD PRESSURE: 121 MMHG | BODY MASS INDEX: 34.85 KG/M2 | OXYGEN SATURATION: 96 % | HEIGHT: 68 IN | DIASTOLIC BLOOD PRESSURE: 77 MMHG | WEIGHT: 229.94 LBS | TEMPERATURE: 97.5 F | RESPIRATION RATE: 18 BRPM | HEART RATE: 54 BPM

## 2024-07-25 PROCEDURE — 94761 N-INVAS EAR/PLS OXIMETRY MLT: CPT

## 2024-07-25 PROCEDURE — 94640 AIRWAY INHALATION TREATMENT: CPT

## 2024-07-25 PROCEDURE — 6360000002 HC RX W HCPCS: Performed by: INTERNAL MEDICINE

## 2024-07-25 PROCEDURE — 99233 SBSQ HOSP IP/OBS HIGH 50: CPT

## 2024-07-25 PROCEDURE — 2580000003 HC RX 258: Performed by: INTERNAL MEDICINE

## 2024-07-25 PROCEDURE — 6370000000 HC RX 637 (ALT 250 FOR IP): Performed by: INTERNAL MEDICINE

## 2024-07-25 RX ORDER — LEVETIRACETAM 1000 MG/1
1000 TABLET ORAL 2 TIMES DAILY
Qty: 60 TABLET | Refills: 3 | Status: SHIPPED | OUTPATIENT
Start: 2024-07-26

## 2024-07-25 RX ORDER — FOLIC ACID 1 MG/1
1 TABLET ORAL DAILY
Qty: 30 TABLET | Refills: 3 | Status: SHIPPED | OUTPATIENT
Start: 2024-07-26

## 2024-07-25 RX ORDER — THIAMINE MONONITRATE (VIT B1) 100 MG
100 TABLET ORAL DAILY
Qty: 30 TABLET | Refills: 0 | Status: SHIPPED | OUTPATIENT
Start: 2024-07-26 | End: 2024-08-25

## 2024-07-25 RX ORDER — LEVETIRACETAM 500 MG/1
1000 TABLET ORAL 2 TIMES DAILY
Status: DISCONTINUED | OUTPATIENT
Start: 2024-07-26 | End: 2024-07-25 | Stop reason: HOSPADM

## 2024-07-25 RX ADMIN — POTASSIUM CHLORIDE 10 MEQ: 750 TABLET, FILM COATED, EXTENDED RELEASE ORAL at 08:23

## 2024-07-25 RX ADMIN — ARFORMOTEROL TARTRATE: 15 SOLUTION RESPIRATORY (INHALATION) at 07:42

## 2024-07-25 RX ADMIN — Medication 100 MG: at 08:23

## 2024-07-25 RX ADMIN — LISINOPRIL 10 MG: 5 TABLET ORAL at 08:22

## 2024-07-25 RX ADMIN — FLUOXETINE HYDROCHLORIDE 40 MG: 20 CAPSULE ORAL at 08:28

## 2024-07-25 RX ADMIN — SODIUM CHLORIDE, PRESERVATIVE FREE 10 ML: 5 INJECTION INTRAVENOUS at 08:24

## 2024-07-25 RX ADMIN — FOLIC ACID 1 MG: 1 TABLET ORAL at 08:23

## 2024-07-25 RX ADMIN — PANTOPRAZOLE SODIUM 40 MG: 40 TABLET, DELAYED RELEASE ORAL at 05:36

## 2024-07-25 RX ADMIN — LEVETIRACETAM 2000 MG: 500 TABLET, FILM COATED ORAL at 08:22

## 2024-07-25 RX ADMIN — ENOXAPARIN SODIUM 30 MG: 100 INJECTION SUBCUTANEOUS at 08:22

## 2024-07-25 NOTE — CARE COORDINATION
1052 - Per IDRs, possible d/c today. Planning for home with outpatient PT,  to transport. No CM needs identified. SMAARTER tool completed and on door frame. CM to complete full assessment if able, otherwise pt is clear from CM for d/c.     1128 - CM received call from pt's , he is unable to transport and unable to bring pt's RW. CM discussed with pt, pt reporting she will need stretcher. Confirmed address on file, [address].  will be home to receive her. CM timoteo set-up Medicaid transport once medically ready.     1233 - Per MD, pt will be ready at 1400. CM contacted [facility] at [phone] to schedule trip. Trip reference #[id number]; window [phone].  would like a call when pt has left [facility].     1539 - If Medicaid transport significantly delayed, please escalate for H2H. Please call  at d/c.       PEE Banegas  Care Management  [facility]  [phone]

## 2024-07-25 NOTE — PLAN OF CARE
Patient showing no signs of acute respiratory distress upon RT arrival at bedside. Patient continues to receive Brovana and 0.25 mg Pulmicort BID. Vitals stable during scanned treatment administration time.

## 2024-07-25 NOTE — TELEPHONE ENCOUNTER
Hello,    Can this patient be scheduled for hospital follow-up in 2 to 4 weeks with any other neurology providers for possible breakthrough seizure?    Thanks,  Fadi

## 2024-07-25 NOTE — PROGRESS NOTES
Hospitalist Progress Note    NAME:   María Amos   : 1965   MRN: [id number]     Date/Time: 2024 2:14 PM  Patient PCP: Patricia Wood NP-C    Estimated discharge date: 24  Barriers: needs neurology consult     Assessment / Plan:  Seizure relapse  Stroke rule out  Patient admitted to neuro with telemetry  CT head done 2024 no acute findings  CTA head and neckno large vessel occlusion no vascular abnormality.  EEG has been done we will follow-up the results.  Will check MRI of the brain.    Neurology consulted will follow-up recommendation   Continue Keppra for seizures at home dose   PT OT eval carried out-will need outpatient physical therapy   Continue seizure and fall precautions.  As needed Ativan  Discussed with the - patient might have been decreasing her seizure meds as she was getting drowsy due to meds .   Will check keppra levels     Chronic alcoholism  Patient mention last dose of alcohol was in   Alcohol level less than 10  Could most likely be the trigger for seizures.    Continue multivitamins     Hypertension  History of CVA  COPD  Depression  Dyslipidemia  Continue PTA lisinopril  Continue aspirin.  Continue statin  Continue home inhalers  Continue PTA Prozac and also on Lipitor    Cocaine positive in urine  Monitor for any withdrawal signs         Medical Decision Making:   I personally reviewed labs: CBC, BMP  I personally reviewed imaging: CT head done on 2024-no acute intracranial pathology.  I personally reviewed EKG: Done on 2024 with normal sinus rhythm and rate of 70 bpm  Toxic drug monitoring: Monitor for mental status with Keppra.  Discussed case with: patient      Code Status: Full code  DVT Prophylaxis: Lovenox  Baseline: independent       Subjective:     Chief Complaint / Reason for Physician Visit  Patient currently admitted and treated for repeated seizures.  She also had weakness postseizure.    Labs and chart reviewed patient examined 
      Hospitalist Progress Note    NAME:   María Amos   : 1965   MRN: [id number]     Date/Time: 2024 8:04 AM  Patient PCP: Patricia Wood NP-C    Estimated discharge date: 24  Barriers: needs neurology consult     Assessment / Plan:  Seizure relapse  Stroke rule out  Likely due to noncompliance of medications for seizure  Patient admitted to neuro with telemetry  CT head done 2024 no acute findings  CTA head and neckno large vessel occlusion no vascular abnormality.  EEG has been done, negative for acute findings  MRI done on 2024-no acute brain infarct or intracranial hemorrhage, no mass lesion, no obvious epileptogenic lesion.  Neurology consulted -advised for Keppra 1000 twice daily  PT OT eval carried out-will need outpatient physical therapy   Continue seizure and fall precautions.  As needed Ativan-for seizures-while in the hospital  Per neurology recommendation patient may need prolonged ambulatory EEG as an outpatient setting    Chronic alcoholism  Patient mention last dose of alcohol was in   Alcohol level less than 10  Could most likely be the trigger for seizures.    Continue multivitamins     Hypertension  History of CVA  COPD  Depression  Dyslipidemia  Continue PTA lisinopril  Continue aspirin.  Continue statin  Continue home inhalers  Continue PTA Prozac and also on Lipitor    Cocaine positive in urine  Monitor for any withdrawal signs         Medical Decision Making:   I personally reviewed labs: none   I personally reviewed imaging:MRI done on 2024-no acute brain infarct or intracranial hemorrhage, no mass lesion, no obvious epileptogen  I personally reviewed EKG: none   Toxic drug monitoring: Monitor for mental status with Keppra.  Discussed case with: patient      Code Status: Full code  DVT Prophylaxis: Lovenox  Baseline: independent       Subjective:     Chief Complaint / Reason for Physician Visit  Patient currently admitted and treated for repeated 
End of Shift Note    Bedside shift change report given to ELISE Joe (oncoming nurse) by Rebeca Correa RN .        Shift worked:  Days    Shift summary and any significant changes:    MRI completed  EEG completed  PT/OT came to bedside   Bladder scan x3. Patient able to void independently.   Concerns for physician to address:    Zone phone for oncoming shift:  [phone]     Patient Information  María Amos  59 y.o.  7/23/2024 12:53 PM by Romeo Britt MD. María Amos was admitted from [facility]    Problem List  Patient Active Problem List    Diagnosis Date Noted    Left-sided weakness 07/24/2024    Cerebral vascular disease 07/24/2024    Breakthrough seizure (HCC) 07/23/2024     Past Medical History:   Diagnosis Date    Alcohol abuse     Anxiety attack     Cocaine abuse (HCC)     COVID-19     Depression     Diabetes mellitus (HCC)     Fracture of left wrist     Hemorrhoids     HTN (hypertension)     Ill-defined condition     hemorrhoids    Orthostatic hypotension     Perineal rash     Rectal bleeding     Rib contusion, left, initial encounter     Seizures (HCC)     Stroke (HCC)     Syncope     Toe fracture     great toe    Trichomonas infection     UTI (urinary tract infection)        Core Measures:  CVA: yes  CHF: no  PNA: no    Activity:  Level of Assistance: Other (Comment) (awaiting pt eval)  Number times ambulated in hallways past shift: 0  Number of times OOB to chair past shift: 0    Cardiac:   Cardiac Monitoring: yes, Sinus Smith    Access:   Current line(s): PIV    Respiratory:   O2 Device: None (Room air)    GI:  Last BM (including prior to admit): 07/23/24  Current diet:  ADULT DIET; Regular  Tolerating current diet: Yes    Pain Management:   Patient states pain is manageable on current regimen: yes    Skin:  Dino Scale Score: 17  Interventions: N/A  Pressure injury: no    Patient Safety:  Fall Score: Shah Total Score: 45  Interventions: stay with me program  Self-release roll belt: No  Dexterity to release 
End of Shift Note    Bedside shift change report given to ELISE Jose  (oncoming nurse) by Alla Hubbard RN .        Shift worked:  Nights    Shift summary and any significant changes:    New CVA rule out to the unit/ seizure work up. MRI and EEG pending for today. Pt lethargic entire shift. Retaining urine bladder scans and straight cath done per fs.    Concerns for physician to address: Retaining urine   Zone phone for oncoming shift:  [phone]     Patient Information  María Amos  59 y.o.  7/23/2024 12:53 PM by Romeo Britt MD. María Amos was admitted from [facility]    Problem List  Patient Active Problem List    Diagnosis Date Noted    Breakthrough seizure (HCC) 07/23/2024     Past Medical History:   Diagnosis Date    Alcohol abuse     Anxiety attack     Cocaine abuse (HCC)     COVID-19     Depression     Diabetes mellitus (HCC)     Fracture of left wrist     Hemorrhoids     HTN (hypertension)     Ill-defined condition     hemorrhoids    Orthostatic hypotension     Perineal rash     Rectal bleeding     Rib contusion, left, initial encounter     Seizures (HCC)     Stroke (HCC)     Syncope     Toe fracture     great toe    Trichomonas infection     UTI (urinary tract infection)        Core Measures:  CVA: yes  CHF: no  PNA: no    Activity:  Level of Assistance: Other (Comment) (awaiting pt eval)  Number times ambulated in hallways past shift: 0  Number of times OOB to chair past shift: 0    Cardiac:   Cardiac Monitoring: yes, Sinus Smith    Access:   Current line(s): PIV    Respiratory:   O2 Device: None (Room air)    GI:  Last BM (including prior to admit): 07/23/24  Current diet:  ADULT DIET; Regular  Tolerating current diet: Yes    Pain Management:   Patient states pain is manageable on current regimen: yes    Skin:  Dino Scale Score: 19  Interventions: N/A  Pressure injury: no    Patient Safety:  Fall Score: Shah Total Score: 45  Interventions: stay with me program  Self-release roll belt: No  Dexterity to 
End of Shift Note    Bedside shift change report given to ELISE Jose (oncoming nurse) by Tatyana Joiner RN .        Shift worked:  7p - 7a   Shift summary and any significant changes:    No acute concerns.   Concerns for physician to address: None   Zone phone for oncoming shift:  [phone]     Patient Information  María Amos  59 y.o.  7/23/2024 12:53 PM by Romeo Britt MD. María Amos was admitted from [facility]    Problem List  Patient Active Problem List    Diagnosis Date Noted    Left-sided weakness 07/24/2024    Cerebral vascular disease 07/24/2024    Breakthrough seizure (HCC) 07/23/2024     Past Medical History:   Diagnosis Date    Alcohol abuse     Anxiety attack     Cocaine abuse (HCC)     COVID-19     Depression     Diabetes mellitus (HCC)     Fracture of left wrist     Hemorrhoids     HTN (hypertension)     Ill-defined condition     hemorrhoids    Orthostatic hypotension     Perineal rash     Rectal bleeding     Rib contusion, left, initial encounter     Seizures (HCC)     Stroke (HCC)     Syncope     Toe fracture     great toe    Trichomonas infection     UTI (urinary tract infection)        Core Measures:  CVA: yes  CHF: no  PNA: no    Activity:  Level of Assistance: Other (Comment) (awaiting pt eval)  Number times ambulated in hallways past shift: 0  Number of times OOB to chair past shift: 0    Cardiac:   Cardiac Monitoring: yes, Sinus Smith    Access:   Current line(s): PIV    Respiratory:   O2 Device: None (Room air)    GI:  Last BM (including prior to admit): 07/23/24  Current diet:  ADULT DIET; Regular  Tolerating current diet: Yes    Pain Management:   Patient states pain is manageable on current regimen: yes    Skin:  Dino Scale Score: 19  Interventions: N/A  Pressure injury: no    Patient Safety:  Fall Score: Shah Total Score: 45  Interventions: stay with me program  Self-release roll belt: No  Dexterity to release roll belt: N/A   (must document dexterity  here by stating Yes or No here, 
Hospital follow-up PCP transitional care appointment has been scheduled with MARIELLE Wood on 8/27/24 3307. Patient has been placed on the wait list for a sooner appt. This is the first available appt due to limited provider availability. PCP office does not offer alternate provider option for hospital follow up. [facility] placed [facility] information AVS for patient resource. Pending patient discharge. Tiffani Alberto, Care Management Assistant  
MRI PENDING    Completion of MRI Screening Sheet    Fax to [phone] when completed    Please call [phone]  When this is done    Thank You  
Orders received, chart reviewed and patient evaluated by occupational therapy. Pending progression with skilled acute occupational therapy, recommend:  No skilled occupational therapy - recommend return to OP PT     Recommend with nursing patient to complete as able in order to maintain strength, endurance and independence: OOB to chair 3x/day, ADLs with supervision/setup and mobilizing to the bathroom for toileting with 1 assist using RW. Thank you for your assistance.     Full evaluation to follow.   
Patient discharged home with outpatient PT. Discharge education and follow up information reviewed with patient. LDAs removed. Hospital to home provided transportation.   
Routine EEG completed.   
policy.)    DVT prophylaxis:  DVT prophylaxis: Meds    Active Consults:  IP CONSULT TO TELE-NEUROLOGY  IP CONSULT TO SOCIAL WORK    Length of Stay:  Expected LOS: 3  Actual LOS: 0    FENG SMITH RN                            
without contrast: No acute    - CTA head and neck without any large vessel occlusion or acute vascular abnormality.  No hemodynamically significant stenosis.    - CT brain perfusion was normal    - Brain MRI with and without contrast was nonacute.  Mild nonspecific white matter lesions may suggest chronic microangiopathic white matter changes.    - EEG: Normal    RECOMMENDATIONS:  Reported longstanding history of seizures with breakthrough seizures leading to admission: No seizures reported overnight.  The etiology of seizures can include medication noncompliance (patient states she does occasionally miss doses of medications), alcohol use/withdrawal, cocaine use.  - MRI brain ww/o C - reviewed  - EEG - reviewed  - Will switch keppra to 1000 mg twice a day.  Side effects were reviewed.  - Alcohol cessation education was discussed with patient.  - Continue to monitor signs and symptoms of withdrawal  - Continue to correct any metabolic abnormality  -There was a question from [facility] records of potential nonepileptic events as well.  The patient will need a prolonged ambulatory EEG in the outpatient setting.    Cerebrovascular disease/history of prior stroke  - Her stroke risk factors include hypertension, dyslipidemia  - Antiplatelets: Continue 81 mg aspirin  - Hypertension: Aggressive control with goal  blood pressure less than 140/90  - Dyslipidemia: Continue home statin therapy.    Stroke education today in regards to risk factors for stroke and lifestyle modifications to help minimize the risk of future stroke.  This included medication compliance, regular follow up with primary care physician,  and healthy lifestyle habits (nutrition/exercise).    Cocaine use:   - Resources for rehabilitation should be provided at discharge      There is no additional work-up needed at this time from a neurological standpoint.  Neurology will sign off.  Patient can follow-up in neurology clinic in 2 to 4 weeks.  Please do not

## 2024-07-25 NOTE — DISCHARGE SUMMARY
Neck , no lymphadenopathy urgent care provider that comes to your home. You may call them if you would like to set up an appt to be seen for a follow up while waiting to be seen by your PCP.          Total time in minutes spent coordinating this discharge (includes going over instructions, follow-up, prescriptions, and preparing report for sign off to her PCP) :  35 minutes

## 2024-07-26 LAB — LEVETIRACETAM SERPL-MCNC: 47.3 UG/ML (ref 10–40)

## 2024-07-31 ENCOUNTER — TELEPHONE (OUTPATIENT)
Age: 59
End: 2024-07-31

## 2024-08-14 ENCOUNTER — HOSPITAL ENCOUNTER (EMERGENCY)
Facility: HOSPITAL | Age: 59
Discharge: HOME | End: 2024-08-15
Attending: EMERGENCY MEDICINE
Payer: MEDICARE

## 2024-08-14 ENCOUNTER — APPOINTMENT (OUTPATIENT)
Facility: HOSPITAL | Age: 59
End: 2024-08-14
Payer: MEDICARE

## 2024-08-14 DIAGNOSIS — J44.1 COPD EXACERBATION (HCC): Primary | ICD-10-CM

## 2024-08-14 LAB
ALBUMIN SERPL-MCNC: 3.5 G/DL (ref 3.5–5)
ALBUMIN/GLOB SERPL: 1 (ref 1.1–2.2)
ALP SERPL-CCNC: 89 U/L (ref 45–117)
ALT SERPL-CCNC: 25 U/L (ref 12–78)
ANION GAP SERPL CALC-SCNC: 5 MMOL/L (ref 5–15)
AST SERPL W P-5'-P-CCNC: 16 U/L (ref 15–37)
BASOPHILS # BLD: 0.1 K/UL (ref 0–0.1)
BASOPHILS NFR BLD: 1 % (ref 0–1)
BILIRUB SERPL-MCNC: 0.2 MG/DL (ref 0.2–1)
BUN SERPL-MCNC: 13 MG/DL (ref 6–20)
BUN/CREAT SERPL: 17 (ref 12–20)
CA-I BLD-MCNC: 8.9 MG/DL (ref 8.5–10.1)
CHLORIDE SERPL-SCNC: 108 MMOL/L (ref 97–108)
CO2 SERPL-SCNC: 28 MMOL/L (ref 21–32)
CREAT SERPL-MCNC: 0.75 MG/DL (ref 0.55–1.02)
DIFFERENTIAL METHOD BLD: ABNORMAL
EOSINOPHIL # BLD: 0.7 K/UL (ref 0–0.4)
EOSINOPHIL NFR BLD: 13 % (ref 0–7)
ERYTHROCYTE [DISTWIDTH] IN BLOOD BY AUTOMATED COUNT: 14.7 % (ref 11.5–14.5)
FLUAV RNA SPEC QL NAA+PROBE: NOT DETECTED
FLUBV RNA SPEC QL NAA+PROBE: NOT DETECTED
GLOBULIN SER CALC-MCNC: 3.4 G/DL (ref 2–4)
GLUCOSE SERPL-MCNC: 94 MG/DL (ref 65–100)
HCT VFR BLD AUTO: 35.6 % (ref 35–47)
HGB BLD-MCNC: 11.6 G/DL (ref 11.5–16)
IMM GRANULOCYTES # BLD AUTO: 0 K/UL (ref 0–0.04)
IMM GRANULOCYTES NFR BLD AUTO: 0 % (ref 0–0.5)
LYMPHOCYTES # BLD: 2.4 K/UL (ref 0.8–3.5)
LYMPHOCYTES NFR BLD: 42 % (ref 12–49)
MAGNESIUM SERPL-MCNC: 1.9 MG/DL (ref 1.6–2.4)
MCH RBC QN AUTO: 28.6 PG (ref 26–34)
MCHC RBC AUTO-ENTMCNC: 32.6 G/DL (ref 30–36.5)
MCV RBC AUTO: 87.7 FL (ref 80–99)
MONOCYTES # BLD: 0.4 K/UL (ref 0–1)
MONOCYTES NFR BLD: 6 % (ref 5–13)
NEUTS SEG # BLD: 2.2 K/UL (ref 1.8–8)
NEUTS SEG NFR BLD: 38 % (ref 32–75)
NRBC # BLD: 0 K/UL (ref 0–0.01)
NRBC BLD-RTO: 0 PER 100 WBC
PLATELET # BLD AUTO: 274 K/UL (ref 150–400)
PMV BLD AUTO: 9.2 FL (ref 8.9–12.9)
POTASSIUM SERPL-SCNC: 3.6 MMOL/L (ref 3.5–5.1)
PROT SERPL-MCNC: 6.9 G/DL (ref 6.4–8.2)
RBC # BLD AUTO: 4.06 M/UL (ref 3.8–5.2)
SARS-COV-2 RNA RESP QL NAA+PROBE: NOT DETECTED
SODIUM SERPL-SCNC: 141 MMOL/L (ref 136–145)
TROPONIN I SERPL HS-MCNC: 4 NG/L (ref 0–51)
WBC # BLD AUTO: 5.7 K/UL (ref 3.6–11)

## 2024-08-14 PROCEDURE — 71046 X-RAY EXAM CHEST 2 VIEWS: CPT

## 2024-08-14 PROCEDURE — 80053 COMPREHEN METABOLIC PANEL: CPT

## 2024-08-14 PROCEDURE — 87636 SARSCOV2 & INF A&B AMP PRB: CPT

## 2024-08-14 PROCEDURE — 93005 ELECTROCARDIOGRAM TRACING: CPT

## 2024-08-14 PROCEDURE — 85025 COMPLETE CBC W/AUTO DIFF WBC: CPT

## 2024-08-14 PROCEDURE — 36415 COLL VENOUS BLD VENIPUNCTURE: CPT

## 2024-08-14 PROCEDURE — 96374 THER/PROPH/DIAG INJ IV PUSH: CPT

## 2024-08-14 PROCEDURE — 83735 ASSAY OF MAGNESIUM: CPT

## 2024-08-14 PROCEDURE — 99285 EMERGENCY DEPT VISIT HI MDM: CPT

## 2024-08-14 PROCEDURE — 2580000003 HC RX 258: Performed by: EMERGENCY MEDICINE

## 2024-08-14 PROCEDURE — 84484 ASSAY OF TROPONIN QUANT: CPT

## 2024-08-14 PROCEDURE — 6370000000 HC RX 637 (ALT 250 FOR IP): Performed by: EMERGENCY MEDICINE

## 2024-08-14 PROCEDURE — 6360000002 HC RX W HCPCS: Performed by: EMERGENCY MEDICINE

## 2024-08-14 RX ORDER — ALBUTEROL SULFATE 90 UG/1
4 AEROSOL, METERED RESPIRATORY (INHALATION) EVERY 6 HOURS PRN
Status: DISCONTINUED | OUTPATIENT
Start: 2024-08-14 | End: 2024-08-15 | Stop reason: HOSPADM

## 2024-08-14 RX ORDER — PREDNISONE 20 MG/1
40 TABLET ORAL DAILY
Qty: 8 TABLET | Refills: 0 | Status: SHIPPED | OUTPATIENT
Start: 2024-08-14 | End: 2024-08-18

## 2024-08-14 RX ORDER — ALBUTEROL SULFATE 90 UG/1
2 AEROSOL, METERED RESPIRATORY (INHALATION) EVERY 6 HOURS PRN
Qty: 18 G | Refills: 0 | Status: SHIPPED | OUTPATIENT
Start: 2024-08-14

## 2024-08-14 RX ORDER — IPRATROPIUM BROMIDE AND ALBUTEROL SULFATE 2.5; .5 MG/3ML; MG/3ML
1 SOLUTION RESPIRATORY (INHALATION)
Status: COMPLETED | OUTPATIENT
Start: 2024-08-14 | End: 2024-08-14

## 2024-08-14 RX ADMIN — METHYLPREDNISOLONE SODIUM SUCCINATE 125 MG: 125 INJECTION INTRAMUSCULAR; INTRAVENOUS at 20:39

## 2024-08-14 RX ADMIN — IPRATROPIUM BROMIDE AND ALBUTEROL SULFATE 1 DOSE: 2.5; .5 SOLUTION RESPIRATORY (INHALATION) at 23:28

## 2024-08-14 RX ADMIN — Medication 4 PUFF: at 22:08

## 2024-08-14 ASSESSMENT — PAIN SCALES - GENERAL
PAINLEVEL_OUTOF10: 0
PAINLEVEL_OUTOF10: 0

## 2024-08-14 ASSESSMENT — PAIN - FUNCTIONAL ASSESSMENT: PAIN_FUNCTIONAL_ASSESSMENT: 0-10

## 2024-08-14 NOTE — ED TRIAGE NOTES
Pt arrives to ED by EMS from [facility]. Pt reports wheezing and SOB x2 hours. Did albuterol treatments with no relief. EMS gave 1 duoneb.

## 2024-08-15 VITALS
DIASTOLIC BLOOD PRESSURE: 75 MMHG | TEMPERATURE: 97.9 F | WEIGHT: 222 LBS | BODY MASS INDEX: 33.65 KG/M2 | HEART RATE: 70 BPM | RESPIRATION RATE: 16 BRPM | OXYGEN SATURATION: 96 % | HEIGHT: 68 IN | SYSTOLIC BLOOD PRESSURE: 125 MMHG

## 2024-08-15 ASSESSMENT — PAIN - FUNCTIONAL ASSESSMENT: PAIN_FUNCTIONAL_ASSESSMENT: NONE - DENIES PAIN

## 2024-08-15 NOTE — ED NOTES
called and updated on patient's dc status. ([phone])     Cab ride being set up,  states he will assist her out of the cab on arrival and will be anticipating her.     Ride setup using insurance: [phone]    Trip #[id number]

## 2024-08-15 NOTE — ED PROVIDER NOTES
details.  None     Social Determinants affecting Diagnosis/Treatment: None    Smoking Cessation: Not Applicable    PROCEDURES   Unless otherwise noted above, none  Procedures      CRITICAL CARE TIME   Patient does not meet Critical Care Time, 0 minutes    ED IMPRESSION     1. COPD exacerbation (HCC)          DISPOSITION/PLAN   DISPOSITION Discharge - Pending Orders Complete 08/14/2024 11:40:13 PM  Condition at Disposition: Stable    Discharge Note: The patient is stable for discharge home. The signs, symptoms, diagnosis, and discharge instructions have been discussed, understanding conveyed, and agreed upon. The patient is to follow up as recommended or return to ER should their symptoms worsen.      PATIENT REFERRED TO:  No follow-up provider specified.      DISCHARGE MEDICATIONS:     Medication List        START taking these medications      predniSONE 20 MG tablet  Commonly known as: DELTASONE  Take 2 tablets by mouth daily for 4 days Start on 8/15            CHANGE how you take these medications      albuterol sulfate  (90 Base) MCG/ACT inhaler  Commonly known as: PROVENTIL;VENTOLIN;PROAIR  Inhale 2 puffs into the lungs every 6 hours as needed for Wheezing  What changed: See the new instructions.            ASK your doctor about these medications      acetaminophen 500 MG tablet  Commonly known as: TYLENOL     atorvastatin 40 MG tablet  Commonly known as: LIPITOR     B-Sure witch hazel 50 % Pads  Apply topically as needed for Hemorrhoids     budesonide-formoterol 160-4.5 MCG/ACT Aero  Commonly known as: SYMBICORT  Inhale 2 puffs into the lungs in the morning and 2 puffs in the evening.     Claritin 10 MG capsule  Generic drug: loratadine     FLUoxetine 20 MG capsule  Commonly known as: PROZAC     fluticasone-salmeterol 250-50 MCG/ACT Aepb diskus inhaler  Commonly known as: ADVAIR     folic acid 1 MG tablet  Commonly known as: FOLVITE  Take 1 tablet by mouth daily     Hydrocort-Pramoxine (Perianal) 1-1 %

## 2024-08-16 LAB
EKG ATRIAL RATE: 71 BPM
EKG DIAGNOSIS: NORMAL
EKG P AXIS: 63 DEGREES
EKG P-R INTERVAL: 150 MS
EKG Q-T INTERVAL: 476 MS
EKG QRS DURATION: 84 MS
EKG QTC CALCULATION (BAZETT): 517 MS
EKG R AXIS: 47 DEGREES
EKG T AXIS: 76 DEGREES
EKG VENTRICULAR RATE: 71 BPM

## 2024-09-05 ENCOUNTER — HOSPITAL ENCOUNTER (EMERGENCY)
Facility: HOSPITAL | Age: 59
Discharge: HOME | End: 2024-09-06
Attending: EMERGENCY MEDICINE
Payer: MEDICARE

## 2024-09-05 ENCOUNTER — APPOINTMENT (OUTPATIENT)
Facility: HOSPITAL | Age: 59
End: 2024-09-05
Payer: MEDICARE

## 2024-09-05 VITALS
HEIGHT: 68 IN | TEMPERATURE: 97.9 F | HEART RATE: 64 BPM | RESPIRATION RATE: 20 BRPM | DIASTOLIC BLOOD PRESSURE: 77 MMHG | SYSTOLIC BLOOD PRESSURE: 161 MMHG | BODY MASS INDEX: 33.65 KG/M2 | OXYGEN SATURATION: 97 % | WEIGHT: 222 LBS

## 2024-09-05 DIAGNOSIS — J44.1 COPD EXACERBATION (HCC): Primary | ICD-10-CM

## 2024-09-05 LAB
ALBUMIN SERPL-MCNC: 3 G/DL (ref 3.5–5)
ALBUMIN/GLOB SERPL: 0.9 (ref 1.1–2.2)
ALP SERPL-CCNC: 83 U/L (ref 45–117)
ALT SERPL-CCNC: 26 U/L (ref 12–78)
ANION GAP SERPL CALC-SCNC: 8 MMOL/L (ref 2–12)
AST SERPL W P-5'-P-CCNC: 15 U/L (ref 15–37)
BASOPHILS # BLD: 0 K/UL (ref 0–0.1)
BASOPHILS NFR BLD: 0 % (ref 0–1)
BILIRUB SERPL-MCNC: 0.2 MG/DL (ref 0.2–1)
BUN SERPL-MCNC: 9 MG/DL (ref 6–20)
BUN/CREAT SERPL: 12 (ref 12–20)
CA-I BLD-MCNC: 8.5 MG/DL (ref 8.5–10.1)
CHLORIDE SERPL-SCNC: 105 MMOL/L (ref 97–108)
CO2 SERPL-SCNC: 25 MMOL/L (ref 21–32)
CREAT SERPL-MCNC: 0.73 MG/DL (ref 0.55–1.02)
DIFFERENTIAL METHOD BLD: ABNORMAL
EOSINOPHIL # BLD: 0.3 K/UL (ref 0–0.4)
EOSINOPHIL NFR BLD: 5 % (ref 0–7)
ERYTHROCYTE [DISTWIDTH] IN BLOOD BY AUTOMATED COUNT: 14.9 % (ref 11.5–14.5)
ETHANOL SERPL-MCNC: <10 MG/DL (ref 0–0.08)
GLOBULIN SER CALC-MCNC: 3.2 G/DL (ref 2–4)
GLUCOSE SERPL-MCNC: 150 MG/DL (ref 65–100)
HCT VFR BLD AUTO: 34.9 % (ref 35–47)
HGB BLD-MCNC: 11.9 G/DL (ref 11.5–16)
IMM GRANULOCYTES # BLD AUTO: 0 K/UL (ref 0–0.04)
IMM GRANULOCYTES NFR BLD AUTO: 0 % (ref 0–0.5)
LYMPHOCYTES # BLD: 2.6 K/UL (ref 0.8–3.5)
LYMPHOCYTES NFR BLD: 48 % (ref 12–49)
MAGNESIUM SERPL-MCNC: 1.7 MG/DL (ref 1.6–2.4)
MCH RBC QN AUTO: 29.4 PG (ref 26–34)
MCHC RBC AUTO-ENTMCNC: 34.1 G/DL (ref 30–36.5)
MCV RBC AUTO: 86.2 FL (ref 80–99)
MONOCYTES # BLD: 0.3 K/UL (ref 0–1)
MONOCYTES NFR BLD: 6 % (ref 5–13)
NEUTS SEG # BLD: 2.2 K/UL (ref 1.8–8)
NEUTS SEG NFR BLD: 41 % (ref 32–75)
NRBC # BLD: 0 K/UL (ref 0–0.01)
NRBC BLD-RTO: 0 PER 100 WBC
PLATELET # BLD AUTO: 231 K/UL (ref 150–400)
PMV BLD AUTO: 9.2 FL (ref 8.9–12.9)
POTASSIUM SERPL-SCNC: 3.4 MMOL/L (ref 3.5–5.1)
PROT SERPL-MCNC: 6.2 G/DL (ref 6.4–8.2)
RBC # BLD AUTO: 4.05 M/UL (ref 3.8–5.2)
SODIUM SERPL-SCNC: 138 MMOL/L (ref 136–145)
TROPONIN I SERPL HS-MCNC: <4 NG/L (ref 0–51)
WBC # BLD AUTO: 5.4 K/UL (ref 3.6–11)

## 2024-09-05 PROCEDURE — 6370000000 HC RX 637 (ALT 250 FOR IP): Performed by: EMERGENCY MEDICINE

## 2024-09-05 PROCEDURE — 83735 ASSAY OF MAGNESIUM: CPT

## 2024-09-05 PROCEDURE — 82077 ASSAY SPEC XCP UR&BREATH IA: CPT

## 2024-09-05 PROCEDURE — 71045 X-RAY EXAM CHEST 1 VIEW: CPT

## 2024-09-05 PROCEDURE — 80053 COMPREHEN METABOLIC PANEL: CPT

## 2024-09-05 PROCEDURE — 99285 EMERGENCY DEPT VISIT HI MDM: CPT

## 2024-09-05 PROCEDURE — 93005 ELECTROCARDIOGRAM TRACING: CPT | Performed by: EMERGENCY MEDICINE

## 2024-09-05 PROCEDURE — 6360000002 HC RX W HCPCS: Performed by: EMERGENCY MEDICINE

## 2024-09-05 PROCEDURE — 96374 THER/PROPH/DIAG INJ IV PUSH: CPT

## 2024-09-05 PROCEDURE — 36415 COLL VENOUS BLD VENIPUNCTURE: CPT

## 2024-09-05 PROCEDURE — 84484 ASSAY OF TROPONIN QUANT: CPT

## 2024-09-05 PROCEDURE — 85025 COMPLETE CBC W/AUTO DIFF WBC: CPT

## 2024-09-05 RX ORDER — IPRATROPIUM BROMIDE AND ALBUTEROL SULFATE 2.5; .5 MG/3ML; MG/3ML
1 SOLUTION RESPIRATORY (INHALATION)
Status: COMPLETED | OUTPATIENT
Start: 2024-09-05 | End: 2024-09-05

## 2024-09-05 RX ORDER — METHYLPREDNISOLONE SODIUM SUCCINATE 125 MG/2ML
80 INJECTION, POWDER, LYOPHILIZED, FOR SOLUTION INTRAMUSCULAR; INTRAVENOUS ONCE
Status: COMPLETED | OUTPATIENT
Start: 2024-09-05 | End: 2024-09-05

## 2024-09-05 RX ORDER — PREDNISONE 50 MG/1
50 TABLET ORAL DAILY
Qty: 5 TABLET | Refills: 0 | Status: SHIPPED | OUTPATIENT
Start: 2024-09-05 | End: 2024-09-10

## 2024-09-05 RX ORDER — ALBUTEROL SULFATE 90 UG/1
2 AEROSOL, METERED RESPIRATORY (INHALATION) 4 TIMES DAILY PRN
Qty: 18 G | Refills: 0 | Status: SHIPPED | OUTPATIENT
Start: 2024-09-05

## 2024-09-05 RX ADMIN — IPRATROPIUM BROMIDE AND ALBUTEROL SULFATE 1 DOSE: 2.5; .5 SOLUTION RESPIRATORY (INHALATION) at 21:14

## 2024-09-05 RX ADMIN — METHYLPREDNISOLONE SODIUM SUCCINATE 80 MG: 125 INJECTION INTRAMUSCULAR; INTRAVENOUS at 21:10

## 2024-09-05 ASSESSMENT — PAIN - FUNCTIONAL ASSESSMENT: PAIN_FUNCTIONAL_ASSESSMENT: NONE - DENIES PAIN

## 2024-09-06 LAB
EKG ATRIAL RATE: 66 BPM
EKG DIAGNOSIS: NORMAL
EKG P AXIS: 58 DEGREES
EKG P-R INTERVAL: 144 MS
EKG Q-T INTERVAL: 460 MS
EKG QRS DURATION: 84 MS
EKG QTC CALCULATION (BAZETT): 482 MS
EKG R AXIS: 37 DEGREES
EKG T AXIS: 61 DEGREES
EKG VENTRICULAR RATE: 66 BPM

## 2024-09-06 NOTE — ED NOTES
Called [facility] Medicaid transportation to schedule another  for pt. Reference #[id number] and ETA [phone].

## 2024-09-06 NOTE — ED NOTES
Spoke with Lesli from [facility] Medicaid cab transportation and she gave a reference #[id number] and ETA 30mins - 3hours for the cab to arrive. Advised pt will be waiting in the lobby.

## 2024-09-06 NOTE — ED TRIAGE NOTES
Been feeling bad since 10am, c/o shortness of breath. Hx of COPD and asthma. Attempted using her own neb at home with no relief. Ems gave duoneb which seemed to help. Hx of CVA with deficits.

## 2024-09-06 NOTE — ED NOTES
Assumed care of this pt at this time from Anahi OLIVARES. Pt on continuous cardiac monitoring, pulse ox, and blood pressure monitoring at this time.

## 2024-09-06 NOTE — ED PROVIDER NOTES
Magnesium 1.7 1.6 - 2.4 mg/dL   Troponin “IF” patient is greater than 40 years of age or has a history of cardiac disease.    Collection Time: 09/05/24  8:58 PM   Result Value Ref Range    Troponin, High Sensitivity <4 0 - 51 ng/L   Ethanol    Collection Time: 09/05/24  8:58 PM   Result Value Ref Range    Ethanol Lvl <10 <10 mg/dL       EKG:.EKG interpreted by me. Shows Normal Sinus Rhythm with a HR of 67.  No STEMI.    Radiologic Studies:  Non-plain film images such as CT, Ultrasound and MRI are read by the radiologist. Plain radiographic images are visualized and preliminarily interpreted by the ED Provider with the following findings: Xray Interpreted by me.  Shows no appreciable intra thoracic process    Interpretation per the Radiologist below, if available at the time of this note:  XR CHEST 1 VIEW   Final Result   No acute intrathoracic process is identified.             Electronically signed by STEWART ADAMS           ED COURSE and DIFFERENTIAL DIAGNOSIS/MDM   11:50 PM Differential and Considerations: Will assess with basic cardiac labs EKG and chest x-ray.  Differentials pneumonia, viral syndrome, ACS, arrhythmia, volume overload.    Records Reviewed (source and summary of external notes): Prior medical records and Nursing notes.    Vitals:    Vitals:    09/05/24 2215 09/05/24 2230 09/05/24 2245 09/05/24 2300   BP: 108/73 137/76 135/81 136/80   Pulse: 73 66 72 68   Resp: 20 16 20 19   Temp:       TempSrc:       SpO2: 91% 95% 97% 96%   Weight:       Height:            ED COURSE  ED Course as of 09/05/24 2350   Thu Sep 05, 2024   2616 I be signing the patient out to Dr. Cross who will make final disposition based on results and his physical exam. [CS]      ED Course User Index  [CS] Francisco Guzman MD       SEPSIS Reassessment: Sepsis reassessment not applicable    Clinical Management Tools:      Patient was given the following medications:  Medications   ipratropium 0.5 mg-albuterol 2.5 mg (DUONEB)  (90 Base) MCG/ACT inhaler  predniSONE 50 MG tablet           DISCONTINUED MEDICATIONS:  Current Discharge Medication List          I am the Primary Clinician of Record. Francisco Guzman MD (electronically signed)    (Please note that parts of this dictation were completed with voice recognition software. Quite often unanticipated grammatical, syntax, homophones, and other interpretive errors are inadvertently transcribed by the computer software. Please disregards these errors. Please excuse any errors that have escaped final proofreading.)     Francisco Guzman MD  09/05/24 5905

## 2024-09-18 ENCOUNTER — APPOINTMENT (OUTPATIENT)
Facility: HOSPITAL | Age: 59
End: 2024-09-18
Payer: MEDICARE

## 2024-09-18 ENCOUNTER — HOSPITAL ENCOUNTER (EMERGENCY)
Facility: HOSPITAL | Age: 59
Discharge: HOME | End: 2024-09-18
Attending: STUDENT IN AN ORGANIZED HEALTH CARE EDUCATION/TRAINING PROGRAM
Payer: MEDICARE

## 2024-09-18 VITALS
TEMPERATURE: 98.1 F | HEIGHT: 68 IN | SYSTOLIC BLOOD PRESSURE: 121 MMHG | RESPIRATION RATE: 21 BRPM | WEIGHT: 182 LBS | HEART RATE: 91 BPM | OXYGEN SATURATION: 99 % | DIASTOLIC BLOOD PRESSURE: 76 MMHG | BODY MASS INDEX: 27.58 KG/M2

## 2024-09-18 DIAGNOSIS — J44.1 COPD EXACERBATION (HCC): Primary | ICD-10-CM

## 2024-09-18 LAB
ALBUMIN SERPL-MCNC: 3.3 G/DL (ref 3.5–5)
ALBUMIN/GLOB SERPL: 0.8 (ref 1.1–2.2)
ALP SERPL-CCNC: 79 U/L (ref 45–117)
ALT SERPL-CCNC: 19 U/L (ref 12–78)
ANION GAP SERPL CALC-SCNC: 4 MMOL/L (ref 2–12)
AST SERPL W P-5'-P-CCNC: 23 U/L (ref 15–37)
BASOPHILS # BLD: 0 K/UL (ref 0–0.1)
BASOPHILS NFR BLD: 1 % (ref 0–1)
BILIRUB SERPL-MCNC: 0.5 MG/DL (ref 0.2–1)
BUN SERPL-MCNC: 11 MG/DL (ref 6–20)
BUN/CREAT SERPL: 14 (ref 12–20)
CA-I BLD-MCNC: 9 MG/DL (ref 8.5–10.1)
CHLORIDE SERPL-SCNC: 109 MMOL/L (ref 97–108)
CO2 SERPL-SCNC: 25 MMOL/L (ref 21–32)
CREAT SERPL-MCNC: 0.81 MG/DL (ref 0.55–1.02)
DIFFERENTIAL METHOD BLD: ABNORMAL
EOSINOPHIL # BLD: 0.2 K/UL (ref 0–0.4)
EOSINOPHIL NFR BLD: 4 % (ref 0–7)
ERYTHROCYTE [DISTWIDTH] IN BLOOD BY AUTOMATED COUNT: 15.5 % (ref 11.5–14.5)
FLUAV RNA SPEC QL NAA+PROBE: NOT DETECTED
FLUBV RNA SPEC QL NAA+PROBE: NOT DETECTED
GLOBULIN SER CALC-MCNC: 3.9 G/DL (ref 2–4)
GLUCOSE SERPL-MCNC: 94 MG/DL (ref 65–100)
HCT VFR BLD AUTO: 38.2 % (ref 35–47)
HGB BLD-MCNC: 12.5 G/DL (ref 11.5–16)
IMM GRANULOCYTES # BLD AUTO: 0 K/UL (ref 0–0.04)
IMM GRANULOCYTES NFR BLD AUTO: 1 % (ref 0–0.5)
LYMPHOCYTES # BLD: 2 K/UL (ref 0.8–3.5)
LYMPHOCYTES NFR BLD: 40 % (ref 12–49)
MCH RBC QN AUTO: 28.9 PG (ref 26–34)
MCHC RBC AUTO-ENTMCNC: 32.7 G/DL (ref 30–36.5)
MCV RBC AUTO: 88.2 FL (ref 80–99)
MONOCYTES # BLD: 0.4 K/UL (ref 0–1)
MONOCYTES NFR BLD: 7 % (ref 5–13)
NEUTS SEG # BLD: 2.5 K/UL (ref 1.8–8)
NEUTS SEG NFR BLD: 47 % (ref 32–75)
NRBC # BLD: 0 K/UL (ref 0–0.01)
NRBC BLD-RTO: 0 PER 100 WBC
PLATELET # BLD AUTO: 218 K/UL (ref 150–400)
PMV BLD AUTO: 9.8 FL (ref 8.9–12.9)
POTASSIUM SERPL-SCNC: 4.2 MMOL/L (ref 3.5–5.1)
POTASSIUM SERPL-SCNC: 5.3 MMOL/L (ref 3.5–5.1)
PROT SERPL-MCNC: 7.2 G/DL (ref 6.4–8.2)
RBC # BLD AUTO: 4.33 M/UL (ref 3.8–5.2)
SARS-COV-2 RNA RESP QL NAA+PROBE: NOT DETECTED
SODIUM SERPL-SCNC: 138 MMOL/L (ref 136–145)
TROPONIN I SERPL HS-MCNC: <4 NG/L (ref 0–51)
WBC # BLD AUTO: 5.1 K/UL (ref 3.6–11)

## 2024-09-18 PROCEDURE — 36415 COLL VENOUS BLD VENIPUNCTURE: CPT

## 2024-09-18 PROCEDURE — 99285 EMERGENCY DEPT VISIT HI MDM: CPT

## 2024-09-18 PROCEDURE — 80053 COMPREHEN METABOLIC PANEL: CPT

## 2024-09-18 PROCEDURE — 71045 X-RAY EXAM CHEST 1 VIEW: CPT

## 2024-09-18 PROCEDURE — 85025 COMPLETE CBC W/AUTO DIFF WBC: CPT

## 2024-09-18 PROCEDURE — 96374 THER/PROPH/DIAG INJ IV PUSH: CPT

## 2024-09-18 PROCEDURE — 96375 TX/PRO/DX INJ NEW DRUG ADDON: CPT

## 2024-09-18 PROCEDURE — 6370000000 HC RX 637 (ALT 250 FOR IP): Performed by: STUDENT IN AN ORGANIZED HEALTH CARE EDUCATION/TRAINING PROGRAM

## 2024-09-18 PROCEDURE — 84484 ASSAY OF TROPONIN QUANT: CPT

## 2024-09-18 PROCEDURE — 84132 ASSAY OF SERUM POTASSIUM: CPT

## 2024-09-18 PROCEDURE — 87636 SARSCOV2 & INF A&B AMP PRB: CPT

## 2024-09-18 PROCEDURE — 93005 ELECTROCARDIOGRAM TRACING: CPT | Performed by: STUDENT IN AN ORGANIZED HEALTH CARE EDUCATION/TRAINING PROGRAM

## 2024-09-18 PROCEDURE — 6360000002 HC RX W HCPCS: Performed by: STUDENT IN AN ORGANIZED HEALTH CARE EDUCATION/TRAINING PROGRAM

## 2024-09-18 RX ORDER — PREDNISONE 50 MG/1
50 TABLET ORAL DAILY
Qty: 5 TABLET | Refills: 0 | Status: SHIPPED | OUTPATIENT
Start: 2024-09-18 | End: 2024-09-23

## 2024-09-18 RX ORDER — KETOROLAC TROMETHAMINE 15 MG/ML
15 INJECTION, SOLUTION INTRAMUSCULAR; INTRAVENOUS
Status: COMPLETED | OUTPATIENT
Start: 2024-09-18 | End: 2024-09-18

## 2024-09-18 RX ORDER — PREDNISONE 20 MG/1
60 TABLET ORAL ONCE
Status: COMPLETED | OUTPATIENT
Start: 2024-09-18 | End: 2024-09-18

## 2024-09-18 RX ORDER — ONDANSETRON 2 MG/ML
4 INJECTION INTRAMUSCULAR; INTRAVENOUS ONCE
Status: COMPLETED | OUTPATIENT
Start: 2024-09-18 | End: 2024-09-18

## 2024-09-18 RX ORDER — BUDESONIDE AND FORMOTEROL FUMARATE DIHYDRATE 160; 4.5 UG/1; UG/1
2 AEROSOL RESPIRATORY (INHALATION)
Qty: 10.2 G | Refills: 3 | Status: SHIPPED | OUTPATIENT
Start: 2024-09-18

## 2024-09-18 RX ORDER — ALBUTEROL SULFATE 90 UG/1
2 INHALANT RESPIRATORY (INHALATION) EVERY 6 HOURS PRN
Qty: 18 G | Refills: 0 | Status: SHIPPED | OUTPATIENT
Start: 2024-09-18 | End: 2024-10-18

## 2024-09-18 RX ORDER — IPRATROPIUM BROMIDE AND ALBUTEROL SULFATE 2.5; .5 MG/3ML; MG/3ML
1 SOLUTION RESPIRATORY (INHALATION)
Status: DISPENSED | OUTPATIENT
Start: 2024-09-18 | End: 2024-09-18

## 2024-09-18 RX ADMIN — PREDNISONE 60 MG: 20 TABLET ORAL at 17:09

## 2024-09-18 RX ADMIN — KETOROLAC TROMETHAMINE 15 MG: 15 INJECTION, SOLUTION INTRAMUSCULAR; INTRAVENOUS at 17:10

## 2024-09-18 RX ADMIN — ONDANSETRON 4 MG: 2 INJECTION INTRAMUSCULAR; INTRAVENOUS at 17:09

## 2024-09-18 ASSESSMENT — PAIN SCALES - GENERAL
PAINLEVEL_OUTOF10: 6
PAINLEVEL_OUTOF10: 8

## 2024-09-18 ASSESSMENT — PAIN DESCRIPTION - LOCATION: LOCATION: CHEST

## 2024-09-18 ASSESSMENT — PAIN - FUNCTIONAL ASSESSMENT: PAIN_FUNCTIONAL_ASSESSMENT: 0-10

## 2024-09-19 LAB
EKG ATRIAL RATE: 69 BPM
EKG DIAGNOSIS: NORMAL
EKG P AXIS: 51 DEGREES
EKG P-R INTERVAL: 142 MS
EKG Q-T INTERVAL: 410 MS
EKG QRS DURATION: 72 MS
EKG QTC CALCULATION (BAZETT): 439 MS
EKG R AXIS: 19 DEGREES
EKG T AXIS: 59 DEGREES
EKG VENTRICULAR RATE: 69 BPM

## 2024-10-30 ENCOUNTER — HOSPITAL ENCOUNTER (EMERGENCY)
Facility: HOSPITAL | Age: 59
Discharge: HOME | End: 2024-10-30
Attending: STUDENT IN AN ORGANIZED HEALTH CARE EDUCATION/TRAINING PROGRAM
Payer: MEDICARE

## 2024-10-30 VITALS
SYSTOLIC BLOOD PRESSURE: 111 MMHG | DIASTOLIC BLOOD PRESSURE: 79 MMHG | HEIGHT: 68 IN | WEIGHT: 202 LBS | TEMPERATURE: 97.9 F | OXYGEN SATURATION: 100 % | RESPIRATION RATE: 18 BRPM | BODY MASS INDEX: 30.62 KG/M2 | HEART RATE: 58 BPM

## 2024-10-30 DIAGNOSIS — J06.9 VIRAL URI: ICD-10-CM

## 2024-10-30 DIAGNOSIS — J40 BRONCHITIS: Primary | ICD-10-CM

## 2024-10-30 LAB
FLUAV RNA SPEC QL NAA+PROBE: NOT DETECTED
FLUBV RNA SPEC QL NAA+PROBE: NOT DETECTED
SARS-COV-2 RNA RESP QL NAA+PROBE: NOT DETECTED

## 2024-10-30 PROCEDURE — 6360000002 HC RX W HCPCS: Performed by: STUDENT IN AN ORGANIZED HEALTH CARE EDUCATION/TRAINING PROGRAM

## 2024-10-30 PROCEDURE — 87636 SARSCOV2 & INF A&B AMP PRB: CPT

## 2024-10-30 PROCEDURE — 96372 THER/PROPH/DIAG INJ SC/IM: CPT

## 2024-10-30 PROCEDURE — 6370000000 HC RX 637 (ALT 250 FOR IP): Performed by: STUDENT IN AN ORGANIZED HEALTH CARE EDUCATION/TRAINING PROGRAM

## 2024-10-30 PROCEDURE — 99284 EMERGENCY DEPT VISIT MOD MDM: CPT

## 2024-10-30 RX ORDER — IPRATROPIUM BROMIDE AND ALBUTEROL SULFATE 2.5; .5 MG/3ML; MG/3ML
1 SOLUTION RESPIRATORY (INHALATION)
Status: COMPLETED | OUTPATIENT
Start: 2024-10-30 | End: 2024-10-30

## 2024-10-30 RX ORDER — KETOROLAC TROMETHAMINE 30 MG/ML
30 INJECTION, SOLUTION INTRAMUSCULAR; INTRAVENOUS
Status: COMPLETED | OUTPATIENT
Start: 2024-10-30 | End: 2024-10-30

## 2024-10-30 RX ADMIN — KETOROLAC TROMETHAMINE 30 MG: 30 INJECTION, SOLUTION INTRAMUSCULAR; INTRAVENOUS at 18:36

## 2024-10-30 RX ADMIN — IPRATROPIUM BROMIDE AND ALBUTEROL SULFATE 1 DOSE: .5; 2.5 SOLUTION RESPIRATORY (INHALATION) at 18:20

## 2024-10-30 ASSESSMENT — PAIN - FUNCTIONAL ASSESSMENT
PAIN_FUNCTIONAL_ASSESSMENT: 0-10
PAIN_FUNCTIONAL_ASSESSMENT: 0-10

## 2024-10-30 ASSESSMENT — PAIN SCALES - GENERAL
PAINLEVEL_OUTOF10: 10
PAINLEVEL_OUTOF10: 4
PAINLEVEL_OUTOF10: 8

## 2024-10-30 ASSESSMENT — PAIN DESCRIPTION - LOCATION
LOCATION: GENERALIZED
LOCATION: GENERALIZED

## 2024-11-11 ENCOUNTER — HOSPITAL ENCOUNTER (EMERGENCY)
Facility: HOSPITAL | Age: 59
Discharge: HOME | End: 2024-11-11
Attending: EMERGENCY MEDICINE
Payer: MEDICARE

## 2024-11-11 ENCOUNTER — APPOINTMENT (OUTPATIENT)
Facility: HOSPITAL | Age: 59
End: 2024-11-11
Payer: MEDICARE

## 2024-11-11 VITALS
TEMPERATURE: 97.9 F | OXYGEN SATURATION: 94 % | SYSTOLIC BLOOD PRESSURE: 134 MMHG | HEART RATE: 75 BPM | WEIGHT: 201.94 LBS | RESPIRATION RATE: 22 BRPM | DIASTOLIC BLOOD PRESSURE: 80 MMHG | BODY MASS INDEX: 30.71 KG/M2

## 2024-11-11 DIAGNOSIS — J45.21 MILD INTERMITTENT ASTHMA WITH EXACERBATION: ICD-10-CM

## 2024-11-11 DIAGNOSIS — G40.919 BREAKTHROUGH SEIZURE (HCC): Primary | ICD-10-CM

## 2024-11-11 LAB
ALBUMIN SERPL-MCNC: 3.7 G/DL (ref 3.5–5)
ALBUMIN/GLOB SERPL: 1.1 (ref 1.1–2.2)
ALP SERPL-CCNC: 85 U/L (ref 45–117)
ALT SERPL-CCNC: 22 U/L (ref 12–78)
AMPHET UR QL SCN: NEGATIVE
ANION GAP SERPL CALC-SCNC: 6 MMOL/L (ref 2–12)
APPEARANCE UR: CLEAR
AST SERPL-CCNC: 23 U/L (ref 15–37)
BACTERIA URNS QL MICRO: NEGATIVE /HPF
BARBITURATES UR QL SCN: NEGATIVE
BASOPHILS # BLD: 0.1 K/UL (ref 0–0.1)
BASOPHILS NFR BLD: 2 % (ref 0–1)
BENZODIAZ UR QL: POSITIVE
BILIRUB SERPL-MCNC: 0.4 MG/DL (ref 0.2–1)
BILIRUB UR QL: NEGATIVE
BUN SERPL-MCNC: 8 MG/DL (ref 6–20)
BUN/CREAT SERPL: 10 (ref 12–20)
CALCIUM SERPL-MCNC: 8.7 MG/DL (ref 8.5–10.1)
CANNABINOIDS UR QL SCN: NEGATIVE
CHLORIDE SERPL-SCNC: 106 MMOL/L (ref 97–108)
CO2 SERPL-SCNC: 26 MMOL/L (ref 21–32)
COCAINE UR QL SCN: NEGATIVE
COLOR UR: NORMAL
CREAT SERPL-MCNC: 0.79 MG/DL (ref 0.55–1.02)
DIFFERENTIAL METHOD BLD: ABNORMAL
EOSINOPHIL # BLD: 1.2 K/UL (ref 0–0.4)
EOSINOPHIL NFR BLD: 21 % (ref 0–7)
EPITH CASTS URNS QL MICRO: NORMAL /LPF
ERYTHROCYTE [DISTWIDTH] IN BLOOD BY AUTOMATED COUNT: 13.2 % (ref 11.5–14.5)
ETHANOL SERPL-MCNC: <10 MG/DL (ref 0–0.08)
GLOBULIN SER CALC-MCNC: 3.4 G/DL (ref 2–4)
GLUCOSE SERPL-MCNC: 92 MG/DL (ref 65–100)
GLUCOSE UR STRIP.AUTO-MCNC: NEGATIVE MG/DL
HCT VFR BLD AUTO: 39.3 % (ref 35–47)
HGB BLD-MCNC: 13.3 G/DL (ref 11.5–16)
HGB UR QL STRIP: NEGATIVE
HYALINE CASTS URNS QL MICRO: NORMAL /LPF (ref 0–2)
IMM GRANULOCYTES # BLD AUTO: 0 K/UL (ref 0–0.04)
IMM GRANULOCYTES NFR BLD AUTO: 0 % (ref 0–0.5)
KETONES UR QL STRIP.AUTO: NEGATIVE MG/DL
LEUKOCYTE ESTERASE UR QL STRIP.AUTO: NEGATIVE
LYMPHOCYTES # BLD: 2 K/UL (ref 0.8–3.5)
LYMPHOCYTES NFR BLD: 35 % (ref 12–49)
Lab: ABNORMAL
MCH RBC QN AUTO: 29.5 PG (ref 26–34)
MCHC RBC AUTO-ENTMCNC: 33.8 G/DL (ref 30–36.5)
MCV RBC AUTO: 87.1 FL (ref 80–99)
METHADONE UR QL: NEGATIVE
MONOCYTES # BLD: 0.3 K/UL (ref 0–1)
MONOCYTES NFR BLD: 5 % (ref 5–13)
NEUTS SEG # BLD: 2 K/UL (ref 1.8–8)
NEUTS SEG NFR BLD: 37 % (ref 32–75)
NITRITE UR QL STRIP.AUTO: NEGATIVE
NRBC # BLD: 0 K/UL (ref 0–0.01)
NRBC BLD-RTO: 0 PER 100 WBC
OPIATES UR QL: NEGATIVE
PCP UR QL: NEGATIVE
PH UR STRIP: 7 (ref 5–8)
PLATELET # BLD AUTO: 260 K/UL (ref 150–400)
PMV BLD AUTO: 9.4 FL (ref 8.9–12.9)
POTASSIUM SERPL-SCNC: 3.8 MMOL/L (ref 3.5–5.1)
PROT SERPL-MCNC: 7.1 G/DL (ref 6.4–8.2)
PROT UR STRIP-MCNC: NEGATIVE MG/DL
RBC # BLD AUTO: 4.51 M/UL (ref 3.8–5.2)
RBC #/AREA URNS HPF: NORMAL /HPF (ref 0–5)
RBC MORPH BLD: ABNORMAL
SODIUM SERPL-SCNC: 138 MMOL/L (ref 136–145)
SP GR UR REFRACTOMETRY: 1
URINE CULTURE IF INDICATED: NORMAL
UROBILINOGEN UR QL STRIP.AUTO: 0.2 EU/DL (ref 0.2–1)
WBC # BLD AUTO: 5.6 K/UL (ref 3.6–11)
WBC URNS QL MICRO: NORMAL /HPF (ref 0–4)

## 2024-11-11 PROCEDURE — 80177 DRUG SCRN QUAN LEVETIRACETAM: CPT

## 2024-11-11 PROCEDURE — 36415 COLL VENOUS BLD VENIPUNCTURE: CPT

## 2024-11-11 PROCEDURE — 94762 N-INVAS EAR/PLS OXIMTRY CONT: CPT

## 2024-11-11 PROCEDURE — 93005 ELECTROCARDIOGRAM TRACING: CPT | Performed by: EMERGENCY MEDICINE

## 2024-11-11 PROCEDURE — 85025 COMPLETE CBC W/AUTO DIFF WBC: CPT

## 2024-11-11 PROCEDURE — 71045 X-RAY EXAM CHEST 1 VIEW: CPT

## 2024-11-11 PROCEDURE — 70450 CT HEAD/BRAIN W/O DYE: CPT

## 2024-11-11 PROCEDURE — 80053 COMPREHEN METABOLIC PANEL: CPT

## 2024-11-11 PROCEDURE — 80307 DRUG TEST PRSMV CHEM ANLYZR: CPT

## 2024-11-11 PROCEDURE — 6360000002 HC RX W HCPCS: Performed by: EMERGENCY MEDICINE

## 2024-11-11 PROCEDURE — 94640 AIRWAY INHALATION TREATMENT: CPT

## 2024-11-11 PROCEDURE — 82077 ASSAY SPEC XCP UR&BREATH IA: CPT

## 2024-11-11 PROCEDURE — 6370000000 HC RX 637 (ALT 250 FOR IP): Performed by: EMERGENCY MEDICINE

## 2024-11-11 PROCEDURE — 99285 EMERGENCY DEPT VISIT HI MDM: CPT

## 2024-11-11 PROCEDURE — 81001 URINALYSIS AUTO W/SCOPE: CPT

## 2024-11-11 PROCEDURE — 96374 THER/PROPH/DIAG INJ IV PUSH: CPT

## 2024-11-11 RX ORDER — LEVETIRACETAM 500 MG/5ML
1000 INJECTION, SOLUTION, CONCENTRATE INTRAVENOUS
Status: COMPLETED | OUTPATIENT
Start: 2024-11-11 | End: 2024-11-11

## 2024-11-11 RX ORDER — METHYLPREDNISOLONE 4 MG/1
TABLET ORAL
Qty: 21 TABLET | Refills: 0 | Status: SHIPPED | OUTPATIENT
Start: 2024-11-11 | End: 2024-11-17

## 2024-11-11 RX ORDER — IPRATROPIUM BROMIDE AND ALBUTEROL SULFATE 2.5; .5 MG/3ML; MG/3ML
1 SOLUTION RESPIRATORY (INHALATION)
Status: COMPLETED | OUTPATIENT
Start: 2024-11-11 | End: 2024-11-11

## 2024-11-11 RX ORDER — AZITHROMYCIN 250 MG/1
TABLET, FILM COATED ORAL
Qty: 6 TABLET | Refills: 0 | Status: SHIPPED | OUTPATIENT
Start: 2024-11-11 | End: 2024-11-21

## 2024-11-11 RX ADMIN — LEVETIRACETAM 1000 MG: 100 INJECTION INTRAVENOUS at 13:52

## 2024-11-11 RX ADMIN — IPRATROPIUM BROMIDE AND ALBUTEROL SULFATE 1 DOSE: .5; 3 SOLUTION RESPIRATORY (INHALATION) at 13:49

## 2024-11-11 ASSESSMENT — PAIN - FUNCTIONAL ASSESSMENT: PAIN_FUNCTIONAL_ASSESSMENT: NONE - DENIES PAIN

## 2024-11-11 NOTE — ED NOTES
called to get an update, informed primary RN.      confirmed /name of patient.     Confirmed starred number in chart for  is correct.

## 2024-11-11 NOTE — ED NOTES
Pt's cousin called to get in touch with patient. When trying to confirm name and  with the family member, this RN was told \"look her lady, just transfer me now\".     Encouraged pt's family to not yell or be rude at staff. Explained HIPAA concerns and verbally he repeated the same sentiment as above.     Transferred patient through to family, primary RN notified.

## 2024-11-11 NOTE — DISCHARGE INSTRUCTIONS
You were evaluated in the emergency department for another seizure.  Your examination was reassuring as was your work-up including lab work, urinalysis, EKG, and chest xray.  It will be important for you to follow-up with your primary care physician in 2-3 days as well as a neurologist.  If you develop worsening symptoms such as recurrent seizures, fevers, chest pain, or shortness of breath, please return to the emergency department immediately.

## 2024-11-11 NOTE — ED PROVIDER NOTES
the Primary Clinician of Record.   Bruce Fraire MD (electronically signed)    (Please note that parts of this dictation were completed with voice recognition software. Quite often unanticipated grammatical, syntax, homophones, and other interpretive errors are inadvertently transcribed by the computer software. Please disregards these errors. Please excuse any errors that have escaped final proofreading.)       Bruce Fraire MD  11/11/24 1919

## 2024-11-12 LAB
EKG ATRIAL RATE: 66 BPM
EKG DIAGNOSIS: NORMAL
EKG P AXIS: 78 DEGREES
EKG P-R INTERVAL: 146 MS
EKG Q-T INTERVAL: 480 MS
EKG QRS DURATION: 80 MS
EKG QTC CALCULATION (BAZETT): 503 MS
EKG R AXIS: 69 DEGREES
EKG T AXIS: 83 DEGREES
EKG VENTRICULAR RATE: 66 BPM

## 2024-11-13 LAB — LEVETIRACETAM SERPL-MCNC: <2 UG/ML (ref 10–40)

## 2024-12-01 ENCOUNTER — APPOINTMENT (OUTPATIENT)
Facility: HOSPITAL | Age: 59
DRG: 896 | End: 2024-12-01
Payer: MEDICARE

## 2024-12-01 ENCOUNTER — HOSPITAL ENCOUNTER (INPATIENT)
Facility: HOSPITAL | Age: 59
LOS: 10 days | Discharge: NURSING, REHAB OR LONG-TERM CARE | DRG: 896 | End: 2024-12-11
Attending: EMERGENCY MEDICINE
Payer: MEDICARE

## 2024-12-01 DIAGNOSIS — R06.02 SHORTNESS OF BREATH: Primary | ICD-10-CM

## 2024-12-01 DIAGNOSIS — J20.9 ACUTE BRONCHITIS WITH COPD (HCC): ICD-10-CM

## 2024-12-01 DIAGNOSIS — J44.0 ACUTE BRONCHITIS WITH COPD (HCC): ICD-10-CM

## 2024-12-01 PROBLEM — J44.1 COPD EXACERBATION (HCC): Status: ACTIVE | Noted: 2024-12-01

## 2024-12-01 LAB
ALBUMIN SERPL-MCNC: 3.7 G/DL (ref 3.5–5)
ALBUMIN/GLOB SERPL: 1.1 (ref 1.1–2.2)
ALP SERPL-CCNC: 98 U/L (ref 45–117)
ALT SERPL-CCNC: 20 U/L (ref 12–78)
AMPHET UR QL SCN: NEGATIVE
ANION GAP SERPL CALC-SCNC: 7 MMOL/L (ref 2–12)
APPEARANCE UR: CLEAR
AST SERPL W P-5'-P-CCNC: 18 U/L (ref 15–37)
BACTERIA URNS QL MICRO: NEGATIVE /HPF
BARBITURATES UR QL SCN: NEGATIVE
BASOPHILS # BLD: 0.1 K/UL (ref 0–0.1)
BASOPHILS NFR BLD: 2 % (ref 0–1)
BENZODIAZ UR QL: POSITIVE
BILIRUB SERPL-MCNC: 0.2 MG/DL (ref 0.2–1)
BILIRUB UR QL: NEGATIVE
BUN SERPL-MCNC: 10 MG/DL (ref 6–20)
BUN/CREAT SERPL: 12 (ref 12–20)
CA-I BLD-MCNC: 8.8 MG/DL (ref 8.5–10.1)
CANNABINOIDS UR QL SCN: NEGATIVE
CHLORIDE SERPL-SCNC: 108 MMOL/L (ref 97–108)
CO2 SERPL-SCNC: 28 MMOL/L (ref 21–32)
COCAINE UR QL SCN: POSITIVE
COLOR UR: ABNORMAL
CREAT SERPL-MCNC: 0.82 MG/DL (ref 0.55–1.02)
DIFFERENTIAL METHOD BLD: ABNORMAL
EKG ATRIAL RATE: 70 BPM
EKG DIAGNOSIS: NORMAL
EKG P AXIS: 63 DEGREES
EKG P-R INTERVAL: 148 MS
EKG Q-T INTERVAL: 516 MS
EKG QRS DURATION: 84 MS
EKG QTC CALCULATION (BAZETT): 557 MS
EKG R AXIS: 51 DEGREES
EKG T AXIS: 218 DEGREES
EKG VENTRICULAR RATE: 70 BPM
EOSINOPHIL # BLD: 0.6 K/UL (ref 0–0.4)
EOSINOPHIL NFR BLD: 11 % (ref 0–7)
ERYTHROCYTE [DISTWIDTH] IN BLOOD BY AUTOMATED COUNT: 13.5 % (ref 11.5–14.5)
ETHANOL SERPL-MCNC: 138 MG/DL (ref 0–0.08)
GLOBULIN SER CALC-MCNC: 3.5 G/DL (ref 2–4)
GLUCOSE SERPL-MCNC: 83 MG/DL (ref 65–100)
GLUCOSE UR STRIP.AUTO-MCNC: NEGATIVE MG/DL
HCT VFR BLD AUTO: 39.3 % (ref 35–47)
HGB BLD-MCNC: 12.6 G/DL (ref 11.5–16)
HGB UR QL STRIP: NEGATIVE
IMM GRANULOCYTES # BLD AUTO: 0 K/UL (ref 0–0.04)
IMM GRANULOCYTES NFR BLD AUTO: 0 % (ref 0–0.5)
KETONES UR QL STRIP.AUTO: NEGATIVE MG/DL
LEUKOCYTE ESTERASE UR QL STRIP.AUTO: NEGATIVE
LYMPHOCYTES # BLD: 2.4 K/UL (ref 0.8–3.5)
LYMPHOCYTES NFR BLD: 43 % (ref 12–49)
Lab: ABNORMAL
MCH RBC QN AUTO: 29.6 PG (ref 26–34)
MCHC RBC AUTO-ENTMCNC: 32.1 G/DL (ref 30–36.5)
MCV RBC AUTO: 92.5 FL (ref 80–99)
METHADONE UR QL: NEGATIVE
MONOCYTES # BLD: 0.3 K/UL (ref 0–1)
MONOCYTES NFR BLD: 6 % (ref 5–13)
MUCOUS THREADS URNS QL MICRO: ABNORMAL /LPF
NEUTS SEG # BLD: 2 K/UL (ref 1.8–8)
NEUTS SEG NFR BLD: 38 % (ref 32–75)
NITRITE UR QL STRIP.AUTO: NEGATIVE
NRBC # BLD: 0 K/UL (ref 0–0.01)
NRBC BLD-RTO: 0 PER 100 WBC
OPIATES UR QL: NEGATIVE
PCP UR QL: NEGATIVE
PH UR STRIP: 5 (ref 5–8)
PLATELET # BLD AUTO: 294 K/UL (ref 150–400)
PMV BLD AUTO: 9.3 FL (ref 8.9–12.9)
POTASSIUM SERPL-SCNC: 3.5 MMOL/L (ref 3.5–5.1)
PROT SERPL-MCNC: 7.2 G/DL (ref 6.4–8.2)
PROT UR STRIP-MCNC: NEGATIVE MG/DL
RBC # BLD AUTO: 4.25 M/UL (ref 3.8–5.2)
RBC #/AREA URNS HPF: ABNORMAL /HPF (ref 0–5)
SODIUM SERPL-SCNC: 143 MMOL/L (ref 136–145)
SP GR UR REFRACTOMETRY: 1.01 (ref 1–1.03)
TROPONIN I SERPL HS-MCNC: 8 NG/L (ref 0–51)
TROPONIN I SERPL HS-MCNC: 8 NG/L (ref 0–51)
UROBILINOGEN UR QL STRIP.AUTO: 0.1 EU/DL (ref 0.1–1)
WBC # BLD AUTO: 5.4 K/UL (ref 3.6–11)
WBC URNS QL MICRO: ABNORMAL /HPF (ref 0–4)

## 2024-12-01 PROCEDURE — 84443 ASSAY THYROID STIM HORMONE: CPT

## 2024-12-01 PROCEDURE — 84100 ASSAY OF PHOSPHORUS: CPT

## 2024-12-01 PROCEDURE — 84439 ASSAY OF FREE THYROXINE: CPT

## 2024-12-01 PROCEDURE — 81003 URINALYSIS AUTO W/O SCOPE: CPT

## 2024-12-01 PROCEDURE — 6360000002 HC RX W HCPCS: Performed by: EMERGENCY MEDICINE

## 2024-12-01 PROCEDURE — 93005 ELECTROCARDIOGRAM TRACING: CPT | Performed by: EMERGENCY MEDICINE

## 2024-12-01 PROCEDURE — 83735 ASSAY OF MAGNESIUM: CPT

## 2024-12-01 PROCEDURE — 71045 X-RAY EXAM CHEST 1 VIEW: CPT

## 2024-12-01 PROCEDURE — 82077 ASSAY SPEC XCP UR&BREATH IA: CPT

## 2024-12-01 PROCEDURE — 82550 ASSAY OF CK (CPK): CPT

## 2024-12-01 PROCEDURE — 80307 DRUG TEST PRSMV CHEM ANLYZR: CPT

## 2024-12-01 PROCEDURE — 94640 AIRWAY INHALATION TREATMENT: CPT

## 2024-12-01 PROCEDURE — 99285 EMERGENCY DEPT VISIT HI MDM: CPT

## 2024-12-01 PROCEDURE — 87040 BLOOD CULTURE FOR BACTERIA: CPT

## 2024-12-01 PROCEDURE — 96365 THER/PROPH/DIAG IV INF INIT: CPT

## 2024-12-01 PROCEDURE — 80053 COMPREHEN METABOLIC PANEL: CPT

## 2024-12-01 PROCEDURE — 84484 ASSAY OF TROPONIN QUANT: CPT

## 2024-12-01 PROCEDURE — 36415 COLL VENOUS BLD VENIPUNCTURE: CPT

## 2024-12-01 PROCEDURE — 1100000000 HC RM PRIVATE

## 2024-12-01 PROCEDURE — 70450 CT HEAD/BRAIN W/O DYE: CPT

## 2024-12-01 PROCEDURE — 96375 TX/PRO/DX INJ NEW DRUG ADDON: CPT

## 2024-12-01 PROCEDURE — 85025 COMPLETE CBC W/AUTO DIFF WBC: CPT

## 2024-12-01 PROCEDURE — 80177 DRUG SCRN QUAN LEVETIRACETAM: CPT

## 2024-12-01 PROCEDURE — 2580000003 HC RX 258: Performed by: EMERGENCY MEDICINE

## 2024-12-01 RX ORDER — SODIUM CHLORIDE 0.9 % (FLUSH) 0.9 %
5-40 SYRINGE (ML) INJECTION EVERY 12 HOURS SCHEDULED
Status: DISCONTINUED | OUTPATIENT
Start: 2024-12-01 | End: 2024-12-11 | Stop reason: HOSPADM

## 2024-12-01 RX ORDER — SODIUM CHLORIDE 0.9 % (FLUSH) 0.9 %
5-40 SYRINGE (ML) INJECTION PRN
Status: DISCONTINUED | OUTPATIENT
Start: 2024-12-01 | End: 2024-12-11 | Stop reason: HOSPADM

## 2024-12-01 RX ORDER — LORAZEPAM 2 MG/ML
3 INJECTION INTRAMUSCULAR
Status: DISCONTINUED | OUTPATIENT
Start: 2024-12-01 | End: 2024-12-11 | Stop reason: HOSPADM

## 2024-12-01 RX ORDER — ACETAMINOPHEN 650 MG/1
650 SUPPOSITORY RECTAL EVERY 6 HOURS PRN
Status: DISCONTINUED | OUTPATIENT
Start: 2024-12-01 | End: 2024-12-11 | Stop reason: HOSPADM

## 2024-12-01 RX ORDER — SODIUM CHLORIDE 9 MG/ML
INJECTION, SOLUTION INTRAVENOUS PRN
Status: DISCONTINUED | OUTPATIENT
Start: 2024-12-01 | End: 2024-12-11 | Stop reason: HOSPADM

## 2024-12-01 RX ORDER — GAUZE BANDAGE 2" X 2"
100 BANDAGE TOPICAL DAILY
Status: DISCONTINUED | OUTPATIENT
Start: 2024-12-02 | End: 2024-12-01

## 2024-12-01 RX ORDER — MAGNESIUM SULFATE IN WATER 40 MG/ML
2000 INJECTION, SOLUTION INTRAVENOUS PRN
Status: DISCONTINUED | OUTPATIENT
Start: 2024-12-01 | End: 2024-12-11 | Stop reason: HOSPADM

## 2024-12-01 RX ORDER — MAGNESIUM SULFATE 1 G/100ML
1000 INJECTION INTRAVENOUS
Status: COMPLETED | OUTPATIENT
Start: 2024-12-01 | End: 2024-12-01

## 2024-12-01 RX ORDER — LORAZEPAM 2 MG/ML
4 INJECTION INTRAMUSCULAR
Status: DISCONTINUED | OUTPATIENT
Start: 2024-12-01 | End: 2024-12-11 | Stop reason: HOSPADM

## 2024-12-01 RX ORDER — POLYETHYLENE GLYCOL 3350 17 G/17G
17 POWDER, FOR SOLUTION ORAL DAILY PRN
Status: DISCONTINUED | OUTPATIENT
Start: 2024-12-01 | End: 2024-12-11 | Stop reason: HOSPADM

## 2024-12-01 RX ORDER — POTASSIUM CHLORIDE 7.45 MG/ML
10 INJECTION INTRAVENOUS PRN
Status: DISCONTINUED | OUTPATIENT
Start: 2024-12-01 | End: 2024-12-11 | Stop reason: HOSPADM

## 2024-12-01 RX ORDER — QUETIAPINE FUMARATE 100 MG/1
100 TABLET, FILM COATED ORAL
Status: DISCONTINUED | OUTPATIENT
Start: 2024-12-01 | End: 2024-12-11 | Stop reason: HOSPADM

## 2024-12-01 RX ORDER — IPRATROPIUM BROMIDE AND ALBUTEROL SULFATE 2.5; .5 MG/3ML; MG/3ML
1 SOLUTION RESPIRATORY (INHALATION)
Status: DISCONTINUED | OUTPATIENT
Start: 2024-12-02 | End: 2024-12-02

## 2024-12-01 RX ORDER — ATORVASTATIN CALCIUM 40 MG/1
40 TABLET, FILM COATED ORAL DAILY
Status: DISCONTINUED | OUTPATIENT
Start: 2024-12-02 | End: 2024-12-11 | Stop reason: HOSPADM

## 2024-12-01 RX ORDER — LORAZEPAM 1 MG/1
3 TABLET ORAL
Status: DISCONTINUED | OUTPATIENT
Start: 2024-12-01 | End: 2024-12-11 | Stop reason: HOSPADM

## 2024-12-01 RX ORDER — ONDANSETRON 2 MG/ML
4 INJECTION INTRAMUSCULAR; INTRAVENOUS EVERY 6 HOURS PRN
Status: DISCONTINUED | OUTPATIENT
Start: 2024-12-01 | End: 2024-12-11 | Stop reason: HOSPADM

## 2024-12-01 RX ORDER — LORAZEPAM 2 MG/ML
2 INJECTION INTRAMUSCULAR
Status: DISCONTINUED | OUTPATIENT
Start: 2024-12-01 | End: 2024-12-11 | Stop reason: HOSPADM

## 2024-12-01 RX ORDER — ENOXAPARIN SODIUM 100 MG/ML
40 INJECTION SUBCUTANEOUS DAILY
Status: DISCONTINUED | OUTPATIENT
Start: 2024-12-02 | End: 2024-12-11 | Stop reason: HOSPADM

## 2024-12-01 RX ORDER — LORAZEPAM 2 MG/ML
1 INJECTION INTRAMUSCULAR ONCE
Status: COMPLETED | OUTPATIENT
Start: 2024-12-01 | End: 2024-12-01

## 2024-12-01 RX ORDER — LORAZEPAM 2 MG/ML
1 INJECTION INTRAMUSCULAR
Status: DISCONTINUED | OUTPATIENT
Start: 2024-12-01 | End: 2024-12-11 | Stop reason: HOSPADM

## 2024-12-01 RX ORDER — METHYLPREDNISOLONE SODIUM SUCCINATE 40 MG/ML
40 INJECTION INTRAMUSCULAR; INTRAVENOUS EVERY 8 HOURS
Status: DISCONTINUED | OUTPATIENT
Start: 2024-12-02 | End: 2024-12-05

## 2024-12-01 RX ORDER — LEVETIRACETAM 500 MG/5ML
1000 INJECTION, SOLUTION, CONCENTRATE INTRAVENOUS EVERY 12 HOURS
Status: DISCONTINUED | OUTPATIENT
Start: 2024-12-02 | End: 2024-12-02

## 2024-12-01 RX ORDER — BUDESONIDE AND FORMOTEROL FUMARATE DIHYDRATE 160; 4.5 UG/1; UG/1
2 AEROSOL RESPIRATORY (INHALATION)
Status: DISCONTINUED | OUTPATIENT
Start: 2024-12-02 | End: 2024-12-11 | Stop reason: HOSPADM

## 2024-12-01 RX ORDER — SODIUM CHLORIDE 0.9 % (FLUSH) 0.9 %
5-40 SYRINGE (ML) INJECTION PRN
Status: DISCONTINUED | OUTPATIENT
Start: 2024-12-01 | End: 2024-12-08

## 2024-12-01 RX ORDER — METHYLPREDNISOLONE SODIUM SUCCINATE 125 MG/2ML
125 INJECTION INTRAMUSCULAR; INTRAVENOUS ONCE
Status: DISCONTINUED | OUTPATIENT
Start: 2024-12-01 | End: 2024-12-01

## 2024-12-01 RX ORDER — LORAZEPAM 1 MG/1
2 TABLET ORAL
Status: DISCONTINUED | OUTPATIENT
Start: 2024-12-01 | End: 2024-12-11 | Stop reason: HOSPADM

## 2024-12-01 RX ORDER — ONDANSETRON 4 MG/1
4 TABLET, ORALLY DISINTEGRATING ORAL EVERY 8 HOURS PRN
Status: DISCONTINUED | OUTPATIENT
Start: 2024-12-01 | End: 2024-12-11 | Stop reason: HOSPADM

## 2024-12-01 RX ORDER — LEVETIRACETAM 500 MG/5ML
1000 INJECTION, SOLUTION, CONCENTRATE INTRAVENOUS ONCE
Status: COMPLETED | OUTPATIENT
Start: 2024-12-01 | End: 2024-12-01

## 2024-12-01 RX ORDER — TRAZODONE HYDROCHLORIDE 50 MG/1
50 TABLET, FILM COATED ORAL NIGHTLY PRN
Status: DISCONTINUED | OUTPATIENT
Start: 2024-12-01 | End: 2024-12-11 | Stop reason: HOSPADM

## 2024-12-01 RX ORDER — LORAZEPAM 1 MG/1
1 TABLET ORAL
Status: DISCONTINUED | OUTPATIENT
Start: 2024-12-01 | End: 2024-12-11 | Stop reason: HOSPADM

## 2024-12-01 RX ORDER — POTASSIUM CHLORIDE 1500 MG/1
40 TABLET, EXTENDED RELEASE ORAL PRN
Status: DISCONTINUED | OUTPATIENT
Start: 2024-12-01 | End: 2024-12-11 | Stop reason: HOSPADM

## 2024-12-01 RX ORDER — LISINOPRIL 10 MG/1
10 TABLET ORAL DAILY
Status: DISCONTINUED | OUTPATIENT
Start: 2024-12-02 | End: 2024-12-11 | Stop reason: HOSPADM

## 2024-12-01 RX ORDER — ALBUTEROL SULFATE 5 MG/ML
2.5 SOLUTION RESPIRATORY (INHALATION)
Status: COMPLETED | OUTPATIENT
Start: 2024-12-01 | End: 2024-12-01

## 2024-12-01 RX ORDER — ACETAMINOPHEN 325 MG/1
650 TABLET ORAL EVERY 6 HOURS PRN
Status: DISCONTINUED | OUTPATIENT
Start: 2024-12-01 | End: 2024-12-11 | Stop reason: HOSPADM

## 2024-12-01 RX ORDER — GAUZE BANDAGE 2" X 2"
100 BANDAGE TOPICAL DAILY
Status: DISCONTINUED | OUTPATIENT
Start: 2024-12-01 | End: 2024-12-11 | Stop reason: HOSPADM

## 2024-12-01 RX ORDER — LORAZEPAM 1 MG/1
4 TABLET ORAL
Status: DISCONTINUED | OUTPATIENT
Start: 2024-12-01 | End: 2024-12-11 | Stop reason: HOSPADM

## 2024-12-01 RX ADMIN — ALBUTEROL SULFATE 2.5 MG: 2.5 SOLUTION RESPIRATORY (INHALATION) at 21:00

## 2024-12-01 RX ADMIN — LEVETIRACETAM 1000 MG: 100 INJECTION INTRAVENOUS at 18:35

## 2024-12-01 RX ADMIN — LORAZEPAM 1 MG: 2 INJECTION INTRAMUSCULAR; INTRAVENOUS at 18:37

## 2024-12-01 RX ADMIN — METHYLPREDNISOLONE SODIUM SUCCINATE 125 MG: 125 INJECTION INTRAMUSCULAR; INTRAVENOUS at 21:13

## 2024-12-01 RX ADMIN — MAGNESIUM SULFATE HEPTAHYDRATE 1000 MG: 10 INJECTION, SOLUTION INTRAVENOUS at 21:14

## 2024-12-01 ASSESSMENT — LIFESTYLE VARIABLES
HOW OFTEN DO YOU HAVE A DRINK CONTAINING ALCOHOL: PATIENT UNABLE TO ANSWER
HOW MANY STANDARD DRINKS CONTAINING ALCOHOL DO YOU HAVE ON A TYPICAL DAY: PATIENT UNABLE TO ANSWER

## 2024-12-01 NOTE — ED PROVIDER NOTES
[facility] EMERGENCY DEPT  EMERGENCY DEPARTMENT HISTORY AND PHYSICAL EXAM      Date: 12/1/2024  Patient Name: María Amos  MRN: [id number]  Birthdate 1965  Date of evaluation: 12/1/2024  Provider: Francisco Guzman MD   Note Started: 6:14 PM EST 12/1/24    HISTORY OF PRESENT ILLNESS     Chief Complaint   Patient presents with    Seizures    Chest Pain    Shortness of Breath       History Provided By: Patient    HPI: María Amos is a 59 y.o. female with complicated past medical history to include seizures, drug abuse and alcohol abuse presents after having 2 generalized tonic-clonic seizures at home prior to arrival.  Patient is postictal and unable to provide any other history at this point in time it was witnessed by family but denies any trauma according to EMS.    PAST MEDICAL HISTORY   Past Medical History:  Past Medical History:   Diagnosis Date    Alcohol abuse     Anxiety attack     Cocaine abuse (HCC)     COVID-19     Depression     Diabetes mellitus (HCC)     Fracture of left wrist     Hemorrhoids     HTN (hypertension)     Ill-defined condition     hemorrhoids    Orthostatic hypotension     Perineal rash     Rectal bleeding     Rib contusion, left, initial encounter     Seizures (HCC)     Stroke (HCC)     Syncope     Toe fracture     great toe    Trichomonas infection     UTI (urinary tract infection)        Past Surgical History:  History reviewed. No pertinent surgical history.    Family History:  History reviewed. No pertinent family history.    Social History:  Social History     Tobacco Use    Smoking status: Former     Current packs/day: 0.50     Types: Cigarettes    Smokeless tobacco: Former   Substance Use Topics    Alcohol use: Yes     Comment: 12 pack per day    Drug use: Not Currently     Types: Marijuana (Weed), Cocaine     Comment: socially       Allergies:  Allergies   Allergen Reactions    Peanut Oil Anaphylaxis     Per  Melecio Toth    Bee Venom Swelling    Peanut Butter Flavor

## 2024-12-01 NOTE — ED TRIAGE NOTES
Pt arrives form home, BIB ambulance, c/o sob and cp. Pt had 2 seizures at home, witnessed; seizures were approx a couple of mins per ems. LKW 1400, BG 97. EKG had inverted T waves per ems. Pt placed 2l nc for RA 94%, end tidal 28 per ems. Per ems, pts  was going to drive pt here originally. Per ems, pts last drink was a couple hours ago.

## 2024-12-02 LAB
ANION GAP SERPL CALC-SCNC: 7 MMOL/L (ref 2–12)
BASOPHILS # BLD: 0 K/UL (ref 0–0.1)
BASOPHILS NFR BLD: 0 % (ref 0–1)
BUN SERPL-MCNC: 10 MG/DL (ref 6–20)
BUN/CREAT SERPL: 13 (ref 12–20)
CA-I BLD-MCNC: 8.5 MG/DL (ref 8.5–10.1)
CHLORIDE SERPL-SCNC: 109 MMOL/L (ref 97–108)
CK SERPL-CCNC: 314 U/L (ref 26–192)
CO2 SERPL-SCNC: 22 MMOL/L (ref 21–32)
CREAT SERPL-MCNC: 0.76 MG/DL (ref 0.55–1.02)
DIFFERENTIAL METHOD BLD: ABNORMAL
EKG ATRIAL RATE: 70 BPM
EKG ATRIAL RATE: 79 BPM
EKG DIAGNOSIS: NORMAL
EKG DIAGNOSIS: NORMAL
EKG P AXIS: 55 DEGREES
EKG P AXIS: 61 DEGREES
EKG P-R INTERVAL: 140 MS
EKG P-R INTERVAL: 154 MS
EKG Q-T INTERVAL: 500 MS
EKG Q-T INTERVAL: 544 MS
EKG QRS DURATION: 82 MS
EKG QRS DURATION: 84 MS
EKG QTC CALCULATION (BAZETT): 573 MS
EKG QTC CALCULATION (BAZETT): 587 MS
EKG R AXIS: 43 DEGREES
EKG R AXIS: 52 DEGREES
EKG T AXIS: 170 DEGREES
EKG T AXIS: 177 DEGREES
EKG VENTRICULAR RATE: 70 BPM
EKG VENTRICULAR RATE: 79 BPM
EOSINOPHIL # BLD: 0 K/UL (ref 0–0.4)
EOSINOPHIL NFR BLD: 0 % (ref 0–7)
ERYTHROCYTE [DISTWIDTH] IN BLOOD BY AUTOMATED COUNT: 13.4 % (ref 11.5–14.5)
FLUAV RNA SPEC QL NAA+PROBE: NOT DETECTED
FLUBV RNA SPEC QL NAA+PROBE: NOT DETECTED
FOLATE SERPL-MCNC: 17.9 NG/ML (ref 5–21)
GLUCOSE SERPL-MCNC: 154 MG/DL (ref 65–100)
HCT VFR BLD AUTO: 38 % (ref 35–47)
HGB BLD-MCNC: 12.6 G/DL (ref 11.5–16)
IMM GRANULOCYTES # BLD AUTO: 0 K/UL
IMM GRANULOCYTES NFR BLD AUTO: 0 %
LYMPHOCYTES # BLD: 1 K/UL (ref 0.8–3.5)
LYMPHOCYTES NFR BLD: 13 % (ref 12–49)
MAGNESIUM SERPL-MCNC: 2.1 MG/DL (ref 1.6–2.4)
MAGNESIUM SERPL-MCNC: 2.3 MG/DL (ref 1.6–2.4)
MCH RBC QN AUTO: 29.7 PG (ref 26–34)
MCHC RBC AUTO-ENTMCNC: 33.2 G/DL (ref 30–36.5)
MCV RBC AUTO: 89.6 FL (ref 80–99)
MONOCYTES # BLD: 0.1 K/UL (ref 0–1)
MONOCYTES NFR BLD: 1 % (ref 5–13)
NEUTS SEG # BLD: 6.5 K/UL (ref 1.8–8)
NEUTS SEG NFR BLD: 86 % (ref 32–75)
NRBC # BLD: 0 K/UL (ref 0–0.01)
NRBC BLD-RTO: 0 PER 100 WBC
PHOSPHATE SERPL-MCNC: 2.9 MG/DL (ref 2.6–4.7)
PHOSPHATE SERPL-MCNC: 3.9 MG/DL (ref 2.6–4.7)
PLATELET # BLD AUTO: 276 K/UL (ref 150–400)
PMV BLD AUTO: 9.3 FL (ref 8.9–12.9)
POTASSIUM SERPL-SCNC: 3.8 MMOL/L (ref 3.5–5.1)
RBC # BLD AUTO: 4.24 M/UL (ref 3.8–5.2)
RBC MORPH BLD: ABNORMAL
SARS-COV-2 RNA RESP QL NAA+PROBE: NOT DETECTED
SODIUM SERPL-SCNC: 138 MMOL/L (ref 136–145)
T4 FREE SERPL-MCNC: 0.9 NG/DL (ref 0.8–1.5)
TROPONIN I SERPL HS-MCNC: 7 NG/L (ref 0–51)
TSH SERPL DL<=0.05 MIU/L-ACNC: 0.95 UIU/ML (ref 0.36–3.74)
VIT B12 SERPL-MCNC: 607 PG/ML (ref 193–986)
WBC # BLD AUTO: 7.6 K/UL (ref 3.6–11)

## 2024-12-02 PROCEDURE — 6370000000 HC RX 637 (ALT 250 FOR IP)

## 2024-12-02 PROCEDURE — 94761 N-INVAS EAR/PLS OXIMETRY MLT: CPT

## 2024-12-02 PROCEDURE — 2580000003 HC RX 258

## 2024-12-02 PROCEDURE — 94640 AIRWAY INHALATION TREATMENT: CPT

## 2024-12-02 PROCEDURE — 94010 BREATHING CAPACITY TEST: CPT

## 2024-12-02 PROCEDURE — 6370000000 HC RX 637 (ALT 250 FOR IP): Performed by: INTERNAL MEDICINE

## 2024-12-02 PROCEDURE — 85025 COMPLETE CBC W/AUTO DIFF WBC: CPT

## 2024-12-02 PROCEDURE — 1100000000 HC RM PRIVATE

## 2024-12-02 PROCEDURE — 82607 VITAMIN B-12: CPT

## 2024-12-02 PROCEDURE — 93005 ELECTROCARDIOGRAM TRACING: CPT

## 2024-12-02 PROCEDURE — 2700000000 HC OXYGEN THERAPY PER DAY

## 2024-12-02 PROCEDURE — 84484 ASSAY OF TROPONIN QUANT: CPT

## 2024-12-02 PROCEDURE — 99221 1ST HOSP IP/OBS SF/LOW 40: CPT | Performed by: PSYCHIATRY & NEUROLOGY

## 2024-12-02 PROCEDURE — 6360000002 HC RX W HCPCS

## 2024-12-02 PROCEDURE — 36415 COLL VENOUS BLD VENIPUNCTURE: CPT

## 2024-12-02 PROCEDURE — 84100 ASSAY OF PHOSPHORUS: CPT

## 2024-12-02 PROCEDURE — 87636 SARSCOV2 & INF A&B AMP PRB: CPT

## 2024-12-02 PROCEDURE — 80048 BASIC METABOLIC PNL TOTAL CA: CPT

## 2024-12-02 PROCEDURE — 82746 ASSAY OF FOLIC ACID SERUM: CPT

## 2024-12-02 PROCEDURE — 83735 ASSAY OF MAGNESIUM: CPT

## 2024-12-02 RX ORDER — LEVETIRACETAM 500 MG/1
1000 TABLET ORAL 2 TIMES DAILY
Status: DISCONTINUED | OUTPATIENT
Start: 2024-12-02 | End: 2024-12-09

## 2024-12-02 RX ORDER — IPRATROPIUM BROMIDE AND ALBUTEROL SULFATE 2.5; .5 MG/3ML; MG/3ML
1 SOLUTION RESPIRATORY (INHALATION)
Status: DISCONTINUED | OUTPATIENT
Start: 2024-12-02 | End: 2024-12-04

## 2024-12-02 RX ORDER — AZITHROMYCIN 500 MG/1
500 TABLET, FILM COATED ORAL DAILY
Status: COMPLETED | OUTPATIENT
Start: 2024-12-03 | End: 2024-12-04

## 2024-12-02 RX ADMIN — ATORVASTATIN CALCIUM 40 MG: 40 TABLET, FILM COATED ORAL at 10:02

## 2024-12-02 RX ADMIN — SODIUM CHLORIDE, PRESERVATIVE FREE 10 ML: 5 INJECTION INTRAVENOUS at 02:16

## 2024-12-02 RX ADMIN — IPRATROPIUM BROMIDE AND ALBUTEROL SULFATE 1 DOSE: 2.5; .5 SOLUTION RESPIRATORY (INHALATION) at 07:35

## 2024-12-02 RX ADMIN — LEVETIRACETAM 1000 MG: 100 INJECTION INTRAVENOUS at 12:17

## 2024-12-02 RX ADMIN — IPRATROPIUM BROMIDE AND ALBUTEROL SULFATE 1 DOSE: 2.5; .5 SOLUTION RESPIRATORY (INHALATION) at 21:54

## 2024-12-02 RX ADMIN — LEVETIRACETAM 1000 MG: 100 INJECTION INTRAVENOUS at 00:44

## 2024-12-02 RX ADMIN — LEVETIRACETAM 1000 MG: 500 TABLET, FILM COATED ORAL at 20:00

## 2024-12-02 RX ADMIN — POLYETHYLENE GLYCOL 3350 17 G: 17 POWDER, FOR SOLUTION ORAL at 20:05

## 2024-12-02 RX ADMIN — IPRATROPIUM BROMIDE AND ALBUTEROL SULFATE 1 DOSE: 2.5; .5 SOLUTION RESPIRATORY (INHALATION) at 11:52

## 2024-12-02 RX ADMIN — Medication 2 PUFF: at 07:35

## 2024-12-02 RX ADMIN — AZITHROMYCIN MONOHYDRATE 500 MG: 500 INJECTION, POWDER, LYOPHILIZED, FOR SOLUTION INTRAVENOUS at 00:42

## 2024-12-02 RX ADMIN — Medication 2 PUFF: at 21:54

## 2024-12-02 RX ADMIN — FLUOXETINE HYDROCHLORIDE 20 MG: 20 CAPSULE ORAL at 10:02

## 2024-12-02 RX ADMIN — SODIUM CHLORIDE, PRESERVATIVE FREE 10 ML: 5 INJECTION INTRAVENOUS at 08:04

## 2024-12-02 RX ADMIN — SODIUM CHLORIDE, PRESERVATIVE FREE 10 ML: 5 INJECTION INTRAVENOUS at 02:15

## 2024-12-02 RX ADMIN — METHYLPREDNISOLONE SODIUM SUCCINATE 40 MG: 40 INJECTION INTRAMUSCULAR; INTRAVENOUS at 00:44

## 2024-12-02 RX ADMIN — THIAMINE HCL TAB 100 MG 100 MG: 100 TAB at 10:02

## 2024-12-02 RX ADMIN — LISINOPRIL 10 MG: 10 TABLET ORAL at 10:03

## 2024-12-02 RX ADMIN — METHYLPREDNISOLONE SODIUM SUCCINATE 40 MG: 40 INJECTION INTRAMUSCULAR; INTRAVENOUS at 16:53

## 2024-12-02 RX ADMIN — QUETIAPINE FUMARATE 100 MG: 100 TABLET ORAL at 02:13

## 2024-12-02 RX ADMIN — SODIUM CHLORIDE, PRESERVATIVE FREE 10 ML: 5 INJECTION INTRAVENOUS at 20:01

## 2024-12-02 RX ADMIN — METHYLPREDNISOLONE SODIUM SUCCINATE 40 MG: 40 INJECTION INTRAMUSCULAR; INTRAVENOUS at 08:04

## 2024-12-02 RX ADMIN — ENOXAPARIN SODIUM 40 MG: 100 INJECTION SUBCUTANEOUS at 10:02

## 2024-12-02 RX ADMIN — THIAMINE HCL TAB 100 MG 100 MG: 100 TAB at 02:13

## 2024-12-02 ASSESSMENT — COPD QUESTIONNAIRES
TOTAL_EXACERBATIONS_PASTYEAR: 7
QUESTION4_WALKINCLINE: 2
QUESTION6_LEAVINGHOUSE: 1
QUESTION8_ENERGYLEVEL: 2
QUESTION7_SLEEPQUALITY: 0
QUESTION5_HOMEACTIVITIES: 1
QUESTION1_COUGHFREQUENCY: 2
QUESTION3_CHESTTIGHTNESS: 2
QUESTION2_CHESTPHLEGM: 1
CAT_TOTALSCORE: 11

## 2024-12-02 ASSESSMENT — PAIN SCALES - GENERAL: PAINLEVEL_OUTOF10: 5

## 2024-12-02 ASSESSMENT — PAIN DESCRIPTION - LOCATION: LOCATION: CHEST;HEAD

## 2024-12-02 NOTE — CONSULTS
General-rounding tele-neurology consult called in to [facility] call center at this time r/t breakthrough seizures.  
hypotension     Perineal rash     Rectal bleeding     Rib contusion, left, initial encounter     Seizures (HCC)     Stroke (HCC)     Syncope     Toe fracture     great toe    Trichomonas infection     UTI (urinary tract infection)           PHYSICAL & NEURO EXAM  BP (!) 113/96   Pulse 69   Temp 98.4 °F (36.9 °C) (Axillary)   Resp 18   Ht 1.727 m (5' 8\")   Wt 99 kg (218 lb 4.1 oz)   SpO2 92%   BMI 33.19 kg/m²      Hypersomnolent, arousable, O x 3  Drifts back to sleep. Follows commands  Speech was fluent and articulate  CNs:EOMI, no nystagmus. No facial weakness.    Tongue protrusion is midline. No laceration  No abnormal  or involuntary movements.   Motor:  RUE  5 RLE 5 LUE 3+ LLE 3+-4     Gait: Not tested due to fall concerns    MEDICATIONS  Current Outpatient Medications   Medication Instructions    acetaminophen (TYLENOL) 500 mg, EVERY 6 HOURS PRN    albuterol sulfate HFA (PROVENTIL;VENTOLIN;PROAIR) 108 (90 Base) MCG/ACT inhaler 2 puffs, Inhalation, EVERY 6 HOURS PRN    albuterol sulfate HFA (VENTOLIN HFA) 108 (90 Base) MCG/ACT inhaler 2 puffs, Inhalation, 4 TIMES DAILY PRN    atorvastatin (LIPITOR) 40 MG tablet Take 1 tablet by mouth daily    B-Sure witch hazel (TUCKS) 50 % PADS Topical, PRN    budesonide-formoterol (SYMBICORT) 160-4.5 MCG/ACT AERO 2 puffs, Inhalation, 2 TIMES DAILY RESP    FLUoxetine (PROZAC) 20 mg, Oral, DAILY    fluticasone-salmeterol (ADVAIR) 250-50 MCG/ACT AEPB diskus inhaler 1 puff, Inhalation, 2 TIMES DAILY    folic acid (FOLVITE) 1 mg, Oral, DAILY    Hydrocort-Pramoxine, Perianal, (PROCTOFOAM-HC) 1-1 % rectal foam Apply 3-4 times a day.    levETIRAcetam (KEPPRA) 1,000 mg, Oral, 2 TIMES DAILY    lisinopril (PRINIVIL;ZESTRIL) 10 mg, Oral, DAILY    loratadine (CLARITIN) 10 mg, Oral, DAILY    pantoprazole (PROTONIX) 40 mg, Oral, DAILY    potassium chloride (KLOR-CON) 10 MEQ extended release tablet 10 mEq, Oral, DAILY    QUEtiapine (SEROQUEL) 50 MG tablet 2 tablets, Oral, EVERY BEDTIME

## 2024-12-02 NOTE — H&P
Hospitalist Admission Note    NAME: María Amos   :  1965   MRN:  [id number]     Date/Time:  2024 11:18 PM    Patient PCP: Patricia Wood NP-C    ______________________________________________________________________  Given the patient's current clinical presentation, I have a high level of concern for decompensation if discharged from the emergency department.  Complex decision making was performed, which includes reviewing the patient's available past medical records, laboratory results, and x-ray films.       My assessment of this patient's clinical condition and my plan of care is as follows.    Assessment / Plan:    Dyspnea 2/2 COPD exacerbation  -During my evaluation by bedside, patient was on room air, supplemental oxygen has been weaned off  -Continue IV Solu-Medrol 40 mg every 8 hours  -Around-the-clock DuoNeb every 6 hours  -Start Symbicort  -Maintain oxygen saturation between 88 to 92%  -Give azithromycin for anti-inflammatory properties for 3 days  -Reviewed imaging, x-ray shows unremarkable for pneumonia  -Obtain COVID/flu     Atypical chest pain, monitor for ACS   -Does not appear to be anginal symptom, troponin is 8   -EKG had no ST segment changes, some nonspecific T wave inversion  -Avoid beta-blockers  -EKG in the morning   -Will continue to monitor    Breakthrough seizure in setting of seizure disorder  Seizure secondary to medication noncompliance versus heavy alcohol use   Acute metabolic encephalopathy likely secondary to postictal state  -Patient takes Keppra 1000 mg twice daily  -Will continue IV Keppra 1000 mg twice daily transition to p.o. when appropriate  -CT of the head to rule out stroke  -Spot EEG  -Follow-up Keppra level  -Teleneuro consult  -Follow-up UA, U tox, B12, CK, TSH   -Ativan as needed for breakthrough seizures    Binge Drinking Alcohol   Alcohol use disorder  -Patient is alcohol of is 138  -Last drink today   -Will patient CIWA protocol  -Follow-up U

## 2024-12-02 NOTE — ED NOTES
Bedside report given to ELISE Mclaughlin  
Pt placed on 2L NC for low O2 while sleeping.   
Pt repositioned in bed at this time.   
Writer assumed care of pt at this time from ELISE Hill. Pt is alert to self at this time and responds to painful stimuli. Pt is on cardiac monitor, continuous pulse ox, and BP monitoring. Pt is on 2L NC at this time and does not wear oxygen at baseline.   
  Pertinent or High Risk Medications/Drips: no   If Yes, please provide details:   Blood Product Administration: no  If Yes, please provide details:   Process Protocols/Bundles:     Recommendation  Incomplete STAT orders:   Overdue Medications: Medications verified will be given prior to pt coming upstairs  Patient Belongings:    Additional Comments: Pt utilizes call light and has not been trying to get out of bed in the ED.   If any further questions, please call Sending RN at [phone]      Admitting Unit Notification  Name of person notified and time: Christal      Electronically signed by: Electronically signed by Lissette Garrison RN on 12/2/2024 at 12:35 AM

## 2024-12-02 NOTE — CARE COORDINATION
12/02/24 1035   Service Assessment   Patient Orientation Alert and Oriented   Cognition Alert   History Provided By Patient   Primary Caregiver Self   Support Systems Spouse/Significant Other;Family Members   Patient's Healthcare Decision Maker is: Legal Next of Kin   PCP Verified by CM Yes   Last Visit to PCP Within last 3 months   Prior Functional Level Assistance with the following:;Cooking;Housework;Shopping   Current Functional Level Cooking;Housework;Shopping;Assistance with the following:   Can patient return to prior living arrangement Yes   Ability to make needs known: Good   Family able to assist with home care needs: Yes   Would you like for me to discuss the discharge plan with any other family members/significant others, and if so, who? Yes     Advance Care Planning     General Advance Care Planning (ACP) Conversation    Date of Conversation: 12/2/2024  Conducted with: Patient with Decision Making Capacity  Other persons present: None    Healthcare Decision Maker:   Primary Decision Maker: Jil Alberto - Brother/Sister    Secondary Decision Maker: Melecio Iqbal - Spouse - [phone]         Length of Voluntary ACP Conversation in minutes:  <16 minutes (Non-Billable)    Ludin Peralta RN CM met with patient at bedside to complete discharge planning assessment. Patient lives at home with her sherrie Majano.  She reports she has a cane, walker and wheelchair at home.  She states she needs assistance with cooking, cleaning, shopping.  She is able to bathe, dress, feed self. Patient reports no prior HH.  She has had rehab at the [facility] in the past and if she needed to she would go back there. Pharmacy is [facility].  She will need medical transport home.  Patient is currently on oxygen but does not use home oxygen.  Current disposition is home. CM team will continue to follow the recommendations of the interdisciplinary team.
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
## 2024-12-02 NOTE — PLAN OF CARE
Problem: Chronic Conditions and Co-morbidities  Goal: Patient's chronic conditions and co-morbidity symptoms are monitored and maintained or improved  Outcome: Progressing     Problem: Discharge Planning  Goal: Discharge to home or other facility with appropriate resources  Outcome: Progressing     Problem: Pain  Goal: Verbalizes/displays adequate comfort level or baseline comfort level  Outcome: Progressing     Problem: Safety - Adult  Goal: Free from fall injury  12/2/2024 1228 by Lashawn Mancini, RN  Outcome: Progressing  12/2/2024 0234 by Shweta Callahan, RN  Outcome: Progressing

## 2024-12-03 LAB
GLUCOSE BLD STRIP.AUTO-MCNC: 189 MG/DL (ref 65–100)
LEVETIRACETAM SERPL-MCNC: <2 UG/ML (ref 10–40)
PERFORMED BY:: ABNORMAL

## 2024-12-03 PROCEDURE — 6360000002 HC RX W HCPCS

## 2024-12-03 PROCEDURE — 94761 N-INVAS EAR/PLS OXIMETRY MLT: CPT

## 2024-12-03 PROCEDURE — 6370000000 HC RX 637 (ALT 250 FOR IP)

## 2024-12-03 PROCEDURE — 2580000003 HC RX 258

## 2024-12-03 PROCEDURE — 6370000000 HC RX 637 (ALT 250 FOR IP): Performed by: INTERNAL MEDICINE

## 2024-12-03 PROCEDURE — 94640 AIRWAY INHALATION TREATMENT: CPT

## 2024-12-03 PROCEDURE — 6370000000 HC RX 637 (ALT 250 FOR IP): Performed by: PHYSICIAN ASSISTANT

## 2024-12-03 PROCEDURE — 82962 GLUCOSE BLOOD TEST: CPT

## 2024-12-03 PROCEDURE — 1100000000 HC RM PRIVATE

## 2024-12-03 RX ORDER — PANTOPRAZOLE SODIUM 40 MG/1
40 TABLET, DELAYED RELEASE ORAL
Status: DISCONTINUED | OUTPATIENT
Start: 2024-12-04 | End: 2024-12-11 | Stop reason: HOSPADM

## 2024-12-03 RX ORDER — FAMOTIDINE 20 MG/1
40 TABLET, FILM COATED ORAL ONCE
Status: COMPLETED | OUTPATIENT
Start: 2024-12-03 | End: 2024-12-03

## 2024-12-03 RX ADMIN — LEVETIRACETAM 1000 MG: 500 TABLET, FILM COATED ORAL at 21:21

## 2024-12-03 RX ADMIN — METHYLPREDNISOLONE SODIUM SUCCINATE 40 MG: 40 INJECTION INTRAMUSCULAR; INTRAVENOUS at 23:50

## 2024-12-03 RX ADMIN — ATORVASTATIN CALCIUM 40 MG: 40 TABLET, FILM COATED ORAL at 08:17

## 2024-12-03 RX ADMIN — FAMOTIDINE 40 MG: 20 TABLET, FILM COATED ORAL at 21:21

## 2024-12-03 RX ADMIN — FLUOXETINE HYDROCHLORIDE 20 MG: 20 CAPSULE ORAL at 08:17

## 2024-12-03 RX ADMIN — IPRATROPIUM BROMIDE AND ALBUTEROL SULFATE 1 DOSE: 2.5; .5 SOLUTION RESPIRATORY (INHALATION) at 13:18

## 2024-12-03 RX ADMIN — Medication 2 PUFF: at 08:26

## 2024-12-03 RX ADMIN — Medication 2 PUFF: at 19:56

## 2024-12-03 RX ADMIN — THIAMINE HCL TAB 100 MG 100 MG: 100 TAB at 08:17

## 2024-12-03 RX ADMIN — ONDANSETRON 4 MG: 2 INJECTION INTRAMUSCULAR; INTRAVENOUS at 21:25

## 2024-12-03 RX ADMIN — ENOXAPARIN SODIUM 40 MG: 100 INJECTION SUBCUTANEOUS at 08:17

## 2024-12-03 RX ADMIN — IPRATROPIUM BROMIDE AND ALBUTEROL SULFATE 1 DOSE: 2.5; .5 SOLUTION RESPIRATORY (INHALATION) at 08:26

## 2024-12-03 RX ADMIN — METHYLPREDNISOLONE SODIUM SUCCINATE 40 MG: 40 INJECTION INTRAMUSCULAR; INTRAVENOUS at 17:08

## 2024-12-03 RX ADMIN — LEVETIRACETAM 1000 MG: 500 TABLET, FILM COATED ORAL at 08:17

## 2024-12-03 RX ADMIN — ONDANSETRON 4 MG: 2 INJECTION INTRAMUSCULAR; INTRAVENOUS at 10:49

## 2024-12-03 RX ADMIN — LISINOPRIL 10 MG: 10 TABLET ORAL at 08:17

## 2024-12-03 RX ADMIN — SODIUM CHLORIDE, PRESERVATIVE FREE 10 ML: 5 INJECTION INTRAVENOUS at 08:18

## 2024-12-03 RX ADMIN — IPRATROPIUM BROMIDE AND ALBUTEROL SULFATE 1 DOSE: 2.5; .5 SOLUTION RESPIRATORY (INHALATION) at 19:56

## 2024-12-03 RX ADMIN — SODIUM CHLORIDE, PRESERVATIVE FREE 10 ML: 5 INJECTION INTRAVENOUS at 23:50

## 2024-12-03 RX ADMIN — METHYLPREDNISOLONE SODIUM SUCCINATE 40 MG: 40 INJECTION INTRAMUSCULAR; INTRAVENOUS at 08:17

## 2024-12-03 RX ADMIN — LORAZEPAM 4 MG: 2 INJECTION INTRAMUSCULAR; INTRAVENOUS at 21:25

## 2024-12-03 RX ADMIN — QUETIAPINE FUMARATE 100 MG: 100 TABLET ORAL at 21:21

## 2024-12-03 RX ADMIN — AZITHROMYCIN DIHYDRATE 500 MG: 500 TABLET ORAL at 08:17

## 2024-12-03 RX ADMIN — METHYLPREDNISOLONE SODIUM SUCCINATE 40 MG: 40 INJECTION INTRAMUSCULAR; INTRAVENOUS at 00:49

## 2024-12-03 NOTE — PLAN OF CARE
Problem: Chronic Conditions and Co-morbidities  Goal: Patient's chronic conditions and co-morbidity symptoms are monitored and maintained or improved  12/3/2024 0848 by Lashawn Mancini RN  Outcome: Progressing  12/2/2024 2212 by Lesli Mejia RN  Outcome: Progressing     Problem: Discharge Planning  Goal: Discharge to home or other facility with appropriate resources  12/3/2024 0848 by Lashawn Mancini RN  Outcome: Progressing  12/2/2024 2212 by Lesli Mejia RN  Outcome: Progressing     Problem: Pain  Goal: Verbalizes/displays adequate comfort level or baseline comfort level  12/3/2024 0848 by Lashawn Mancini RN  Outcome: Progressing  12/2/2024 2212 by Lesli Mejia RN  Outcome: Progressing     Problem: Safety - Adult  Goal: Free from fall injury  12/3/2024 0848 by Lashawn Mancini RN  Outcome: Progressing  12/2/2024 2212 by Lesli Mejia RN  Outcome: Progressing

## 2024-12-04 LAB
ANION GAP SERPL CALC-SCNC: 6 MMOL/L (ref 2–12)
BASOPHILS # BLD: 0 K/UL (ref 0–0.1)
BASOPHILS NFR BLD: 0 % (ref 0–1)
BUN SERPL-MCNC: 16 MG/DL (ref 6–20)
BUN/CREAT SERPL: 16 (ref 12–20)
CA-I BLD-MCNC: 8.8 MG/DL (ref 8.5–10.1)
CHLORIDE SERPL-SCNC: 106 MMOL/L (ref 97–108)
CO2 SERPL-SCNC: 26 MMOL/L (ref 21–32)
CREAT SERPL-MCNC: 0.98 MG/DL (ref 0.55–1.02)
DIFFERENTIAL METHOD BLD: ABNORMAL
EOSINOPHIL # BLD: 0 K/UL (ref 0–0.4)
EOSINOPHIL NFR BLD: 0 % (ref 0–7)
ERYTHROCYTE [DISTWIDTH] IN BLOOD BY AUTOMATED COUNT: 13.4 % (ref 11.5–14.5)
GLUCOSE SERPL-MCNC: 229 MG/DL (ref 65–100)
HCT VFR BLD AUTO: 40.7 % (ref 35–47)
HGB BLD-MCNC: 13 G/DL (ref 11.5–16)
IMM GRANULOCYTES # BLD AUTO: 0.1 K/UL (ref 0–0.04)
IMM GRANULOCYTES NFR BLD AUTO: 1 % (ref 0–0.5)
LYMPHOCYTES # BLD: 1 K/UL (ref 0.8–3.5)
LYMPHOCYTES NFR BLD: 9 % (ref 12–49)
MAGNESIUM SERPL-MCNC: 2.4 MG/DL (ref 1.6–2.4)
MCH RBC QN AUTO: 29.8 PG (ref 26–34)
MCHC RBC AUTO-ENTMCNC: 31.9 G/DL (ref 30–36.5)
MCV RBC AUTO: 93.3 FL (ref 80–99)
MONOCYTES # BLD: 0.2 K/UL (ref 0–1)
MONOCYTES NFR BLD: 2 % (ref 5–13)
NEUTS SEG # BLD: 10.2 K/UL (ref 1.8–8)
NEUTS SEG NFR BLD: 88 % (ref 32–75)
NRBC # BLD: 0 K/UL (ref 0–0.01)
NRBC BLD-RTO: 0 PER 100 WBC
PHOSPHATE SERPL-MCNC: 3.6 MG/DL (ref 2.6–4.7)
PLATELET # BLD AUTO: 286 K/UL (ref 150–400)
PMV BLD AUTO: 9.7 FL (ref 8.9–12.9)
POTASSIUM SERPL-SCNC: 4.3 MMOL/L (ref 3.5–5.1)
RBC # BLD AUTO: 4.36 M/UL (ref 3.8–5.2)
SODIUM SERPL-SCNC: 138 MMOL/L (ref 136–145)
WBC # BLD AUTO: 11.5 K/UL (ref 3.6–11)

## 2024-12-04 PROCEDURE — 6370000000 HC RX 637 (ALT 250 FOR IP): Performed by: PHYSICIAN ASSISTANT

## 2024-12-04 PROCEDURE — 6370000000 HC RX 637 (ALT 250 FOR IP): Performed by: INTERNAL MEDICINE

## 2024-12-04 PROCEDURE — 94640 AIRWAY INHALATION TREATMENT: CPT

## 2024-12-04 PROCEDURE — 6370000000 HC RX 637 (ALT 250 FOR IP): Performed by: PSYCHIATRY & NEUROLOGY

## 2024-12-04 PROCEDURE — 6370000000 HC RX 637 (ALT 250 FOR IP)

## 2024-12-04 PROCEDURE — 85025 COMPLETE CBC W/AUTO DIFF WBC: CPT

## 2024-12-04 PROCEDURE — 6360000002 HC RX W HCPCS

## 2024-12-04 PROCEDURE — 83735 ASSAY OF MAGNESIUM: CPT

## 2024-12-04 PROCEDURE — 84100 ASSAY OF PHOSPHORUS: CPT

## 2024-12-04 PROCEDURE — 2580000003 HC RX 258

## 2024-12-04 PROCEDURE — 94761 N-INVAS EAR/PLS OXIMETRY MLT: CPT

## 2024-12-04 PROCEDURE — 36415 COLL VENOUS BLD VENIPUNCTURE: CPT

## 2024-12-04 PROCEDURE — 80048 BASIC METABOLIC PNL TOTAL CA: CPT

## 2024-12-04 PROCEDURE — 1100000000 HC RM PRIVATE

## 2024-12-04 RX ORDER — IPRATROPIUM BROMIDE AND ALBUTEROL SULFATE 2.5; .5 MG/3ML; MG/3ML
1 SOLUTION RESPIRATORY (INHALATION) EVERY 4 HOURS PRN
Status: DISCONTINUED | OUTPATIENT
Start: 2024-12-04 | End: 2024-12-11 | Stop reason: HOSPADM

## 2024-12-04 RX ORDER — CHLORDIAZEPOXIDE HYDROCHLORIDE 10 MG/1
10 CAPSULE, GELATIN COATED ORAL 3 TIMES DAILY
Status: DISCONTINUED | OUTPATIENT
Start: 2024-12-04 | End: 2024-12-11 | Stop reason: HOSPADM

## 2024-12-04 RX ADMIN — LORAZEPAM 3 MG: 2 INJECTION INTRAMUSCULAR; INTRAVENOUS at 09:45

## 2024-12-04 RX ADMIN — QUETIAPINE FUMARATE 100 MG: 100 TABLET ORAL at 20:47

## 2024-12-04 RX ADMIN — PANTOPRAZOLE SODIUM 40 MG: 40 TABLET, DELAYED RELEASE ORAL at 06:00

## 2024-12-04 RX ADMIN — AZITHROMYCIN DIHYDRATE 500 MG: 500 TABLET ORAL at 09:47

## 2024-12-04 RX ADMIN — ATORVASTATIN CALCIUM 40 MG: 40 TABLET, FILM COATED ORAL at 09:47

## 2024-12-04 RX ADMIN — FLUOXETINE HYDROCHLORIDE 20 MG: 20 CAPSULE ORAL at 09:48

## 2024-12-04 RX ADMIN — LORAZEPAM 3 MG: 2 INJECTION INTRAMUSCULAR; INTRAVENOUS at 18:12

## 2024-12-04 RX ADMIN — LEVETIRACETAM 1000 MG: 500 TABLET, FILM COATED ORAL at 09:48

## 2024-12-04 RX ADMIN — LORAZEPAM 3 MG: 2 INJECTION INTRAMUSCULAR; INTRAVENOUS at 14:05

## 2024-12-04 RX ADMIN — METHYLPREDNISOLONE SODIUM SUCCINATE 40 MG: 40 INJECTION INTRAMUSCULAR; INTRAVENOUS at 15:54

## 2024-12-04 RX ADMIN — SODIUM CHLORIDE, PRESERVATIVE FREE 10 ML: 5 INJECTION INTRAVENOUS at 09:48

## 2024-12-04 RX ADMIN — LEVETIRACETAM 1000 MG: 500 TABLET, FILM COATED ORAL at 20:47

## 2024-12-04 RX ADMIN — THIAMINE HCL TAB 100 MG 100 MG: 100 TAB at 09:48

## 2024-12-04 RX ADMIN — IPRATROPIUM BROMIDE AND ALBUTEROL SULFATE 1 DOSE: 2.5; .5 SOLUTION RESPIRATORY (INHALATION) at 08:58

## 2024-12-04 RX ADMIN — CHLORDIAZEPOXIDE HYDROCHLORIDE 10 MG: 10 CAPSULE ORAL at 20:47

## 2024-12-04 RX ADMIN — CHLORDIAZEPOXIDE HYDROCHLORIDE 10 MG: 10 CAPSULE ORAL at 15:54

## 2024-12-04 RX ADMIN — SODIUM CHLORIDE, PRESERVATIVE FREE 10 ML: 5 INJECTION INTRAVENOUS at 20:52

## 2024-12-04 RX ADMIN — METHYLPREDNISOLONE SODIUM SUCCINATE 40 MG: 40 INJECTION INTRAMUSCULAR; INTRAVENOUS at 09:48

## 2024-12-04 RX ADMIN — ENOXAPARIN SODIUM 40 MG: 100 INJECTION SUBCUTANEOUS at 09:48

## 2024-12-04 RX ADMIN — Medication 2 PUFF: at 08:58

## 2024-12-04 RX ADMIN — Medication 2 PUFF: at 19:37

## 2024-12-04 RX ADMIN — LISINOPRIL 10 MG: 10 TABLET ORAL at 09:48

## 2024-12-04 NOTE — PLAN OF CARE
Problem: Chronic Conditions and Co-morbidities  Goal: Patient's chronic conditions and co-morbidity symptoms are monitored and maintained or improved  12/4/2024 1403 by Reena Mirza RN  Outcome: Progressing  12/4/2024 0319 by Monique Laguerre RN  Outcome: Progressing     Problem: Discharge Planning  Goal: Discharge to home or other facility with appropriate resources  12/4/2024 1403 by Reena Mirza RN  Outcome: Progressing  12/4/2024 0319 by Monique Laguerre RN  Outcome: Progressing     Problem: Pain  Goal: Verbalizes/displays adequate comfort level or baseline comfort level  12/4/2024 1403 by Reena Mirza RN  Outcome: Progressing  12/4/2024 0319 by Monique Laguerre RN  Outcome: Progressing     Problem: Safety - Adult  Goal: Free from fall injury  12/4/2024 1403 by Reena Mirza RN  Outcome: Progressing  12/4/2024 0319 by Monique Laguerre RN  Outcome: Progressing

## 2024-12-04 NOTE — CARE COORDINATION
IDR 24HRS.  Tele-neuro consult.  PT/OT eval when medically stable.  Will need psych clearance.        PEE Garcia
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
## 2024-12-05 LAB
ANION GAP SERPL CALC-SCNC: 6 MMOL/L (ref 2–12)
BASOPHILS # BLD: 0 K/UL (ref 0–0.1)
BASOPHILS NFR BLD: 0 % (ref 0–1)
BUN SERPL-MCNC: 21 MG/DL (ref 6–20)
BUN/CREAT SERPL: 21 (ref 12–20)
CA-I BLD-MCNC: 8.6 MG/DL (ref 8.5–10.1)
CHLORIDE SERPL-SCNC: 102 MMOL/L (ref 97–108)
CO2 SERPL-SCNC: 25 MMOL/L (ref 21–32)
CREAT SERPL-MCNC: 0.98 MG/DL (ref 0.55–1.02)
DIFFERENTIAL METHOD BLD: ABNORMAL
EOSINOPHIL # BLD: 0 K/UL (ref 0–0.4)
EOSINOPHIL NFR BLD: 0 % (ref 0–7)
ERYTHROCYTE [DISTWIDTH] IN BLOOD BY AUTOMATED COUNT: 13.1 % (ref 11.5–14.5)
GLUCOSE SERPL-MCNC: 272 MG/DL (ref 65–100)
HCT VFR BLD AUTO: 40.6 % (ref 35–47)
HGB BLD-MCNC: 13.4 G/DL (ref 11.5–16)
IMM GRANULOCYTES # BLD AUTO: 0.1 K/UL (ref 0–0.04)
IMM GRANULOCYTES NFR BLD AUTO: 1 % (ref 0–0.5)
LYMPHOCYTES # BLD: 1.3 K/UL (ref 0.8–3.5)
LYMPHOCYTES NFR BLD: 11 % (ref 12–49)
MAGNESIUM SERPL-MCNC: 2.3 MG/DL (ref 1.6–2.4)
MCH RBC QN AUTO: 29.8 PG (ref 26–34)
MCHC RBC AUTO-ENTMCNC: 33 G/DL (ref 30–36.5)
MCV RBC AUTO: 90.2 FL (ref 80–99)
MONOCYTES # BLD: 0.3 K/UL (ref 0–1)
MONOCYTES NFR BLD: 3 % (ref 5–13)
NEUTS SEG # BLD: 9.8 K/UL (ref 1.8–8)
NEUTS SEG NFR BLD: 85 % (ref 32–75)
NRBC # BLD: 0 K/UL (ref 0–0.01)
NRBC BLD-RTO: 0 PER 100 WBC
PHOSPHATE SERPL-MCNC: 3.3 MG/DL (ref 2.6–4.7)
PLATELET # BLD AUTO: 277 K/UL (ref 150–400)
PMV BLD AUTO: 10.1 FL (ref 8.9–12.9)
POTASSIUM SERPL-SCNC: 4.2 MMOL/L (ref 3.5–5.1)
RBC # BLD AUTO: 4.5 M/UL (ref 3.8–5.2)
SODIUM SERPL-SCNC: 133 MMOL/L (ref 136–145)
WBC # BLD AUTO: 11.4 K/UL (ref 3.6–11)

## 2024-12-05 PROCEDURE — 80048 BASIC METABOLIC PNL TOTAL CA: CPT

## 2024-12-05 PROCEDURE — 6370000000 HC RX 637 (ALT 250 FOR IP): Performed by: PHYSICIAN ASSISTANT

## 2024-12-05 PROCEDURE — 97161 PT EVAL LOW COMPLEX 20 MIN: CPT

## 2024-12-05 PROCEDURE — 6370000000 HC RX 637 (ALT 250 FOR IP)

## 2024-12-05 PROCEDURE — 83735 ASSAY OF MAGNESIUM: CPT

## 2024-12-05 PROCEDURE — 94761 N-INVAS EAR/PLS OXIMETRY MLT: CPT

## 2024-12-05 PROCEDURE — 6360000002 HC RX W HCPCS

## 2024-12-05 PROCEDURE — 97530 THERAPEUTIC ACTIVITIES: CPT

## 2024-12-05 PROCEDURE — 97165 OT EVAL LOW COMPLEX 30 MIN: CPT

## 2024-12-05 PROCEDURE — 6370000000 HC RX 637 (ALT 250 FOR IP): Performed by: INTERNAL MEDICINE

## 2024-12-05 PROCEDURE — 6370000000 HC RX 637 (ALT 250 FOR IP): Performed by: PSYCHIATRY & NEUROLOGY

## 2024-12-05 PROCEDURE — 1100000000 HC RM PRIVATE

## 2024-12-05 PROCEDURE — 94640 AIRWAY INHALATION TREATMENT: CPT

## 2024-12-05 PROCEDURE — 84100 ASSAY OF PHOSPHORUS: CPT

## 2024-12-05 PROCEDURE — 85025 COMPLETE CBC W/AUTO DIFF WBC: CPT

## 2024-12-05 PROCEDURE — 2580000003 HC RX 258

## 2024-12-05 PROCEDURE — 36415 COLL VENOUS BLD VENIPUNCTURE: CPT

## 2024-12-05 RX ORDER — PREDNISONE 20 MG/1
40 TABLET ORAL DAILY
Status: DISCONTINUED | OUTPATIENT
Start: 2024-12-05 | End: 2024-12-11 | Stop reason: HOSPADM

## 2024-12-05 RX ORDER — TOPIRAMATE 25 MG/1
25 TABLET, FILM COATED ORAL 2 TIMES DAILY
Status: DISCONTINUED | OUTPATIENT
Start: 2024-12-05 | End: 2024-12-11 | Stop reason: HOSPADM

## 2024-12-05 RX ORDER — PRAMIPEXOLE DIHYDROCHLORIDE 0.12 MG/1
0.12 TABLET ORAL 3 TIMES DAILY
Status: DISCONTINUED | OUTPATIENT
Start: 2024-12-05 | End: 2024-12-11 | Stop reason: HOSPADM

## 2024-12-05 RX ADMIN — TOPIRAMATE 25 MG: 25 TABLET, FILM COATED ORAL at 21:40

## 2024-12-05 RX ADMIN — ATORVASTATIN CALCIUM 40 MG: 40 TABLET, FILM COATED ORAL at 08:08

## 2024-12-05 RX ADMIN — CHLORDIAZEPOXIDE HYDROCHLORIDE 10 MG: 10 CAPSULE ORAL at 12:32

## 2024-12-05 RX ADMIN — LISINOPRIL 10 MG: 10 TABLET ORAL at 08:08

## 2024-12-05 RX ADMIN — FLUOXETINE HYDROCHLORIDE 20 MG: 20 CAPSULE ORAL at 08:08

## 2024-12-05 RX ADMIN — ENOXAPARIN SODIUM 40 MG: 100 INJECTION SUBCUTANEOUS at 08:08

## 2024-12-05 RX ADMIN — QUETIAPINE FUMARATE 100 MG: 100 TABLET ORAL at 21:40

## 2024-12-05 RX ADMIN — SODIUM CHLORIDE, PRESERVATIVE FREE 10 ML: 5 INJECTION INTRAVENOUS at 08:10

## 2024-12-05 RX ADMIN — Medication 2 PUFF: at 07:23

## 2024-12-05 RX ADMIN — SODIUM CHLORIDE, PRESERVATIVE FREE 10 ML: 5 INJECTION INTRAVENOUS at 08:09

## 2024-12-05 RX ADMIN — PANTOPRAZOLE SODIUM 40 MG: 40 TABLET, DELAYED RELEASE ORAL at 06:25

## 2024-12-05 RX ADMIN — LEVETIRACETAM 1000 MG: 500 TABLET, FILM COATED ORAL at 08:09

## 2024-12-05 RX ADMIN — PREDNISONE 40 MG: 20 TABLET ORAL at 12:32

## 2024-12-05 RX ADMIN — LEVETIRACETAM 1000 MG: 500 TABLET, FILM COATED ORAL at 21:39

## 2024-12-05 RX ADMIN — SODIUM CHLORIDE, PRESERVATIVE FREE 10 ML: 5 INJECTION INTRAVENOUS at 21:40

## 2024-12-05 RX ADMIN — CHLORDIAZEPOXIDE HYDROCHLORIDE 10 MG: 10 CAPSULE ORAL at 08:08

## 2024-12-05 RX ADMIN — PRAMIPEXOLE DIHYDROCHLORIDE 0.12 MG: 0.12 TABLET ORAL at 21:40

## 2024-12-05 RX ADMIN — Medication 2 PUFF: at 18:14

## 2024-12-05 RX ADMIN — METHYLPREDNISOLONE SODIUM SUCCINATE 40 MG: 40 INJECTION INTRAMUSCULAR; INTRAVENOUS at 00:36

## 2024-12-05 RX ADMIN — TOPIRAMATE 25 MG: 25 TABLET, FILM COATED ORAL at 12:32

## 2024-12-05 RX ADMIN — CHLORDIAZEPOXIDE HYDROCHLORIDE 10 MG: 10 CAPSULE ORAL at 21:40

## 2024-12-05 RX ADMIN — THIAMINE HCL TAB 100 MG 100 MG: 100 TAB at 08:09

## 2024-12-05 ASSESSMENT — PAIN SCALES - GENERAL: PAINLEVEL_OUTOF10: 0

## 2024-12-05 NOTE — PLAN OF CARE
PHYSICAL THERAPY EVALUATION  Patient: María Amos (59 y.o. female)  Date: 12/5/2024  Primary Diagnosis: COPD exacerbation (HCC) [J44.1]       Precautions: Fall Risk, Seizure, Other (Comment)                      Recommendations for nursing mobility: Out of bed to chair for meals, AD and gt belt for bed to chair , and Assist x1    In place during session:     ASSESSMENT  Pt is a 59 y.o. female admitted on 12/1/2024 for COPD exac/CIWA. Pt semi supine in bed upon PT arrival, agreeable to evaluation. Pt A&O x 4.      Based on the objective data described below, the patient currently presents with impaired functional mobility, decreased ROM, impaired strength, decreased activity tolerance, poor attention/concentration, decreased coordination, impaired balance, and increased pain levels. (See below for objective details and assist levels).     Overall pt tolerated session fair today with overall CGA with rolling in bed and min A for EOB transfers.  Pt min A with additional time for sit to stand transfer and req 2 attempts to get up to fully standing upright.  Pt was able to amb approx 4 ft with RW, very unsteady at times and decreased foot clearance B.  Pt amb with L foot turned out, states she is supposed to have a brace, but never received it.  Pt CGA for return to semi supine in bed.Pt will benefit from continued skilled PT to address above deficits and return to PLOF. Current PT DC recommendation Moderate intensity short-term skilled physical therapy up to 5x/week once medically appropriate.    GOALS:  FUNCTIONAL STATUS PRIOR TO ADMISSION: Patient was modified independent using a rolling walker and single point cane for functional mobility.    HOME SUPPORT PRIOR TO ADMISSION: The patient lived with spouse who provides assist as needed.    Physical Therapy Goals  Initiated 12/5/2024  Pt stated goal: to go home    I with LE HEP x7 days  Mod I with bed mob x7 days  SBA with all transfers x7 days  Amb 25-50ft with RW and

## 2024-12-05 NOTE — PLAN OF CARE
Problem: Chronic Conditions and Co-morbidities  Goal: Patient's chronic conditions and co-morbidity symptoms are monitored and maintained or improved  12/4/2024 2309 by Monique Laguerre RN  Outcome: Progressing  12/4/2024 1403 by Reena Mirza RN  Outcome: Progressing  Flowsheets (Taken 12/4/2024 0945)  Care Plan - Patient's Chronic Conditions and Co-Morbidity Symptoms are Monitored and Maintained or Improved:   Monitor and assess patient's chronic conditions and comorbid symptoms for stability, deterioration, or improvement   Collaborate with multidisciplinary team to address chronic and comorbid conditions and prevent exacerbation or deterioration   Update acute care plan with appropriate goals if chronic or comorbid symptoms are exacerbated and prevent overall improvement and discharge     Problem: Discharge Planning  Goal: Discharge to home or other facility with appropriate resources  12/4/2024 2309 by Monique Laguerre RN  Outcome: Progressing  12/4/2024 1403 by Reena Mirza RN  Outcome: Progressing  Flowsheets (Taken 12/4/2024 0945)  Discharge to home or other facility with appropriate resources:   Identify barriers to discharge with patient and caregiver   Arrange for needed discharge resources and transportation as appropriate   Identify discharge learning needs (meds, wound care, etc)     Problem: Pain  Goal: Verbalizes/displays adequate comfort level or baseline comfort level  12/4/2024 2309 by Monique Laguerre RN  Outcome: Progressing  12/4/2024 1403 by Reena Mirza RN  Outcome: Progressing     Problem: Safety - Adult  Goal: Free from fall injury  12/4/2024 2309 by Monique Laguerre RN  Outcome: Progressing  12/4/2024 1403 by Reena Mirza RN  Outcome: Progressing

## 2024-12-05 NOTE — SIGNIFICANT EVENT
Patient provided consent to speak to her boyfriend Melecio Bains regarding her current medical status.  Spoke with patient's boyfriend Melecio and updated him regarding patient's current situation that she has been experiencing moderately-severe alcohol withdrawal with concerns for delirium tremens hence she is on CIWA protocol and started on Librium 10 mg 3 times daily.  Patient would require psychiatry clearance prior to discharge.  Patient will be assessed on daily basis and safe discharge plan will be the priority.  Will update the family in timely manner.

## 2024-12-05 NOTE — PLAN OF CARE
OCCUPATIONAL THERAPY EVALUATION  Patient: María Amos (59 y.o. female)  Date: 12/5/2024  Primary Diagnosis: COPD exacerbation (HCC) [J44.1]       Precautions: Fall Risk, Seizure, Other (Comment) ([facility])                Recommendations for nursing mobility: Out of bed to chair for meals, Encourage HEP in prep for ADLs/mobility; see handout for details, Use of bed/chair alarm for safety, AD and gt belt for bed to chair , Amb to bathroom with AD and gait belt, and Assist x1    In place during session:EKG/telemetry   ASSESSMENT  Pt is a 59 y.o. female presenting to [facility] with c/o SOB, chest pain, and witnessed seizures x2, admitted on 12/1/2024 and currently being treated for COPD exacerbation, acute respiratory failure with hypoxia, non-cardiac chest pain, seizure disorder, breakthrough seizure secondary to alcohol use and medications noncompliance, acute encephalopathy, alcohol use disorder ([facility] protocol), HTN, hx of CVA, HLD, depression. Pt received semi-supine in bed upon arrival, AXO x4, and agreeable to OT evaluation.     Based on current observations, pt presents with decreased  functional mobility, independence in ADLs, high-level IADLs, ROM, strength, activity tolerance, endurance, coordination, balance (see below for objective details and assist levels).     Overall, pt tolerates session fair with reports of mild dizziness following mobility. Pt transferring from semi supine to sitting EOB with CGA and additional time. Pt donning b/l socks at EOB with increased time/effort and supervision in figure four position. Pt transferring into standing with min/CGA and use of RW. Pt ambulating household distance from EOB to commode with CGA. Pt transferring onto commode with Robinson/CGA and safety cues. Pt completing omar care with supervision while seated on commode. Pt transferring into standing from commode and ambulating to sink. Pt requiring min A for standing balance at sink while completing hand hygiene and face

## 2024-12-05 NOTE — BH NOTE
Blood pressure 123/77, pulse 56, temperature 97.5 °F (36.4 °C), temperature source Oral, resp. rate 16, height 1.727 m (5' 8\"), weight 99 kg (218 lb 4.1 oz), SpO2 94%.    Intake/Output:  Current Shift: No intake/output data recorded.  Last 3 Shifts: 12/03 1901 - 12/05 0700  In: 305 [P.O.:300; I.V.:5]  Out: -     Data Review:   Recent Results (from the past 24 hour(s))   Phosphorus    Collection Time: 12/05/24  8:58 AM   Result Value Ref Range    Phosphorus 3.3 2.6 - 4.7 mg/dL   Magnesium    Collection Time: 12/05/24  8:58 AM   Result Value Ref Range    Magnesium 2.3 1.6 - 2.4 mg/dL   Basic Metabolic Panel    Collection Time: 12/05/24  8:58 AM   Result Value Ref Range    Sodium 133 (L) 136 - 145 mmol/L    Potassium 4.2 3.5 - 5.1 mmol/L    Chloride 102 97 - 108 mmol/L    CO2 25 21 - 32 mmol/L    Anion Gap 6 2 - 12 mmol/L    Glucose 272 (H) 65 - 100 mg/dL    BUN 21 (H) 6 - 20 mg/dL    Creatinine 0.98 0.55 - 1.02 mg/dL    BUN/Creatinine Ratio 21 (H) 12 - 20      Est, Glom Filt Rate 66 >60 ml/min/1.73m2    Calcium 8.6 8.5 - 10.1 mg/dL   CBC with Auto Differential    Collection Time: 12/05/24  8:58 AM   Result Value Ref Range    WBC 11.4 (H) 3.6 - 11.0 K/uL    RBC 4.50 3.80 - 5.20 M/uL    Hemoglobin 13.4 11.5 - 16.0 g/dL    Hematocrit 40.6 35.0 - 47.0 %    MCV 90.2 80.0 - 99.0 FL    MCH 29.8 26.0 - 34.0 PG    MCHC 33.0 30.0 - 36.5 g/dL    RDW 13.1 11.5 - 14.5 %    Platelets 277 150 - 400 K/uL    MPV 10.1 8.9 - 12.9 FL    Nucleated RBCs 0.0 0.0  WBC    nRBC 0.00 0.00 - 0.01 K/uL    Neutrophils % 85 (H) 32 - 75 %    Lymphocytes % 11 (L) 12 - 49 %    Monocytes % 3 (L) 5 - 13 %    Eosinophils % 0 0 - 7 %    Basophils % 0 0 - 1 %    Immature Granulocytes % 1 (H) 0 - 0.5 %    Neutrophils Absolute 9.8 (H) 1.8 - 8.0 K/UL    Lymphocytes Absolute 1.3 0.8 - 3.5 K/UL    Monocytes Absolute 0.3 0.0 - 1.0 K/UL    Eosinophils Absolute 0.0 0.0 - 0.4 K/UL    Basophils Absolute 0.0 0.0 - 0.1 K/UL    Immature Granulocytes Absolute

## 2024-12-05 NOTE — CARE COORDINATION
IDR 24 HRS. Tele-neuro clearance.  Patient voiced \"not .  Melecio and I have been together for 20+ years.  I give permission for the doctor to update Melecio.  He has my sister Jil phone#.\"      Perfect serve message sent to attending, per the patient's request.     PT/OT eval when medically stable. Will need psych clearance.     PEE Garcia

## 2024-12-06 LAB
ANION GAP SERPL CALC-SCNC: 5 MMOL/L (ref 2–12)
BASOPHILS # BLD: 0 K/UL (ref 0–0.1)
BASOPHILS NFR BLD: 0 % (ref 0–1)
BUN SERPL-MCNC: 22 MG/DL (ref 6–20)
BUN/CREAT SERPL: 24 (ref 12–20)
CA-I BLD-MCNC: 8.8 MG/DL (ref 8.5–10.1)
CHLORIDE SERPL-SCNC: 103 MMOL/L (ref 97–108)
CO2 SERPL-SCNC: 27 MMOL/L (ref 21–32)
CREAT SERPL-MCNC: 0.9 MG/DL (ref 0.55–1.02)
DIFFERENTIAL METHOD BLD: ABNORMAL
EOSINOPHIL # BLD: 0 K/UL (ref 0–0.4)
EOSINOPHIL NFR BLD: 0 % (ref 0–7)
ERYTHROCYTE [DISTWIDTH] IN BLOOD BY AUTOMATED COUNT: 13 % (ref 11.5–14.5)
FERRITIN SERPL-MCNC: 23 NG/ML (ref 8–252)
GLUCOSE SERPL-MCNC: 251 MG/DL (ref 65–100)
HCT VFR BLD AUTO: 43.3 % (ref 35–47)
HGB BLD-MCNC: 14.3 G/DL (ref 11.5–16)
IMM GRANULOCYTES # BLD AUTO: 0.1 K/UL (ref 0–0.04)
IMM GRANULOCYTES NFR BLD AUTO: 1 % (ref 0–0.5)
IRON SATN MFR SERPL: 17 % (ref 20–50)
IRON SERPL-MCNC: 80 UG/DL (ref 35–150)
LYMPHOCYTES # BLD: 2.3 K/UL (ref 0.8–3.5)
LYMPHOCYTES NFR BLD: 21 % (ref 12–49)
MAGNESIUM SERPL-MCNC: 2.4 MG/DL (ref 1.6–2.4)
MCH RBC QN AUTO: 29.8 PG (ref 26–34)
MCHC RBC AUTO-ENTMCNC: 33 G/DL (ref 30–36.5)
MCV RBC AUTO: 90.2 FL (ref 80–99)
MONOCYTES # BLD: 0.5 K/UL (ref 0–1)
MONOCYTES NFR BLD: 4 % (ref 5–13)
NEUTS SEG # BLD: 8 K/UL (ref 1.8–8)
NEUTS SEG NFR BLD: 74 % (ref 32–75)
NRBC # BLD: 0 K/UL (ref 0–0.01)
NRBC BLD-RTO: 0 PER 100 WBC
PHOSPHATE SERPL-MCNC: 2.8 MG/DL (ref 2.6–4.7)
PLATELET # BLD AUTO: 258 K/UL (ref 150–400)
PMV BLD AUTO: 10.5 FL (ref 8.9–12.9)
POTASSIUM SERPL-SCNC: 3.8 MMOL/L (ref 3.5–5.1)
RBC # BLD AUTO: 4.8 M/UL (ref 3.8–5.2)
SODIUM SERPL-SCNC: 135 MMOL/L (ref 136–145)
TIBC SERPL-MCNC: 471 UG/DL (ref 250–450)
WBC # BLD AUTO: 10.9 K/UL (ref 3.6–11)

## 2024-12-06 PROCEDURE — 83540 ASSAY OF IRON: CPT

## 2024-12-06 PROCEDURE — 6360000002 HC RX W HCPCS

## 2024-12-06 PROCEDURE — 80048 BASIC METABOLIC PNL TOTAL CA: CPT

## 2024-12-06 PROCEDURE — 85025 COMPLETE CBC W/AUTO DIFF WBC: CPT

## 2024-12-06 PROCEDURE — 6370000000 HC RX 637 (ALT 250 FOR IP): Performed by: PSYCHIATRY & NEUROLOGY

## 2024-12-06 PROCEDURE — 6370000000 HC RX 637 (ALT 250 FOR IP)

## 2024-12-06 PROCEDURE — 1100000000 HC RM PRIVATE

## 2024-12-06 PROCEDURE — 6370000000 HC RX 637 (ALT 250 FOR IP): Performed by: PHYSICIAN ASSISTANT

## 2024-12-06 PROCEDURE — 84100 ASSAY OF PHOSPHORUS: CPT

## 2024-12-06 PROCEDURE — 36415 COLL VENOUS BLD VENIPUNCTURE: CPT

## 2024-12-06 PROCEDURE — 94761 N-INVAS EAR/PLS OXIMETRY MLT: CPT

## 2024-12-06 PROCEDURE — 2580000003 HC RX 258

## 2024-12-06 PROCEDURE — 83735 ASSAY OF MAGNESIUM: CPT

## 2024-12-06 PROCEDURE — 94640 AIRWAY INHALATION TREATMENT: CPT

## 2024-12-06 PROCEDURE — 6370000000 HC RX 637 (ALT 250 FOR IP): Performed by: INTERNAL MEDICINE

## 2024-12-06 PROCEDURE — 82728 ASSAY OF FERRITIN: CPT

## 2024-12-06 RX ADMIN — LISINOPRIL 10 MG: 10 TABLET ORAL at 08:48

## 2024-12-06 RX ADMIN — Medication 2 PUFF: at 08:00

## 2024-12-06 RX ADMIN — LEVETIRACETAM 1000 MG: 500 TABLET, FILM COATED ORAL at 08:48

## 2024-12-06 RX ADMIN — SODIUM CHLORIDE, PRESERVATIVE FREE 10 ML: 5 INJECTION INTRAVENOUS at 08:50

## 2024-12-06 RX ADMIN — QUETIAPINE FUMARATE 100 MG: 100 TABLET ORAL at 21:00

## 2024-12-06 RX ADMIN — PANTOPRAZOLE SODIUM 40 MG: 40 TABLET, DELAYED RELEASE ORAL at 06:24

## 2024-12-06 RX ADMIN — TRAZODONE HYDROCHLORIDE 50 MG: 50 TABLET ORAL at 21:00

## 2024-12-06 RX ADMIN — PREDNISONE 40 MG: 20 TABLET ORAL at 08:48

## 2024-12-06 RX ADMIN — CHLORDIAZEPOXIDE HYDROCHLORIDE 10 MG: 10 CAPSULE ORAL at 21:00

## 2024-12-06 RX ADMIN — LORAZEPAM 1 MG: 1 TABLET ORAL at 11:50

## 2024-12-06 RX ADMIN — FLUOXETINE HYDROCHLORIDE 20 MG: 20 CAPSULE ORAL at 08:48

## 2024-12-06 RX ADMIN — CHLORDIAZEPOXIDE HYDROCHLORIDE 10 MG: 10 CAPSULE ORAL at 13:33

## 2024-12-06 RX ADMIN — SODIUM CHLORIDE, PRESERVATIVE FREE 10 ML: 5 INJECTION INTRAVENOUS at 21:07

## 2024-12-06 RX ADMIN — SODIUM CHLORIDE, PRESERVATIVE FREE 10 ML: 5 INJECTION INTRAVENOUS at 21:01

## 2024-12-06 RX ADMIN — CHLORDIAZEPOXIDE HYDROCHLORIDE 10 MG: 10 CAPSULE ORAL at 10:24

## 2024-12-06 RX ADMIN — TOPIRAMATE 25 MG: 25 TABLET, FILM COATED ORAL at 21:00

## 2024-12-06 RX ADMIN — TOPIRAMATE 25 MG: 25 TABLET, FILM COATED ORAL at 08:49

## 2024-12-06 RX ADMIN — THIAMINE HCL TAB 100 MG 100 MG: 100 TAB at 08:48

## 2024-12-06 RX ADMIN — ENOXAPARIN SODIUM 40 MG: 100 INJECTION SUBCUTANEOUS at 08:48

## 2024-12-06 RX ADMIN — PRAMIPEXOLE DIHYDROCHLORIDE 0.12 MG: 0.12 TABLET ORAL at 08:49

## 2024-12-06 RX ADMIN — SODIUM CHLORIDE, PRESERVATIVE FREE 10 ML: 5 INJECTION INTRAVENOUS at 08:51

## 2024-12-06 RX ADMIN — Medication 2 PUFF: at 20:32

## 2024-12-06 RX ADMIN — ATORVASTATIN CALCIUM 40 MG: 40 TABLET, FILM COATED ORAL at 08:48

## 2024-12-06 RX ADMIN — LEVETIRACETAM 1000 MG: 500 TABLET, FILM COATED ORAL at 21:00

## 2024-12-06 RX ADMIN — PRAMIPEXOLE DIHYDROCHLORIDE 0.12 MG: 0.12 TABLET ORAL at 21:00

## 2024-12-06 RX ADMIN — PRAMIPEXOLE DIHYDROCHLORIDE 0.12 MG: 0.12 TABLET ORAL at 13:32

## 2024-12-06 ASSESSMENT — PAIN SCALES - GENERAL: PAINLEVEL_OUTOF10: 0

## 2024-12-06 NOTE — CARE COORDINATION
Met w/patient at the bedside to discuss discharge disposition.  Patient desires to go to [facility]. Verbal consent given, and referral sent.      \"Please let Melecio know.\"      Insurance will require insurance auth.      [facility] @ [phone], option # 3.     Clinicals to be faxed to [phone].      Reference# [id number]    PEE Garcia

## 2024-12-07 LAB
ANION GAP SERPL CALC-SCNC: 6 MMOL/L (ref 2–12)
BACTERIA SPEC CULT: NORMAL
BACTERIA SPEC CULT: NORMAL
BASOPHILS # BLD: 0 K/UL (ref 0–0.1)
BASOPHILS NFR BLD: 0 % (ref 0–1)
BUN SERPL-MCNC: 21 MG/DL (ref 6–20)
BUN/CREAT SERPL: 23 (ref 12–20)
CA-I BLD-MCNC: 8.5 MG/DL (ref 8.5–10.1)
CHLORIDE SERPL-SCNC: 105 MMOL/L (ref 97–108)
CO2 SERPL-SCNC: 27 MMOL/L (ref 21–32)
CREAT SERPL-MCNC: 0.9 MG/DL (ref 0.55–1.02)
DIFFERENTIAL METHOD BLD: ABNORMAL
EOSINOPHIL # BLD: 0.1 K/UL (ref 0–0.4)
EOSINOPHIL NFR BLD: 1 % (ref 0–7)
ERYTHROCYTE [DISTWIDTH] IN BLOOD BY AUTOMATED COUNT: 12.7 % (ref 11.5–14.5)
GLUCOSE SERPL-MCNC: 225 MG/DL (ref 65–100)
HCT VFR BLD AUTO: 42.6 % (ref 35–47)
HGB BLD-MCNC: 14.1 G/DL (ref 11.5–16)
IMM GRANULOCYTES # BLD AUTO: 0.1 K/UL (ref 0–0.04)
IMM GRANULOCYTES NFR BLD AUTO: 1 % (ref 0–0.5)
LYMPHOCYTES # BLD: 2.9 K/UL (ref 0.8–3.5)
LYMPHOCYTES NFR BLD: 29 % (ref 12–49)
Lab: NORMAL
Lab: NORMAL
MAGNESIUM SERPL-MCNC: 2.3 MG/DL (ref 1.6–2.4)
MCH RBC QN AUTO: 29.8 PG (ref 26–34)
MCHC RBC AUTO-ENTMCNC: 33.1 G/DL (ref 30–36.5)
MCV RBC AUTO: 90.1 FL (ref 80–99)
MONOCYTES # BLD: 0.5 K/UL (ref 0–1)
MONOCYTES NFR BLD: 5 % (ref 5–13)
NEUTS SEG # BLD: 6.3 K/UL (ref 1.8–8)
NEUTS SEG NFR BLD: 64 % (ref 32–75)
NRBC # BLD: 0 K/UL (ref 0–0.01)
NRBC BLD-RTO: 0 PER 100 WBC
PHOSPHATE SERPL-MCNC: 3.2 MG/DL (ref 2.6–4.7)
PLATELET # BLD AUTO: 280 K/UL (ref 150–400)
PMV BLD AUTO: 9.8 FL (ref 8.9–12.9)
POTASSIUM SERPL-SCNC: 3.7 MMOL/L (ref 3.5–5.1)
RBC # BLD AUTO: 4.73 M/UL (ref 3.8–5.2)
SODIUM SERPL-SCNC: 138 MMOL/L (ref 136–145)
WBC # BLD AUTO: 9.7 K/UL (ref 3.6–11)

## 2024-12-07 PROCEDURE — 6370000000 HC RX 637 (ALT 250 FOR IP)

## 2024-12-07 PROCEDURE — 80048 BASIC METABOLIC PNL TOTAL CA: CPT

## 2024-12-07 PROCEDURE — 85025 COMPLETE CBC W/AUTO DIFF WBC: CPT

## 2024-12-07 PROCEDURE — 97530 THERAPEUTIC ACTIVITIES: CPT

## 2024-12-07 PROCEDURE — 94640 AIRWAY INHALATION TREATMENT: CPT

## 2024-12-07 PROCEDURE — 6360000002 HC RX W HCPCS

## 2024-12-07 PROCEDURE — 83735 ASSAY OF MAGNESIUM: CPT

## 2024-12-07 PROCEDURE — 6370000000 HC RX 637 (ALT 250 FOR IP): Performed by: PHYSICIAN ASSISTANT

## 2024-12-07 PROCEDURE — 1100000000 HC RM PRIVATE

## 2024-12-07 PROCEDURE — 94761 N-INVAS EAR/PLS OXIMETRY MLT: CPT

## 2024-12-07 PROCEDURE — 36415 COLL VENOUS BLD VENIPUNCTURE: CPT

## 2024-12-07 PROCEDURE — 84100 ASSAY OF PHOSPHORUS: CPT

## 2024-12-07 PROCEDURE — 6370000000 HC RX 637 (ALT 250 FOR IP): Performed by: INTERNAL MEDICINE

## 2024-12-07 PROCEDURE — 2580000003 HC RX 258

## 2024-12-07 PROCEDURE — 6370000000 HC RX 637 (ALT 250 FOR IP): Performed by: PSYCHIATRY & NEUROLOGY

## 2024-12-07 RX ADMIN — SODIUM CHLORIDE, PRESERVATIVE FREE 10 ML: 5 INJECTION INTRAVENOUS at 08:16

## 2024-12-07 RX ADMIN — QUETIAPINE FUMARATE 100 MG: 100 TABLET ORAL at 20:57

## 2024-12-07 RX ADMIN — PRAMIPEXOLE DIHYDROCHLORIDE 0.12 MG: 0.12 TABLET ORAL at 20:57

## 2024-12-07 RX ADMIN — SODIUM CHLORIDE, PRESERVATIVE FREE 10 ML: 5 INJECTION INTRAVENOUS at 08:17

## 2024-12-07 RX ADMIN — FLUOXETINE HYDROCHLORIDE 20 MG: 20 CAPSULE ORAL at 08:16

## 2024-12-07 RX ADMIN — SODIUM CHLORIDE, PRESERVATIVE FREE 10 ML: 5 INJECTION INTRAVENOUS at 21:00

## 2024-12-07 RX ADMIN — TOPIRAMATE 25 MG: 25 TABLET, FILM COATED ORAL at 08:16

## 2024-12-07 RX ADMIN — PANTOPRAZOLE SODIUM 40 MG: 40 TABLET, DELAYED RELEASE ORAL at 05:05

## 2024-12-07 RX ADMIN — TOPIRAMATE 25 MG: 25 TABLET, FILM COATED ORAL at 20:57

## 2024-12-07 RX ADMIN — THIAMINE HCL TAB 100 MG 100 MG: 100 TAB at 08:16

## 2024-12-07 RX ADMIN — PRAMIPEXOLE DIHYDROCHLORIDE 0.12 MG: 0.12 TABLET ORAL at 14:38

## 2024-12-07 RX ADMIN — PREDNISONE 40 MG: 20 TABLET ORAL at 08:16

## 2024-12-07 RX ADMIN — PRAMIPEXOLE DIHYDROCHLORIDE 0.12 MG: 0.12 TABLET ORAL at 08:16

## 2024-12-07 RX ADMIN — LEVETIRACETAM 1000 MG: 500 TABLET, FILM COATED ORAL at 08:15

## 2024-12-07 RX ADMIN — LEVETIRACETAM 1000 MG: 500 TABLET, FILM COATED ORAL at 20:57

## 2024-12-07 RX ADMIN — CHLORDIAZEPOXIDE HYDROCHLORIDE 10 MG: 10 CAPSULE ORAL at 20:57

## 2024-12-07 RX ADMIN — CHLORDIAZEPOXIDE HYDROCHLORIDE 10 MG: 10 CAPSULE ORAL at 08:16

## 2024-12-07 RX ADMIN — TRAZODONE HYDROCHLORIDE 50 MG: 50 TABLET ORAL at 20:59

## 2024-12-07 RX ADMIN — Medication 2 PUFF: at 21:26

## 2024-12-07 RX ADMIN — ENOXAPARIN SODIUM 40 MG: 100 INJECTION SUBCUTANEOUS at 08:15

## 2024-12-07 RX ADMIN — Medication 2 PUFF: at 08:01

## 2024-12-07 ASSESSMENT — PAIN SCALES - GENERAL: PAINLEVEL_OUTOF10: 0

## 2024-12-07 NOTE — PLAN OF CARE
OCCUPATIONAL THERAPY TREATMENT  Patient: María Amos (59 y.o. female)  Date: 12/7/2024  Primary Diagnosis: COPD exacerbation (HCC) [J44.1]       Precautions: Fall Risk, Seizure, Other (Comment)                Recommendations for nursing mobility: Out of bed to chair for meals, Encourage HEP in prep for ADLs/mobility; see handout for details, Frequent repositioning to prevent skin breakdown, Use of bed/chair alarm for safety, AD and gt belt for bed to chair , and Assist x1    In place during session: EKG/telemetry   Chart, occupational therapy assessment, plan of care, and goals were reviewed.    ASSESSMENT  Patient continues with skilled OT services and is progressing towards goals.  Pt received semi-supine in bed upon arrival, AXO x 4 and agreeable to TIERNEY tx at this time. Pt req'd encouragement to participate and demonstrated fair effort during activities with additional time d/t fatigue.  Pt noted with improved bed mobility<>EOB with decreased assistance form CGA to SBA without cues using bed rail. Pt presented good sitting balance. Pt req'd CGA for STS using AD with vc's for correct hand placement for safety. Upon standing pt reported dizziness and returned to sitting EOB. BP 96/70. Pt returned to long sitting. Pt set-up for simple grooming. Pt engaged in frederic UE exercises with education and cueing provided regarding joint protection/proper technique/pacing self/achieve full ROM needed to maintain/improve strength to increase performance in ADL'S and functional tf's, see grid below. /68. Pt educated on fall prevention and safety awareness to improve overall ADL performance and decrease falls, verbalizing good understanding. Handout provided. Pt reported dizziness was improved and nursing aware of therapy outcomes.   (See below for objective details and assist levels)     Overall, pt limited by generalized weakness/strength that interferes with performance in ADL's and functional tf's safely.  Will continue

## 2024-12-08 LAB
ANION GAP SERPL CALC-SCNC: 7 MMOL/L (ref 2–12)
BASOPHILS # BLD: 0 K/UL (ref 0–0.1)
BASOPHILS NFR BLD: 0 % (ref 0–1)
BUN SERPL-MCNC: 21 MG/DL (ref 6–20)
BUN/CREAT SERPL: 19 (ref 12–20)
CA-I BLD-MCNC: 8.1 MG/DL (ref 8.5–10.1)
CHLORIDE SERPL-SCNC: 105 MMOL/L (ref 97–108)
CO2 SERPL-SCNC: 25 MMOL/L (ref 21–32)
CREAT SERPL-MCNC: 1.1 MG/DL (ref 0.55–1.02)
DIFFERENTIAL METHOD BLD: ABNORMAL
EOSINOPHIL # BLD: 0.1 K/UL (ref 0–0.4)
EOSINOPHIL NFR BLD: 2 % (ref 0–7)
ERYTHROCYTE [DISTWIDTH] IN BLOOD BY AUTOMATED COUNT: 13 % (ref 11.5–14.5)
GLUCOSE SERPL-MCNC: 267 MG/DL (ref 65–100)
HCT VFR BLD AUTO: 39.7 % (ref 35–47)
HGB BLD-MCNC: 12.8 G/DL (ref 11.5–16)
IMM GRANULOCYTES # BLD AUTO: 0.1 K/UL (ref 0–0.04)
IMM GRANULOCYTES NFR BLD AUTO: 1 % (ref 0–0.5)
LYMPHOCYTES # BLD: 3.3 K/UL (ref 0.8–3.5)
LYMPHOCYTES NFR BLD: 40 % (ref 12–49)
MAGNESIUM SERPL-MCNC: 2.1 MG/DL (ref 1.6–2.4)
MCH RBC QN AUTO: 29.3 PG (ref 26–34)
MCHC RBC AUTO-ENTMCNC: 32.2 G/DL (ref 30–36.5)
MCV RBC AUTO: 90.8 FL (ref 80–99)
MONOCYTES # BLD: 0.4 K/UL (ref 0–1)
MONOCYTES NFR BLD: 5 % (ref 5–13)
NEUTS SEG # BLD: 4.2 K/UL (ref 1.8–8)
NEUTS SEG NFR BLD: 52 % (ref 32–75)
NRBC # BLD: 0 K/UL (ref 0–0.01)
NRBC BLD-RTO: 0 PER 100 WBC
PHOSPHATE SERPL-MCNC: 3.4 MG/DL (ref 2.6–4.7)
PLATELET # BLD AUTO: 238 K/UL (ref 150–400)
PMV BLD AUTO: 10.1 FL (ref 8.9–12.9)
POTASSIUM SERPL-SCNC: 3.6 MMOL/L (ref 3.5–5.1)
RBC # BLD AUTO: 4.37 M/UL (ref 3.8–5.2)
SODIUM SERPL-SCNC: 137 MMOL/L (ref 136–145)
WBC # BLD AUTO: 8.1 K/UL (ref 3.6–11)

## 2024-12-08 PROCEDURE — 6360000002 HC RX W HCPCS

## 2024-12-08 PROCEDURE — 6370000000 HC RX 637 (ALT 250 FOR IP): Performed by: INTERNAL MEDICINE

## 2024-12-08 PROCEDURE — 94761 N-INVAS EAR/PLS OXIMETRY MLT: CPT

## 2024-12-08 PROCEDURE — 83735 ASSAY OF MAGNESIUM: CPT

## 2024-12-08 PROCEDURE — 6370000000 HC RX 637 (ALT 250 FOR IP)

## 2024-12-08 PROCEDURE — 94640 AIRWAY INHALATION TREATMENT: CPT

## 2024-12-08 PROCEDURE — 84100 ASSAY OF PHOSPHORUS: CPT

## 2024-12-08 PROCEDURE — 80048 BASIC METABOLIC PNL TOTAL CA: CPT

## 2024-12-08 PROCEDURE — 6370000000 HC RX 637 (ALT 250 FOR IP): Performed by: PHYSICIAN ASSISTANT

## 2024-12-08 PROCEDURE — 2580000003 HC RX 258

## 2024-12-08 PROCEDURE — 85025 COMPLETE CBC W/AUTO DIFF WBC: CPT

## 2024-12-08 PROCEDURE — 1100000000 HC RM PRIVATE

## 2024-12-08 PROCEDURE — 6370000000 HC RX 637 (ALT 250 FOR IP): Performed by: PSYCHIATRY & NEUROLOGY

## 2024-12-08 PROCEDURE — 36415 COLL VENOUS BLD VENIPUNCTURE: CPT

## 2024-12-08 RX ADMIN — PRAMIPEXOLE DIHYDROCHLORIDE 0.12 MG: 0.12 TABLET ORAL at 13:55

## 2024-12-08 RX ADMIN — Medication 2 PUFF: at 19:26

## 2024-12-08 RX ADMIN — PANTOPRAZOLE SODIUM 40 MG: 40 TABLET, DELAYED RELEASE ORAL at 07:08

## 2024-12-08 RX ADMIN — CHLORDIAZEPOXIDE HYDROCHLORIDE 10 MG: 10 CAPSULE ORAL at 08:35

## 2024-12-08 RX ADMIN — PREDNISONE 40 MG: 20 TABLET ORAL at 08:35

## 2024-12-08 RX ADMIN — TRAZODONE HYDROCHLORIDE 50 MG: 50 TABLET ORAL at 20:24

## 2024-12-08 RX ADMIN — ENOXAPARIN SODIUM 40 MG: 100 INJECTION SUBCUTANEOUS at 08:35

## 2024-12-08 RX ADMIN — LEVETIRACETAM 1000 MG: 500 TABLET, FILM COATED ORAL at 20:25

## 2024-12-08 RX ADMIN — SODIUM CHLORIDE, PRESERVATIVE FREE 10 ML: 5 INJECTION INTRAVENOUS at 08:34

## 2024-12-08 RX ADMIN — LEVETIRACETAM 1000 MG: 500 TABLET, FILM COATED ORAL at 08:35

## 2024-12-08 RX ADMIN — TOPIRAMATE 25 MG: 25 TABLET, FILM COATED ORAL at 20:25

## 2024-12-08 RX ADMIN — CHLORDIAZEPOXIDE HYDROCHLORIDE 10 MG: 10 CAPSULE ORAL at 13:55

## 2024-12-08 RX ADMIN — Medication 2 PUFF: at 08:06

## 2024-12-08 RX ADMIN — THIAMINE HCL TAB 100 MG 100 MG: 100 TAB at 08:35

## 2024-12-08 RX ADMIN — SODIUM CHLORIDE, PRESERVATIVE FREE 10 ML: 5 INJECTION INTRAVENOUS at 22:34

## 2024-12-08 RX ADMIN — TOPIRAMATE 25 MG: 25 TABLET, FILM COATED ORAL at 08:35

## 2024-12-08 RX ADMIN — ATORVASTATIN CALCIUM 40 MG: 40 TABLET, FILM COATED ORAL at 08:35

## 2024-12-08 RX ADMIN — FLUOXETINE HYDROCHLORIDE 20 MG: 20 CAPSULE ORAL at 08:35

## 2024-12-08 RX ADMIN — PRAMIPEXOLE DIHYDROCHLORIDE 0.12 MG: 0.12 TABLET ORAL at 20:24

## 2024-12-08 RX ADMIN — CHLORDIAZEPOXIDE HYDROCHLORIDE 10 MG: 10 CAPSULE ORAL at 20:25

## 2024-12-08 RX ADMIN — QUETIAPINE FUMARATE 100 MG: 100 TABLET ORAL at 20:25

## 2024-12-08 RX ADMIN — PRAMIPEXOLE DIHYDROCHLORIDE 0.12 MG: 0.12 TABLET ORAL at 08:35

## 2024-12-08 NOTE — PLAN OF CARE
Problem: Chronic Conditions and Co-morbidities  Goal: Patient's chronic conditions and co-morbidity symptoms are monitored and maintained or improved  12/7/2024 2331 by Alvaro Carson RN  Outcome: Progressing  Flowsheets (Taken 12/7/2024 2000)  Care Plan - Patient's Chronic Conditions and Co-Morbidity Symptoms are Monitored and Maintained or Improved: Monitor and assess patient's chronic conditions and comorbid symptoms for stability, deterioration, or improvement  12/7/2024 1148 by Alvaro Angeles RN  Outcome: Progressing     Problem: Discharge Planning  Goal: Discharge to home or other facility with appropriate resources  12/7/2024 2331 by Alvaro Carson RN  Outcome: Progressing  Flowsheets (Taken 12/7/2024 2000)  Discharge to home or other facility with appropriate resources: Identify barriers to discharge with patient and caregiver  12/7/2024 1148 by Alvaro Angeles RN  Outcome: Progressing     Problem: Pain  Goal: Verbalizes/displays adequate comfort level or baseline comfort level  12/7/2024 2331 by Alvaro Carson RN  Outcome: Progressing  12/7/2024 1148 by Alvaro Angeles RN  Outcome: Progressing     Problem: Safety - Adult  Goal: Free from fall injury  12/7/2024 2331 by Alvaro Carson RN  Outcome: Progressing  12/7/2024 1148 by Alvaro Angeles RN  Outcome: Progressing

## 2024-12-08 NOTE — PLAN OF CARE
Problem: Chronic Conditions and Co-morbidities  Goal: Patient's chronic conditions and co-morbidity symptoms are monitored and maintained or improved  12/8/2024 1014 by Alvaro Angeles RN  Outcome: Progressing  Flowsheets (Taken 12/8/2024 0830)  Care Plan - Patient's Chronic Conditions and Co-Morbidity Symptoms are Monitored and Maintained or Improved: Monitor and assess patient's chronic conditions and comorbid symptoms for stability, deterioration, or improvement  12/7/2024 2331 by Alvaro Carson RN  Outcome: Progressing  Flowsheets (Taken 12/7/2024 2000)  Care Plan - Patient's Chronic Conditions and Co-Morbidity Symptoms are Monitored and Maintained or Improved: Monitor and assess patient's chronic conditions and comorbid symptoms for stability, deterioration, or improvement     Problem: Discharge Planning  Goal: Discharge to home or other facility with appropriate resources  12/8/2024 1014 by Alvaro Angeles RN  Outcome: Progressing  12/7/2024 2331 by Alvaro Carson RN  Outcome: Progressing  Flowsheets (Taken 12/7/2024 2000)  Discharge to home or other facility with appropriate resources: Identify barriers to discharge with patient and caregiver     Problem: Pain  Goal: Verbalizes/displays adequate comfort level or baseline comfort level  12/8/2024 1014 by Alvaro Angeles RN  Outcome: Progressing  12/7/2024 2331 by Alvaro Carson RN  Outcome: Progressing     Problem: Safety - Adult  Goal: Free from fall injury  12/8/2024 1014 by Alvaro Angeles RN  Outcome: Progressing  12/7/2024 2331 by Alvaro Carson RN  Outcome: Progressing

## 2024-12-09 ENCOUNTER — APPOINTMENT (OUTPATIENT)
Facility: HOSPITAL | Age: 59
DRG: 896 | End: 2024-12-09
Payer: MEDICARE

## 2024-12-09 LAB
ECHO AO ROOT DIAM: 2.9 CM
ECHO AO ROOT INDEX: 1.37 CM/M2
ECHO AV AREA PEAK VELOCITY: 2 CM2
ECHO AV AREA VTI: 1.9 CM2
ECHO AV AREA/BSA PEAK VELOCITY: 0.9 CM2/M2
ECHO AV AREA/BSA VTI: 0.9 CM2/M2
ECHO AV MEAN GRADIENT: 3 MMHG
ECHO AV MEAN VELOCITY: 0.7 M/S
ECHO AV PEAK GRADIENT: 5 MMHG
ECHO AV PEAK VELOCITY: 1.1 M/S
ECHO AV VELOCITY RATIO: 0.91
ECHO AV VTI: 24.3 CM
ECHO BSA: 2.18 M2
ECHO LA AREA 4C: 19.9 CM2
ECHO LA MAJOR AXIS: 5.7 CM
ECHO LA VOL MOD A4C: 52 ML (ref 22–52)
ECHO LA VOLUME INDEX MOD A4C: 25 ML/M2 (ref 16–34)
ECHO LV E' LATERAL VELOCITY: 8.81 CM/S
ECHO LV E' SEPTAL VELOCITY: 9.36 CM/S
ECHO LV EDV A4C: 90 ML
ECHO LV EDV INDEX A4C: 42 ML/M2
ECHO LV EF PHYSICIAN: 70 %
ECHO LV EJECTION FRACTION A4C: 76 %
ECHO LV ESV A4C: 22 ML
ECHO LV ESV INDEX A4C: 10 ML/M2
ECHO LV FRACTIONAL SHORTENING: 80 % (ref 28–44)
ECHO LV INTERNAL DIMENSION DIASTOLE INDEX: 2.31 CM/M2
ECHO LV INTERNAL DIMENSION DIASTOLIC: 4.9 CM (ref 3.9–5.3)
ECHO LV INTERNAL DIMENSION SYSTOLIC INDEX: 0.47 CM/M2
ECHO LV INTERNAL DIMENSION SYSTOLIC: 1 CM
ECHO LV IVSD: 0.8 CM (ref 0.6–0.9)
ECHO LV MASS 2D: 131.2 G (ref 67–162)
ECHO LV MASS INDEX 2D: 61.9 G/M2 (ref 43–95)
ECHO LV POSTERIOR WALL DIASTOLIC: 0.8 CM (ref 0.6–0.9)
ECHO LV RELATIVE WALL THICKNESS RATIO: 0.33
ECHO LVOT AREA: 2.3 CM2
ECHO LVOT AV VTI INDEX: 0.85
ECHO LVOT DIAM: 1.7 CM
ECHO LVOT MEAN GRADIENT: 2 MMHG
ECHO LVOT PEAK GRADIENT: 4 MMHG
ECHO LVOT PEAK VELOCITY: 1 M/S
ECHO LVOT STROKE VOLUME INDEX: 22 ML/M2
ECHO LVOT SV: 46.7 ML
ECHO LVOT VTI: 20.6 CM
ECHO MV A VELOCITY: 0.7 M/S
ECHO MV AREA VTI: 1.4 CM2
ECHO MV E DECELERATION TIME (DT): 268 MS
ECHO MV E VELOCITY: 0.74 M/S
ECHO MV E/A RATIO: 1.06
ECHO MV E/E' LATERAL: 8.4
ECHO MV E/E' RATIO (AVERAGED): 8.15
ECHO MV E/E' SEPTAL: 7.91
ECHO MV LVOT VTI INDEX: 1.58
ECHO MV MAX VELOCITY: 1 M/S
ECHO MV MEAN GRADIENT: 1 MMHG
ECHO MV MEAN VELOCITY: 0.6 M/S
ECHO MV PEAK GRADIENT: 4 MMHG
ECHO MV REGURGITANT PEAK GRADIENT: 14 MMHG
ECHO MV REGURGITANT PEAK VELOCITY: 1.9 M/S
ECHO MV VTI: 32.6 CM
ECHO RA AREA 4C: 10.5 CM2
ECHO RA END SYSTOLIC VOLUME APICAL 4 CHAMBER INDEX BSA: 9 ML/M2
ECHO RA VOLUME: 19 ML
ECHO RV BASAL DIMENSION: 3.7 CM
ECHO RV FREE WALL PEAK S': 9.9 CM/S
ECHO RV TAPSE: 1.7 CM (ref 1.7–?)
ECHO TV REGURGITANT MAX VELOCITY: 1.65 M/S
ECHO TV REGURGITANT PEAK GRADIENT: 11 MMHG

## 2024-12-09 PROCEDURE — 6370000000 HC RX 637 (ALT 250 FOR IP)

## 2024-12-09 PROCEDURE — 94640 AIRWAY INHALATION TREATMENT: CPT

## 2024-12-09 PROCEDURE — 2580000003 HC RX 258

## 2024-12-09 PROCEDURE — C8929 TTE W OR WO FOL WCON,DOPPLER: HCPCS

## 2024-12-09 PROCEDURE — 97535 SELF CARE MNGMENT TRAINING: CPT

## 2024-12-09 PROCEDURE — 93005 ELECTROCARDIOGRAM TRACING: CPT

## 2024-12-09 PROCEDURE — 1100000000 HC RM PRIVATE

## 2024-12-09 PROCEDURE — 97530 THERAPEUTIC ACTIVITIES: CPT

## 2024-12-09 PROCEDURE — 94761 N-INVAS EAR/PLS OXIMETRY MLT: CPT

## 2024-12-09 PROCEDURE — 76700 US EXAM ABDOM COMPLETE: CPT

## 2024-12-09 PROCEDURE — 6360000004 HC RX CONTRAST MEDICATION

## 2024-12-09 PROCEDURE — 6370000000 HC RX 637 (ALT 250 FOR IP): Performed by: PSYCHIATRY & NEUROLOGY

## 2024-12-09 RX ORDER — LEVETIRACETAM 500 MG/1
1000 TABLET ORAL 2 TIMES DAILY
Status: DISCONTINUED | OUTPATIENT
Start: 2024-12-09 | End: 2024-12-11 | Stop reason: HOSPADM

## 2024-12-09 RX ADMIN — QUETIAPINE FUMARATE 100 MG: 100 TABLET ORAL at 21:07

## 2024-12-09 RX ADMIN — TOPIRAMATE 25 MG: 25 TABLET, FILM COATED ORAL at 21:06

## 2024-12-09 RX ADMIN — TRAZODONE HYDROCHLORIDE 50 MG: 50 TABLET ORAL at 21:08

## 2024-12-09 RX ADMIN — CHLORDIAZEPOXIDE HYDROCHLORIDE 10 MG: 10 CAPSULE ORAL at 17:18

## 2024-12-09 RX ADMIN — Medication 2 PUFF: at 07:17

## 2024-12-09 RX ADMIN — PERFLUTREN 2 ML: 6.52 INJECTION, SUSPENSION INTRAVENOUS at 12:18

## 2024-12-09 RX ADMIN — SODIUM CHLORIDE, PRESERVATIVE FREE 10 ML: 5 INJECTION INTRAVENOUS at 21:16

## 2024-12-09 RX ADMIN — CHLORDIAZEPOXIDE HYDROCHLORIDE 10 MG: 10 CAPSULE ORAL at 21:06

## 2024-12-09 RX ADMIN — LEVETIRACETAM 1000 MG: 500 TABLET, FILM COATED ORAL at 21:06

## 2024-12-09 RX ADMIN — PRAMIPEXOLE DIHYDROCHLORIDE 0.12 MG: 0.12 TABLET ORAL at 21:06

## 2024-12-09 RX ADMIN — PRAMIPEXOLE DIHYDROCHLORIDE 0.12 MG: 0.12 TABLET ORAL at 17:18

## 2024-12-09 RX ADMIN — Medication 2 PUFF: at 17:57

## 2024-12-09 ASSESSMENT — PAIN SCALES - GENERAL: PAINLEVEL_OUTOF10: 8

## 2024-12-09 ASSESSMENT — PAIN DESCRIPTION - LOCATION: LOCATION: LEG

## 2024-12-09 NOTE — CARE COORDINATION
IDR 24HRS.  Patient will need psych clearance for discharge.  Accepted at [facility].  Insurance auth still pending.      [facility] @ [phone], option # 3.      Clinicals to be faxed to [phone].       Reference# [id number].    PEE Garcia

## 2024-12-09 NOTE — PLAN OF CARE
Problem: Chronic Conditions and Co-morbidities  Goal: Patient's chronic conditions and co-morbidity symptoms are monitored and maintained or improved  12/9/2024 1201 by Andrew Ye RN  Outcome: Progressing  12/9/2024 0554 by Alvaro Carson RN  Outcome: Not Progressing  Flowsheets (Taken 12/8/2024 1953)  Care Plan - Patient's Chronic Conditions and Co-Morbidity Symptoms are Monitored and Maintained or Improved: Monitor and assess patient's chronic conditions and comorbid symptoms for stability, deterioration, or improvement     Problem: Discharge Planning  Goal: Discharge to home or other facility with appropriate resources  12/9/2024 1201 by Andrew Ye RN  Outcome: Progressing  12/9/2024 0554 by Alvaro Carson RN  Outcome: Progressing  Flowsheets (Taken 12/8/2024 1953)  Discharge to home or other facility with appropriate resources: Identify barriers to discharge with patient and caregiver     Problem: Pain  Goal: Verbalizes/displays adequate comfort level or baseline comfort level  12/9/2024 1201 by Andrew Ye RN  Outcome: Progressing  12/9/2024 0554 by Alvaro Carson RN  Outcome: Progressing     Problem: Safety - Adult  Goal: Free from fall injury  12/9/2024 1201 by Andrew Ye RN  Outcome: Progressing  12/9/2024 0554 by Alvaro Carson RN  Outcome: Progressing     Problem: Chronic Conditions and Co-morbidities  Goal: Patient's chronic conditions and co-morbidity symptoms are monitored and maintained or improved  12/9/2024 1201 by Andrew Ye RN  Outcome: Progressing  12/9/2024 0554 by Alvaro Carson RN  Outcome: Not Progressing  Flowsheets (Taken 12/8/2024 1953)  Care Plan - Patient's Chronic Conditions and Co-Morbidity Symptoms are Monitored and Maintained or Improved: Monitor and assess patient's chronic conditions and comorbid symptoms for stability, deterioration, or improvement

## 2024-12-09 NOTE — PLAN OF CARE
PHYSICAL THERAPY TREATMENT     Patient: María Amos (59 y.o. female)  Date: 12/9/2024  Diagnosis: COPD exacerbation (HCC) [J44.1] COPD exacerbation (HCC)      Precautions: Fall Risk, Seizure, Other (Comment)                      Recommendations for nursing mobility: Out of bed to chair for meals, Encourage HEP in prep for ADLs/mobility; see handout for details, Use of bed/chair alarm for safety, AD and gt belt for bed to chair , Amb to bathroom with AD and gait belt, and Assist x1    In place during session: EKG/telemetry   Chart, physical therapy assessment, plan of care and goals were reviewed.  ASSESSMENT  Patient continues with skilled PT services and is progressing towards goals. Pt received semi supine upon PT arrival, agreeable to session. Pt A&O x 4. Pt. Dizzy supine. (See below for objective details and assist levels). Upon arrival, pt. Not feeling well but agreed to work with therapy at bed side. Pt. Limited today due to dizziness and not feeling well. Pt. Performed LE exercises supine prior to sitting. Pt. Required more assist for mobility this session compare to last session. BP 95/54 supine. Pt. Only able to tolerate sitting EOB approx 4 min then needed to return to supine. Increase in SOB noted while sitting EOB. Pt. S eyes were glazed over and pt. Reported not feeling well and dizzy. It seemed pt. Was going to pass out.HR 45 Unable to obtain BP in sitting. Notified RN immediately and RN in room. Instructed pt. In PLB throughout session.    Overall pt tolerated session poor today with Mobility.  Will continue to benefit from skilled PT services, and will continue to progress as tolerated. Current PT DC recommendation Moderate intensity short-term skilled physical therapy up to 5x/week once medically appropriate.     Start of Session End of Session   SPO2 (%) 97 97   Heart Rate (BPM) 56 45     GOALS:    Problem: Physical Therapy - Adult  Goal: By Discharge: Performs mobility at highest level

## 2024-12-09 NOTE — PLAN OF CARE
Problem: Discharge Planning  Goal: Discharge to home or other facility with appropriate resources  Outcome: Progressing  Flowsheets (Taken 12/8/2024 1953)  Discharge to home or other facility with appropriate resources: Identify barriers to discharge with patient and caregiver     Problem: Pain  Goal: Verbalizes/displays adequate comfort level or baseline comfort level  Outcome: Progressing     Problem: Safety - Adult  Goal: Free from fall injury  Outcome: Progressing     Problem: Chronic Conditions and Co-morbidities  Goal: Patient's chronic conditions and co-morbidity symptoms are monitored and maintained or improved  Outcome: Not Progressing  Flowsheets (Taken 12/8/2024 1953)  Care Plan - Patient's Chronic Conditions and Co-Morbidity Symptoms are Monitored and Maintained or Improved: Monitor and assess patient's chronic conditions and comorbid symptoms for stability, deterioration, or improvement

## 2024-12-10 LAB
ANION GAP SERPL CALC-SCNC: 5 MMOL/L (ref 2–12)
BASOPHILS # BLD: 0 K/UL (ref 0–0.1)
BASOPHILS NFR BLD: 0 % (ref 0–1)
BUN SERPL-MCNC: 17 MG/DL (ref 6–20)
BUN/CREAT SERPL: 18 (ref 12–20)
CA-I BLD-MCNC: 8.4 MG/DL (ref 8.5–10.1)
CHLORIDE SERPL-SCNC: 108 MMOL/L (ref 97–108)
CO2 SERPL-SCNC: 25 MMOL/L (ref 21–32)
CREAT SERPL-MCNC: 0.96 MG/DL (ref 0.55–1.02)
DIFFERENTIAL METHOD BLD: ABNORMAL
EKG ATRIAL RATE: 58 BPM
EKG DIAGNOSIS: NORMAL
EKG P AXIS: 66 DEGREES
EKG P-R INTERVAL: 132 MS
EKG Q-T INTERVAL: 518 MS
EKG QRS DURATION: 84 MS
EKG QTC CALCULATION (BAZETT): 508 MS
EKG R AXIS: 55 DEGREES
EKG T AXIS: 193 DEGREES
EKG VENTRICULAR RATE: 58 BPM
EOSINOPHIL # BLD: 0.1 K/UL (ref 0–0.4)
EOSINOPHIL NFR BLD: 2 % (ref 0–7)
ERYTHROCYTE [DISTWIDTH] IN BLOOD BY AUTOMATED COUNT: 13 % (ref 11.5–14.5)
GLUCOSE SERPL-MCNC: 197 MG/DL (ref 65–100)
HCT VFR BLD AUTO: 37.9 % (ref 35–47)
HGB BLD-MCNC: 12.6 G/DL (ref 11.5–16)
IMM GRANULOCYTES # BLD AUTO: 0.1 K/UL (ref 0–0.04)
IMM GRANULOCYTES NFR BLD AUTO: 1 % (ref 0–0.5)
LYMPHOCYTES # BLD: 2.2 K/UL (ref 0.8–3.5)
LYMPHOCYTES NFR BLD: 29 % (ref 12–49)
MAGNESIUM SERPL-MCNC: 2.2 MG/DL (ref 1.6–2.4)
MCH RBC QN AUTO: 29.6 PG (ref 26–34)
MCHC RBC AUTO-ENTMCNC: 33.2 G/DL (ref 30–36.5)
MCV RBC AUTO: 89.2 FL (ref 80–99)
MONOCYTES # BLD: 0.4 K/UL (ref 0–1)
MONOCYTES NFR BLD: 5 % (ref 5–13)
NEUTS SEG # BLD: 4.8 K/UL (ref 1.8–8)
NEUTS SEG NFR BLD: 63 % (ref 32–75)
NRBC # BLD: 0 K/UL (ref 0–0.01)
NRBC BLD-RTO: 0 PER 100 WBC
PHOSPHATE SERPL-MCNC: 3.3 MG/DL (ref 2.6–4.7)
PLATELET # BLD AUTO: 241 K/UL (ref 150–400)
PMV BLD AUTO: 10.1 FL (ref 8.9–12.9)
POTASSIUM SERPL-SCNC: 3.6 MMOL/L (ref 3.5–5.1)
RBC # BLD AUTO: 4.25 M/UL (ref 3.8–5.2)
SODIUM SERPL-SCNC: 138 MMOL/L (ref 136–145)
WBC # BLD AUTO: 7.6 K/UL (ref 3.6–11)

## 2024-12-10 PROCEDURE — 94640 AIRWAY INHALATION TREATMENT: CPT

## 2024-12-10 PROCEDURE — 2580000003 HC RX 258

## 2024-12-10 PROCEDURE — 97530 THERAPEUTIC ACTIVITIES: CPT

## 2024-12-10 PROCEDURE — 83735 ASSAY OF MAGNESIUM: CPT

## 2024-12-10 PROCEDURE — 36415 COLL VENOUS BLD VENIPUNCTURE: CPT

## 2024-12-10 PROCEDURE — 6370000000 HC RX 637 (ALT 250 FOR IP)

## 2024-12-10 PROCEDURE — 85025 COMPLETE CBC W/AUTO DIFF WBC: CPT

## 2024-12-10 PROCEDURE — 94761 N-INVAS EAR/PLS OXIMETRY MLT: CPT

## 2024-12-10 PROCEDURE — 1100000000 HC RM PRIVATE

## 2024-12-10 PROCEDURE — 80048 BASIC METABOLIC PNL TOTAL CA: CPT

## 2024-12-10 PROCEDURE — 84100 ASSAY OF PHOSPHORUS: CPT

## 2024-12-10 PROCEDURE — 6370000000 HC RX 637 (ALT 250 FOR IP): Performed by: PHYSICIAN ASSISTANT

## 2024-12-10 PROCEDURE — 6360000002 HC RX W HCPCS

## 2024-12-10 PROCEDURE — 6370000000 HC RX 637 (ALT 250 FOR IP): Performed by: PSYCHIATRY & NEUROLOGY

## 2024-12-10 RX ORDER — HYDROCORTISONE ACETATE 25 MG/1
25 SUPPOSITORY RECTAL 2 TIMES DAILY
Status: DISCONTINUED | OUTPATIENT
Start: 2024-12-10 | End: 2024-12-11 | Stop reason: HOSPADM

## 2024-12-10 RX ORDER — MIDODRINE HYDROCHLORIDE 5 MG/1
5 TABLET ORAL
Status: DISCONTINUED | OUTPATIENT
Start: 2024-12-10 | End: 2024-12-11 | Stop reason: HOSPADM

## 2024-12-10 RX ADMIN — LEVETIRACETAM 1000 MG: 500 TABLET, FILM COATED ORAL at 21:28

## 2024-12-10 RX ADMIN — MIDODRINE HYDROCHLORIDE 5 MG: 5 TABLET ORAL at 12:02

## 2024-12-10 RX ADMIN — ATORVASTATIN CALCIUM 40 MG: 40 TABLET, FILM COATED ORAL at 09:40

## 2024-12-10 RX ADMIN — QUETIAPINE FUMARATE 100 MG: 100 TABLET ORAL at 21:28

## 2024-12-10 RX ADMIN — CHLORDIAZEPOXIDE HYDROCHLORIDE 10 MG: 10 CAPSULE ORAL at 15:44

## 2024-12-10 RX ADMIN — Medication 2 PUFF: at 21:11

## 2024-12-10 RX ADMIN — PRAMIPEXOLE DIHYDROCHLORIDE 0.12 MG: 0.12 TABLET ORAL at 21:28

## 2024-12-10 RX ADMIN — MIDODRINE HYDROCHLORIDE 5 MG: 5 TABLET ORAL at 17:15

## 2024-12-10 RX ADMIN — SODIUM CHLORIDE, PRESERVATIVE FREE 10 ML: 5 INJECTION INTRAVENOUS at 21:29

## 2024-12-10 RX ADMIN — TRAZODONE HYDROCHLORIDE 50 MG: 50 TABLET ORAL at 21:28

## 2024-12-10 RX ADMIN — CHLORDIAZEPOXIDE HYDROCHLORIDE 10 MG: 10 CAPSULE ORAL at 21:28

## 2024-12-10 RX ADMIN — PRAMIPEXOLE DIHYDROCHLORIDE 0.12 MG: 0.12 TABLET ORAL at 15:44

## 2024-12-10 RX ADMIN — TOPIRAMATE 25 MG: 25 TABLET, FILM COATED ORAL at 21:28

## 2024-12-10 RX ADMIN — Medication 2 PUFF: at 08:02

## 2024-12-10 RX ADMIN — THIAMINE HCL TAB 100 MG 100 MG: 100 TAB at 09:39

## 2024-12-10 RX ADMIN — LEVETIRACETAM 1000 MG: 500 TABLET, FILM COATED ORAL at 09:39

## 2024-12-10 RX ADMIN — PRAMIPEXOLE DIHYDROCHLORIDE 0.12 MG: 0.12 TABLET ORAL at 09:39

## 2024-12-10 RX ADMIN — PREDNISONE 40 MG: 20 TABLET ORAL at 09:40

## 2024-12-10 RX ADMIN — ENOXAPARIN SODIUM 40 MG: 100 INJECTION SUBCUTANEOUS at 09:39

## 2024-12-10 RX ADMIN — SODIUM CHLORIDE, PRESERVATIVE FREE 10 ML: 5 INJECTION INTRAVENOUS at 09:40

## 2024-12-10 RX ADMIN — TOPIRAMATE 25 MG: 25 TABLET, FILM COATED ORAL at 09:39

## 2024-12-10 RX ADMIN — FLUOXETINE HYDROCHLORIDE 20 MG: 20 CAPSULE ORAL at 09:39

## 2024-12-10 RX ADMIN — PANTOPRAZOLE SODIUM 40 MG: 40 TABLET, DELAYED RELEASE ORAL at 05:27

## 2024-12-10 RX ADMIN — CHLORDIAZEPOXIDE HYDROCHLORIDE 10 MG: 10 CAPSULE ORAL at 09:40

## 2024-12-10 ASSESSMENT — PAIN SCALES - GENERAL
PAINLEVEL_OUTOF10: 0
PAINLEVEL_OUTOF10: 0

## 2024-12-10 NOTE — RT PROTOCOL NOTE
Pulmonary Disease Navigator Note  [facility]    Current GOLD classification for María Amos  Patient's chart was reviewed by Pulmonary Disease Navigator for compliance with prescribed treatment with [employer] For Chronic Obstructive Lung Disease (GOLD).    Follow up assessment as per IDR patient is being prepped for discharge soon.  Patient has auth to go to [facility].  With admission to facility, it is hoped that medication compliance will be better and patient will be able to have better control of COPD.  No needs or concerns voiced at this time.      Time spent in review and with patient at bedside: 15 minutes    Electronically signed by Rivka Hopkins, RRT

## 2024-12-10 NOTE — PLAN OF CARE
Problem: Chronic Conditions and Co-morbidities  Goal: Patient's chronic conditions and co-morbidity symptoms are monitored and maintained or improved  12/10/2024 0159 by Chandni Victoria RN  Outcome: Progressing  Flowsheets (Taken 12/9/2024 2110)  Care Plan - Patient's Chronic Conditions and Co-Morbidity Symptoms are Monitored and Maintained or Improved: Monitor and assess patient's chronic conditions and comorbid symptoms for stability, deterioration, or improvement  12/9/2024 1201 by Andrew Ye RN  Outcome: Progressing     Problem: Discharge Planning  Goal: Discharge to home or other facility with appropriate resources  12/10/2024 0159 by Chandni Victoria RN  Outcome: Progressing  Flowsheets (Taken 12/9/2024 2110)  Discharge to home or other facility with appropriate resources: Identify barriers to discharge with patient and caregiver  12/9/2024 1201 by Andrew Ye RN  Outcome: Progressing     Problem: Pain  Goal: Verbalizes/displays adequate comfort level or baseline comfort level  12/10/2024 0159 by Chandni Victoria RN  Outcome: Progressing  Flowsheets (Taken 12/9/2024 2113)  Verbalizes/displays adequate comfort level or baseline comfort level: Encourage patient to monitor pain and request assistance  12/9/2024 1201 by Andrew Ye RN  Outcome: Progressing     Problem: Safety - Adult  Goal: Free from fall injury  12/10/2024 0159 by Chandni Victoria RN  Outcome: Progressing  Flowsheets (Taken 12/9/2024 2110)  Free From Fall Injury: Instruct family/caregiver on patient safety  12/9/2024 1201 by Andrew Ye RN  Outcome: Progressing     Problem: Occupational Therapy - Adult  Goal: By Discharge: Performs self-care activities at highest level of function for planned discharge setting.  See evaluation for individualized goals.  Description: FUNCTIONAL STATUS PRIOR TO ADMISSION:   Pt reports she is mostly independent with ADLs but  will assist prn, and she is modified independent with

## 2024-12-10 NOTE — CARE COORDINATION
0845: Chart reviewed.    Per notes; patient followed via dietitian and psych.    AUTH pending at [facility].    CM will continue to follow patient and recs of medical team.    1115: [facility] received AUTH.    ZULAY 24 hours per IDR meeting pending BP control and psych clearance.    [facility]

## 2024-12-10 NOTE — PLAN OF CARE
PHYSICAL THERAPY TREATMENT     Patient: María Amos (59 y.o. female)  Date: 12/10/2024  Diagnosis: COPD exacerbation (HCC) [J44.1] COPD exacerbation (HCC)      Precautions: Fall Risk, Seizure, Other (Comment)                      Recommendations for nursing mobility: Out of bed to chair for meals, Encourage HEP in prep for ADLs/mobility; see handout for details, Frequent repositioning to prevent skin breakdown, AD and gt belt for bed to chair , Amb to bathroom with AD and gait belt, and Assist x1    In place during session: EKG/telemetry   Chart, physical therapy assessment, plan of care and goals were reviewed.  ASSESSMENT  Patient continues with skilled PT services and is progressing towards goals. Pt supine in bed upon PT arrival, agreeable to session. (See below for objective details and assist levels).     Overall pt tolerated session fair today. Pt transferred to eob with no hands on assist. Pt was able to sudhakar L sock but required assistance to sudhakar R. Pt performed seated LE therex with no c/o pain or discomfort. Pt stood from bed and ambulated ~16 ft with RW. Gait was slow and fairly steady with no lob or knee buckling noted. Pt returned supine in bed and was left with all needs met. Will continue to benefit from skilled PT services, and will continue to progress as tolerated. Current PT DC recommendation Moderate intensity short-term skilled physical therapy up to 5x/week once medically appropriate.    GOALS:  Problem: Physical Therapy - Adult  Goal: By Discharge: Performs mobility at highest level of function for planned discharge setting.  See evaluation for individualized goals.  Description: FUNCTIONAL STATUS PRIOR TO ADMISSION: Patient was modified independent using a rolling walker and single point cane for functional mobility.    HOME SUPPORT PRIOR TO ADMISSION: The patient lived with spouse who provides assist as needed.    Physical Therapy Goals  Initiated 12/5/2024  Pt stated goal: to go

## 2024-12-11 VITALS
TEMPERATURE: 98.4 F | RESPIRATION RATE: 18 BRPM | OXYGEN SATURATION: 97 % | BODY MASS INDEX: 33.08 KG/M2 | SYSTOLIC BLOOD PRESSURE: 121 MMHG | HEIGHT: 68 IN | WEIGHT: 218.26 LBS | DIASTOLIC BLOOD PRESSURE: 76 MMHG | HEART RATE: 56 BPM

## 2024-12-11 LAB
ANION GAP SERPL CALC-SCNC: 5 MMOL/L (ref 2–12)
BASOPHILS # BLD: 0 K/UL (ref 0–0.1)
BASOPHILS NFR BLD: 0 % (ref 0–1)
BUN SERPL-MCNC: 16 MG/DL (ref 6–20)
BUN/CREAT SERPL: 16 (ref 12–20)
CA-I BLD-MCNC: 8.6 MG/DL (ref 8.5–10.1)
CHLORIDE SERPL-SCNC: 105 MMOL/L (ref 97–108)
CO2 SERPL-SCNC: 25 MMOL/L (ref 21–32)
CREAT SERPL-MCNC: 1.01 MG/DL (ref 0.55–1.02)
DIFFERENTIAL METHOD BLD: ABNORMAL
EOSINOPHIL # BLD: 0.1 K/UL (ref 0–0.4)
EOSINOPHIL NFR BLD: 1 % (ref 0–7)
ERYTHROCYTE [DISTWIDTH] IN BLOOD BY AUTOMATED COUNT: 12.8 % (ref 11.5–14.5)
GLUCOSE SERPL-MCNC: 224 MG/DL (ref 65–100)
HCT VFR BLD AUTO: 39.8 % (ref 35–47)
HGB BLD-MCNC: 12.9 G/DL (ref 11.5–16)
IMM GRANULOCYTES # BLD AUTO: 0.1 K/UL (ref 0–0.04)
IMM GRANULOCYTES NFR BLD AUTO: 1 % (ref 0–0.5)
LYMPHOCYTES # BLD: 3.7 K/UL (ref 0.8–3.5)
LYMPHOCYTES NFR BLD: 34 % (ref 12–49)
MAGNESIUM SERPL-MCNC: 2.2 MG/DL (ref 1.6–2.4)
MCH RBC QN AUTO: 29.5 PG (ref 26–34)
MCHC RBC AUTO-ENTMCNC: 32.4 G/DL (ref 30–36.5)
MCV RBC AUTO: 91.1 FL (ref 80–99)
MONOCYTES # BLD: 0.6 K/UL (ref 0–1)
MONOCYTES NFR BLD: 6 % (ref 5–13)
NEUTS SEG # BLD: 6.2 K/UL (ref 1.8–8)
NEUTS SEG NFR BLD: 58 % (ref 32–75)
NRBC # BLD: 0 K/UL (ref 0–0.01)
NRBC BLD-RTO: 0 PER 100 WBC
PHOSPHATE SERPL-MCNC: 3.5 MG/DL (ref 2.6–4.7)
PLATELET # BLD AUTO: 259 K/UL (ref 150–400)
PMV BLD AUTO: 9.9 FL (ref 8.9–12.9)
POTASSIUM SERPL-SCNC: 3.6 MMOL/L (ref 3.5–5.1)
RBC # BLD AUTO: 4.37 M/UL (ref 3.8–5.2)
SODIUM SERPL-SCNC: 135 MMOL/L (ref 136–145)
WBC # BLD AUTO: 10.8 K/UL (ref 3.6–11)

## 2024-12-11 PROCEDURE — 6370000000 HC RX 637 (ALT 250 FOR IP)

## 2024-12-11 PROCEDURE — 83735 ASSAY OF MAGNESIUM: CPT

## 2024-12-11 PROCEDURE — 36415 COLL VENOUS BLD VENIPUNCTURE: CPT

## 2024-12-11 PROCEDURE — 6370000000 HC RX 637 (ALT 250 FOR IP): Performed by: PSYCHIATRY & NEUROLOGY

## 2024-12-11 PROCEDURE — 6370000000 HC RX 637 (ALT 250 FOR IP): Performed by: PHYSICIAN ASSISTANT

## 2024-12-11 PROCEDURE — 94640 AIRWAY INHALATION TREATMENT: CPT

## 2024-12-11 PROCEDURE — 6360000002 HC RX W HCPCS

## 2024-12-11 PROCEDURE — 84100 ASSAY OF PHOSPHORUS: CPT

## 2024-12-11 PROCEDURE — 80048 BASIC METABOLIC PNL TOTAL CA: CPT

## 2024-12-11 PROCEDURE — 2580000003 HC RX 258

## 2024-12-11 PROCEDURE — 85025 COMPLETE CBC W/AUTO DIFF WBC: CPT

## 2024-12-11 PROCEDURE — 94761 N-INVAS EAR/PLS OXIMETRY MLT: CPT

## 2024-12-11 RX ORDER — TOPIRAMATE 25 MG/1
25 TABLET, FILM COATED ORAL 2 TIMES DAILY
Qty: 60 TABLET | Refills: 3 | Status: SHIPPED | OUTPATIENT
Start: 2024-12-11

## 2024-12-11 RX ORDER — HYDROCORTISONE ACETATE 25 MG/1
25 SUPPOSITORY RECTAL 2 TIMES DAILY
Qty: 12 SUPPOSITORY | Refills: 0 | Status: SHIPPED | OUTPATIENT
Start: 2024-12-11

## 2024-12-11 RX ORDER — PRAMIPEXOLE DIHYDROCHLORIDE 0.12 MG/1
0.12 TABLET ORAL 3 TIMES DAILY
Qty: 90 TABLET | Refills: 3 | Status: SHIPPED | OUTPATIENT
Start: 2024-12-11

## 2024-12-11 RX ORDER — MIDODRINE HYDROCHLORIDE 5 MG/1
5 TABLET ORAL
Qty: 90 TABLET | Refills: 3 | Status: SHIPPED | OUTPATIENT
Start: 2024-12-11

## 2024-12-11 RX ADMIN — ENOXAPARIN SODIUM 40 MG: 100 INJECTION SUBCUTANEOUS at 08:51

## 2024-12-11 RX ADMIN — SODIUM CHLORIDE, PRESERVATIVE FREE 10 ML: 5 INJECTION INTRAVENOUS at 08:51

## 2024-12-11 RX ADMIN — PREDNISONE 40 MG: 20 TABLET ORAL at 08:51

## 2024-12-11 RX ADMIN — MIDODRINE HYDROCHLORIDE 5 MG: 5 TABLET ORAL at 11:47

## 2024-12-11 RX ADMIN — TOPIRAMATE 25 MG: 25 TABLET, FILM COATED ORAL at 08:51

## 2024-12-11 RX ADMIN — PRAMIPEXOLE DIHYDROCHLORIDE 0.12 MG: 0.12 TABLET ORAL at 08:54

## 2024-12-11 RX ADMIN — MIDODRINE HYDROCHLORIDE 5 MG: 5 TABLET ORAL at 08:51

## 2024-12-11 RX ADMIN — FLUOXETINE HYDROCHLORIDE 20 MG: 20 CAPSULE ORAL at 08:51

## 2024-12-11 RX ADMIN — THIAMINE HCL TAB 100 MG 100 MG: 100 TAB at 08:51

## 2024-12-11 RX ADMIN — LEVETIRACETAM 1000 MG: 500 TABLET, FILM COATED ORAL at 08:51

## 2024-12-11 RX ADMIN — PANTOPRAZOLE SODIUM 40 MG: 40 TABLET, DELAYED RELEASE ORAL at 06:45

## 2024-12-11 RX ADMIN — ATORVASTATIN CALCIUM 40 MG: 40 TABLET, FILM COATED ORAL at 08:51

## 2024-12-11 RX ADMIN — PRAMIPEXOLE DIHYDROCHLORIDE 0.12 MG: 0.12 TABLET ORAL at 13:44

## 2024-12-11 RX ADMIN — CHLORDIAZEPOXIDE HYDROCHLORIDE 10 MG: 10 CAPSULE ORAL at 13:44

## 2024-12-11 RX ADMIN — Medication 2 PUFF: at 07:52

## 2024-12-11 RX ADMIN — CHLORDIAZEPOXIDE HYDROCHLORIDE 10 MG: 10 CAPSULE ORAL at 08:51

## 2024-12-11 ASSESSMENT — PAIN SCALES - GENERAL: PAINLEVEL_OUTOF10: 0

## 2024-12-11 NOTE — PLAN OF CARE
Problem: Chronic Conditions and Co-morbidities  Goal: Patient's chronic conditions and co-morbidity symptoms are monitored and maintained or improved  12/11/2024 0118 by Solange Morales RN  Outcome: Progressing  12/10/2024 1857 by Julee Carson RN  Outcome: Progressing     Problem: Discharge Planning  Goal: Discharge to home or other facility with appropriate resources  12/11/2024 0118 by Solange Morales RN  Outcome: Progressing  12/10/2024 1857 by Julee Carson RN  Outcome: Progressing  Flowsheets (Taken 12/10/2024 0940)  Discharge to home or other facility with appropriate resources: Identify barriers to discharge with patient and caregiver     Problem: Pain  Goal: Verbalizes/displays adequate comfort level or baseline comfort level  12/11/2024 0118 by Solange Morales RN  Outcome: Progressing  12/10/2024 1857 by Julee Carson RN  Outcome: Progressing

## 2024-12-11 NOTE — CARE COORDINATION
0835: Chart reviewed.    Per notes; patient followed via dietitian and psych.    [facility]    Updates attached in [facility]Port.    CM will continue to follow patient and recs of medical team.    1045: Room assignment received.    Discharge orders pending.    1155: Discharge order noted and attached in CarePort with MAR.    CM met with patient at bedside to provide SNF contact information in writing and offer to call family.    Patient does not have her brother's number, but asked CM to call her boyfriend, Melecio and he will relay information.    Detailed VM left with Melecio requesting return call, if needed.    When transportation is arranged after 3PM, patient to discharge to:  [place] [address]    Room [address]601-A    Report to be called to [phone].    DISCHARGE SUMMARY PENDING    Discharge Checklist Completed    Transition of Care Plan:    RUR: 12%  Prior Level of Functioning: Independent  Disposition: SNF  ZULAY: 12/11/2024  If SNF or IPR: Date FOC offered: 12/06/2024  Date FOC received: 12/06/2024  Accepting facility: [facility]  Date authorization started with reference number: 12/06/2024 Reference# [id number]  Date authorization received and expires: 12/12/2024  Follow up appointments: Discharge Summary  DME needed: None  Transportation at discharge: Medicaid  IM/IMM Medicare/ letter given: 12/11/2024  Is patient a  and connected with [facility]? No  If yes, was  transfer form completed and [facility] notified? N/A  Caregiver Contact: VM with Melecio and SNF  Discharge Caregiver contacted prior to discharge? SNF  Care Conference needed? No  Barriers to discharge: None

## 2024-12-11 NOTE — DISCHARGE SUMMARY
Lab Results   Component Value Date/Time    TSH 0.95 12/01/2024 10:58 PM     Troponin: No results found for: \"TROPONINT\"  Lactic Acid: No results for input(s): \"LACTA\" in the last 72 hours.  BNP: No results for input(s): \"PROBNP\" in the last 72 hours.  UA:  Lab Results   Component Value Date/Time    NITRU Negative 12/01/2024 11:20 PM    COLORU Yellow/Straw 12/01/2024 11:20 PM    PHUR 5.0 12/01/2024 11:20 PM    PHUR 6.0 12/05/2022 05:18 PM    WBCUA 0-4 12/01/2024 11:20 PM    RBCUA 0-5 12/01/2024 11:20 PM    MUCUS Trace 12/01/2024 11:20 PM    BACTERIA Negative 12/01/2024 11:20 PM    CLARITYU CLEAR 12/05/2022 05:18 PM    LEUKOCYTESUR Negative 12/01/2024 11:20 PM    UROBILINOGEN 0.1 12/01/2024 11:20 PM    BILIRUBINUR Negative 12/01/2024 11:20 PM    BLOODU Negative 12/01/2024 11:20 PM    GLUCOSEU Negative 12/01/2024 11:20 PM    GLUCOSEU Negative 03/11/2024 12:44 PM    KETUA Negative 12/01/2024 11:20 PM     Urine Cultures: No results found for: \"LABURIN\"  Blood Cultures: No results found for: \"BC\"  No results found for: \"BLOODCULT2\"  Organism: No results found for: \"ORG\"    Time Spent Discharging patient 35 minutes     Electronically signed by Aayush Canales MD on 12/11/2024 at 1:54 PM

## 2024-12-11 NOTE — PROGRESS NOTES
Hospitalist Progress Note               Daily Progress Note: 12/10/2024      Hospital Day: 10     Chief complaint:   Chief Complaint   Patient presents with    Seizures    Chest Pain    Shortness of Breath        Brief HPI/ Hospital course to date:  María Amos is a 59 y.o.  female with PMHx significant for History of seizure disorder, hypertension, previous CVA, COPD, depression presents from home due to complaint of shortness of breath and chest pain and as well as witnessed seizures x 2. On evaluation, also noted lethargic, and has been drinking cans of beer since Thanksgiving dinner. Last drink on the day of presentation, 3 24oz cans of beer. On presentation noted on 2L NC. LFT unremarkable, alcohol level elevated. She is admitted for COPD exacerbation management. IV keppra. Neurology consulted, recomended to continue the same anti-epileptic medications.     --------  12/3 during my assessment today patient was fast asleep and did not wake up in spite of multiple attempts.  Assessed with bedside RN.  Per bedside RN patient is interested in seeking medication for alcohol use disorder, psychiatry on board, will touch base tomorrow if no note is in.  Concern for patient's Keppra level and restless leg, to touch base with neurology 12/4.    12/4 patient is experiencing alcohol withdrawal symptoms, reports diaphoresis/restless leg/uneasiness and chest, assessed with bedside RN.  Bedside RN to evaluate patient CIWA scale and administer Ativan as needed.  Dr. Burch to be reached out regarding starting patient on Librium for her symptoms.    12/5 patient has been started on Librium 10 mg 3 times a day, patient was fast asleep when I went to assess her.  Spoke with patient's boyfriend Melecio and patient had given permission to per case management Dana.    12/6 patient is awake and alert, reports minimal pain in back, reports significant improvement in her restlessness.  Patient is willing to go to rehab 
    Hospitalist Progress Note               Daily Progress Note: 12/6/2024      Hospital Day: 6     Chief complaint:   Chief Complaint   Patient presents with    Seizures    Chest Pain    Shortness of Breath        Brief HPI/ Hospital course to date:  María Amos is a 59 y.o.  female with PMHx significant for History of seizure disorder, hypertension, previous CVA, COPD, depression presents from home due to complaint of shortness of breath and chest pain and as well as witnessed seizures x 2. On evaluation, also noted lethargic, and has been drinking cans of beer since Thanksgiving dinner. Last drink on the day of presentation, 3 24oz cans of beer. On presentation noted on 2L NC. LFT unremarkable, alcohol level elevated. She is admitted for COPD exacerbation management. IV keppra. Neurology consulted, recomended to continue the same anti-epileptic medications.     --------  12/3 during my assessment today patient was fast asleep and did not wake up in spite of multiple attempts.  Assessed with bedside RN.  Per bedside RN patient is interested in seeking medication for alcohol use disorder, psychiatry on board, will touch base tomorrow if no note is in.  Concern for patient's Keppra level and restless leg, to touch base with neurology 12/4.    12/4 patient is experiencing alcohol withdrawal symptoms, reports diaphoresis/restless leg/uneasiness and chest, assessed with bedside RN.  Bedside RN to evaluate patient CIWA scale and administer Ativan as needed.  Dr. Burch to be reached out regarding starting patient on Librium for her symptoms.    12/5 patient has been started on Librium 10 mg 3 times a day, patient was fast asleep when I went to assess her.  Spoke with patient's boyfriend Melecio and patient had given permission to per case management Dana.    12/6 patient is awake and alert, reports minimal pain in back, reports significant improvement in her restlessness.  Patient is willing to go to rehab facility 
    Hospitalist Progress Note               Daily Progress Note: 12/7/2024      Hospital Day: 7     Chief complaint:   Chief Complaint   Patient presents with    Seizures    Chest Pain    Shortness of Breath        Brief HPI/ Hospital course to date:  María Amos is a 59 y.o.  female with PMHx significant for History of seizure disorder, hypertension, previous CVA, COPD, depression presents from home due to complaint of shortness of breath and chest pain and as well as witnessed seizures x 2. On evaluation, also noted lethargic, and has been drinking cans of beer since Thanksgiving dinner. Last drink on the day of presentation, 3 24oz cans of beer. On presentation noted on 2L NC. LFT unremarkable, alcohol level elevated. She is admitted for COPD exacerbation management. IV keppra. Neurology consulted, recomended to continue the same anti-epileptic medications.     --------  12/3 during my assessment today patient was fast asleep and did not wake up in spite of multiple attempts.  Assessed with bedside RN.  Per bedside RN patient is interested in seeking medication for alcohol use disorder, psychiatry on board, will touch base tomorrow if no note is in.  Concern for patient's Keppra level and restless leg, to touch base with neurology 12/4.    12/4 patient is experiencing alcohol withdrawal symptoms, reports diaphoresis/restless leg/uneasiness and chest, assessed with bedside RN.  Bedside RN to evaluate patient CIWA scale and administer Ativan as needed.  Dr. Burch to be reached out regarding starting patient on Librium for her symptoms.    12/5 patient has been started on Librium 10 mg 3 times a day, patient was fast asleep when I went to assess her.  Spoke with patient's boyfriend Melecio and patient had given permission to per case management Dana.    12/6 patient is awake and alert, reports minimal pain in back, reports significant improvement in her restlessness.  Patient is willing to go to rehab facility 
    Hospitalist Progress Note               Daily Progress Note: 12/8/2024      Hospital Day: 8     Chief complaint:   Chief Complaint   Patient presents with    Seizures    Chest Pain    Shortness of Breath        Brief HPI/ Hospital course to date:  María Amos is a 59 y.o.  female with PMHx significant for History of seizure disorder, hypertension, previous CVA, COPD, depression presents from home due to complaint of shortness of breath and chest pain and as well as witnessed seizures x 2. On evaluation, also noted lethargic, and has been drinking cans of beer since Thanksgiving dinner. Last drink on the day of presentation, 3 24oz cans of beer. On presentation noted on 2L NC. LFT unremarkable, alcohol level elevated. She is admitted for COPD exacerbation management. IV keppra. Neurology consulted, recomended to continue the same anti-epileptic medications.     --------  12/3 during my assessment today patient was fast asleep and did not wake up in spite of multiple attempts.  Assessed with bedside RN.  Per bedside RN patient is interested in seeking medication for alcohol use disorder, psychiatry on board, will touch base tomorrow if no note is in.  Concern for patient's Keppra level and restless leg, to touch base with neurology 12/4.    12/4 patient is experiencing alcohol withdrawal symptoms, reports diaphoresis/restless leg/uneasiness and chest, assessed with bedside RN.  Bedside RN to evaluate patient CIWA scale and administer Ativan as needed.  Dr. Burch to be reached out regarding starting patient on Librium for her symptoms.    12/5 patient has been started on Librium 10 mg 3 times a day, patient was fast asleep when I went to assess her.  Spoke with patient's boyfriend Melecio and patient had given permission to per case management Dana.    12/6 patient is awake and alert, reports minimal pain in back, reports significant improvement in her restlessness.  Patient is willing to go to rehab facility 
    Hospitalist Progress Note               Daily Progress Note: 12/9/2024      Hospital Day: 9     Chief complaint:   Chief Complaint   Patient presents with    Seizures    Chest Pain    Shortness of Breath        Brief HPI/ Hospital course to date:  María Amos is a 59 y.o.  female with PMHx significant for History of seizure disorder, hypertension, previous CVA, COPD, depression presents from home due to complaint of shortness of breath and chest pain and as well as witnessed seizures x 2. On evaluation, also noted lethargic, and has been drinking cans of beer since Thanksgiving dinner. Last drink on the day of presentation, 3 24oz cans of beer. On presentation noted on 2L NC. LFT unremarkable, alcohol level elevated. She is admitted for COPD exacerbation management. IV keppra. Neurology consulted, recomended to continue the same anti-epileptic medications.     --------  12/3 during my assessment today patient was fast asleep and did not wake up in spite of multiple attempts.  Assessed with bedside RN.  Per bedside RN patient is interested in seeking medication for alcohol use disorder, psychiatry on board, will touch base tomorrow if no note is in.  Concern for patient's Keppra level and restless leg, to touch base with neurology 12/4.    12/4 patient is experiencing alcohol withdrawal symptoms, reports diaphoresis/restless leg/uneasiness and chest, assessed with bedside RN.  Bedside RN to evaluate patient CIWA scale and administer Ativan as needed.  Dr. Burch to be reached out regarding starting patient on Librium for her symptoms.    12/5 patient has been started on Librium 10 mg 3 times a day, patient was fast asleep when I went to assess her.  Spoke with patient's boyfriend Melecio and patient had given permission to per case management Dana.    12/6 patient is awake and alert, reports minimal pain in back, reports significant improvement in her restlessness.  Patient is willing to go to rehab facility 
    Hospitalist Progress Note               Daily Progress Note: 2024      Hospital Day: 2     Chief complaint:   Chief Complaint   Patient presents with    Seizures    Chest Pain    Shortness of Breath        Brief HPI/ Hospital course to date:  María Amos is a 59 y.o.  female with PMHx significant for History of seizure disorder, hypertension, previous CVA, COPD, depression presents from home due to complaint of shortness of breath and chest pain and as well as witnessed seizures x 2. On evaluation, also noted lethargic, and has been drinking cans of beer since Thanksgiving dinner. Last drink on the day of presentation, 3 24oz cans of beer. On presentation noted on 2L NC. LFT unremarkable, alcohol level elevated. She is admitted for COPD exacerbation management. IV keppra. Neurology consulted, recomended to continue the same anti-epileptic medications.     --------  Patient is seen today for follow-up. Patient is still somewhat drowsy, however responding questions appropriately. Reports no chest pain, no tremors or anxiety. Wants to have medication of alcohol use disorder.         All ROS negative otherwise mentioned above.      BP (!) 113/96   Pulse 69   Temp 98.4 °F (36.9 °C) (Axillary)   Resp 18   Ht 1.727 m (5' 8\")   Wt 99 kg (218 lb 4.1 oz)   SpO2 92%   BMI 33.19 kg/m²  O2 Flow Rate (L/min): 1 L/min O2 Device: Nasal cannula    Temp (24hrs), Av.4 °F (36.9 °C), Min:98.1 °F (36.7 °C), Max:98.8 °F (37.1 °C)    701 -  1900  In: -   Out: 500 [Urine:500]   1901 -  0700  In: 100 [I.V.:100]  Out: 2245 [Urine:2245]    Physical Exam:  General:   drowsy   Lungs:   Exp wheezing on auscultation bilaterally.   Heart:  Regular rate and rhythm, S1, S2 normal, no murmur. No click, rub or gallop.   Abdomen:   Soft, non-tender. Bowel sounds normal. No masses,  No organomegaly.   Extremities: no  edema. Extremities normal, atraumatic, no cyanosis.    Pulses: 2+ and symmetric all 
    Hospitalist Progress Note               Daily Progress Note: 2024      Hospital Day: 4     Chief complaint:   Chief Complaint   Patient presents with    Seizures    Chest Pain    Shortness of Breath        Brief HPI/ Hospital course to date:  María Amos is a 59 y.o.  female with PMHx significant for History of seizure disorder, hypertension, previous CVA, COPD, depression presents from home due to complaint of shortness of breath and chest pain and as well as witnessed seizures x 2. On evaluation, also noted lethargic, and has been drinking cans of beer since Thanksgiving dinner. Last drink on the day of presentation, 3 24oz cans of beer. On presentation noted on 2L NC. LFT unremarkable, alcohol level elevated. She is admitted for COPD exacerbation management. IV keppra. Neurology consulted, recomended to continue the same anti-epileptic medications.     --------  12/3 during my assessment today patient was fast asleep and did not wake up in spite of multiple attempts.  Assessed with bedside RN.  Per bedside RN patient is interested in seeking medication for alcohol use disorder, psychiatry on board, will touch base tomorrow if no note is in.  Concern for patient's Keppra level and restless leg, to touch base with neurology .     patient is experiencing alcohol withdrawal symptoms, reports diaphoresis/restless leg/uneasiness and chest, assessed with bedside RN.  Bedside RN to evaluate patient CIWA scale and administer Ativan as needed.  Dr. Burch to be reached out regarding starting patient on Librium for her symptoms.    All ROS negative otherwise mentioned above.      /68   Pulse 50   Temp 98.1 °F (36.7 °C) (Rectal)   Resp 16   Ht 1.727 m (5' 8\")   Wt 99 kg (218 lb 4.1 oz)   SpO2 99%   BMI 33.19 kg/m²  O2 Flow Rate (L/min): 1 L/min O2 Device: None (Room air)    Temp (24hrs), Av.9 °F (36.6 °C), Min:97.7 °F (36.5 °C), Max:98.1 °F (36.7 °C)    701 -  1900  In: 5 
  Physician Progress Note      PATIENT:               CHITRA DONOVAN  CSN #:                  [id number]  :                       1965  ADMIT DATE:       2024 5:45 PM  DISCH DATE:  RESPONDING  PROVIDER #:        Xin Manriquez MD          QUERY TEXT:    Pt admitted with breakthrough seizures and alcohol intoxication and has   encephalopathy documented. If possible, please document in progress notes and   discharge summary further specificity regarding the type of encephalopathy:    The medical record reflects the following:  Risk Factors: 59 year old female, COPD exacerbation, Alcohol intoxication,   Cocaine+  Clinical Indicators:  Neurology consult - Encephalopathy- alcohol   intoxicated state and lingering cocaine effects, follows commands and is O x   3. Residual hypersomnolence state+; Unlikely post ictal   Attending PN -  Breakthrough seizure secondary to alcohol use and   medications noncompliance.  Acute encephalopathy secondary to above  Treatment: IV ativan per CIWA protocol , Vitamin supplements, Keppra      Please email [email or web address] with any questions  Options provided:  -- Toxic encephalopathy  -- Alcoholic encephalopathy  -- Other - I will add my own diagnosis  -- Disagree - Not applicable / Not valid  -- Disagree - Clinically unable to determine / Unknown  -- Refer to Clinical Documentation Reviewer    PROVIDER RESPONSE TEXT:    This patient has alcoholic encephalopathy.    Query created by: Cathleen Armendariz on 12/3/2024 6:07 AM      Electronically signed by:  Xin Manriquez MD 2024 7:57 AM          
4 Eyes Skin Assessment     NAME:  María Amos  YOB: 1965  MEDICAL RECORD NUMBER:  [id number]    The patient is being assessed for  Admission    I agree that at least one RN has performed a thorough Head to Toe Skin Assessment on the patient. ALL assessment sites listed below have been assessed.      Areas assessed by both nurses:    Head, Face, Ears, Shoulders, Back, Chest, Arms, Elbows, Hands, Sacrum. Buttock, Coccyx, Ischium, Legs. Feet and Heels, and Under Medical Devices         Does the Patient have a Wound? No noted wound(s)       Dino Prevention initiated by RN: Yes  Wound Care Orders initiated by RN: No    Pressure Injury (Stage 3,4, Unstageable, DTI, NWPT, and Complex wounds) if present, place Wound referral order by RN under : No    New Ostomies, if present place, Ostomy referral order under : No     Nurse 1 eSignature: Electronically signed by Shweta Callahan RN on 12/2/24 at 2:25 AM EST    **SHARE this note so that the co-signing nurse can place an eSignature**    Nurse 2 eSignature: Electronically signed by Rivka Blanton RN on 12/2/24 at 2:46 AM EST   
4 Eyes Skin Assessment     NAME:  María Amos  YOB: 1965  MEDICAL RECORD NUMBER:  [id number]    The patient is being assessed for  Other weekly    I agree that at least one RN has performed a thorough Head to Toe Skin Assessment on the patient. ALL assessment sites listed below have been assessed.      Areas assessed by both nurses:    Head, Face, Ears, Shoulders, Back, Chest, Arms, Elbows, Hands, Sacrum. Buttock, Coccyx, Ischium, Legs. Feet and Heels, and Under Medical Devices         Does the Patient have a Wound? No noted wound(s)       Dino Prevention initiated by RN: Yes  Wound Care Orders initiated by RN: No    Pressure Injury (Stage 3,4, Unstageable, DTI, NWPT, and Complex wounds) if present, place Wound referral order by RN under : No    New Ostomies, if present place, Ostomy referral order under : No     Nurse 1 eSignature: Electronically signed by Solange Morales RN on 12/11/24 at 1:48 AM EST    **SHARE this note so that the co-signing nurse can place an eSignature**    Nurse 2 eSignature: Electronically signed by Latrell Naranjo RN on 12/11/24 at 1:49 AM EST   
4 Eyes Skin Assessment     NAME:  María Amos  YOB: 1965  MEDICAL RECORD NUMBER:  [id number]    The patient is being assessed for  Other weekly skin assess    I agree that at least one RN has performed a thorough Head to Toe Skin Assessment on the patient. ALL assessment sites listed below have been assessed.      Areas assessed by both nurses:    Head, Face, Ears, Shoulders, Back, Chest, Arms, Elbows, Hands, Sacrum. Buttock, Coccyx, Ischium, Legs. Feet and Heels, and Under Medical Devices         Does the Patient have a Wound? No noted wound(s)       Dino Prevention initiated by RN: Yes  Wound Care Orders initiated by RN: No    Pressure Injury (Stage 3,4, Unstageable, DTI, NWPT, and Complex wounds) if present, place Wound referral order by RN under : No    New Ostomies, if present place, Ostomy referral order under : No     Nurse 1 eSignature: Electronically signed by Monique Laguerre RN on 12/4/24 at 12:13 AM EST    **SHARE this note so that the co-signing nurse can place an eSignature**    Nurse 2 eSignature: Electronically signed by Shannan Croft RN on 12/4/24 at 12:49 AM EST    
Comprehensive Nutrition Assessment    Type and Reason for Visit:  Initial, LOS    Nutrition Recommendations/Plan:   Continue current diet order, encourage po intake  RD to monitor po intake, weight, labs, GI function     Malnutrition Assessment:  Malnutrition Status:  Insufficient data (12/09/24 1456)    Context:  Acute Illness     Findings of the 6 clinical characteristics of malnutrition:  Energy Intake:  No decrease in energy intake  Weight Loss:  No weight loss     Body Fat Loss:  Unable to assess     Muscle Mass Loss:  Unable to assess    Fluid Accumulation:  Unable to assess     Strength:  Not Performed    Nutrition Assessment:    Pt assessed for LOS. Pt admitted for COPD exacerbation. Pt currently ordered a cardiac diet. Meds: protonix, , deltasone, thiamine, lipitor. Labs: Glucose 267 H.    Nutrition Related Findings:    PO intakes %. Last BM 12/9. Wound Type: None       Current Nutrition Intake & Therapies:    Average Meal Intake: %  Average Supplements Intake: None Ordered  ADULT DIET; Regular; Low Fat/Low Chol/High Fiber/2 gm Na; Low Sodium (2 gm)    Anthropometric Measures:  Height: 172.7 cm (5' 8\")  Ideal Body Weight (IBW): 140 lbs (64 kg)       Current Body Weight: 98.9 kg (218 lb), 155.7 % IBW. Weight Source: Not specified  Current BMI (kg/m2): 33.2        Weight Adjustment For: No Adjustment     BMI Categories: Obese Class 1 (BMI 30.0-34.9)  Last Weight Metrics:      12/9/2024     1:59 PM 12/2/2024     1:25 AM 12/1/2024     5:51 PM 11/11/2024    12:13 PM 10/30/2024     5:09 PM 9/18/2024     3:19 PM 9/5/2024     8:49 PM   Weight Loss Metrics   Height 5' 8\" 5' 8\"   5' 8\" 5' 8\" 5' 8\"   Weight - Scale  218 lbs 4 oz 180 lbs 201 lbs 15 oz 202 lbs 182 lbs 222 lbs   BMI (Calculated)  33.3 kg/m2 0 kg/m2 0 kg/m2 30.8 kg/m2 27.7 kg/m2 33.8 kg/m2      Estimated Daily Nutrient Needs:  Energy Requirements Based On: Kcal/kg  Weight Used for Energy Requirements: Ideal  Energy (kcal/day): 
Discharge plan of care/case management plan validated with provider discharge order. Discharge order noted and discharge summary in. Confirmed discharge with Dr. Canales as patients BP is still soft however she is asymptomatic.    Report called to Cheryl at [facility]. Reviewed patient discharge instructions and medications. All questions answered and no concerns verbalized.    IV access and telemetry box removed. Patient transported by [facility] to [facility] with all belongings.   
OCCUPATIONAL THERAPY TREATMENT  Patient: María Amos (59 y.o. female)  Date: 12/9/2024  Primary Diagnosis: COPD exacerbation (HCC) [J44.1]       Precautions: Fall Risk, Seizure, Other (Comment)                Recommendations for nursing mobility: Out of bed to chair for meals, Encourage HEP in prep for ADLs/mobility; see handout for details, Frequent repositioning to prevent skin breakdown, Use of bed/chair alarm for safety, AD and gt belt for bed to chair , Amb to bathroom with AD and gait belt, and Assist x1    In place during session: External Catheter and EKG/telemetry   Chart, occupational therapy assessment, plan of care, and goals were reviewed.  ASSESSMENT  Patient continues with skilled OT services and is progressing towards goals. Pt side lying upon TIERNEY arrival, agreeable to session. Pt A&O x 4. Therapist attempted OT session at 11:03 however pt was off the floor.  Pt's BP and HR monitored throughout session.  Pt sat EOB and completed LB dressing and donning slipper socks.  Pt stood for BP and stated she needed to sit.  Sitting toward end of session, pt reported she felt like she was going to  pass out.  Pt returned supine in bed and rolled side to side to pull up briefs.  Pt made no effort to assist with pulling briefs up.  Pt returned supine and was left semi supine with all needs met. . (See below for objective details and assist levels).     Overall pt tolerated session fair today with LB dressing and donning foot wear.  Pt fatigues easily and requires frequent rest breaks.. Current OT recommendations for discharge Moderate intensity short-term skilled occupational therapy up to 5x/week. Will continue to benefit from skilled OT services, and will continue to progress as tolerated.      Start of Session Sitting EOB Standing End of Session   SPO2 (%) 98   96   Heart Rate (BPM) 60 58-60  60   BP 94/63 108/70 Unable to get reading 99/65     GOALS:    Problem: Occupational Therapy - Adult  Goal: By 
Pulmonary Disease Navigator Note  Julián Jimenez [facility]    Current GOLD classification for María Amos    Patient's chart was reviewed by Pulmonary Disease Navigator for compliance with prescribed treatment with [employer] Initiative For Chronic Obstructive Lung Disease (GOLD).    Please, review beneath recommendations for pharmacological treatment for patient with obstructive lung disease.     Current Pharmacological Treatment:     Patient currently receiving Duoneb QID and Symbicort -4.5 mcg/ACT BID     Current eosinophil count: 0  Recorded domestic exacerbations past 12 months: 7          Combination  ICS-LABA Inhaler Acceptable   Therapy  Device For Use   Salmeterol/fluticasone Advair  Diskus    Vilanterol/fluticasone  Breo  Ellipta    Formoterol/mometasone  Dulera MDI Yes   Formoterol/budesonide  Symbicort MDI Yes   Triple Therapy Recommended (For Group E) [id number]     Fluticasone/umeclidinium/vilanterol  Trelegy  Ellipta    Budesonide/glycopyrrolate/formoterol fumarate  Breztri  MDI Yes   Recommended (For Group A & B) LAMA/LABA     Vilanterol/umeclidinium  Anoro  Ellipta    Olodaterol/tiotropium  Stiolto  Respimat Yes   Formoterol/glycopyrronium  Bevespi  MDI Yes   Aclidinium/formoterol [place]ir  Pressair      *Nebulizer Options    LAMA LABA ICS   Yupelri  Brovanna Budesonide    Performist      The CAT provides a reliable measure of the impact of COPD on a patient's health status. Range of CAT scores from 0-40. Higher scores denote a more severe impact of COPD on a patient’s life. Scores <10 have a low impact, 10-20 medium, 21-30 high and >30 very high impact, requiring gradually more interventions.     Current recorded COPD Assessment Tool (CAT) score of  Cough Assessment: 2  Phlegm Assessment: 1  Chest tightness: 2  Walking on an incline: 2  Home Activities: 1  Confident Leaving The Home: 1  Sleeping Soundly: 0  Have Energy: 2  Assessment Score: 11       None pharmacological 
RRT Clinical Rounding Nurse Progress Report      SUBJECTIVE: Patient assessed secondary to elevated Deterioration Index Score.      Vitals:    12/01/24 2345 12/02/24 0045 12/02/24 0125 12/02/24 0130   BP: (!) 147/95 (!) 145/83  132/83   Pulse: 76 74  79   Resp: 18 17  20   Temp:  98.1 °F (36.7 °C)  98.4 °F (36.9 °C)   TempSrc:  Oral  Oral   SpO2: 97% 95%  97%   Weight:   99 kg (218 lb 4.1 oz)    Height:   1.727 m (5' 8\")         DETERIORATION INDEX SCORE: 54    ASSESSMENT:      PLAN:  Spoke to primary ELISE Puga about DI score of 54 on arrival to unit. Vivien is currently at this patients bedside.     Luci Castro RN  
Received Order for Telemetry     María Dandre   1965   [id number]   COPD exacerbation (HCC) [J44.1]   Harper Norris MD     Tele Box # [id number] placed on patient at  0057 am  ER Room # TR1  Admitting to Room 219  Verified with Primary Nurse Brigitte at  0109 am    
VAT RN consulted to place midline. ELISE Morales, advised patient no longer needs line.   
Protocol order mode.        4-6 - enter or revise RT Bronchodilator order(s) to two equivalent RT bronchodilator orders with one order with BID Frequency and one order with Frequency of every 4 hours PRN wheezing or increased work of breathing using Per Protocol order mode.        7-10 - enter or revise RT Bronchodilator order(s) to two equivalent RT bronchodilator orders with one order with TID Frequency and one order with Frequency of every 4 hours PRN wheezing or increased work of breathing using Per Protocol order mode.       11-13 - enter or revise RT Bronchodilator order(s) to one equivalent RT bronchodilator order with QID Frequency and an Albuterol order with Frequency of every 4 hours PRN wheezing or increased work of breathing using Per Protocol order mode.      Greater than 13 - enter or revise RT Bronchodilator order(s) to one equivalent RT bronchodilator order with every 4 hours Frequency and an Albuterol order with Frequency of every 2 hours PRN wheezing or increased work of breathing using Per Protocol order mode.     RT to enter RT Home Evaluation for COPD & MDI Assessment order using Per Protocol order mode.    Electronically signed by Megan Bruton, RT on 12/4/2024 at 10:49 AM  
5-40 mL  5-40 mL IntraVENous PRN    0.9 % sodium chloride infusion   IntraVENous PRN    LORazepam (ATIVAN) tablet 1 mg  1 mg Oral Q1H PRN    Or    LORazepam (ATIVAN) injection 1 mg  1 mg IntraVENous Q1H PRN    Or    LORazepam (ATIVAN) tablet 2 mg  2 mg Oral Q1H PRN    Or    LORazepam (ATIVAN) injection 2 mg  2 mg IntraVENous Q1H PRN    Or    LORazepam (ATIVAN) tablet 3 mg  3 mg Oral Q1H PRN    Or    LORazepam (ATIVAN) injection 3 mg  3 mg IntraVENous Q1H PRN    Or    LORazepam (ATIVAN) tablet 4 mg  4 mg Oral Q1H PRN    Or    LORazepam (ATIVAN) injection 4 mg  4 mg IntraVENous Q1H PRN    thiamine mononitrate tablet 100 mg  100 mg Oral Daily    methylPREDNISolone sodium succ (SOLU-MEDROL) injection 40 mg  40 mg IntraVENous Q8H    budesonide-formoterol (SYMBICORT) 160-4.5 MCG/ACT inhaler 2 puff  2 puff Inhalation BID RT    atorvastatin (LIPITOR) tablet 40 mg  40 mg Oral Daily    FLUoxetine (PROZAC) capsule 20 mg  20 mg Oral Daily    lisinopril (PRINIVIL;ZESTRIL) tablet 10 mg  10 mg Oral Daily    QUEtiapine (SEROQUEL) tablet 100 mg  100 mg Oral QHS    traZODone (DESYREL) tablet 50 mg  50 mg Oral Nightly PRN         All relevant laboratory and imaging results reviewed in EPIC electronic medical records.            Discussion/MDM:     [x] High (any 2)    A. Problems (any 1)  [x] Acute/Chronic Illness/injury posing threat to life or bodily function:    [] Severe exacerbation of chronic illness:    ---------------------------------------------------------------------  B. Risk of Treatment (any 1)   [] Drugs/treatments that require intensive monitoring for toxicity include:    [] IV ABX requiring serial renal monitoring for nephrotoxicity:     [] IV Narcotic analgesia for adverse drug reaction  [] Aggressive IV diuresis requiring serial monitoring for renal impairment and electrolyte derangements  [] Critical electrolyte abnormalities requiring IV replacement and close serial monitoring  [] SQ Insulin SS- monitoring serial 
from yesterday/today were reviewed  [x] All current labs were reviewed and interpreted for clinical significance   [x] Appropriate follow-up labs were ordered  [] Collateral history obtained from:       Time spent with patient including counseling, chart review and nursing communication: 38 minutes    Xin Manriquez MD

## 2024-12-11 NOTE — PLAN OF CARE
Problem: Chronic Conditions and Co-morbidities  Goal: Patient's chronic conditions and co-morbidity symptoms are monitored and maintained or improved  12/10/2024 1857 by Julee Carson RN  Outcome: Progressing     Problem: Discharge Planning  Goal: Discharge to home or other facility with appropriate resources  12/10/2024 1857 by Julee Carson RN  Outcome: Progressing  Flowsheets (Taken 12/10/2024 0940)  Discharge to home or other facility with appropriate resources: Identify barriers to discharge with patient and caregiver     Problem: Pain  Goal: Verbalizes/displays adequate comfort level or baseline comfort level  12/10/2024 1857 by Julee Carson RN  Outcome: Progressing     Problem: Safety - Adult  Goal: Free from fall injury  12/10/2024 1857 by Julee Carson RN  Outcome: Progressing     12/10/2024 1526 by Michelle Galdamez PTA  Outcome: Progressing

## 2024-12-28 ENCOUNTER — APPOINTMENT (OUTPATIENT)
Facility: HOSPITAL | Age: 59
End: 2024-12-28
Payer: MEDICARE

## 2024-12-28 ENCOUNTER — HOSPITAL ENCOUNTER (OUTPATIENT)
Facility: HOSPITAL | Age: 59
Setting detail: OBSERVATION
Discharge: HOME | End: 2024-12-30
Attending: EMERGENCY MEDICINE | Admitting: HOSPITALIST
Payer: MEDICARE

## 2024-12-28 DIAGNOSIS — I63.9 ACUTE CVA (CEREBROVASCULAR ACCIDENT) (HCC): Primary | ICD-10-CM

## 2024-12-28 PROBLEM — I61.9 CVA (CEREBROVASCULAR ACCIDENT DUE TO INTRACEREBRAL HEMORRHAGE) (HCC): Status: ACTIVE | Noted: 2024-12-28

## 2024-12-28 LAB
ALBUMIN SERPL-MCNC: 3.8 G/DL (ref 3.5–5)
ALBUMIN/GLOB SERPL: 1 (ref 1.1–2.2)
ALP SERPL-CCNC: 103 U/L (ref 45–117)
ALT SERPL-CCNC: 26 U/L (ref 12–78)
ANION GAP SERPL CALC-SCNC: 5 MMOL/L (ref 2–12)
AST SERPL W P-5'-P-CCNC: 29 U/L (ref 15–37)
BASOPHILS # BLD: 0 K/UL (ref 0–0.1)
BASOPHILS NFR BLD: 0 % (ref 0–1)
BILIRUB SERPL-MCNC: 0.8 MG/DL (ref 0.2–1)
BUN SERPL-MCNC: 13 MG/DL (ref 6–20)
BUN/CREAT SERPL: 15 (ref 12–20)
CA-I BLD-MCNC: 9.9 MG/DL (ref 8.5–10.1)
CHLORIDE SERPL-SCNC: 108 MMOL/L (ref 97–108)
CO2 SERPL-SCNC: 25 MMOL/L (ref 21–32)
CREAT SERPL-MCNC: 0.85 MG/DL (ref 0.55–1.02)
DIFFERENTIAL METHOD BLD: ABNORMAL
EOSINOPHIL # BLD: 0.1 K/UL (ref 0–0.4)
EOSINOPHIL NFR BLD: 1 % (ref 0–7)
ERYTHROCYTE [DISTWIDTH] IN BLOOD BY AUTOMATED COUNT: 12.3 % (ref 11.5–14.5)
ETHANOL SERPL-MCNC: <10 MG/DL (ref 0–0.08)
GLOBULIN SER CALC-MCNC: 3.7 G/DL (ref 2–4)
GLUCOSE BLD STRIP.AUTO-MCNC: 100 MG/DL (ref 65–100)
GLUCOSE BLD STRIP.AUTO-MCNC: 109 MG/DL (ref 65–100)
GLUCOSE BLD STRIP.AUTO-MCNC: 142 MG/DL (ref 65–100)
GLUCOSE SERPL-MCNC: 108 MG/DL (ref 65–100)
HCT VFR BLD AUTO: 42.1 % (ref 35–47)
HGB BLD-MCNC: 14.2 G/DL (ref 11.5–16)
IMM GRANULOCYTES # BLD AUTO: 0 K/UL (ref 0–0.04)
IMM GRANULOCYTES NFR BLD AUTO: 0 % (ref 0–0.5)
INR PPP: 1 (ref 0.9–1.1)
LYMPHOCYTES # BLD: 1.6 K/UL (ref 0.8–3.5)
LYMPHOCYTES NFR BLD: 28 % (ref 12–49)
MCH RBC QN AUTO: 29.7 PG (ref 26–34)
MCHC RBC AUTO-ENTMCNC: 33.7 G/DL (ref 30–36.5)
MCV RBC AUTO: 88.1 FL (ref 80–99)
MONOCYTES # BLD: 0.2 K/UL (ref 0–1)
MONOCYTES NFR BLD: 4 % (ref 5–13)
NEUTS SEG # BLD: 3.7 K/UL (ref 1.8–8)
NEUTS SEG NFR BLD: 67 % (ref 32–75)
NRBC # BLD: 0 K/UL (ref 0–0.01)
NRBC BLD-RTO: 0 PER 100 WBC
PERFORMED BY:: ABNORMAL
PERFORMED BY:: ABNORMAL
PERFORMED BY:: NORMAL
PLATELET # BLD AUTO: 259 K/UL (ref 150–400)
PMV BLD AUTO: 10.1 FL (ref 8.9–12.9)
POTASSIUM SERPL-SCNC: 4.8 MMOL/L (ref 3.5–5.1)
PROT SERPL-MCNC: 7.5 G/DL (ref 6.4–8.2)
PROTHROMBIN TIME: 13.3 SEC (ref 11.9–14.6)
RBC # BLD AUTO: 4.78 M/UL (ref 3.8–5.2)
SODIUM SERPL-SCNC: 138 MMOL/L (ref 136–145)
TROPONIN I SERPL HS-MCNC: <4 NG/L (ref 0–51)
WBC # BLD AUTO: 5.6 K/UL (ref 3.6–11)

## 2024-12-28 PROCEDURE — 80053 COMPREHEN METABOLIC PANEL: CPT

## 2024-12-28 PROCEDURE — 82077 ASSAY SPEC XCP UR&BREATH IA: CPT

## 2024-12-28 PROCEDURE — 70450 CT HEAD/BRAIN W/O DYE: CPT

## 2024-12-28 PROCEDURE — 94640 AIRWAY INHALATION TREATMENT: CPT

## 2024-12-28 PROCEDURE — G0378 HOSPITAL OBSERVATION PER HR: HCPCS

## 2024-12-28 PROCEDURE — 99285 EMERGENCY DEPT VISIT HI MDM: CPT

## 2024-12-28 PROCEDURE — 36415 COLL VENOUS BLD VENIPUNCTURE: CPT

## 2024-12-28 PROCEDURE — 2500000003 HC RX 250 WO HCPCS: Performed by: HOSPITALIST

## 2024-12-28 PROCEDURE — 6370000000 HC RX 637 (ALT 250 FOR IP): Performed by: HOSPITALIST

## 2024-12-28 PROCEDURE — 70498 CT ANGIOGRAPHY NECK: CPT

## 2024-12-28 PROCEDURE — 85025 COMPLETE CBC W/AUTO DIFF WBC: CPT

## 2024-12-28 PROCEDURE — 0042T CT BRAIN PERFUSION: CPT

## 2024-12-28 PROCEDURE — 6360000004 HC RX CONTRAST MEDICATION: Performed by: EMERGENCY MEDICINE

## 2024-12-28 PROCEDURE — 82962 GLUCOSE BLOOD TEST: CPT

## 2024-12-28 PROCEDURE — 93005 ELECTROCARDIOGRAM TRACING: CPT | Performed by: EMERGENCY MEDICINE

## 2024-12-28 PROCEDURE — 85610 PROTHROMBIN TIME: CPT

## 2024-12-28 PROCEDURE — 84484 ASSAY OF TROPONIN QUANT: CPT

## 2024-12-28 RX ORDER — POLYETHYLENE GLYCOL 3350 17 G/17G
17 POWDER, FOR SOLUTION ORAL DAILY PRN
Status: DISCONTINUED | OUTPATIENT
Start: 2024-12-28 | End: 2024-12-30 | Stop reason: HOSPADM

## 2024-12-28 RX ORDER — BUDESONIDE AND FORMOTEROL FUMARATE DIHYDRATE 160; 4.5 UG/1; UG/1
2 AEROSOL RESPIRATORY (INHALATION)
Status: DISCONTINUED | OUTPATIENT
Start: 2024-12-28 | End: 2024-12-30 | Stop reason: HOSPADM

## 2024-12-28 RX ORDER — SODIUM CHLORIDE 0.9 % (FLUSH) 0.9 %
5-40 SYRINGE (ML) INJECTION PRN
Status: DISCONTINUED | OUTPATIENT
Start: 2024-12-28 | End: 2024-12-30 | Stop reason: HOSPADM

## 2024-12-28 RX ORDER — ONDANSETRON 2 MG/ML
4 INJECTION INTRAMUSCULAR; INTRAVENOUS EVERY 6 HOURS PRN
Status: DISCONTINUED | OUTPATIENT
Start: 2024-12-28 | End: 2024-12-30 | Stop reason: HOSPADM

## 2024-12-28 RX ORDER — TOPIRAMATE 25 MG/1
25 TABLET, FILM COATED ORAL 2 TIMES DAILY
Status: DISCONTINUED | OUTPATIENT
Start: 2024-12-28 | End: 2024-12-30 | Stop reason: HOSPADM

## 2024-12-28 RX ORDER — IOPAMIDOL 755 MG/ML
100 INJECTION, SOLUTION INTRAVASCULAR
Status: COMPLETED | OUTPATIENT
Start: 2024-12-28 | End: 2024-12-28

## 2024-12-28 RX ORDER — LEVETIRACETAM 500 MG/1
1000 TABLET ORAL 2 TIMES DAILY
Status: DISCONTINUED | OUTPATIENT
Start: 2024-12-28 | End: 2024-12-30 | Stop reason: HOSPADM

## 2024-12-28 RX ORDER — ONDANSETRON 4 MG/1
4 TABLET, ORALLY DISINTEGRATING ORAL EVERY 8 HOURS PRN
Status: DISCONTINUED | OUTPATIENT
Start: 2024-12-28 | End: 2024-12-30 | Stop reason: HOSPADM

## 2024-12-28 RX ORDER — MIDODRINE HYDROCHLORIDE 5 MG/1
5 TABLET ORAL
Status: DISCONTINUED | OUTPATIENT
Start: 2024-12-28 | End: 2024-12-30 | Stop reason: HOSPADM

## 2024-12-28 RX ORDER — SODIUM CHLORIDE 9 MG/ML
INJECTION, SOLUTION INTRAVENOUS PRN
Status: DISCONTINUED | OUTPATIENT
Start: 2024-12-28 | End: 2024-12-30 | Stop reason: HOSPADM

## 2024-12-28 RX ORDER — TRAZODONE HYDROCHLORIDE 50 MG/1
50 TABLET, FILM COATED ORAL NIGHTLY
Status: DISCONTINUED | OUTPATIENT
Start: 2024-12-28 | End: 2024-12-30 | Stop reason: HOSPADM

## 2024-12-28 RX ORDER — LORAZEPAM 1 MG/1
1 TABLET ORAL
Status: COMPLETED | OUTPATIENT
Start: 2024-12-28 | End: 2024-12-29

## 2024-12-28 RX ORDER — QUETIAPINE FUMARATE 100 MG/1
100 TABLET, FILM COATED ORAL
Status: DISCONTINUED | OUTPATIENT
Start: 2024-12-28 | End: 2024-12-30 | Stop reason: HOSPADM

## 2024-12-28 RX ORDER — PRAMIPEXOLE DIHYDROCHLORIDE 0.12 MG/1
0.12 TABLET ORAL 3 TIMES DAILY
Status: DISCONTINUED | OUTPATIENT
Start: 2024-12-28 | End: 2024-12-30 | Stop reason: HOSPADM

## 2024-12-28 RX ORDER — SODIUM CHLORIDE 0.9 % (FLUSH) 0.9 %
5-40 SYRINGE (ML) INJECTION EVERY 12 HOURS SCHEDULED
Status: DISCONTINUED | OUTPATIENT
Start: 2024-12-28 | End: 2024-12-30 | Stop reason: HOSPADM

## 2024-12-28 RX ORDER — ENOXAPARIN SODIUM 100 MG/ML
40 INJECTION SUBCUTANEOUS DAILY
Status: DISCONTINUED | OUTPATIENT
Start: 2024-12-28 | End: 2024-12-30 | Stop reason: HOSPADM

## 2024-12-28 RX ORDER — ASPIRIN 300 MG/1
300 SUPPOSITORY RECTAL
Status: ACTIVE | OUTPATIENT
Start: 2024-12-28 | End: 2024-12-29

## 2024-12-28 RX ORDER — PANTOPRAZOLE SODIUM 40 MG/1
40 TABLET, DELAYED RELEASE ORAL DAILY
Status: DISCONTINUED | OUTPATIENT
Start: 2024-12-28 | End: 2024-12-30 | Stop reason: HOSPADM

## 2024-12-28 RX ORDER — CLOPIDOGREL BISULFATE 75 MG/1
75 TABLET ORAL DAILY
Status: DISCONTINUED | OUTPATIENT
Start: 2024-12-28 | End: 2024-12-30

## 2024-12-28 RX ORDER — ATORVASTATIN CALCIUM 40 MG/1
40 TABLET, FILM COATED ORAL DAILY
Status: DISCONTINUED | OUTPATIENT
Start: 2024-12-28 | End: 2024-12-30 | Stop reason: HOSPADM

## 2024-12-28 RX ADMIN — LEVETIRACETAM 1000 MG: 500 TABLET, FILM COATED ORAL at 21:39

## 2024-12-28 RX ADMIN — TOPIRAMATE 25 MG: 25 TABLET, FILM COATED ORAL at 21:39

## 2024-12-28 RX ADMIN — CLOPIDOGREL BISULFATE 75 MG: 75 TABLET ORAL at 18:46

## 2024-12-28 RX ADMIN — QUETIAPINE 100 MG: 100 TABLET ORAL at 21:39

## 2024-12-28 RX ADMIN — SODIUM CHLORIDE, PRESERVATIVE FREE 10 ML: 5 INJECTION INTRAVENOUS at 21:39

## 2024-12-28 RX ADMIN — PRAMIPEXOLE DIHYDROCHLORIDE 0.12 MG: 0.12 TABLET ORAL at 21:39

## 2024-12-28 RX ADMIN — TRAZODONE HYDROCHLORIDE 50 MG: 50 TABLET ORAL at 21:39

## 2024-12-28 RX ADMIN — Medication 2 PUFF: at 20:21

## 2024-12-28 RX ADMIN — IOPAMIDOL 100 ML: 755 INJECTION, SOLUTION INTRAVENOUS at 14:35

## 2024-12-28 RX ADMIN — POLYETHYLENE GLYCOL 3350 17 G: 17 POWDER, FOR SOLUTION ORAL at 21:39

## 2024-12-28 ASSESSMENT — PAIN DESCRIPTION - RADICULAR PAIN: RADICULAR_PAIN: ABSENT

## 2024-12-28 ASSESSMENT — PAIN DESCRIPTION - LOCATION
LOCATION: HEAD
LOCATION: ABDOMEN

## 2024-12-28 ASSESSMENT — PAIN SCALES - GENERAL
PAINLEVEL_OUTOF10: 6
PAINLEVEL_OUTOF10: 7
PAINLEVEL_OUTOF10: 0

## 2024-12-28 ASSESSMENT — PAIN DESCRIPTION - DESCRIPTORS: DESCRIPTORS: ACHING

## 2024-12-28 ASSESSMENT — PAIN - FUNCTIONAL ASSESSMENT: PAIN_FUNCTIONAL_ASSESSMENT: 0-10

## 2024-12-28 NOTE — ED TRIAGE NOTES
Pt roomed. LKWT: 1300    1405:  NIH by RN  1408: stroke called  1409: Bass at bedside  1413: Pt to CT  1435:  Pt returned to room  1442:  Tele-neuro on screen    Purewick placed for safety.

## 2024-12-28 NOTE — ED NOTES
Unable to reach Hospitalist for meds prior to MRI.   
12/28/24 Left;Anterior Cephalic (Active)   Site Assessment Clean, dry & intact 12/28/24 1436   Line Status Blood return noted 12/28/24 1436   Phlebitis Assessment No symptoms 12/28/24 1436   Infiltration Assessment 0 12/28/24 1436   Dressing Status New dressing applied 12/28/24 1436   Dressing Type Transparent 12/28/24 1436       Peripheral IV 12/28/24 Proximal;Right;Anterior Forearm (Active)     Active Central Lines:                          Active Wounds:    Active Duncan's:    Active Feeding Tubes:      Administered Medications:   Medications   aspirin suppository 300 mg (has no administration in time range)   sodium chloride flush 0.9 % injection 5-40 mL (has no administration in time range)   sodium chloride flush 0.9 % injection 5-40 mL (has no administration in time range)   0.9 % sodium chloride infusion (has no administration in time range)   ondansetron (ZOFRAN-ODT) disintegrating tablet 4 mg (has no administration in time range)     Or   ondansetron (ZOFRAN) injection 4 mg (has no administration in time range)   polyethylene glycol (GLYCOLAX) packet 17 g (has no administration in time range)   enoxaparin (LOVENOX) injection 40 mg (has no administration in time range)   clopidogrel (PLAVIX) tablet 75 mg (has no administration in time range)   iopamidol (ISOVUE-370) 76 % injection 100 mL (100 mLs IntraVENous Given 12/28/24 1435)     Last documented pain medication administration: na   Pertinent or High Risk Medications/Drips: no   If Yes, please provide details: na  Blood Product Administration: no  If Yes, please provide details: na  Process Protocols/Bundles: Stroke Protocol/Bundle Completion-     Recommendation  Incomplete STAT orders: na  Overdue Medications: aspirin  Patient Belongings:  in bag  Additional Comments: on purewick  If any further questions, please call Sending RN at [phone]      Admitting Unit Notification  Name of person notified and time: luly, unit clerk.  Awaiting RN for bedside neuro

## 2024-12-28 NOTE — ED PROVIDER NOTES
patient's partner, Melecio, because there was initially confusion over last known well.  Patient was upgraded from my level 2 to a level 1 stroke alert.  Blood sugar normal similar as well as some dysarthria.  Pursuing stroke workup and labs including a CBC, CMP, INR, EtOH, troponin.  Head CT and CTA of the head and neck is ordered. [LW]   1456 Patient declines TNK. [LW]   1459 Spoke with teleneurology.  Recommended aspirin suppository which is ordered. [LW]      ED Course User Index  [LW] Patricia Bass MD       SEPSIS Reassessment: This patient does NOT meet Sepsis criteria after ED workup    Clinical Management Tools:  Not Applicable    Patient was given the following medications:  Medications   aspirin suppository 300 mg (has no administration in time range)   iopamidol (ISOVUE-370) 76 % injection 100 mL (100 mLs IntraVENous Given 12/28/24 1435)       CONSULTS: See ED Course/MDM for further details.  IP CONSULT TO TELE-NEUROLOGY     Social Determinants affecting Diagnosis/Treatment: None    Smoking Cessation: Not Applicable    PROCEDURES   Unless otherwise noted above, none  Procedures      CRITICAL CARE TIME   Patient does not meet Critical Care Time, 0 minutes    ED IMPRESSION     1. Acute CVA (cerebrovascular accident) (HCC)          DISPOSITION/PLAN   DISPOSITION Decision To Admit 12/28/2024 03:40:05 PM   DISPOSITION CONDITION Stable         Admit Note: Pt is being admitted by Hospitalist . The results of their tests and reason(s) for their admission have been discussed with pt and/or available family. They convey agreement and understanding for the need to be admitted and for the admission diagnosis.     PATIENT REFERRED TO:  No follow-up provider specified.      DISCHARGE MEDICATIONS:     Medication List        ASK your doctor about these medications      acetaminophen 500 MG tablet  Commonly known as: TYLENOL     albuterol sulfate  (90 Base) MCG/ACT inhaler  Commonly known as: Ventolin HFA  Inhale

## 2024-12-28 NOTE — H&P
(SYMBICORT) 160-4.5 MCG/ACT AERO Inhale 2 puffs into the lungs in the morning and 2 puffs in the evening. 10.2 g 3    albuterol sulfate HFA (VENTOLIN HFA) 108 (90 Base) MCG/ACT inhaler Inhale 2 puffs into the lungs 4 times daily as needed for Wheezing 18 g 0    levETIRAcetam (KEPPRA) 1000 MG tablet Take 1 tablet by mouth 2 times daily 60 tablet 3    folic acid (FOLVITE) 1 MG tablet Take 1 tablet by mouth daily 30 tablet 3    thiamine mononitrate (THIAMINE) 100 MG tablet Take 1 tablet by mouth daily 30 tablet 0    acetaminophen (TYLENOL) 500 MG tablet Take 1 tablet by mouth every 6 hours as needed for Pain      B-Sure witch hazel (TUCKS) 50 % PADS Apply topically as needed for Hemorrhoids 10 each 0    potassium chloride (KLOR-CON) 10 MEQ extended release tablet Take 1 tablet by mouth daily      QUEtiapine (SEROQUEL) 50 MG tablet Take 2 tablets by mouth nightly      vitamin D (ERGOCALCIFEROL) 1.25 MG (87279 UT) CAPS capsule Take 1 capsule by mouth once a week      pantoprazole (PROTONIX) 40 MG tablet Take 1 tablet by mouth daily      FLUoxetine (PROZAC) 20 MG capsule Take 1 capsule by mouth daily Indications: depression      loratadine (CLARITIN) 10 MG capsule Take 1 capsule by mouth daily      atorvastatin (LIPITOR) 40 MG tablet Take 1 tablet by mouth daily      traZODone (DESYREL) 50 MG tablet take 1 tablet by mouth AT NIGHT IF NEEDED FOR SLEEP           Allergies  Allergies   Allergen Reactions    Peanut Oil Anaphylaxis     Per  Melecio Toth    Bee Venom Swelling    Peanut Butter Flavoring Agent (Non-Screening)        REVIEW OF SYSTEMS   Within above limitations. 14 point review of systems reviewed. Pertinent positive or negative as per HPI or otherwise negative per 14 point systems review.      PHYSICAL EXAM     Wt Readings from Last 3 Encounters:   12/28/24 94.3 kg (208 lb)   12/02/24 99 kg (218 lb 4.1 oz)   11/11/24 91.6 kg (201 lb 15.1 oz)       Blood pressure 129/84, pulse (!) 48, temperature 97.5

## 2024-12-29 ENCOUNTER — APPOINTMENT (OUTPATIENT)
Facility: HOSPITAL | Age: 59
End: 2024-12-29
Payer: MEDICARE

## 2024-12-29 LAB
ANION GAP SERPL CALC-SCNC: 6 MMOL/L (ref 2–12)
BUN SERPL-MCNC: 12 MG/DL (ref 6–20)
BUN/CREAT SERPL: 16 (ref 12–20)
CA-I BLD-MCNC: 8.8 MG/DL (ref 8.5–10.1)
CHLORIDE SERPL-SCNC: 110 MMOL/L (ref 97–108)
CHOLEST SERPL-MCNC: 208 MG/DL
CO2 SERPL-SCNC: 25 MMOL/L (ref 21–32)
CREAT SERPL-MCNC: 0.75 MG/DL (ref 0.55–1.02)
EKG ATRIAL RATE: 53 BPM
EKG DIAGNOSIS: NORMAL
EKG P AXIS: 53 DEGREES
EKG P-R INTERVAL: 158 MS
EKG Q-T INTERVAL: 504 MS
EKG QRS DURATION: 76 MS
EKG QTC CALCULATION (BAZETT): 472 MS
EKG R AXIS: 48 DEGREES
EKG T AXIS: 198 DEGREES
EKG VENTRICULAR RATE: 53 BPM
ERYTHROCYTE [DISTWIDTH] IN BLOOD BY AUTOMATED COUNT: 12.5 % (ref 11.5–14.5)
EST. AVERAGE GLUCOSE BLD GHB EST-MCNC: 160 MG/DL
GLUCOSE BLD STRIP.AUTO-MCNC: 122 MG/DL (ref 65–100)
GLUCOSE BLD STRIP.AUTO-MCNC: 134 MG/DL (ref 65–100)
GLUCOSE BLD STRIP.AUTO-MCNC: 65 MG/DL (ref 65–100)
GLUCOSE SERPL-MCNC: 106 MG/DL (ref 65–100)
HBA1C MFR BLD: 7.2 % (ref 4–5.6)
HCT VFR BLD AUTO: 36.5 % (ref 35–47)
HDLC SERPL-MCNC: 59 MG/DL
HDLC SERPL: 3.5 (ref 0–5)
HGB BLD-MCNC: 12.1 G/DL (ref 11.5–16)
LDLC SERPL CALC-MCNC: 125.2 MG/DL (ref 0–100)
LIPID PANEL: ABNORMAL
MCH RBC QN AUTO: 29.5 PG (ref 26–34)
MCHC RBC AUTO-ENTMCNC: 33.2 G/DL (ref 30–36.5)
MCV RBC AUTO: 89 FL (ref 80–99)
NRBC # BLD: 0 K/UL (ref 0–0.01)
NRBC BLD-RTO: 0 PER 100 WBC
PERFORMED BY:: ABNORMAL
PERFORMED BY:: ABNORMAL
PERFORMED BY:: NORMAL
PLATELET # BLD AUTO: 253 K/UL (ref 150–400)
PMV BLD AUTO: 9.7 FL (ref 8.9–12.9)
POTASSIUM SERPL-SCNC: 3.1 MMOL/L (ref 3.5–5.1)
RBC # BLD AUTO: 4.1 M/UL (ref 3.8–5.2)
SODIUM SERPL-SCNC: 141 MMOL/L (ref 136–145)
TRIGL SERPL-MCNC: 119 MG/DL
VLDLC SERPL CALC-MCNC: 23.8 MG/DL
WBC # BLD AUTO: 4.7 K/UL (ref 3.6–11)

## 2024-12-29 PROCEDURE — 97161 PT EVAL LOW COMPLEX 20 MIN: CPT

## 2024-12-29 PROCEDURE — G0378 HOSPITAL OBSERVATION PER HR: HCPCS

## 2024-12-29 PROCEDURE — 94640 AIRWAY INHALATION TREATMENT: CPT

## 2024-12-29 PROCEDURE — 83036 HEMOGLOBIN GLYCOSYLATED A1C: CPT

## 2024-12-29 PROCEDURE — 82962 GLUCOSE BLOOD TEST: CPT

## 2024-12-29 PROCEDURE — 6370000000 HC RX 637 (ALT 250 FOR IP): Performed by: HOSPITALIST

## 2024-12-29 PROCEDURE — 92610 EVALUATE SWALLOWING FUNCTION: CPT

## 2024-12-29 PROCEDURE — 36415 COLL VENOUS BLD VENIPUNCTURE: CPT

## 2024-12-29 PROCEDURE — 2500000003 HC RX 250 WO HCPCS: Performed by: HOSPITALIST

## 2024-12-29 PROCEDURE — 97535 SELF CARE MNGMENT TRAINING: CPT

## 2024-12-29 PROCEDURE — 85027 COMPLETE CBC AUTOMATED: CPT

## 2024-12-29 PROCEDURE — 80048 BASIC METABOLIC PNL TOTAL CA: CPT

## 2024-12-29 PROCEDURE — 70551 MRI BRAIN STEM W/O DYE: CPT

## 2024-12-29 PROCEDURE — 80061 LIPID PANEL: CPT

## 2024-12-29 PROCEDURE — 6360000002 HC RX W HCPCS: Performed by: HOSPITALIST

## 2024-12-29 PROCEDURE — G0426 INPT/ED TELECONSULT50: HCPCS | Performed by: STUDENT IN AN ORGANIZED HEALTH CARE EDUCATION/TRAINING PROGRAM

## 2024-12-29 PROCEDURE — 97165 OT EVAL LOW COMPLEX 30 MIN: CPT

## 2024-12-29 PROCEDURE — 97530 THERAPEUTIC ACTIVITIES: CPT

## 2024-12-29 PROCEDURE — 96372 THER/PROPH/DIAG INJ SC/IM: CPT

## 2024-12-29 RX ADMIN — ATORVASTATIN CALCIUM 40 MG: 40 TABLET, FILM COATED ORAL at 08:12

## 2024-12-29 RX ADMIN — Medication 2 PUFF: at 20:14

## 2024-12-29 RX ADMIN — LEVETIRACETAM 1000 MG: 500 TABLET, FILM COATED ORAL at 08:11

## 2024-12-29 RX ADMIN — PANTOPRAZOLE SODIUM 40 MG: 40 TABLET, DELAYED RELEASE ORAL at 08:12

## 2024-12-29 RX ADMIN — CLOPIDOGREL BISULFATE 75 MG: 75 TABLET ORAL at 08:12

## 2024-12-29 RX ADMIN — SODIUM CHLORIDE, PRESERVATIVE FREE 10 ML: 5 INJECTION INTRAVENOUS at 21:04

## 2024-12-29 RX ADMIN — LORAZEPAM 1 MG: 1 TABLET ORAL at 10:59

## 2024-12-29 RX ADMIN — TOPIRAMATE 25 MG: 25 TABLET, FILM COATED ORAL at 08:12

## 2024-12-29 RX ADMIN — TRAZODONE HYDROCHLORIDE 50 MG: 50 TABLET ORAL at 21:04

## 2024-12-29 RX ADMIN — FLUOXETINE HYDROCHLORIDE 20 MG: 20 CAPSULE ORAL at 08:12

## 2024-12-29 RX ADMIN — QUETIAPINE 100 MG: 100 TABLET ORAL at 21:04

## 2024-12-29 RX ADMIN — MIDODRINE HYDROCHLORIDE 5 MG: 5 TABLET ORAL at 08:12

## 2024-12-29 RX ADMIN — Medication 2 PUFF: at 09:17

## 2024-12-29 RX ADMIN — TOPIRAMATE 25 MG: 25 TABLET, FILM COATED ORAL at 21:04

## 2024-12-29 RX ADMIN — LEVETIRACETAM 1000 MG: 500 TABLET, FILM COATED ORAL at 21:04

## 2024-12-29 RX ADMIN — ENOXAPARIN SODIUM 40 MG: 100 INJECTION SUBCUTANEOUS at 08:12

## 2024-12-29 RX ADMIN — MIDODRINE HYDROCHLORIDE 5 MG: 5 TABLET ORAL at 17:04

## 2024-12-29 RX ADMIN — PRAMIPEXOLE DIHYDROCHLORIDE 0.12 MG: 0.12 TABLET ORAL at 08:11

## 2024-12-29 RX ADMIN — PRAMIPEXOLE DIHYDROCHLORIDE 0.12 MG: 0.12 TABLET ORAL at 14:46

## 2024-12-29 RX ADMIN — SODIUM CHLORIDE, PRESERVATIVE FREE 10 ML: 5 INJECTION INTRAVENOUS at 08:12

## 2024-12-29 RX ADMIN — PRAMIPEXOLE DIHYDROCHLORIDE 0.12 MG: 0.12 TABLET ORAL at 21:04

## 2024-12-29 NOTE — CARE COORDINATION
1025: CM reviewed PT evaluation.    CM met with patient confirming she discharged home from [facility].    Patient declined SNF placement.    Patient agreeable to CM attempting to set up home health stating if not, she can receive out-therapy at [facility] located behind her home.    Patient understands she will need an order from her PCP.    Choice letter received, placed on chart and referrals sent.

## 2024-12-29 NOTE — CARE COORDINATION
12/29/24 0814   Service Assessment   Patient Orientation Alert and Oriented   Cognition Alert   History Provided By Patient   Primary Caregiver Self   Support Systems Spouse/Significant Other;Children;Family Members   Patient's Healthcare Decision Maker is: Legal Next of Kin   PCP Verified by CM Yes   Last Visit to PCP Within last 6 months   Prior Functional Level Independent in ADLs/IADLs   Current Functional Level Other (see comment)  (TBD)   Can patient return to prior living arrangement Unknown at present   Ability to make needs known: Good   Family able to assist with home care needs: Yes   Would you like for me to discuss the discharge plan with any other family members/significant others, and if so, who? No   Financial Resources None   Community Resources None   Social/Functional History   Lives With Spouse   Services At/After Discharge   Mode of Transport at Discharge Other (see comment)   Confirm Follow Up Transport Other (see comment)  (Medicaid ride)     WES met f/f with Pt, she confirmed that the information on the face sheet is correct. Pt stated that she lives with her .    No HH, has a walker, cane and wheelchair, and sometimes she needs assistance with ADL    Send Rx to [facility]     Pt will need Medicaid ride home when D/C.    WES dispo: TBD    Advance Care Planning     General Advance Care Planning (ACP) Conversation    Date of Conversation: 12/29/2024  resent:     Healthcare Decision Maker:   Primary Decision Maker: Jil Alberto - Brother/Sister       Content/Action Overview:    Reviewed DNR/DNI and patient elects Full Code (Attempt Resuscitation)

## 2024-12-29 NOTE — CARE COORDINATION
12/29/24 0813   Readmission Assessment   Number of Days since last admission? 8-30 days   Previous Disposition Home with Family   Who is being Interviewed Patient   What was the patient's/caregiver's perception as to why they think they needed to return back to the hospital? Other (Comment)  (No suggestions)   Did you visit your Primary Care Physician after you left the hospital, before you returned this time? No   Why weren't you able to visit your PCP? Did not have an appointment   Did you see a specialist, such as Cardiac, Pulmonary, Orthopedic Physician, etc. after you left the hospital? No   Who advised the patient to return to the hospital? Self-referral   Does the patient report anything that got in the way of taking their medications? No   In our efforts to provide the best possible care to you and others like you, can you think of anything that we could have done to help you after you left the hospital the first time, so that you might not have needed to return so soon? Additional Community resources available for illness support;Education on how to continue taking medications upon discharge;Discharge instructions that are concise, clear, and non contradictory;Teach back instructions regarding management of illness;Arrange for more help when leaving the hospital;Identify patient's health literacy needs;Improved written discharge instructions;Teaching during hospitalization regarding your illness

## 2024-12-29 NOTE — CONSULTS
hemorrhoids    Orthostatic hypotension     Perineal rash     Rectal bleeding     Rib contusion, left, initial encounter     Seizures (HCC)     Stroke (HCC)     Syncope     Toe fracture     great toe    Trichomonas infection     UTI (urinary tract infection)       PSH: History reviewed. No pertinent surgical history.   Allergies:   Allergies   Allergen Reactions    Peanut Oil Anaphylaxis     Per  Melecio Toth    Bee Venom Swelling    Peanut Butter Flavoring Agent (Non-Screening)       FH: History reviewed. No pertinent family history.   SH:   Social History     Tobacco Use    Smoking status: Former     Current packs/day: 0.50     Types: Cigarettes    Smokeless tobacco: Former   Substance Use Topics    Alcohol use: Yes     Comment: 12 pack per day    Drug use: Not Currently     Types: Marijuana (Weed), Cocaine     Comment: socially      Meds:   Current Outpatient Medications   Medication Instructions    acetaminophen (TYLENOL) 500 mg, Oral, EVERY 6 HOURS PRN    albuterol sulfate HFA (VENTOLIN HFA) 108 (90 Base) MCG/ACT inhaler 2 puffs, Inhalation, 4 TIMES DAILY PRN    atorvastatin (LIPITOR) 40 MG tablet Take 1 tablet by mouth daily    B-Sure witch hazel (TUCKS) 50 % PADS Topical, PRN    budesonide-formoterol (SYMBICORT) 160-4.5 MCG/ACT AERO 2 puffs, Inhalation, 2 TIMES DAILY RESP    FLUoxetine (PROZAC) 20 mg, Oral, DAILY    folic acid (FOLVITE) 1 mg, Oral, DAILY    hydrocortisone (ANUSOL-HC) 25 mg, Rectal, 2 TIMES DAILY    levETIRAcetam (KEPPRA) 1,000 mg, Oral, 2 TIMES DAILY    loratadine (CLARITIN) 10 mg, Oral, DAILY    midodrine (PROAMATINE) 5 mg, Oral, 3 TIMES DAILY WITH MEALS    pantoprazole (PROTONIX) 40 mg, Oral, DAILY    potassium chloride (KLOR-CON) 10 MEQ extended release tablet 10 mEq, Oral, DAILY    pramipexole (MIRAPEX) 0.125 mg, Oral, 3 TIMES DAILY    QUEtiapine (SEROQUEL) 50 MG tablet 2 tablets, Oral, EVERY BEDTIME    topiramate (TOPAMAX) 25 mg, Oral, 2 TIMES DAILY    traZODone (DESYREL)

## 2024-12-30 VITALS
TEMPERATURE: 98.4 F | HEART RATE: 62 BPM | OXYGEN SATURATION: 97 % | SYSTOLIC BLOOD PRESSURE: 115 MMHG | BODY MASS INDEX: 31.52 KG/M2 | WEIGHT: 208 LBS | RESPIRATION RATE: 17 BRPM | DIASTOLIC BLOOD PRESSURE: 76 MMHG | HEIGHT: 68 IN

## 2024-12-30 LAB
ANION GAP SERPL CALC-SCNC: 5 MMOL/L (ref 2–12)
BUN SERPL-MCNC: 13 MG/DL (ref 6–20)
BUN/CREAT SERPL: 17 (ref 12–20)
CA-I BLD-MCNC: 8.6 MG/DL (ref 8.5–10.1)
CHLORIDE SERPL-SCNC: 110 MMOL/L (ref 97–108)
CO2 SERPL-SCNC: 25 MMOL/L (ref 21–32)
CREAT SERPL-MCNC: 0.78 MG/DL (ref 0.55–1.02)
ERYTHROCYTE [DISTWIDTH] IN BLOOD BY AUTOMATED COUNT: 12.7 % (ref 11.5–14.5)
GLUCOSE BLD STRIP.AUTO-MCNC: 108 MG/DL (ref 65–100)
GLUCOSE BLD STRIP.AUTO-MCNC: 131 MG/DL (ref 65–100)
GLUCOSE SERPL-MCNC: 100 MG/DL (ref 65–100)
HCT VFR BLD AUTO: 37.7 % (ref 35–47)
HGB BLD-MCNC: 12.2 G/DL (ref 11.5–16)
MCH RBC QN AUTO: 29.5 PG (ref 26–34)
MCHC RBC AUTO-ENTMCNC: 32.4 G/DL (ref 30–36.5)
MCV RBC AUTO: 91.1 FL (ref 80–99)
NRBC # BLD: 0 K/UL (ref 0–0.01)
NRBC BLD-RTO: 0 PER 100 WBC
PERFORMED BY:: ABNORMAL
PERFORMED BY:: ABNORMAL
PLATELET # BLD AUTO: 248 K/UL (ref 150–400)
PMV BLD AUTO: 9.7 FL (ref 8.9–12.9)
POTASSIUM SERPL-SCNC: 3.3 MMOL/L (ref 3.5–5.1)
RBC # BLD AUTO: 4.14 M/UL (ref 3.8–5.2)
SODIUM SERPL-SCNC: 140 MMOL/L (ref 136–145)
WBC # BLD AUTO: 3.9 K/UL (ref 3.6–11)

## 2024-12-30 PROCEDURE — 96372 THER/PROPH/DIAG INJ SC/IM: CPT

## 2024-12-30 PROCEDURE — 36415 COLL VENOUS BLD VENIPUNCTURE: CPT

## 2024-12-30 PROCEDURE — 2500000003 HC RX 250 WO HCPCS: Performed by: HOSPITALIST

## 2024-12-30 PROCEDURE — 94640 AIRWAY INHALATION TREATMENT: CPT

## 2024-12-30 PROCEDURE — 85027 COMPLETE CBC AUTOMATED: CPT

## 2024-12-30 PROCEDURE — 6370000000 HC RX 637 (ALT 250 FOR IP): Performed by: STUDENT IN AN ORGANIZED HEALTH CARE EDUCATION/TRAINING PROGRAM

## 2024-12-30 PROCEDURE — 94761 N-INVAS EAR/PLS OXIMETRY MLT: CPT

## 2024-12-30 PROCEDURE — G0378 HOSPITAL OBSERVATION PER HR: HCPCS

## 2024-12-30 PROCEDURE — 6370000000 HC RX 637 (ALT 250 FOR IP): Performed by: HOSPITALIST

## 2024-12-30 PROCEDURE — 82962 GLUCOSE BLOOD TEST: CPT

## 2024-12-30 PROCEDURE — 97530 THERAPEUTIC ACTIVITIES: CPT

## 2024-12-30 PROCEDURE — 97110 THERAPEUTIC EXERCISES: CPT

## 2024-12-30 PROCEDURE — 6360000002 HC RX W HCPCS: Performed by: HOSPITALIST

## 2024-12-30 PROCEDURE — 80048 BASIC METABOLIC PNL TOTAL CA: CPT

## 2024-12-30 RX ORDER — ASPIRIN 81 MG/1
81 TABLET ORAL DAILY
Status: DISCONTINUED | OUTPATIENT
Start: 2024-12-30 | End: 2024-12-30 | Stop reason: HOSPADM

## 2024-12-30 RX ORDER — ASPIRIN 81 MG/1
81 TABLET ORAL DAILY
Qty: 30 TABLET | Refills: 3 | Status: SHIPPED | OUTPATIENT
Start: 2024-12-31

## 2024-12-30 RX ORDER — ATORVASTATIN CALCIUM 40 MG/1
40 TABLET, FILM COATED ORAL DAILY
Qty: 90 TABLET | Refills: 0 | Status: SHIPPED | OUTPATIENT
Start: 2024-12-30

## 2024-12-30 RX ADMIN — PANTOPRAZOLE SODIUM 40 MG: 40 TABLET, DELAYED RELEASE ORAL at 08:57

## 2024-12-30 RX ADMIN — Medication 2 PUFF: at 08:48

## 2024-12-30 RX ADMIN — TOPIRAMATE 25 MG: 25 TABLET, FILM COATED ORAL at 08:57

## 2024-12-30 RX ADMIN — PRAMIPEXOLE DIHYDROCHLORIDE 0.12 MG: 0.12 TABLET ORAL at 13:02

## 2024-12-30 RX ADMIN — LEVETIRACETAM 1000 MG: 500 TABLET, FILM COATED ORAL at 08:57

## 2024-12-30 RX ADMIN — MIDODRINE HYDROCHLORIDE 5 MG: 5 TABLET ORAL at 13:02

## 2024-12-30 RX ADMIN — MIDODRINE HYDROCHLORIDE 5 MG: 5 TABLET ORAL at 08:57

## 2024-12-30 RX ADMIN — FLUOXETINE HYDROCHLORIDE 20 MG: 20 CAPSULE ORAL at 08:57

## 2024-12-30 RX ADMIN — ATORVASTATIN CALCIUM 40 MG: 40 TABLET, FILM COATED ORAL at 08:57

## 2024-12-30 RX ADMIN — SODIUM CHLORIDE, PRESERVATIVE FREE 10 ML: 5 INJECTION INTRAVENOUS at 08:58

## 2024-12-30 RX ADMIN — ENOXAPARIN SODIUM 40 MG: 100 INJECTION SUBCUTANEOUS at 08:58

## 2024-12-30 RX ADMIN — PRAMIPEXOLE DIHYDROCHLORIDE 0.12 MG: 0.12 TABLET ORAL at 08:57

## 2024-12-30 RX ADMIN — ASPIRIN 81 MG: 81 TABLET, COATED ORAL at 08:57

## 2024-12-30 ASSESSMENT — PAIN SCALES - GENERAL
PAINLEVEL_OUTOF10: 0

## 2024-12-30 NOTE — CARE COORDINATION
DC Plan: Home with spouse and HH vs outpatient    Pt recently dc from [facility]. Pt declined SNF placement.     Home health acceptance is pending. If no accepting home health agencies, pt indicated she will do outpatient therapy at [facility] near her home.

## 2024-12-30 NOTE — DISCHARGE SUMMARY
tablet  Commonly known as: TOPAMAX  Take 1 tablet by mouth 2 times daily     traZODone 50 MG tablet  Commonly known as: DESYREL     vitamin B-1 100 MG tablet  Commonly known as: THIAMINE  Take 1 tablet by mouth daily     vitamin D 1.25 MG (54900 UT) Caps capsule  Commonly known as: ERGOCALCIFEROL            STOP taking these medications      acetaminophen 500 MG tablet  Commonly known as: TYLENOL     B-Sure witch hazel 50 % Pads     folic acid 1 MG tablet  Commonly known as: FOLVITE     hydrocortisone 25 MG suppository  Commonly known as: ANUSOL-HC               Where to Get Your Medications        These medications were sent to [facility] - [place], [place] - [address] [place] [address] -  [phone] - F [phone] [address] [place] [address]      Phone: [phone]   aspirin 81 MG EC tablet  atorvastatin 40 MG tablet          Code Status: Full Code     Consults:   IP CONSULT TO TELE-NEUROLOGY  IP CONSULT TO CASE MANAGEMENT  IP CONSULT TO STROKE COORDINATOR  IP CONSULT TO TELE-NEUROLOGY    Diet: regular diet    Activity: activity as tolerated   Work:    Discharged Condition: good    Prognosis: Good    Disposition: home      Follow-up with   PCP within   5-7    Days    Discharge Physician Signed:   Hospitalist, Internal medicine  12/30/2024 at 3:17 PM    The patient was seen and examined on day of discharge and this discharge summary is in conjunction with any daily progress note from day of discharge.  Time spent on discharge in the examination, evaluation, counseling and review of medications and discharge plan: 30 minutes    Please forward this discharge summary to patient's PCP

## 2024-12-30 NOTE — PLAN OF CARE
PHYSICAL THERAPY TREATMENT     Patient: María Amos (59 y.o. female)  Date: 12/30/2024  Diagnosis: CVA (cerebrovascular accident due to intracerebral hemorrhage) ([facility]) [I61.9]  Acute CVA (cerebrovascular accident) ([facility]) [I63.9] CVA (cerebrovascular accident due to intracerebral hemorrhage) ([facility])      Precautions: Fall Risk, General Precautions                      Recommendations for nursing mobility: Out of bed to chair for meals, Frequent repositioning to prevent skin breakdown, Use of bed/chair alarm for safety, Use of BSC for toileting , AD and gt belt for bed to chair , and Assist x1    In place during session:   Chart, physical therapy assessment, plan of care and goals were reviewed.  ASSESSMENT  Patient continues with skilled PT services and is progressing towards goals. Pt partial sitting EOB upon PT arrival, agreeable to session. Pt A&O x 4. (See below for objective details and assist levels).     Overall pt tolerated session well today with PT. Significant decreased assistance required for bed mobility, transfers, and gait. Improvements also noted with balance, both sitting and standing. Maintained standing position, and participated with standing marches, 10steps in place. Also amb 5ft to/from RW. Transferred to chair, improved ability to advance LLE, no knee buckling noted. Participated with LE seated therex, AAROM required for LLE. Reports of fatigue post therex and requested to return to bed. Encouragement provided to remain in chair, however pt politely declined. Assisted BTB with min A-CGA.  Will continue to benefit from skilled PT services, and will continue to progress as tolerated. Current PT DC recommendation Moderate intensity short-term skilled physical therapy up to 5x/week once medically appropriate.     Start of Session End of Session   SPO2 (%) WFL WFL   Heart Rate (BPM) WFL WFL     GOALS:    Problem: Physical Therapy - Adult  Goal: By Discharge: Performs mobility at highest 
  Problem: Chronic Conditions and Co-morbidities  Goal: Patient's chronic conditions and co-morbidity symptoms are monitored and maintained or improved  12/29/2024 0202 by Anastasia Gonzalez RN  Outcome: Progressing  12/28/2024 1800 by Maribel Saab RN  Outcome: Progressing     Problem: Discharge Planning  Goal: Discharge to home or other facility with appropriate resources  12/29/2024 0202 by Anastasia Gonzalez RN  Outcome: Progressing  12/28/2024 1800 by Maribel Saab RN  Outcome: Progressing     Problem: Pain  Goal: Verbalizes/displays adequate comfort level or baseline comfort level  Outcome: Progressing     Problem: Skin/Tissue Integrity  Goal: Absence of new skin breakdown  Description: 1.  Monitor for areas of redness and/or skin breakdown  2.  Assess vascular access sites hourly  3.  Every 4-6 hours minimum:  Change oxygen saturation probe site  4.  Every 4-6 hours:  If on nasal continuous positive airway pressure, respiratory therapy assess nares and determine need for appliance change or resting period.  Outcome: Progressing     Problem: Safety - Adult  Goal: Free from fall injury  Outcome: Progressing     
  Problem: Chronic Conditions and Co-morbidities  Goal: Patient's chronic conditions and co-morbidity symptoms are monitored and maintained or improved  Outcome: Progressing     Problem: Discharge Planning  Goal: Discharge to home or other facility with appropriate resources  Outcome: Progressing     Problem: Pain  Goal: Verbalizes/displays adequate comfort level or baseline comfort level  Outcome: Progressing     Problem: Skin/Tissue Integrity  Goal: Absence of new skin breakdown  Description: 1.  Monitor for areas of redness and/or skin breakdown  2.  Assess vascular access sites hourly  3.  Every 4-6 hours minimum:  Change oxygen saturation probe site  4.  Every 4-6 hours:  If on nasal continuous positive airway pressure, respiratory therapy assess nares and determine need for appliance change or resting period.  Outcome: Progressing     Problem: Physical Therapy - Adult  Goal: By Discharge: Performs mobility at highest level of function for planned discharge setting.  See evaluation for individualized goals.  Description: c  12/29/2024 0946 by Judit Bryant, PT  Outcome: Progressing     Problem: Occupational Therapy - Adult  Goal: By Discharge: Performs self-care activities at highest level of function for planned discharge setting.  See evaluation for individualized goals.  Description: FUNCTIONAL STATUS PRIOR TO ADMISSION:  Pt reports being grossly independent in BADLs PTA except for LE dressing/LE bathing, assist needed for IADLs    HOME SUPPORT: Pt lives w/ her  who is in good physical health who assist in IADLs/BADLs    Occupational Therapy Goals:  Initiated 12/29/2024  Patient/Family stated goal: \"I want to go home\".  1.  Patient will perform upper body dressing with Minimal Assist within 7 day(s).  2.  Patient will perform toilet transfer with Minimal Assist within 7 day(s).  3.  Patient will perform toileting with Minimal Assist within 7 day(s).     12/29/2024 1320 by Patricia Alford, 
OCCUPATIONAL THERAPY TREATMENT  Patient: María Amos (59 y.o. female)  Date: 12/30/2024  Primary Diagnosis: CVA (cerebrovascular accident due to intracerebral hemorrhage) ([facility]) [I61.9]  Acute CVA (cerebrovascular accident) ([facility]) [I63.9]       Precautions: Fall Risk, General Precautions                Recommendations for nursing mobility: Out of bed to chair for meals, Encourage HEP in prep for ADLs/mobility; see handout for details, Use of BSC for toileting , AD and gt belt for bed to chair , and Assist x1    In place during session: External Catheter and EKG/telemetry   Chart, occupational therapy assessment, plan of care, and goals were reviewed.  ASSESSMENT  Patient continues with skilled OT services and is progressing towards goals. Pt semi supine in bed upon TIERNEY arrival, agreeable to session. Pt A&O x 4. Reviewed BE FAST w/ pt verbalizing good understanding. Pt completed bed mobility w/ sba and extra time.  Pt t/f to/from BSC w/ rw and cga. Pt had continent void of bladder and required set up of wet wipes to complete omar care. Pt returned to semi supine in bed.  Education provided on energy conservation and safety awareness w/ pt verbalizing good understanding. Pt left semi supine in bed with all known needs met. (See below for objective details and assist levels).     Overall pt tolerated session fair today with rest breaks.Pt pleasant and appears very motivated to increase functional level to return to PLOF. Current OT recommendations for discharge Moderate intensity short-term skilled occupational therapy up to 5x/week. Will continue to benefit from skilled OT services, and will continue to progress as tolerated.       GOALS:    Problem: Occupational Therapy - Adult  Goal: By Discharge: Performs self-care activities at highest level of function for planned discharge setting.  See evaluation for individualized goals.  Description: FUNCTIONAL STATUS PRIOR TO ADMISSION:  Pt reports being grossly independent 
PHYSICAL THERAPY EVALUATION  Patient: María Amos (59 y.o. female)  Date: 12/29/2024  Primary Diagnosis: CVA (cerebrovascular accident due to intracerebral hemorrhage) ([facility]) [I61.9]  Acute CVA (cerebrovascular accident) ([facility]) [I63.9]       Precautions: Fall Risk                      Recommendations for nursing mobility: Out of bed to chair for meals, Use of bed/chair alarm for safety, and Assist x2    In place during session:     ASSESSMENT  Pt is a 59 y.o. female admitted on 12/28/2024 for CVA workup; pt currently being treated for L side weakness. Initial head CT neg, MRI pending . Pt semi supine in bed upon PT arrival, agreeable to evaluation. Pt A&O x 4, pt noted with difficulty at times with word finding and stuttering which she states is not her normal.      Based on the objective data described below, the patient currently presents with impaired functional mobility, decreased ROM, impaired strength, decreased activity tolerance, decreased coordination, and impaired balance. (See below for objective details and assist levels).     Overall pt tolerated session fair today with overall min/mod A with bed mob and mod A for OOB transfers.  Pt c/o feeling dizzy initially upon sitting, BP taken and was 128/88 HR 68 SpO2 98%.  Pt stated dizziness resolved and then was able to stand with mod Ax1.  Pt took 3 steps side to side at EOB with mod A, difficulty clearing L foot and may need 2 person assist for stand pivot transfers and further ambulation. Pt min A to return to sitting and mod A to return to semi supine in bed.  Pt will benefit from continued skilled PT to address above deficits and return to PLOF. Current PT DC recommendation Moderate intensity short-term skilled physical therapy up to 5x/week once medically appropriate.      GOALS:    Problem: Physical Therapy - Adult  Goal: By Discharge: Performs mobility at highest level of function for planned discharge setting.  See evaluation for individualized 
impairments.      Problem: SLP Adult - Impaired Swallowing  Goal: By Discharge: Advance to least restrictive diet without signs or symptoms of aspiration for planned discharge setting.  See evaluation for individualized goals.  Description: Speech Therapy Swallow Goals  Initiated 12/29/2024    -Patient stated goal: wants to go home  -Patient will tolerate Easy to chew and thin liquids diet without clinical indicators of aspiration given min cues within 7 day(s).      -Patient will tolerate PO trials without clinical indicators of aspiration given min cues within 7 day(s).    -Patient will demonstrate understanding of swallow safety precautions and aspiration precautions, diet recs with min cues within 7 day(s).        Note: initial      PLAN :  Recommendations and Planned Interventions:  Diet: Easy to chew and thin liquids  STRICT aspiration/GERD precautions advised.   Intermittent supervision with po, small sips/bites, slow rate of intake, maintain HOB elevation 90 degrees for at least 30 minutes after meals.   Cognitive-linguistic evaluation if/as indicated.     Acute SLP Services: Yes, patient will be followed by speech-language pathology 2x/week to address goals. Patient's rehabilitation potential is considered to be Good.    Discharge Recommendations: To Be Determined     SUBJECTIVE:   Patient is agreeable to bedside swallow evaluation.    OBJECTIVE:     Past Medical History:   Diagnosis Date    Alcohol abuse     Anxiety attack     Cocaine abuse ([facility])     COVID-19     Depression     Diabetes mellitus ([facility]CC)     Fracture of left wrist     Hemorrhoids     HTN (hypertension)     Ill-defined condition     hemorrhoids    Orthostatic hypotension     Perineal rash     Rectal bleeding     Rib contusion, left, initial encounter     Seizures (HCC)     Stroke ([facility])     Syncope     Toe fracture     great toe    Trichomonas infection     UTI (urinary tract infection)    History reviewed. No pertinent surgical history.  Prior 
discharge: pt has impaired cognition, pt is a high fall risk, pt is not safe to be alone, and concern for pt safely navigating or managing the home environment.     IF patient discharges home will need the following DME: continuing to assess with progress     SUBJECTIVE:   Patient stated “I want to go home.”    OBJECTIVE DATA SUMMARY:     Past Medical History:   Diagnosis Date    Alcohol abuse     Anxiety attack     Cocaine abuse (HCC)     COVID-19     Depression     Diabetes mellitus (HCC)     Fracture of left wrist     Hemorrhoids     HTN (hypertension)     Ill-defined condition     hemorrhoids    Orthostatic hypotension     Perineal rash     Rectal bleeding     Rib contusion, left, initial encounter     Seizures (HCC)     Stroke ([facility])     Syncope     Toe fracture     great toe    Trichomonas infection     UTI (urinary tract infection)    History reviewed. No pertinent surgical history.     Expanded or extensive additional review of patient history:   Lives With: Spouse  Type of Home: House  Home Layout: One level  Home Access: Ramped entrance     Entrance Stairs - Rails: Both  Bathroom Shower/Tub: Tub/Shower unit, Shower chair with back   Home Equipment: Walker - Rolling, Wheelchair - Manual     Social/Functional History  Lives With: Spouse  Type of Home: House  Home Layout: One level  Home Access: Ramped entrance  Entrance Stairs - Number of Steps: 2  Entrance Stairs - Rails: Both  Bathroom Shower/Tub: Tub/Shower unit, Shower chair with back  Home Equipment: Walker - Rolling, Wheelchair - Manual  Receives Help From: Family  Prior Level of Assist for ADLs:  (Reports independence grossly except for LE dressing/LE bathing)  Prior Level of Assist for Homemaking: Needs assistance  Prior Level of Assist for Ambulation: Independent household ambulator, with or without device  Prior Level of Assist for Transfers: Independent  Active : No    Hand Dominance: right     EXAMINATION OF PERFORMANCE

## 2024-12-30 NOTE — CARE COORDINATION
DC Plan: Home with spouse and outpatient rehab    Transition of Care Plan:    RUR: N/A - OBS  Prior Level of Functioning: independent  Disposition: home  ZULAY: 12/30/24  If SNF or IPR: Date FOC offered: N/A  Date FOC received: N/A  Accepting facility: N/A  Date authorization started with reference number: N/A  Date authorization received and expires: N/A  Follow up appointments: Yes  DME needed: None  Transportation at discharge: TBD  IM/IMM Medicare/ letter given: N/A - OBS  Is patient a [place] and connected with [facility]? N/A   If yes, was [facility] transfer form completed and [facility] notified?   Caregiver Contact: Family  Discharge Caregiver contacted prior to discharge?   Care Conference needed? No  Barriers to discharge: None

## 2024-12-30 NOTE — PROGRESS NOTES
4 Eyes Skin Assessment     NAME:  María Amos  YOB: 1965  MEDICAL RECORD NUMBER:  [id number]    The patient is being assessed for  Admission    I agree that at least one RN has performed a thorough Head to Toe Skin Assessment on the patient. ALL assessment sites listed below have been assessed.      Areas assessed by both nurses:    Head, Face, Ears, Shoulders, Back, Chest, Arms, Elbows, Hands, Sacrum. Buttock, Coccyx, Ischium, Legs. Feet and Heels, and Under Medical Devices         Does the Patient have a Wound? No noted wound(s)       Dino Prevention initiated by RN: No  Wound Care Orders initiated by RN: No    Pressure Injury (Stage 3,4, Unstageable, DTI, NWPT, and Complex wounds) if present, place Wound referral order by RN under : No    New Ostomies, if present place, Ostomy referral order under : No     Nurse 1 eSignature: Electronically signed by Maribel Saab RN on 12/28/24 at 6:12 PM EST    **SHARE this note so that the co-signing nurse can place an eSignature**    Nurse 2 eSignature: Electronically signed by EARL MARTINEZ RN on 12/28/24 at 6:32 PM EST    
Discharge plan of care/case management plan validated with provider discharge order.   
Received Order for Telemetry     María Dandre   1965   [id number]   CVA (cerebrovascular accident due to intracerebral hemorrhage) ([facility]) [I61.9]   Eamon Jerome MD     Tele Box # 3 placed on patient at  1718 pm  ER Room # 3  Admitting to Room 573  Verified with Primary Nurse *** at {Time:2200000038}   
Transportation arranged 1 [phone] [employer] medicaid trip# [id number]. Called [facility] to provide details no eta provided.    Advised Nurse Jerry  
PERFUSION  Clinical history: Speech changes, aphasia  INDICATION:   Aphasia, speech change    COMPARISON: 7/23/2024    CONTRAST: 100ml Isovue 370  TECHNIQUE:    CT dose reduction was achieved through use of a standardized protocol tailored  for this examination and automatic exposure control for dose modulation.  Following the uneventful administration of Isovue-370 contrast material, axial  CT angiography of the head and neck was performed.  Coronal and sagittal  reconstructions were obtained.  3D MAXIMAL INTENSITY PROJECTION reconstructed imaging of the cranial vasculature  in both the coronal and sagittal plane was performed.    CTA perfusion imaging was also obtained. Reconstructions were created with color  coding of axial time to peak, axial blood volume, axial blood flow, axial mean  transit time and axial T Max.  The study was analyzed by the Janell Rudolph. Algorithm  FINDINGS:  There is no pulmonary mass or nodule. No pneumothorax is identified..  No large  thyroid lesion. No acute cervical process. The paranasal sinuses are clear.  CTA NECK:  Common origin of the innominate and left common carotid artery..  The origins of  the innominate, left common carotid and left subclavian arteries are patent.    There is  0%stenosis in the right internal carotid artery utilizing NASCET  criteria.  There is  0%stenosis in the left internal carotid artery utilizing NASCET  criteria.  CTA HEAD:  Right vertebral artery is dominant.. There are A1 segments bilaterally. A2 and  A3 segments are unremarkable. Symmetric arborization of M2 vessels. The basilar  artery and its branches are normal. The internal carotid, anterior cerebral, and  middle cerebral arteries are patent. There is no flow-limiting intracranial  stenosis. There is no aneurysm. There are no sizable posterior communicating  arteries.    CT PERFUSION: No evidence of perfusion mismatch. No evidence of reversible  ischemia.  R CBF<30% :0  Tmax >6s: 0  Impression:

## 2025-01-12 ENCOUNTER — APPOINTMENT (OUTPATIENT)
Facility: HOSPITAL | Age: 60
DRG: 057 | End: 2025-01-12
Payer: MEDICARE

## 2025-01-12 ENCOUNTER — HOSPITAL ENCOUNTER (INPATIENT)
Facility: HOSPITAL | Age: 60
LOS: 1 days | Discharge: HOME | DRG: 057 | End: 2025-01-13
Attending: STUDENT IN AN ORGANIZED HEALTH CARE EDUCATION/TRAINING PROGRAM
Payer: MEDICARE

## 2025-01-12 DIAGNOSIS — E11.65 TYPE 2 DIABETES MELLITUS WITH HYPERGLYCEMIA (HCC): ICD-10-CM

## 2025-01-12 DIAGNOSIS — I63.9 CEREBROVASCULAR ACCIDENT (CVA), UNSPECIFIED MECHANISM (HCC): ICD-10-CM

## 2025-01-12 DIAGNOSIS — R41.82 ALTERED MENTAL STATUS, UNSPECIFIED ALTERED MENTAL STATUS TYPE: Primary | ICD-10-CM

## 2025-01-12 LAB
ALBUMIN SERPL-MCNC: 4 G/DL (ref 3.5–5)
ALBUMIN/GLOB SERPL: 0.9 (ref 1.1–2.2)
ALP SERPL-CCNC: 96 U/L (ref 45–117)
ALT SERPL-CCNC: 19 U/L (ref 12–78)
ANION GAP SERPL CALC-SCNC: 5 MMOL/L (ref 2–12)
AST SERPL W P-5'-P-CCNC: 16 U/L (ref 15–37)
BASOPHILS # BLD: 0.03 K/UL (ref 0–0.1)
BASOPHILS NFR BLD: 0.5 % (ref 0–1)
BILIRUB SERPL-MCNC: 0.4 MG/DL (ref 0.2–1)
BUN SERPL-MCNC: 14 MG/DL (ref 6–20)
BUN/CREAT SERPL: 14 (ref 12–20)
CA-I BLD-MCNC: 9.6 MG/DL (ref 8.5–10.1)
CHLORIDE SERPL-SCNC: 109 MMOL/L (ref 97–108)
CO2 SERPL-SCNC: 25 MMOL/L (ref 21–32)
CREAT SERPL-MCNC: 1 MG/DL (ref 0.55–1.02)
DIFFERENTIAL METHOD BLD: NORMAL
EKG ATRIAL RATE: 60 BPM
EKG DIAGNOSIS: NORMAL
EKG P AXIS: 63 DEGREES
EKG P-R INTERVAL: 164 MS
EKG Q-T INTERVAL: 460 MS
EKG QRS DURATION: 74 MS
EKG QTC CALCULATION (BAZETT): 460 MS
EKG R AXIS: 64 DEGREES
EKG T AXIS: 94 DEGREES
EKG VENTRICULAR RATE: 60 BPM
EOSINOPHIL # BLD: 0.08 K/UL (ref 0–0.4)
EOSINOPHIL NFR BLD: 1.3 % (ref 0–7)
ERYTHROCYTE [DISTWIDTH] IN BLOOD BY AUTOMATED COUNT: 12.9 % (ref 11.5–14.5)
GLOBULIN SER CALC-MCNC: 4.3 G/DL (ref 2–4)
GLUCOSE SERPL-MCNC: 99 MG/DL (ref 65–100)
HCT VFR BLD AUTO: 44 % (ref 35–47)
HGB BLD-MCNC: 14.5 G/DL (ref 11.5–16)
IMM GRANULOCYTES # BLD AUTO: 0.02 K/UL (ref 0–0.04)
IMM GRANULOCYTES NFR BLD AUTO: 0.3 % (ref 0–0.5)
INR PPP: 1 (ref 0.9–1.1)
LYMPHOCYTES # BLD: 2.47 K/UL (ref 0.8–3.5)
LYMPHOCYTES NFR BLD: 40.8 % (ref 12–49)
MCH RBC QN AUTO: 29.6 PG (ref 26–34)
MCHC RBC AUTO-ENTMCNC: 33 G/DL (ref 30–36.5)
MCV RBC AUTO: 89.8 FL (ref 80–99)
MONOCYTES # BLD: 0.35 K/UL (ref 0–1)
MONOCYTES NFR BLD: 5.8 % (ref 5–13)
NEUTS SEG # BLD: 3.11 K/UL (ref 1.8–8)
NEUTS SEG NFR BLD: 51.3 % (ref 32–75)
NRBC # BLD: 0 K/UL (ref 0–0.01)
NRBC BLD-RTO: 0 PER 100 WBC
PLATELET # BLD AUTO: 264 K/UL (ref 150–400)
PMV BLD AUTO: 10.9 FL (ref 8.9–12.9)
POTASSIUM SERPL-SCNC: 4.7 MMOL/L (ref 3.5–5.1)
PROT SERPL-MCNC: 8.3 G/DL (ref 6.4–8.2)
PROTHROMBIN TIME: 13.3 SEC (ref 11.9–14.6)
RBC # BLD AUTO: 4.9 M/UL (ref 3.8–5.2)
SODIUM SERPL-SCNC: 139 MMOL/L (ref 136–145)
TROPONIN I SERPL HS-MCNC: 4 NG/L (ref 0–51)
WBC # BLD AUTO: 6.1 K/UL (ref 3.6–11)

## 2025-01-12 PROCEDURE — 85610 PROTHROMBIN TIME: CPT

## 2025-01-12 PROCEDURE — 2500000003 HC RX 250 WO HCPCS

## 2025-01-12 PROCEDURE — 6370000000 HC RX 637 (ALT 250 FOR IP)

## 2025-01-12 PROCEDURE — 80053 COMPREHEN METABOLIC PANEL: CPT

## 2025-01-12 PROCEDURE — 6360000004 HC RX CONTRAST MEDICATION: Performed by: STUDENT IN AN ORGANIZED HEALTH CARE EDUCATION/TRAINING PROGRAM

## 2025-01-12 PROCEDURE — 6360000002 HC RX W HCPCS

## 2025-01-12 PROCEDURE — 85025 COMPLETE CBC W/AUTO DIFF WBC: CPT

## 2025-01-12 PROCEDURE — 0042T CT BRAIN PERFUSION: CPT

## 2025-01-12 PROCEDURE — 1100000000 HC RM PRIVATE

## 2025-01-12 PROCEDURE — 93005 ELECTROCARDIOGRAM TRACING: CPT | Performed by: STUDENT IN AN ORGANIZED HEALTH CARE EDUCATION/TRAINING PROGRAM

## 2025-01-12 PROCEDURE — 4A03X5D MEASUREMENT OF ARTERIAL FLOW, INTRACRANIAL, EXTERNAL APPROACH: ICD-10-PCS | Performed by: PSYCHIATRY & NEUROLOGY

## 2025-01-12 PROCEDURE — 80177 DRUG SCRN QUAN LEVETIRACETAM: CPT

## 2025-01-12 PROCEDURE — G0378 HOSPITAL OBSERVATION PER HR: HCPCS

## 2025-01-12 PROCEDURE — 96374 THER/PROPH/DIAG INJ IV PUSH: CPT

## 2025-01-12 PROCEDURE — 96372 THER/PROPH/DIAG INJ SC/IM: CPT

## 2025-01-12 PROCEDURE — 70450 CT HEAD/BRAIN W/O DYE: CPT

## 2025-01-12 PROCEDURE — 99285 EMERGENCY DEPT VISIT HI MDM: CPT

## 2025-01-12 PROCEDURE — 84484 ASSAY OF TROPONIN QUANT: CPT

## 2025-01-12 PROCEDURE — 6360000002 HC RX W HCPCS: Performed by: STUDENT IN AN ORGANIZED HEALTH CARE EDUCATION/TRAINING PROGRAM

## 2025-01-12 PROCEDURE — 94640 AIRWAY INHALATION TREATMENT: CPT

## 2025-01-12 PROCEDURE — 70498 CT ANGIOGRAPHY NECK: CPT

## 2025-01-12 RX ORDER — CLOPIDOGREL BISULFATE 75 MG/1
75 TABLET ORAL DAILY
Status: DISCONTINUED | OUTPATIENT
Start: 2025-01-12 | End: 2025-01-13 | Stop reason: HOSPADM

## 2025-01-12 RX ORDER — TRAZODONE HYDROCHLORIDE 50 MG/1
50 TABLET, FILM COATED ORAL NIGHTLY
Status: DISCONTINUED | OUTPATIENT
Start: 2025-01-12 | End: 2025-01-13 | Stop reason: HOSPADM

## 2025-01-12 RX ORDER — ONDANSETRON 2 MG/ML
4 INJECTION INTRAMUSCULAR; INTRAVENOUS EVERY 6 HOURS PRN
Status: DISCONTINUED | OUTPATIENT
Start: 2025-01-12 | End: 2025-01-13 | Stop reason: HOSPADM

## 2025-01-12 RX ORDER — ENOXAPARIN SODIUM 100 MG/ML
40 INJECTION SUBCUTANEOUS DAILY
Status: DISCONTINUED | OUTPATIENT
Start: 2025-01-12 | End: 2025-01-13 | Stop reason: HOSPADM

## 2025-01-12 RX ORDER — BUDESONIDE AND FORMOTEROL FUMARATE DIHYDRATE 160; 4.5 UG/1; UG/1
2 AEROSOL RESPIRATORY (INHALATION)
Status: DISCONTINUED | OUTPATIENT
Start: 2025-01-12 | End: 2025-01-13 | Stop reason: HOSPADM

## 2025-01-12 RX ORDER — QUETIAPINE FUMARATE 50 MG/1
100 TABLET, EXTENDED RELEASE ORAL NIGHTLY
Status: DISCONTINUED | OUTPATIENT
Start: 2025-01-12 | End: 2025-01-13 | Stop reason: HOSPADM

## 2025-01-12 RX ORDER — POLYETHYLENE GLYCOL 3350 17 G/17G
17 POWDER, FOR SOLUTION ORAL DAILY PRN
Status: DISCONTINUED | OUTPATIENT
Start: 2025-01-12 | End: 2025-01-13 | Stop reason: HOSPADM

## 2025-01-12 RX ORDER — PRAMIPEXOLE DIHYDROCHLORIDE 0.12 MG/1
0.12 TABLET ORAL 3 TIMES DAILY
Status: DISCONTINUED | OUTPATIENT
Start: 2025-01-12 | End: 2025-01-13 | Stop reason: HOSPADM

## 2025-01-12 RX ORDER — SODIUM CHLORIDE 0.9 % (FLUSH) 0.9 %
5-40 SYRINGE (ML) INJECTION EVERY 12 HOURS SCHEDULED
Status: DISCONTINUED | OUTPATIENT
Start: 2025-01-12 | End: 2025-01-13 | Stop reason: HOSPADM

## 2025-01-12 RX ORDER — ATORVASTATIN CALCIUM 40 MG/1
80 TABLET, FILM COATED ORAL NIGHTLY
Status: DISCONTINUED | OUTPATIENT
Start: 2025-01-12 | End: 2025-01-13 | Stop reason: HOSPADM

## 2025-01-12 RX ORDER — TOPIRAMATE 25 MG/1
25 TABLET, FILM COATED ORAL 2 TIMES DAILY
Status: DISCONTINUED | OUTPATIENT
Start: 2025-01-12 | End: 2025-01-13 | Stop reason: HOSPADM

## 2025-01-12 RX ORDER — ASPIRIN 81 MG/1
81 TABLET, CHEWABLE ORAL DAILY
Status: DISCONTINUED | OUTPATIENT
Start: 2025-01-12 | End: 2025-01-13 | Stop reason: HOSPADM

## 2025-01-12 RX ORDER — ONDANSETRON 4 MG/1
4 TABLET, ORALLY DISINTEGRATING ORAL EVERY 8 HOURS PRN
Status: DISCONTINUED | OUTPATIENT
Start: 2025-01-12 | End: 2025-01-13 | Stop reason: HOSPADM

## 2025-01-12 RX ORDER — ALBUTEROL SULFATE 2.5 MG/.5ML
2.5 SOLUTION RESPIRATORY (INHALATION) EVERY 6 HOURS PRN
Status: DISCONTINUED | OUTPATIENT
Start: 2025-01-12 | End: 2025-01-13 | Stop reason: HOSPADM

## 2025-01-12 RX ORDER — ASPIRIN 300 MG/1
300 SUPPOSITORY RECTAL DAILY
Status: DISCONTINUED | OUTPATIENT
Start: 2025-01-12 | End: 2025-01-13 | Stop reason: HOSPADM

## 2025-01-12 RX ORDER — LEVETIRACETAM 500 MG/1
1000 TABLET ORAL 2 TIMES DAILY
Status: DISCONTINUED | OUTPATIENT
Start: 2025-01-13 | End: 2025-01-13 | Stop reason: HOSPADM

## 2025-01-12 RX ORDER — SODIUM CHLORIDE 0.9 % (FLUSH) 0.9 %
5-40 SYRINGE (ML) INJECTION PRN
Status: DISCONTINUED | OUTPATIENT
Start: 2025-01-12 | End: 2025-01-13 | Stop reason: HOSPADM

## 2025-01-12 RX ORDER — GAUZE BANDAGE 2" X 2"
100 BANDAGE TOPICAL DAILY
Status: DISCONTINUED | OUTPATIENT
Start: 2025-01-12 | End: 2025-01-13 | Stop reason: HOSPADM

## 2025-01-12 RX ORDER — LEVETIRACETAM 500 MG/5ML
2000 INJECTION, SOLUTION, CONCENTRATE INTRAVENOUS
Status: COMPLETED | OUTPATIENT
Start: 2025-01-12 | End: 2025-01-12

## 2025-01-12 RX ORDER — IOPAMIDOL 755 MG/ML
100 INJECTION, SOLUTION INTRAVASCULAR
Status: COMPLETED | OUTPATIENT
Start: 2025-01-12 | End: 2025-01-12

## 2025-01-12 RX ORDER — SODIUM CHLORIDE 9 MG/ML
INJECTION, SOLUTION INTRAVENOUS PRN
Status: DISCONTINUED | OUTPATIENT
Start: 2025-01-12 | End: 2025-01-13 | Stop reason: HOSPADM

## 2025-01-12 RX ADMIN — FLUOXETINE HYDROCHLORIDE 20 MG: 20 CAPSULE ORAL at 21:27

## 2025-01-12 RX ADMIN — THIAMINE HCL TAB 100 MG 100 MG: 100 TAB at 21:27

## 2025-01-12 RX ADMIN — Medication 2 PUFF: at 22:58

## 2025-01-12 RX ADMIN — TRAZODONE HYDROCHLORIDE 50 MG: 50 TABLET ORAL at 21:23

## 2025-01-12 RX ADMIN — PRAMIPEXOLE DIHYDROCHLORIDE 0.12 MG: 0.12 TABLET ORAL at 21:23

## 2025-01-12 RX ADMIN — LEVETIRACETAM 2000 MG: 100 INJECTION INTRAVENOUS at 16:08

## 2025-01-12 RX ADMIN — ATORVASTATIN CALCIUM 80 MG: 40 TABLET, FILM COATED ORAL at 21:23

## 2025-01-12 RX ADMIN — TOPIRAMATE 25 MG: 25 TABLET, FILM COATED ORAL at 21:23

## 2025-01-12 RX ADMIN — SODIUM CHLORIDE, PRESERVATIVE FREE 10 ML: 5 INJECTION INTRAVENOUS at 21:23

## 2025-01-12 RX ADMIN — QUETIAPINE FUMARATE 100 MG: 50 TABLET, EXTENDED RELEASE ORAL at 21:22

## 2025-01-12 RX ADMIN — CLOPIDOGREL BISULFATE 75 MG: 75 TABLET ORAL at 21:22

## 2025-01-12 RX ADMIN — ASPIRIN 81 MG CHEWABLE TABLET 81 MG: 81 TABLET CHEWABLE at 21:26

## 2025-01-12 RX ADMIN — IOPAMIDOL 100 ML: 755 INJECTION, SOLUTION INTRAVENOUS at 15:53

## 2025-01-12 RX ADMIN — ENOXAPARIN SODIUM 40 MG: 100 INJECTION SUBCUTANEOUS at 21:26

## 2025-01-12 ASSESSMENT — LIFESTYLE VARIABLES
HOW OFTEN DO YOU HAVE A DRINK CONTAINING ALCOHOL: PATIENT DECLINED
HOW MANY STANDARD DRINKS CONTAINING ALCOHOL DO YOU HAVE ON A TYPICAL DAY: PATIENT DECLINED

## 2025-01-12 NOTE — PROGRESS NOTES
Received Order for Telemetry     María Dandre   1965   [id number]   AMS (altered mental status) [R41.82]   Bessy Carlton MD     Tele Box # [id number] placed on patient at  1857 pm  ER Room # 27  Admitting to Room 566  Verified with Primary Nurse *** at {Time:2200000038}

## 2025-01-12 NOTE — ED PROVIDER NOTES
[facility] EMERGENCY DEPT  EMERGENCY DEPARTMENT HISTORY AND PHYSICAL EXAM      Date: 1/12/2025  Patient Name: María Amso  MRN: [id number]  Birthdate 1965  Date of evaluation: 1/12/2025  Provider: Helene Pearson MD   Note Started: 3:01 PM EST 1/12/25    HISTORY OF PRESENT ILLNESS     Chief Complaint   Patient presents with    Cerebrovascular Accident       History Provided By: Patient and EMS    HPI: María Amos is a 59 y.o. female with PMH medication as below including a recent stroke that happened approximate weeks ago comes to the ED for evaluation of strokelike symptoms.  Patient had an episode of aphasia while eating and after that she became altered.  Patient is been tremulous.  Slow to answer questions.  EMS were unable to elicit any new focal deficits.  Patient is currently on aspirin.  Limited history due to patient cooperation.    PAST MEDICAL HISTORY   Past Medical History:  Past Medical History:   Diagnosis Date    Alcohol abuse     Anxiety attack     Cocaine abuse (HCC)     COVID-19     Depression     Diabetes mellitus (HCC)     Fracture of left wrist     Hemorrhoids     HTN (hypertension)     Ill-defined condition     hemorrhoids    Orthostatic hypotension     Perineal rash     Rectal bleeding     Rib contusion, left, initial encounter     Seizures (HCC)     Stroke (HCC)     Syncope     Toe fracture     great toe    Trichomonas infection     UTI (urinary tract infection)        Past Surgical History:  History reviewed. No pertinent surgical history.    Family History:  History reviewed. No pertinent family history.    Social History:  Social History     Tobacco Use    Smoking status: Former     Current packs/day: 0.50     Types: Cigarettes    Smokeless tobacco: Former   Substance Use Topics    Alcohol use: Yes     Comment: 12 pack per day    Drug use: Not Currently     Types: Marijuana (Weed), Cocaine     Comment: socially       Allergies:  Allergies   Allergen Reactions    Peanut Oil Anaphylaxis      perfusion deficits along   the inferior right PCA territory may suggest tissue at ischemic risk versus   artifact.      CTA Head demonstrates no large vessel occlusion or significant flow-limiting   stenosis. Clinical correlation advised consider further evaluation with brain   MRI as clinically indicated.      CTA Neck demonstrates no large vessel occlusion or significant flow-limiting   stenosis.      Others:   No intracranial mass or neck mass/enhancing lesion.   Cervical spondylosis as detailed.               Electronically signed by ABRIL SY      CT HEAD WO CONTRAST   Final Result   No evidence of acute process.            Electronically signed by FRANK RAE      MRI brain without contrast    (Results Pending)        ED COURSE and DIFFERENTIAL DIAGNOSIS/MDM   10:04 PM Differential and Considerations:     Patient is a 10-year-old female with PMH of DM, HTN, seizure disorder, CVA, and substance use disorder comes to the ED with an episode aphasia.  She has some tremor.  EMS reported history of aphasia.  In the ED patient is a bit disoriented confused.  She did have weakness on the right side that is attributed to previous stroke.  No seizure-like activity.  Her presentation today is concerning for alteration mental status secondary to other stroke, or seizure.  No history of exam suggestive complex migraine.  No concerns for hyperglycemia as the fingerstick is showing sugar of 131.  Stroke is activated and with labs, EKG and imaging of her head and will get teleneurology consultation.      Records Reviewed (source and summary of external notes): Prior medical records and Nursing notes.    Vitals:    Vitals:    01/12/25 1656 01/12/25 1700 01/12/25 1756 01/12/25 1910   BP: (!) 152/88 (!) 157/113 (!) 133/97 118/84   Pulse: 60 61 66 53   Resp: 16 18 15 18   Temp:    97.3 °F (36.3 °C)   TempSrc:    Oral   SpO2: 97% 98% 98% 97%        ED COURSE  I independently reviewed and interpreted the patient workup.

## 2025-01-12 NOTE — H&P
Hospitalist Admission Note    NAME:   María Amos   : 1965   MRN: [id number]     Date/Time: 2025 5:54 PM    Patient PCP: Patricia Wood NP-C    ______________________________________________________________________  Given the patient's current clinical presentation, I have a high level of concern for decompensation if discharged from the emergency department.  Complex decision making was performed, which includes reviewing the patient's available past medical records, laboratory results, and x-ray films.       My assessment of this patient's clinical condition and my plan of care is as follows.    Assessment / Plan:    Altered mental status  History of CVA  New onset CVA versus seizures  CT no acute process,   CT brain perfusion demonstrates nonspecific apparent perfusion deficit along the inferior right PCA territory  tissue at ischemic risk versus artifact.  CTA head no large vessel occlusion.  Loaded with Keppra in the ED  Recent echo in December, normal EF no need to repeat  Patient does have left-sided deficit on the baseline from previous stroke.  Left arm strength 3 out of 5, left leg strength 4 out of 5.  She does have new dysarthria and left-sided facial droop with new tremor  Follow-up MRI, ordered  Continue aspirin, increase atorvastatin to 80, add Plavix  Teleneurology consult  Continue Keppra home dose for now and other antiseizure meds  Allow permissive hypertension for the next 2448 hrs.  Avoid hypotension  Frequent neurochecks    Chronic/stable medical problems  History of hypertension  Hyperlipidemia  History of seizures  Depression  History of COPD  GERD  Continue home meds    Medical Decision Making:   I personally reviewed labs: CBC, BMP  I personally reviewed imaging: CT no acute process, CT brain perfusion demonstrates nonspecific apparent perfusion deficit along the inferior right PCA territory messages tissue at ischemic risk versus artifact.  CTA head no large vessel

## 2025-01-12 NOTE — ED NOTES
ED TO INPATIENT SBAR HANDOFF    Patient Name: María Amos   Preferred Name: María  : 1965  59 y.o.   Family/Caregiver Present: no   Code Status Order: Full Code  PO Status: NPO:Yes  Telemetry Order:   C-SSRS: Risk of Suicide: No Risk  Sitter no     Restraints:     Sepsis Risk Score      Situation  Chief Complaint   Patient presents with    Cerebrovascular Accident     Brief Description of Patient's Condition: Arrives via EMS from home, complaints of AMS, alert and oriented to place and person, LKW 1300. . HX CVA and Seizures   Mental Status: oriented  Arrived from:Home  Imaging:   CTA HEAD NECK W CONTRAST   Final Result   CT Brain Perfusion demonstrates nonspecific apparent perfusion deficits along   the inferior right PCA territory may suggest tissue at ischemic risk versus   artifact.      CTA Head demonstrates no large vessel occlusion or significant flow-limiting   stenosis. Clinical correlation advised consider further evaluation with brain   MRI as clinically indicated.      CTA Neck demonstrates no large vessel occlusion or significant flow-limiting   stenosis.      Others:   No intracranial mass or neck mass/enhancing lesion.   Cervical spondylosis as detailed.               Electronically signed by [email or web address]      CT BRAIN PERFUSION   Final Result   CT Brain Perfusion demonstrates nonspecific apparent perfusion deficits along   the inferior right PCA territory may suggest tissue at ischemic risk versus   artifact.      CTA Head demonstrates no large vessel occlusion or significant flow-limiting   stenosis. Clinical correlation advised consider further evaluation with brain   MRI as clinically indicated.      CTA Neck demonstrates no large vessel occlusion or significant flow-limiting   stenosis.      Others:   No intracranial mass or neck mass/enhancing lesion.   Cervical spondylosis as detailed.               Electronically signed by [email or web address]      CT HEAD WO CONTRAST   Final

## 2025-01-13 VITALS
OXYGEN SATURATION: 99 % | HEIGHT: 68 IN | RESPIRATION RATE: 18 BRPM | TEMPERATURE: 98.1 F | HEART RATE: 64 BPM | BODY MASS INDEX: 31.52 KG/M2 | SYSTOLIC BLOOD PRESSURE: 106 MMHG | WEIGHT: 207.98 LBS | DIASTOLIC BLOOD PRESSURE: 66 MMHG

## 2025-01-13 LAB
CHOLEST SERPL-MCNC: 145 MG/DL
ERYTHROCYTE [DISTWIDTH] IN BLOOD BY AUTOMATED COUNT: 13 % (ref 11.5–14.5)
EST. AVERAGE GLUCOSE BLD GHB EST-MCNC: 148 MG/DL
HBA1C MFR BLD: 6.8 % (ref 4–5.6)
HCT VFR BLD AUTO: 38.4 % (ref 35–47)
HDLC SERPL-MCNC: 46 MG/DL
HDLC SERPL: 3.2 (ref 0–5)
HGB BLD-MCNC: 12.4 G/DL (ref 11.5–16)
LDLC SERPL CALC-MCNC: 66.2 MG/DL (ref 0–100)
LIPID PANEL: ABNORMAL
MCH RBC QN AUTO: 28.9 PG (ref 26–34)
MCHC RBC AUTO-ENTMCNC: 32.3 G/DL (ref 30–36.5)
MCV RBC AUTO: 89.5 FL (ref 80–99)
NRBC # BLD: 0 K/UL (ref 0–0.01)
NRBC BLD-RTO: 0 PER 100 WBC
PLATELET # BLD AUTO: 269 K/UL (ref 150–400)
PMV BLD AUTO: 9.6 FL (ref 8.9–12.9)
RBC # BLD AUTO: 4.29 M/UL (ref 3.8–5.2)
TRIGL SERPL-MCNC: 164 MG/DL
VLDLC SERPL CALC-MCNC: 32.8 MG/DL
WBC # BLD AUTO: 3.8 K/UL (ref 3.6–11)

## 2025-01-13 PROCEDURE — 94640 AIRWAY INHALATION TREATMENT: CPT

## 2025-01-13 PROCEDURE — 92610 EVALUATE SWALLOWING FUNCTION: CPT

## 2025-01-13 PROCEDURE — 97165 OT EVAL LOW COMPLEX 30 MIN: CPT

## 2025-01-13 PROCEDURE — 97530 THERAPEUTIC ACTIVITIES: CPT

## 2025-01-13 PROCEDURE — 2500000003 HC RX 250 WO HCPCS

## 2025-01-13 PROCEDURE — 6370000000 HC RX 637 (ALT 250 FOR IP)

## 2025-01-13 PROCEDURE — 6360000002 HC RX W HCPCS

## 2025-01-13 PROCEDURE — 80061 LIPID PANEL: CPT

## 2025-01-13 PROCEDURE — G0378 HOSPITAL OBSERVATION PER HR: HCPCS

## 2025-01-13 PROCEDURE — 94761 N-INVAS EAR/PLS OXIMETRY MLT: CPT

## 2025-01-13 PROCEDURE — 85027 COMPLETE CBC AUTOMATED: CPT

## 2025-01-13 PROCEDURE — 97161 PT EVAL LOW COMPLEX 20 MIN: CPT

## 2025-01-13 PROCEDURE — 83036 HEMOGLOBIN GLYCOSYLATED A1C: CPT

## 2025-01-13 PROCEDURE — 36415 COLL VENOUS BLD VENIPUNCTURE: CPT

## 2025-01-13 PROCEDURE — 96372 THER/PROPH/DIAG INJ SC/IM: CPT

## 2025-01-13 RX ORDER — ATORVASTATIN CALCIUM 80 MG/1
80 TABLET, FILM COATED ORAL NIGHTLY
Qty: 30 TABLET | Refills: 0 | Status: SHIPPED | OUTPATIENT
Start: 2025-01-13

## 2025-01-13 RX ORDER — CLOPIDOGREL BISULFATE 75 MG/1
75 TABLET ORAL DAILY
Qty: 30 TABLET | Refills: 0 | Status: SHIPPED | OUTPATIENT
Start: 2025-01-14

## 2025-01-13 RX ORDER — METFORMIN HYDROCHLORIDE 500 MG/1
500 TABLET, EXTENDED RELEASE ORAL
Qty: 30 TABLET | Refills: 0 | Status: SHIPPED | OUTPATIENT
Start: 2025-01-13

## 2025-01-13 RX ADMIN — ASPIRIN 81 MG CHEWABLE TABLET 81 MG: 81 TABLET CHEWABLE at 08:40

## 2025-01-13 RX ADMIN — THIAMINE HCL TAB 100 MG 100 MG: 100 TAB at 08:40

## 2025-01-13 RX ADMIN — ENOXAPARIN SODIUM 40 MG: 100 INJECTION SUBCUTANEOUS at 08:43

## 2025-01-13 RX ADMIN — Medication 2 PUFF: at 08:03

## 2025-01-13 RX ADMIN — PRAMIPEXOLE DIHYDROCHLORIDE 0.12 MG: 0.12 TABLET ORAL at 15:41

## 2025-01-13 RX ADMIN — CLOPIDOGREL BISULFATE 75 MG: 75 TABLET ORAL at 08:40

## 2025-01-13 RX ADMIN — FLUOXETINE HYDROCHLORIDE 20 MG: 20 CAPSULE ORAL at 08:41

## 2025-01-13 RX ADMIN — TOPIRAMATE 25 MG: 25 TABLET, FILM COATED ORAL at 08:40

## 2025-01-13 RX ADMIN — SODIUM CHLORIDE, PRESERVATIVE FREE 10 ML: 5 INJECTION INTRAVENOUS at 11:04

## 2025-01-13 RX ADMIN — Medication 2 PUFF: at 20:08

## 2025-01-13 RX ADMIN — LEVETIRACETAM 1000 MG: 500 TABLET, FILM COATED ORAL at 11:04

## 2025-01-13 RX ADMIN — PRAMIPEXOLE DIHYDROCHLORIDE 0.12 MG: 0.12 TABLET ORAL at 08:40

## 2025-01-13 NOTE — DISCHARGE INSTR - COC
Continuity of Care Form    Patient Name: María Amos   :  1965  MRN:  [id number]    Admit date:  2025  Discharge date:  ***    Code Status Order: Full Code   Advance Directives:   Advance Care Flowsheet Documentation             Admitting Physician:  Bessy Carlton MD  PCP: Patricia Wood NP-C    Discharging Nurse: ***  Discharging Hospital Unit/Room#: 566/01  Discharging Unit Phone Number: ***    Emergency Contact:   Extended Emergency Contact Information  Primary Emergency Contact: Melecio Iqbal  Home Phone: [phone]  Mobile Phone: [phone]  Relation: Boyfriend  Preferred language: English   needed? No  Secondary Emergency Contact: Jil Alberto  Relation: Brother/Sister    Past Surgical History:  History reviewed. No pertinent surgical history.    Immunization History:     There is no immunization history on file for this patient.    Active Problems:  Patient Active Problem List   Diagnosis Code    Breakthrough seizure ([facility]) G40.919    Left-sided weakness R53.1    Cerebral vascular disease I67.9    COPD exacerbation ([facility]) J44.1    CVA (cerebrovascular accident due to intracerebral hemorrhage) ([facility]) I61.9    AMS (altered mental status) R41.82       Isolation/Infection:   Isolation            No Isolation          Patient Infection Status       Infection Onset Added Last Indicated Last Indicated By Review Planned Expiration Resolved Resolved By    None active    Resolved    COVID-19 22 Conversion, Epic   22 Conversion, Epic                       Nurse Assessment:  Last Vital Signs: BP (!) 97/59   Pulse 58   Temp 97.9 °F (36.6 °C) (Oral)   Resp 18   Ht 1.727 m (5' 7.99\")   Wt 94.3 kg (207 lb 15.7 oz)   SpO2 99%   BMI 31.63 kg/m²     Last documented pain score (0-10 scale):    Last Weight:   Wt Readings from Last 1 Encounters:   25 94.3 kg (207 lb 15.7 oz)     Mental Status:  {IP PT MENTAL STATUS:}    IV Access:  [place]

## 2025-01-13 NOTE — CARE COORDINATION
01/13/25 1125   Service Assessment   Patient Orientation Alert and Oriented   Cognition Alert   History Provided By Patient   Primary Caregiver Self   Accompanied By/Relationship alone   Support Systems Family Members   Patient's Healthcare Decision Maker is: Legal Next of Kin   PCP Verified by CM Yes   Last Visit to PCP Within last 6 months   Prior Functional Level Assistance with the following:;Cooking;Shopping;Housework;Bathing   Current Functional Level Assistance with the following:;Bathing;Cooking;Housework;Shopping   Can patient return to prior living arrangement Yes   Ability to make needs known: Good   Family able to assist with home care needs: Yes   Would you like for me to discuss the discharge plan with any other family members/significant others, and if so, who? No   Financial Resources Medicare   Community Resources None   Social/Functional History   Lives With Family   Type of Home House   Home Layout One level   Home Access Stairs to enter with rails   Entrance Stairs - Number of Steps 2   Discharge Planning   Type of Residence House   Living Arrangements Family Members   Current Services Prior To Admission None   Potential Assistance Needed N/A   DME Ordered? No   Type of Home Care Services None   Patient expects to be discharged to: House   One/Two Story Residence One story   History of falls? 0   Services At/After Discharge   Transition of Care Consult (CM Consult) N/A   Services At/After Discharge None   Mode of Transport at Discharge Other (see comment)  (Family to transport)   Confirm Follow Up Transport Family     CM assessment complete and demographics confirmed. Patient states that she lives in a single story home with 2 steps to entrance. Patient lives with family and needs some assistance with her ADL's  that her family provides.     Preferred pharmacy is [facility]. No interest in meds to beds at this time.     Advance Care Planning     General Advance Care Planning (ACP)

## 2025-01-13 NOTE — PLAN OF CARE
PHYSICAL THERAPY EVALUATION  Patient: María Amos (59 y.o. female)  Date: 1/13/2025  Primary Diagnosis: AMS (altered mental status) [R41.82]       Precautions: Fall Risk                      Recommendations for nursing mobility: Encourage HEP in prep for ADLs/mobility; see handout for details, Frequent repositioning to prevent skin breakdown, Use of bed/chair alarm for safety, LE elevation for management of edema, and Use of BSC for toileting     In place during session: Peripheral IV, External Catheter, and EKG/telemetry     ASSESSMENT  Pt is a 59 y.o. female admitted on 1/12/2025 for AMS; pt currently being treated for stroke work up . PMHx significant for previous TIA and CVA with left sided weakness and numbness, history of seizures, depression, hypertension, hyperlipidemia presented with altered mental status from home. CT head is negative for any acute change. CTA neck did not show any stenosis in the bilateral carotid arteries. EKG shows NSR. Cholesterol panel showed elevated LDL. Her Utox in Dec was positive for cocaine. Echo showed normal EF. Pt semi supine upon PT arrival, agreeable to evaluation. Pt A&O x 4.  Patient lives with  and required some assist for the ADLs.  Patient was recently DC from hosp last month 12/302025.Not much change from the previous functional level at this time.    Based on the objective data described below, the patient currently presents with impaired ability to perform high-level IADLs, impaired strength, poor body mechanics, decreased activity tolerance, poor safety awareness, impaired cognition, decreased command following, impaired balance, and impaired posture. (See below for objective details and assist levels).     Overall pt tolerated session fair/poor today with PT. Pt required min assist for bed mobility and mod for sit to stand transfers. BP 99/73 in sitting,patient unable to stand long enough for the standing BP as it was taking too long for the reading.Pt amb  None   4.  Moving to and from a bed to a chair (including a wheelchair)?   [] 1   [x] 2   [] 3   [] 4   5.  Need to walk in hospital room?   [] 1   [x] 2   [] 3   [] 4   6.  Climbing 3-5 steps with a railing?   [] 1   [x] 2   [] 3   [] 4   © , Trustees of [facility], under license to [employer]. All rights reserved     Score:  Initial:  Most Recent: X (Date: 2025 )   Interpretation of Tool:  Represents activities that are increasingly more difficult (i.e. Bed mobility, Transfers, Gait).  Score 24 23 22-20 19-15 14-10 9-7 6   Modifier CH CI CJ CK CL CM CN         Physical Therapy Evaluation Charge Determination   History Examination Presentation Decision-Making   LOW Complexity : Zero comorbidities / personal factors that will impact the outcome / POC LOW Complexity : 1-2 Standardized tests and measures addressing body structure, function, activity limitation and / or participation in recreation  LOW Complexity : Stable, uncomplicated  Other outcome measures [facility] 6  LOW      Based on the above components, the patient evaluation is determined to be of the following complexity level: LOW    Pain Ratin/10   Pain Intervention(s):       Activity Tolerance:   Poor    After treatment patient left in no apparent distress:   Bed locked and in lowest position Patient left in no apparent distress in bed, Call bell within reach, Bed/ chair alarm activated, and Side rails x3 and nsg updated.    COMMUNICATION/EDUCATION:   The patient’s plan of care was discussed with: Occupational therapist, Registered nurse, and Case management    Patient Education  Education Given To: Patient  Education Provided: Role of Therapy;Plan of Care;Precautions;Energy Conservation;Equipment;Family Education;Mobility Training;Home Exercise Program;Fall Prevention Strategies;Transfer Training;Orientation  Education Method: Demonstration;Verbal;Teach Back  Barriers to Learning: Cognition  Education Outcome: Verbalized

## 2025-01-13 NOTE — PLAN OF CARE
Speech LAnguage Pathology Dysphagia EVALUATION    Patient: María Amos (59 y.o. female)  Date: 1/13/2025  Primary Diagnosis: AMS (altered mental status) [R41.82]       Precautions:  Fall Risk                  Time In: 1000  Time Out: 1016    DIET RECOMMENDATIONS: Regular and thin liquids    SWALLOW SAFETY PRECAUTIONS: 1:1 assistance with ALL PO intake, STRICT aspiration and GERD precautions, monitor pt closely for s/s aspiration, meds as tolerated, FEED ONLY IF AWAKE AND ALERT.      ASSESSMENT :  Based on the objective data described below, the patient presents with oropharyngeal swallow appears WFL at this time.  Per chart review, patient arrived EMS from home w/ AMS and c/o of an episode of aphasia while eating. Patient w/ hospitalization 12/29/24 w/ stroke like symptoms. MRI was negative at that time. CT brain perfusion demonstrates nonspecific apparent perfusion deficit along the inferior right PCA territory  tissue at ischemic risk versus artifact.   Neurology note appreciated.   Patient seen at bedside. She reports changes in her speech and AMS from baseline. She denies difficulty swallowing.   Oral motor WFL. No facial asymmetry.   Patient ingested thin liquids via cup and consecutive swallows w/ timely swallow initiation and adequate pharyngeal response. No clinical indicators of penetration or aspiration. Mastication prolonged and timely for solids. No pharyngeal difficulty noted.   Patient able to answer questions timely and appropriately. Engages in conversation. She does present w/ a stutter ( neurogenic vs psychogenic ) c/b phoneme- syllable-, and whole-word repetitions. This was also noted 12/29/24.      benefit from skilled intervention to address the above impairments.    GOALS:    Problem: SLP Adult - Impaired Communication  Goal: By Discharge: Demonstrates communication skills at highest level of function for planned discharge setting.  See evaluation for individualized goals.  Description:   -

## 2025-01-13 NOTE — PROGRESS NOTES
Spiritual Health History and Assessment/Progress Note  [facility]    Initial Encounter,  ,  ,      Name: María Amos MRN: [id number]    Age: 59 y.o.     Sex: female   Language: English   Advent: Rastafari   AMS (altered mental status)     Date: 1/13/2025            Total Time Calculated: 23 min              Spiritual Assessment began in [facility]        Referral/Consult From: Rounding   Encounter Overview/Reason: Initial Encounter  Service Provided For: Patient    Tere, Belief, Meaning:   Patient is connected with a tere tradition or spiritual practice, has beliefs or practices that help with coping during difficult times, and Other: Rastafari  Family/Friends No family/friends present      Importance and Influence:  Patient has spiritual/personal beliefs that influence decisions regarding their health  Family/Friends No family/friends present    Community:  Patient feels well-supported. Support system includes: Spouse/Partner, Extended family, and Other: Stepchildren  Family/Friends No family/friends present    Assessment and Plan of Care:     Patient Interventions include: Facilitated expression of thoughts and feelings, Explored spiritual coping/struggle/distress, Engaged in theological reflection, Affirmed coping skills/support systems, Facilitated life review and/ or legacy, and Other: Empathic listening, prayer, scripture, spiritual assessment, encouragement, and supportive presence.  Family/Friends Interventions include: No family/friends present    Patient Plan of Care: No spiritual needs identified for follow-up  Family/Friends Plan of Care: No family/friends present     visited pt for spiritual assessment while making rounds on 5W. Pt is alone in her room. She shares circumstances leading up to her current hospital admission. Pt remembers little prior to her arrival, however, she is grateful that her  was able to discern there was something wrong. Pt is supported by

## 2025-01-13 NOTE — PROGRESS NOTES
4 Eyes Skin Assessment     NAME:  María Amos  YOB: 1965  MEDICAL RECORD NUMBER:  [id number]    The patient is being assessed for  Admission    I agree that at least one RN has performed a thorough Head to Toe Skin Assessment on the patient. ALL assessment sites listed below have been assessed.      Areas assessed by both nurses:    Head, Face, Ears, Shoulders, Back, Chest, Arms, Elbows, Hands, Sacrum. Buttock, Coccyx, Ischium, Legs. Feet and Heels, and Under Medical Devices         Does the Patient have a Wound? No noted wound(s)       Dino Prevention initiated by RN: No  Wound Care Orders initiated by RN: No    Pressure Injury (Stage 3,4, Unstageable, DTI, NWPT, and Complex wounds) if present, place Wound referral order by RN under : No    New Ostomies, if present place, Ostomy referral order under : No     Nurse 1 eSignature: Electronically signed by Virgie Hernandez RN on 1/13/25 at 1:38 AM EST    **SHARE this note so that the co-signing nurse can place an eSignature**    Nurse 2 eSignature: Electronically signed by Anastasia Gonzalez RN on 1/13/25 at 5:29 AM EST

## 2025-01-13 NOTE — CARE COORDINATION
01/13/25 1132   Readmission Assessment   Number of Days since last admission? 8-30 days   Previous Disposition Outpatient Rehab   Who is being Interviewed Patient   What was the patient's/caregiver's perception as to why they think they needed to return back to the hospital? Other (Comment)  (AMS)   Did you visit your Primary Care Physician after you left the hospital, before you returned this time? No   Why weren't you able to visit your PCP? Other (Comment)  (Not time for appointment)   Did you see a specialist, such as Cardiac, Pulmonary, Orthopedic Physician, etc. after you left the hospital? No   Who advised the patient to return to the hospital? Self-referral   Does the patient report anything that got in the way of taking their medications? No   In our efforts to provide the best possible care to you and others like you, can you think of anything that we could have done to help you after you left the hospital the first time, so that you might not have needed to return so soon? Teach back instructions regarding management of illness;Discharge instructions that are concise, clear, and non contradictory

## 2025-01-13 NOTE — PLAN OF CARE
OCCUPATIONAL THERAPY EVALUATION  Patient: María Amos (59 y.o. female)  Date: 1/13/2025  Primary Diagnosis: AMS (altered mental status) [R41.82]       Precautions: Fall Risk                Recommendations for nursing mobility: Out of bed to chair for meals, Encourage HEP in prep for ADLs/mobility; see handout for details, Use of bed/chair alarm for safety, LE elevation for management of edema, Use of BSC for toileting , and Assist x1    In place during session:EKG/telemetry   ASSESSMENT  Pt is a 59 y.o. female presenting to [facility] with c/o altered mental status and dysarthria, admitted 1/12/25 and currently being treated for AMS, stroke work up. Pt with hx of TIA/CVA with baseline L sided weakness per chart. Pt received semi-supine in bed upon arrival, AXO x3, and agreeable to OT evaluation. Pt oriented to name, birthday, place and time - disoriented to situation, states she does not know why she is in the hospital.     CT Brain Perfusion demonstrates nonspecific apparent perfusion deficits along  the inferior right PCA territory may suggest tissue at ischemic risk versus  artifact.     CTA Head demonstrates no large vessel occlusion or significant flow-limiting  stenosis. Clinical correlation advised consider further evaluation with brain  MRI as clinically indicated.     CTA Neck demonstrates no large vessel occlusion or significant flow-limiting  stenosis.    Based on current observations, pt presents with decreased  functional mobility, independence in ADLs, high-level IADLs, strength, activity tolerance, endurance, safety awareness, cognition, balance (see below for objective details and assist levels).     Overall, pt tolerates session fair/poor with c/o dizziness with OOB mobility. Pt transferring from semi supine to sitting up at EOB with SBA. Pt donning L sock while sitting at EOB however beginning to c/o dizziness. Pt requiring assist donning R sock due to dizziness. Pt BP sitting at EOB for 3-4 minutes  day(s).        Outcome: Progressing        PLAN :  Recommendations and Planned Interventions: self care training, therapeutic activities, functional mobility training, balance training, therapeutic exercise, endurance activities, cognitive retraining, patient education, and home safety training    Recommend next OT session: Toileting and LB dressing    Frequency/Duration: Patient will be followed by occupational therapy:  3-5x/week to address goals.    Recommendation for discharge: (in order for the patient to meet his/her long term goals)  Moderate intensity short-term skilled occupational therapy up to 5x/week    Potential barriers for safe discharge: pt has poor safety awareness, pt has impaired cognition, pt is a high fall risk, pt is not safe to be alone, and concern for pt safely navigating or managing the home environment.     IF patient discharges home will need the following DME: continuing to assess with progress     SUBJECTIVE:   Patient stated “I'm dizzy.”    OBJECTIVE DATA SUMMARY:     Past Medical History:   Diagnosis Date    Alcohol abuse     Anxiety attack     Cocaine abuse ([facility])     COVID-19     Depression     Diabetes mellitus (HCC)     Fracture of left wrist     Hemorrhoids     HTN (hypertension)     Ill-defined condition     hemorrhoids    Orthostatic hypotension     Perineal rash     Rectal bleeding     Rib contusion, left, initial encounter     Seizures (HCC)     Stroke (HCC)     Syncope     Toe fracture     great toe    Trichomonas infection     UTI (urinary tract infection)    History reviewed. No pertinent surgical history.     Expanded or extensive additional review of patient history:   Lives With: Spouse  Type of Home: House  Home Layout: One level  Home Access: Stairs to enter with rails        Bathroom Shower/Tub: Tub/Shower unit     Bathroom Equipment: Shower chair     Home Equipment: Walker - Rolling  Has the patient had two or more falls in the past year or any fall with injury in

## 2025-01-13 NOTE — DISCHARGE SUMMARY
Discharge Summary    Name: María Amos  [id number]  YOB: 1965 (Age: 59 y.o.)   Date of Admission: 1/12/2025  Date of Discharge: 1/13/2025  Attending Physician: Eamon Jerome MD    Discharge Diagnosis:   Principal Problem:    AMS (altered mental status)  Resolved Problems:    * No resolved hospital problems. *  Hypertension  History of CVA  Hyperlipidemia  Seizure disorder  Depression  COPD  GERD  Consultations:  IP CONSULT TO TELE-NEUROLOGY  IP CONSULT TO CASE MANAGEMENT  IP CONSULT TO TELE-NEUROLOGY      Brief Admission History/Reason for Admission Per Bessy Carlton MD:   CVA workup, altered mental status    Brief Hospital Course by Main Problems:   María Amos is a 59 y.o.  female with PMHx significant for previous TIA and CVA, history of seizures, depression, hypertension hyperlipidemia admitted 1/12/2025 with altered mental status.  Patient/patient's  reported that patient was somewhat confused and feeling weak/dizzy so they brought her to the ER.  Patient had worsening dysarthria.  No other new deficits, patient has prior deficit of left-sided weakness secondary to previous stroke.  CT head shows no acute abnormalities.  CT brain perfusion and CTA head/neck show no large vessel occlusion or significant flow-limiting stenosis.  CT brain perfusion demonstrates nonspecific apparent perfusion deficits versus artifact. Neurology evaluated imaging and patient and states likely recrudescence of old stroke symptoms, examination is at baseline.  No need for MRI or repeat echo.  Patient had recent echo 12/2024 with normal biventricular function of EF of 70%, normal diastolic function, no emboli noted.  Neuro recommends to continue keppra 1000 mg bid for seizures, continue DAPT Aspirin 325 and  Plavix 75 mg qd for secondary stroke prophylaxis x 30 days  and then continue Plavix 75 mg qd, continue atorvastatin 80 mg daily.  PT/OT recommended SNF, patient  bilaterally.  Consistent with patient's baseline from previous stroke.  Minimal stuttering with speech.    Discharge/Recent Laboratory Results:  Recent Labs     01/12/25  1533      K 4.7   *   CO2 25   BUN 14   CREATININE 1.00   GLUCOSE 99   CALCIUM 9.6     Recent Labs     01/13/25  0728   HGB 12.4   HCT 38.4   WBC 3.8          Discharge Medications:     Medication List        START taking these medications      blood glucose monitor kit and supplies  Dispense sufficient amount for indicated testing frequency plus additional to accommodate PRN testing needs. Dispense all needed supplies to include: monitor, strips, lancing device, lancets, control solutions, alcohol swabs.     clopidogrel 75 MG tablet  Commonly known as: PLAVIX  Take 1 tablet by mouth daily  Start taking on: January 14, 2025     metFORMIN 500 MG extended release tablet  Commonly known as: GLUCOPHAGE-XR  Take 1 tablet by mouth daily (with breakfast)            CHANGE how you take these medications      atorvastatin 80 MG tablet  Commonly known as: LIPITOR  Take 1 tablet by mouth nightly  What changed:   medication strength  how much to take  when to take this  Another medication with the same name was removed. Continue taking this medication, and follow the directions you see here.            CONTINUE taking these medications      albuterol sulfate  (90 Base) MCG/ACT inhaler  Commonly known as: Ventolin HFA  Inhale 2 puffs into the lungs 4 times daily as needed for Wheezing     aspirin 81 MG EC tablet  Take 1 tablet by mouth daily     budesonide-formoterol 160-4.5 MCG/ACT Aero  Commonly known as: SYMBICORT  Inhale 2 puffs into the lungs in the morning and 2 puffs in the evening.     Claritin 10 MG capsule  Generic drug: loratadine     FLUoxetine 20 MG capsule  Commonly known as: PROZAC     levETIRAcetam 1000 MG tablet  Commonly known as: KEPPRA  Take 1 tablet by mouth 2 times daily     midodrine 5 MG tablet  Commonly known  as: PROAMATINE  Take 1 tablet by mouth 3 times daily (with meals)     pantoprazole 40 MG tablet  Commonly known as: PROTONIX     potassium chloride 10 MEQ extended release tablet  Commonly known as: KLOR-CON     pramipexole 0.125 MG tablet  Commonly known as: MIRAPEX  Take 1 tablet by mouth 3 times daily     QUEtiapine 50 MG tablet  Commonly known as: SEROQUEL     topiramate 25 MG tablet  Commonly known as: TOPAMAX  Take 1 tablet by mouth 2 times daily     traZODone 50 MG tablet  Commonly known as: DESYREL     vitamin B-1 100 MG tablet  Commonly known as: THIAMINE  Take 1 tablet by mouth daily     vitamin D 1.25 MG (61347 UT) Caps capsule  Commonly known as: ERGOCALCIFEROL               Where to Get Your Medications        These medications were sent to [facility] - [address] -  [phone] - F [phone] [address] [place] [id number]      Phone: [phone]   atorvastatin 80 MG tablet  blood glucose monitor kit and supplies  clopidogrel 75 MG tablet  metFORMIN 500 MG extended release tablet             DISPOSITION:    Home with Family:       Home with HH/PT/OT/RN:    SNF/LTC:    [facility]:    OTHER: Outpatient PT/OT.  Patient denied SNF or home health           Code status: full  Recommended diet: regular diet  Recommended activity: activity as tolerated  Wound care: None      Follow up with:   PCP : Patricia Wood, MARIELLE-C  Patricia Wood NP-C  [address] [facility] [address] [phone]    Follow up in 1 week(s)  Follow-up after hospitalization for management of medications and other comorbidities    Waleska Viveros, APRN - CNP  [address] [address] [phone]    Follow up in 2 week(s)  Follow-up after hospitalization for recrudescence of stroke symptoms.          Total time in minutes spent coordinating this discharge (includes going over instructions, follow-up, prescriptions, and preparing report for sign off to her PCP) :  35

## 2025-01-13 NOTE — PROGRESS NOTES
Transportation set with [facility] [phone] for discharge trip # [id number] est  8:20pm. Informed Tere OLIVARES.    2035 Trip was cancelled reset for 8:45 p trip # [id number]. Informed  Alex OLIVARES

## 2025-01-13 NOTE — CONSULTS
[facility] NEUROLOGY CONSULTATION          Chief Complaint/Admission Diagnosis: AMS (altered mental status) [R41.82]     I have been asked to see this 59 y.o. female in neurological consultation by Eamon Nguyen MD  to render advice and opinion regarding stroke.     ?     Impression/Recommendations:   María Amos is a 59 y.o.  female with PMHx significant for previous TIA and CVA with left sided weakness and numbness, history of seizures, depression, hypertension, hyperlipidemia presented with altered mental status from home. CT head is negative for any acute change. CTA neck did not show any stenosis in the bilateral carotid arteries. EKG shows NSR. Cholesterol panel showed elevated LDL. Her Utox in Dec was positive for cocaine. Echo showed normal EF.     She is probably suffering from recrudescence of old stroke symptoms and her examination is at baseline.    Continue keppra 1000 mg bid for seizures  Continue DAPT Aspirin 325 and  Plavix 75 mg qd for secondary stroke prophylaxis x 30 days  and then continue Plavix 75 mg qd  Continue Atorvastatin 80 mg qd for secondary stroke prophylaxis  Disposition per PT/OT/SLP        Jesse Norris MD  Teleneurologist    HPI:   History was obtained from a review of the electronic record and from the patient and family.??   María Amos is a 59 y.o.  female with PMHx significant for previous TIA and CVA, history of seizures, depression, hypertension hyperlipidemia presented with altered mental status from home.  Last known well around 1 PM when according to patient's  she started to be altered and not oriented.  Patient also had new dysarthria.  No other new deficits other than dysphagia, patient did have baseline deficit from her prior strokes with left sided weakness.  Patient has alert and oriented to herself and place, not sure why she is in the hospital and does not remember what happened.     Patient was recently admitted in late December for  significant decreased cerebral blood flow or blood volume localized to the inferior right occipitotemporal area/cranial fossae, noting probable overestimation. rCBF < 30% = 0 cc. Tmax > 6 seconds = 27 cc. CTA: Intracranial: There is no evidence of large vessel occlusion in the anterior circulation. There is no evidence of large vessel occlusion in the posterior circulation. No flow-limiting stenosis. There is no evidence of aneurysm or vascular malformation. The dural venous sinuses and deep cerebral venous system are patent. Neck: NASCET method was utilized for calculating stenosis. The aortic arch is unremarkable. The common carotid arteries demonstrate no significant stenosis. There is no evidence of significant stenosis in the right and left cervical internal carotid arteries. Carotid bulbs plaques  without significant luminal narrowing. There is 0% stenosis of the right carotid artery. There is 0% stenosis of the left carotid artery. There is a codominant vertebrobasilar arterial system. The cervical vertebral arteries are normal in course, size and contour without significant stenosis. OTHERS: No evidence of intracranial mass or abnormal enhancement on delayed phase images. Visualized paranasal sinuses and mastoids are patent. Dental amalgam associated metallic artifact precludes optimal evaluation of the adjacent structures, oral cavity and oropharynx. Visualized soft tissues of the skull base and neck are unremarkable. Visualized lung apices are clear. No acute fracture or aggressive osseous lesion. Degenerative changes of the cervical spine with associated at least moderate to severe left and moderate right neuroforaminal narrowing at C5-C6     CT Brain Perfusion demonstrates nonspecific apparent perfusion deficits along the inferior right PCA territory may suggest tissue at ischemic risk versus artifact. CTA Head demonstrates no large vessel occlusion or significant flow-limiting stenosis. Clinical  correlation advised consider further evaluation with brain MRI as clinically indicated. CTA Neck demonstrates no large vessel occlusion or significant flow-limiting stenosis. Others: No intracranial mass or neck mass/enhancing lesion. Cervical spondylosis as detailed. Electronically signed by ABRIL SY    CT HEAD WO CONTRAST    Result Date: 1/12/2025  EXAM: CT CODE NEURO HEAD WO CONTRAST INDICATION: ams COMPARISON: MRI 12/29/2024. CONTRAST: None. TECHNIQUE: Unenhanced CT of the head was performed using 5 mm images. Brain and bone windows were generated. Coronal and sagittal reformats. CT dose reduction was achieved through use of a standardized protocol tailored for this examination and automatic exposure control for dose modulation.  FINDINGS: The ventricles and sulci are normal in size, shape and configuration. There is no significant white matter disease. There is no intracranial hemorrhage, extra-axial collection, or mass effect. The basilar cisterns are open. No CT evidence of acute infarct. The bone windows demonstrate no abnormalities. The visualized portions of the paranasal sinuses and mastoid air cells are clear.     No evidence of acute process. Electronically signed by FRANK Peace Attestation:             ?I discussed the risks and benefits of a telehealth visit and I obtained the patient's or legally authorized representative's informed verbal consent to conduct this assessment using telehealth tools.? All of the patient’s or legally authorized representative's questions regarding the telehealth interaction were addressed. I provided my name and disclosed to the patient or legally authorized representative my licensure, certification, or registration. ?This consultation was remotely performed at the request of Eamon Jerome MD with the assistance of a trained virtual health liaison working at the originating site.? The consultation used real time licensed and encrypted

## 2025-01-14 LAB — LEVETIRACETAM SERPL-MCNC: <2 UG/ML (ref 10–40)

## 2025-01-23 ENCOUNTER — HOSPITAL ENCOUNTER (EMERGENCY)
Facility: HOSPITAL | Age: 60
Discharge: HOME | End: 2025-01-24
Attending: STUDENT IN AN ORGANIZED HEALTH CARE EDUCATION/TRAINING PROGRAM
Payer: MEDICARE

## 2025-01-23 DIAGNOSIS — K52.9 GASTROENTERITIS: Primary | ICD-10-CM

## 2025-01-23 PROCEDURE — 96374 THER/PROPH/DIAG INJ IV PUSH: CPT

## 2025-01-23 PROCEDURE — 93005 ELECTROCARDIOGRAM TRACING: CPT | Performed by: STUDENT IN AN ORGANIZED HEALTH CARE EDUCATION/TRAINING PROGRAM

## 2025-01-23 PROCEDURE — 80048 BASIC METABOLIC PNL TOTAL CA: CPT

## 2025-01-23 PROCEDURE — 85025 COMPLETE CBC W/AUTO DIFF WBC: CPT

## 2025-01-23 PROCEDURE — 84484 ASSAY OF TROPONIN QUANT: CPT

## 2025-01-23 PROCEDURE — 6360000002 HC RX W HCPCS: Performed by: STUDENT IN AN ORGANIZED HEALTH CARE EDUCATION/TRAINING PROGRAM

## 2025-01-23 PROCEDURE — 36415 COLL VENOUS BLD VENIPUNCTURE: CPT

## 2025-01-23 PROCEDURE — 96375 TX/PRO/DX INJ NEW DRUG ADDON: CPT

## 2025-01-23 PROCEDURE — 99284 EMERGENCY DEPT VISIT MOD MDM: CPT

## 2025-01-23 RX ORDER — MORPHINE SULFATE 4 MG/ML
4 INJECTION, SOLUTION INTRAMUSCULAR; INTRAVENOUS
Status: COMPLETED | OUTPATIENT
Start: 2025-01-23 | End: 2025-01-23

## 2025-01-23 RX ORDER — ONDANSETRON 2 MG/ML
4 INJECTION INTRAMUSCULAR; INTRAVENOUS ONCE
Status: COMPLETED | OUTPATIENT
Start: 2025-01-23 | End: 2025-01-23

## 2025-01-23 RX ADMIN — ONDANSETRON 4 MG: 2 INJECTION INTRAMUSCULAR; INTRAVENOUS at 23:40

## 2025-01-23 RX ADMIN — MORPHINE SULFATE 4 MG: 4 INJECTION, SOLUTION INTRAMUSCULAR; INTRAVENOUS at 23:39

## 2025-01-23 ASSESSMENT — LIFESTYLE VARIABLES
HOW MANY STANDARD DRINKS CONTAINING ALCOHOL DO YOU HAVE ON A TYPICAL DAY: PATIENT DECLINED
HOW OFTEN DO YOU HAVE A DRINK CONTAINING ALCOHOL: PATIENT DECLINED

## 2025-01-23 ASSESSMENT — PAIN SCALES - GENERAL: PAINLEVEL_OUTOF10: 10

## 2025-01-23 ASSESSMENT — PAIN - FUNCTIONAL ASSESSMENT: PAIN_FUNCTIONAL_ASSESSMENT: 0-10

## 2025-01-23 ASSESSMENT — PAIN DESCRIPTION - LOCATION: LOCATION: CHEST

## 2025-01-24 VITALS
HEIGHT: 67 IN | RESPIRATION RATE: 20 BRPM | HEART RATE: 65 BPM | WEIGHT: 202 LBS | SYSTOLIC BLOOD PRESSURE: 114 MMHG | OXYGEN SATURATION: 98 % | DIASTOLIC BLOOD PRESSURE: 87 MMHG | TEMPERATURE: 97.5 F | BODY MASS INDEX: 31.71 KG/M2

## 2025-01-24 LAB
ANION GAP SERPL CALC-SCNC: 6 MMOL/L (ref 2–12)
BASOPHILS # BLD: 0.05 K/UL (ref 0–0.1)
BASOPHILS NFR BLD: 0.8 % (ref 0–1)
BUN SERPL-MCNC: 9 MG/DL (ref 6–20)
BUN/CREAT SERPL: 9 (ref 12–20)
CA-I BLD-MCNC: 8.7 MG/DL (ref 8.5–10.1)
CHLORIDE SERPL-SCNC: 109 MMOL/L (ref 97–108)
CO2 SERPL-SCNC: 25 MMOL/L (ref 21–32)
CREAT SERPL-MCNC: 0.97 MG/DL (ref 0.55–1.02)
DIFFERENTIAL METHOD BLD: NORMAL
EOSINOPHIL # BLD: 0.27 K/UL (ref 0–0.4)
EOSINOPHIL NFR BLD: 4.1 % (ref 0–7)
ERYTHROCYTE [DISTWIDTH] IN BLOOD BY AUTOMATED COUNT: 13 % (ref 11.5–14.5)
GLUCOSE SERPL-MCNC: 83 MG/DL (ref 65–100)
HCT VFR BLD AUTO: 38.3 % (ref 35–47)
HGB BLD-MCNC: 13.2 G/DL (ref 11.5–16)
IMM GRANULOCYTES # BLD AUTO: 0.02 K/UL (ref 0–0.04)
IMM GRANULOCYTES NFR BLD AUTO: 0.3 % (ref 0–0.5)
LYMPHOCYTES # BLD: 2.91 K/UL (ref 0.8–3.5)
LYMPHOCYTES NFR BLD: 44.2 % (ref 12–49)
MCH RBC QN AUTO: 30.2 PG (ref 26–34)
MCHC RBC AUTO-ENTMCNC: 34.5 G/DL (ref 30–36.5)
MCV RBC AUTO: 87.6 FL (ref 80–99)
MONOCYTES # BLD: 0.41 K/UL (ref 0–1)
MONOCYTES NFR BLD: 6.2 % (ref 5–13)
NEUTS SEG # BLD: 2.92 K/UL (ref 1.8–8)
NEUTS SEG NFR BLD: 44.4 % (ref 32–75)
NRBC # BLD: 0 K/UL (ref 0–0.01)
NRBC BLD-RTO: 0 PER 100 WBC
PLATELET # BLD AUTO: 295 K/UL (ref 150–400)
PMV BLD AUTO: 9.9 FL (ref 8.9–12.9)
POTASSIUM SERPL-SCNC: 3.2 MMOL/L (ref 3.5–5.1)
RBC # BLD AUTO: 4.37 M/UL (ref 3.8–5.2)
SODIUM SERPL-SCNC: 140 MMOL/L (ref 136–145)
TROPONIN I SERPL HS-MCNC: 4 NG/L (ref 0–51)
TROPONIN I SERPL HS-MCNC: 4 NG/L (ref 0–51)
WBC # BLD AUTO: 6.6 K/UL (ref 3.6–11)

## 2025-01-24 PROCEDURE — 84484 ASSAY OF TROPONIN QUANT: CPT

## 2025-01-24 PROCEDURE — 6370000000 HC RX 637 (ALT 250 FOR IP): Performed by: STUDENT IN AN ORGANIZED HEALTH CARE EDUCATION/TRAINING PROGRAM

## 2025-01-24 PROCEDURE — 96372 THER/PROPH/DIAG INJ SC/IM: CPT

## 2025-01-24 PROCEDURE — 6360000002 HC RX W HCPCS: Performed by: STUDENT IN AN ORGANIZED HEALTH CARE EDUCATION/TRAINING PROGRAM

## 2025-01-24 PROCEDURE — 36415 COLL VENOUS BLD VENIPUNCTURE: CPT

## 2025-01-24 RX ORDER — DICYCLOMINE HYDROCHLORIDE 10 MG/1
10 CAPSULE ORAL EVERY 8 HOURS
Qty: 15 CAPSULE | Refills: 0 | Status: SHIPPED | OUTPATIENT
Start: 2025-01-24

## 2025-01-24 RX ORDER — POTASSIUM CHLORIDE 750 MG/1
40 TABLET, EXTENDED RELEASE ORAL ONCE
Status: COMPLETED | OUTPATIENT
Start: 2025-01-24 | End: 2025-01-24

## 2025-01-24 RX ORDER — DICYCLOMINE HYDROCHLORIDE 10 MG/ML
20 INJECTION INTRAMUSCULAR ONCE
Status: COMPLETED | OUTPATIENT
Start: 2025-01-24 | End: 2025-01-24

## 2025-01-24 RX ORDER — ONDANSETRON 4 MG/1
4 TABLET, FILM COATED ORAL 3 TIMES DAILY PRN
Qty: 15 TABLET | Refills: 0 | Status: SHIPPED | OUTPATIENT
Start: 2025-01-24

## 2025-01-24 RX ADMIN — DICYCLOMINE HYDROCHLORIDE 20 MG: 10 INJECTION, SOLUTION INTRAMUSCULAR at 02:12

## 2025-01-24 RX ADMIN — POTASSIUM CHLORIDE 40 MEQ: 750 TABLET, EXTENDED RELEASE ORAL at 02:18

## 2025-01-24 NOTE — ED TRIAGE NOTES
Pt brought in by [facility] for chest pain after eating fast food. Pt has history of acid reflux. EMS reports pt has been belching. Pt took pepto at home without relief.

## 2025-01-27 NOTE — ED PROVIDER NOTES
[facility] EMERGENCY DEPT  EMERGENCY DEPARTMENT HISTORY AND PHYSICAL EXAM      Date: 1/23/2025  Patient Name: María Amos  MRN: [id number]  Birthdate 1965  Date of evaluation: 1/23/2025  Provider: Helene Pearson MD   Note Started: 12:43 PM EST 1/27/25    HISTORY OF PRESENT ILLNESS     Chief Complaint   Patient presents with    Chest Pain       History Provided By: Patient    HPI: María Amos is a 59 y.o. female with PMH and medication as below comes to ED for evaluation of chest pain.  Reported constipation for the patient her pain is in the epigastrium and described as crampy.  Has been having nausea, vomiting and diarrhea as well.  No other sick contacts.  No fever chills or sweats.  Does not have any shortness of breath.  No active chest pain right now.  Patient disclosed that she was taking Pepto-Bismol at home without significant improvement.      PAST MEDICAL HISTORY   Past Medical History:  Past Medical History:   Diagnosis Date    Alcohol abuse     Anxiety attack     Cocaine abuse (HCC)     COVID-19     Depression     Diabetes mellitus (HCC)     Fracture of left wrist     Hemorrhoids     HTN (hypertension)     Ill-defined condition     hemorrhoids    Orthostatic hypotension     Perineal rash     Rectal bleeding     Rib contusion, left, initial encounter     Seizures (HCC)     Stroke (HCC)     Syncope     Toe fracture     great toe    Trichomonas infection     UTI (urinary tract infection)        Past Surgical History:  History reviewed. No pertinent surgical history.    Family History:  History reviewed. No pertinent family history.    Social History:  Social History     Tobacco Use    Smoking status: Former     Current packs/day: 0.50     Types: Cigarettes    Smokeless tobacco: Former   Substance Use Topics    Alcohol use: Yes     Comment: 12 pack per day    Drug use: Not Currently     Types: Marijuana (Weed), Cocaine     Comment: socially       Allergies:  Allergies   Allergen Reactions    Peanut Oil

## 2025-01-28 LAB
EKG ATRIAL RATE: 58 BPM
EKG DIAGNOSIS: NORMAL
EKG P AXIS: 52 DEGREES
EKG P-R INTERVAL: 150 MS
EKG Q-T INTERVAL: 494 MS
EKG QRS DURATION: 86 MS
EKG QTC CALCULATION (BAZETT): 484 MS
EKG R AXIS: 43 DEGREES
EKG T AXIS: 90 DEGREES
EKG VENTRICULAR RATE: 58 BPM

## 2025-02-06 ENCOUNTER — APPOINTMENT (OUTPATIENT)
Facility: HOSPITAL | Age: 60
End: 2025-02-06
Payer: MEDICARE

## 2025-02-06 ENCOUNTER — HOSPITAL ENCOUNTER (EMERGENCY)
Facility: HOSPITAL | Age: 60
Discharge: HOME | End: 2025-02-06
Attending: EMERGENCY MEDICINE
Payer: MEDICARE

## 2025-02-06 VITALS
TEMPERATURE: 97.8 F | HEIGHT: 67 IN | WEIGHT: 202 LBS | DIASTOLIC BLOOD PRESSURE: 78 MMHG | BODY MASS INDEX: 31.71 KG/M2 | HEART RATE: 52 BPM | OXYGEN SATURATION: 99 % | SYSTOLIC BLOOD PRESSURE: 132 MMHG | RESPIRATION RATE: 18 BRPM

## 2025-02-06 DIAGNOSIS — R10.30 LOWER ABDOMINAL PAIN: ICD-10-CM

## 2025-02-06 DIAGNOSIS — K62.5 RECTAL BLEEDING: Primary | ICD-10-CM

## 2025-02-06 LAB
ABO + RH BLD: NORMAL
ALBUMIN SERPL-MCNC: 3.6 G/DL (ref 3.5–5)
ALBUMIN/GLOB SERPL: 1.1 (ref 1.1–2.2)
ALP SERPL-CCNC: 77 U/L (ref 45–117)
ALT SERPL-CCNC: 15 U/L (ref 12–78)
ANION GAP SERPL CALC-SCNC: 8 MMOL/L (ref 2–12)
AST SERPL W P-5'-P-CCNC: 12 U/L (ref 15–37)
BASOPHILS # BLD: 0.05 K/UL (ref 0–0.1)
BASOPHILS NFR BLD: 0.9 % (ref 0–1)
BILIRUB SERPL-MCNC: 0.4 MG/DL (ref 0.2–1)
BLOOD GROUP ANTIBODIES SERPL: NEGATIVE
BUN SERPL-MCNC: 9 MG/DL (ref 6–20)
BUN/CREAT SERPL: 11 (ref 12–20)
CA-I BLD-MCNC: 8.9 MG/DL (ref 8.5–10.1)
CHLORIDE SERPL-SCNC: 113 MMOL/L (ref 97–108)
CO2 SERPL-SCNC: 21 MMOL/L (ref 21–32)
CREAT SERPL-MCNC: 0.83 MG/DL (ref 0.55–1.02)
DIFFERENTIAL METHOD BLD: ABNORMAL
EOSINOPHIL # BLD: 0.49 K/UL (ref 0–0.4)
EOSINOPHIL NFR BLD: 8.9 % (ref 0–7)
ERYTHROCYTE [DISTWIDTH] IN BLOOD BY AUTOMATED COUNT: 13.9 % (ref 11.5–14.5)
GLOBULIN SER CALC-MCNC: 3.4 G/DL (ref 2–4)
GLUCOSE SERPL-MCNC: 85 MG/DL (ref 65–100)
HCT VFR BLD AUTO: 38.3 % (ref 35–47)
HGB BLD-MCNC: 13.2 G/DL (ref 11.5–16)
IMM GRANULOCYTES # BLD AUTO: 0.02 K/UL (ref 0–0.04)
IMM GRANULOCYTES NFR BLD AUTO: 0.4 % (ref 0–0.5)
INR PPP: 1 (ref 0.9–1.1)
LYMPHOCYTES # BLD: 2.31 K/UL (ref 0.8–3.5)
LYMPHOCYTES NFR BLD: 42.1 % (ref 12–49)
MCH RBC QN AUTO: 30.6 PG (ref 26–34)
MCHC RBC AUTO-ENTMCNC: 34.5 G/DL (ref 30–36.5)
MCV RBC AUTO: 88.7 FL (ref 80–99)
MONOCYTES # BLD: 0.33 K/UL (ref 0–1)
MONOCYTES NFR BLD: 6 % (ref 5–13)
NEUTS SEG # BLD: 2.29 K/UL (ref 1.8–8)
NEUTS SEG NFR BLD: 41.7 % (ref 32–75)
NRBC # BLD: 0 K/UL (ref 0–0.01)
NRBC BLD-RTO: 0 PER 100 WBC
PLATELET # BLD AUTO: 303 K/UL (ref 150–400)
PMV BLD AUTO: 9.3 FL (ref 8.9–12.9)
POTASSIUM SERPL-SCNC: 3.6 MMOL/L (ref 3.5–5.1)
PROT SERPL-MCNC: 7 G/DL (ref 6.4–8.2)
PROTHROMBIN TIME: 13.2 SEC (ref 11.9–14.6)
RBC # BLD AUTO: 4.32 M/UL (ref 3.8–5.2)
SODIUM SERPL-SCNC: 142 MMOL/L (ref 136–145)
SPECIMEN EXP DATE BLD: NORMAL
WBC # BLD AUTO: 5.5 K/UL (ref 3.6–11)

## 2025-02-06 PROCEDURE — 99285 EMERGENCY DEPT VISIT HI MDM: CPT

## 2025-02-06 PROCEDURE — 74177 CT ABD & PELVIS W/CONTRAST: CPT

## 2025-02-06 PROCEDURE — 2580000003 HC RX 258: Performed by: EMERGENCY MEDICINE

## 2025-02-06 PROCEDURE — 6360000002 HC RX W HCPCS: Performed by: EMERGENCY MEDICINE

## 2025-02-06 PROCEDURE — 86901 BLOOD TYPING SEROLOGIC RH(D): CPT

## 2025-02-06 PROCEDURE — 6370000000 HC RX 637 (ALT 250 FOR IP): Performed by: EMERGENCY MEDICINE

## 2025-02-06 PROCEDURE — 6360000004 HC RX CONTRAST MEDICATION: Performed by: EMERGENCY MEDICINE

## 2025-02-06 PROCEDURE — 85025 COMPLETE CBC W/AUTO DIFF WBC: CPT

## 2025-02-06 PROCEDURE — 96376 TX/PRO/DX INJ SAME DRUG ADON: CPT

## 2025-02-06 PROCEDURE — 96374 THER/PROPH/DIAG INJ IV PUSH: CPT

## 2025-02-06 PROCEDURE — 80053 COMPREHEN METABOLIC PANEL: CPT

## 2025-02-06 PROCEDURE — 85610 PROTHROMBIN TIME: CPT

## 2025-02-06 PROCEDURE — 86900 BLOOD TYPING SEROLOGIC ABO: CPT

## 2025-02-06 PROCEDURE — 86850 RBC ANTIBODY SCREEN: CPT

## 2025-02-06 RX ORDER — 0.9 % SODIUM CHLORIDE 0.9 %
500 INTRAVENOUS SOLUTION INTRAVENOUS
Status: COMPLETED | OUTPATIENT
Start: 2025-02-06 | End: 2025-02-06

## 2025-02-06 RX ORDER — FENTANYL CITRATE 50 UG/ML
50 INJECTION, SOLUTION INTRAMUSCULAR; INTRAVENOUS
Status: COMPLETED | OUTPATIENT
Start: 2025-02-06 | End: 2025-02-06

## 2025-02-06 RX ORDER — ACETAMINOPHEN 325 MG/1
650 TABLET ORAL
Status: COMPLETED | OUTPATIENT
Start: 2025-02-06 | End: 2025-02-06

## 2025-02-06 RX ORDER — IOPAMIDOL 755 MG/ML
100 INJECTION, SOLUTION INTRAVASCULAR
Status: COMPLETED | OUTPATIENT
Start: 2025-02-06 | End: 2025-02-06

## 2025-02-06 RX ORDER — TRAMADOL HYDROCHLORIDE 50 MG/1
50 TABLET ORAL EVERY 6 HOURS PRN
Qty: 12 TABLET | Refills: 0 | Status: SHIPPED | OUTPATIENT
Start: 2025-02-06 | End: 2025-02-09

## 2025-02-06 RX ADMIN — FENTANYL CITRATE 50 MCG: 50 INJECTION, SOLUTION INTRAMUSCULAR; INTRAVENOUS at 18:57

## 2025-02-06 RX ADMIN — IOPAMIDOL 100 ML: 755 INJECTION, SOLUTION INTRAVENOUS at 17:28

## 2025-02-06 RX ADMIN — ACETAMINOPHEN 650 MG: 325 TABLET ORAL at 18:58

## 2025-02-06 RX ADMIN — SODIUM CHLORIDE 500 ML: 9 INJECTION, SOLUTION INTRAVENOUS at 18:57

## 2025-02-06 RX ADMIN — FENTANYL CITRATE 50 MCG: 50 INJECTION, SOLUTION INTRAMUSCULAR; INTRAVENOUS at 16:47

## 2025-02-06 ASSESSMENT — PAIN SCALES - GENERAL
PAINLEVEL_OUTOF10: 10
PAINLEVEL_OUTOF10: 7
PAINLEVEL_OUTOF10: 9

## 2025-02-06 ASSESSMENT — PAIN DESCRIPTION - LOCATION
LOCATION: ABDOMEN
LOCATION: ABDOMEN

## 2025-02-06 ASSESSMENT — PAIN DESCRIPTION - DESCRIPTORS
DESCRIPTORS: ACHING
DESCRIPTORS: STABBING

## 2025-02-06 ASSESSMENT — PAIN DESCRIPTION - ORIENTATION
ORIENTATION: LOWER;LEFT
ORIENTATION: LEFT;LOWER

## 2025-02-06 NOTE — ED PROVIDER NOTES
ASK your doctor about these medications      albuterol sulfate  (90 Base) MCG/ACT inhaler  Commonly known as: Ventolin HFA  Inhale 2 puffs into the lungs 4 times daily as needed for Wheezing     aspirin 81 MG EC tablet  Take 1 tablet by mouth daily     atorvastatin 80 MG tablet  Commonly known as: LIPITOR  Take 1 tablet by mouth nightly     budesonide-formoterol 160-4.5 MCG/ACT Aero  Commonly known as: SYMBICORT  Inhale 2 puffs into the lungs in the morning and 2 puffs in the evening.     Claritin 10 MG capsule  Generic drug: loratadine     clopidogrel 75 MG tablet  Commonly known as: PLAVIX  Take 1 tablet by mouth daily     dicyclomine 10 MG capsule  Commonly known as: BENTYL  Take 1 capsule by mouth every 8 (eight) hours     FLUoxetine 20 MG capsule  Commonly known as: PROZAC     levETIRAcetam 1000 MG tablet  Commonly known as: KEPPRA  Take 1 tablet by mouth 2 times daily     metFORMIN 500 MG extended release tablet  Commonly known as: GLUCOPHAGE-XR  Take 1 tablet by mouth daily (with breakfast)     midodrine 5 MG tablet  Commonly known as: PROAMATINE  Take 1 tablet by mouth 3 times daily (with meals)     ondansetron 4 MG tablet  Commonly known as: ZOFRAN  Take 1 tablet by mouth 3 times daily as needed for Nausea or Vomiting     pantoprazole 40 MG tablet  Commonly known as: PROTONIX     potassium chloride 10 MEQ extended release tablet  Commonly known as: KLOR-CON     pramipexole 0.125 MG tablet  Commonly known as: MIRAPEX  Take 1 tablet by mouth 3 times daily     QUEtiapine 50 MG tablet  Commonly known as: SEROQUEL     topiramate 25 MG tablet  Commonly known as: TOPAMAX  Take 1 tablet by mouth 2 times daily     traZODone 50 MG tablet  Commonly known as: DESYREL     vitamin B-1 100 MG tablet  Commonly known as: THIAMINE  Take 1 tablet by mouth daily     vitamin D 1.25 MG (34038 UT) Caps capsule  Commonly known as: ERGOCALCIFEROL               Where to Get Your Medications        These medications

## 2025-02-15 ENCOUNTER — APPOINTMENT (OUTPATIENT)
Facility: HOSPITAL | Age: 60
End: 2025-02-15
Payer: MEDICARE

## 2025-02-15 ENCOUNTER — HOSPITAL ENCOUNTER (EMERGENCY)
Facility: HOSPITAL | Age: 60
Discharge: HOME | End: 2025-02-15
Attending: STUDENT IN AN ORGANIZED HEALTH CARE EDUCATION/TRAINING PROGRAM
Payer: MEDICARE

## 2025-02-15 VITALS
RESPIRATION RATE: 14 BRPM | WEIGHT: 202 LBS | DIASTOLIC BLOOD PRESSURE: 86 MMHG | BODY MASS INDEX: 31.64 KG/M2 | SYSTOLIC BLOOD PRESSURE: 121 MMHG | TEMPERATURE: 97.5 F | OXYGEN SATURATION: 95 % | HEART RATE: 73 BPM

## 2025-02-15 DIAGNOSIS — R07.89 OTHER CHEST PAIN: ICD-10-CM

## 2025-02-15 DIAGNOSIS — R56.9 SEIZURE (HCC): Primary | ICD-10-CM

## 2025-02-15 LAB
ALBUMIN SERPL-MCNC: 4.1 G/DL (ref 3.5–5)
ALBUMIN/GLOB SERPL: 1.3 (ref 1.1–2.2)
ALP SERPL-CCNC: 86 U/L (ref 45–117)
ALT SERPL-CCNC: 15 U/L (ref 12–78)
ANION GAP SERPL CALC-SCNC: 9 MMOL/L (ref 2–12)
AST SERPL W P-5'-P-CCNC: 13 U/L (ref 15–37)
BASOPHILS # BLD: 0.04 K/UL (ref 0–0.1)
BASOPHILS NFR BLD: 0.7 % (ref 0–1)
BILIRUB SERPL-MCNC: 0.2 MG/DL (ref 0.2–1)
BUN SERPL-MCNC: 3 MG/DL (ref 6–20)
BUN/CREAT SERPL: 4 (ref 12–20)
CA-I BLD-MCNC: 9 MG/DL (ref 8.5–10.1)
CHLORIDE SERPL-SCNC: 112 MMOL/L (ref 97–108)
CO2 SERPL-SCNC: 22 MMOL/L (ref 21–32)
CREAT SERPL-MCNC: 0.76 MG/DL (ref 0.55–1.02)
DIFFERENTIAL METHOD BLD: ABNORMAL
EOSINOPHIL # BLD: 0.29 K/UL (ref 0–0.4)
EOSINOPHIL NFR BLD: 5.1 % (ref 0–7)
ERYTHROCYTE [DISTWIDTH] IN BLOOD BY AUTOMATED COUNT: 15 % (ref 11.5–14.5)
GLOBULIN SER CALC-MCNC: 3.1 G/DL (ref 2–4)
GLUCOSE SERPL-MCNC: 83 MG/DL (ref 65–100)
HCT VFR BLD AUTO: 36.8 % (ref 35–47)
HGB BLD-MCNC: 12.2 G/DL (ref 11.5–16)
IMM GRANULOCYTES # BLD AUTO: 0.01 K/UL (ref 0–0.04)
IMM GRANULOCYTES NFR BLD AUTO: 0.2 % (ref 0–0.5)
LYMPHOCYTES # BLD: 2.12 K/UL (ref 0.8–3.5)
LYMPHOCYTES NFR BLD: 37.5 % (ref 12–49)
MAGNESIUM SERPL-MCNC: 2 MG/DL (ref 1.6–2.4)
MCH RBC QN AUTO: 30 PG (ref 26–34)
MCHC RBC AUTO-ENTMCNC: 33.2 G/DL (ref 30–36.5)
MCV RBC AUTO: 90.4 FL (ref 80–99)
MONOCYTES # BLD: 0.34 K/UL (ref 0–1)
MONOCYTES NFR BLD: 6 % (ref 5–13)
NEUTS SEG # BLD: 2.85 K/UL (ref 1.8–8)
NEUTS SEG NFR BLD: 50.5 % (ref 32–75)
NRBC # BLD: 0 K/UL (ref 0–0.01)
NRBC BLD-RTO: 0 PER 100 WBC
PLATELET # BLD AUTO: 325 K/UL (ref 150–400)
PMV BLD AUTO: 9.2 FL (ref 8.9–12.9)
POTASSIUM SERPL-SCNC: 3.3 MMOL/L (ref 3.5–5.1)
PROT SERPL-MCNC: 7.2 G/DL (ref 6.4–8.2)
RBC # BLD AUTO: 4.07 M/UL (ref 3.8–5.2)
SODIUM SERPL-SCNC: 143 MMOL/L (ref 136–145)
TROPONIN I SERPL HS-MCNC: 4 NG/L (ref 0–51)
TROPONIN I SERPL HS-MCNC: 5 NG/L (ref 0–51)
WBC # BLD AUTO: 5.7 K/UL (ref 3.6–11)

## 2025-02-15 PROCEDURE — 85025 COMPLETE CBC W/AUTO DIFF WBC: CPT

## 2025-02-15 PROCEDURE — 83735 ASSAY OF MAGNESIUM: CPT

## 2025-02-15 PROCEDURE — 94640 AIRWAY INHALATION TREATMENT: CPT

## 2025-02-15 PROCEDURE — 36415 COLL VENOUS BLD VENIPUNCTURE: CPT

## 2025-02-15 PROCEDURE — 71045 X-RAY EXAM CHEST 1 VIEW: CPT

## 2025-02-15 PROCEDURE — 80053 COMPREHEN METABOLIC PANEL: CPT

## 2025-02-15 PROCEDURE — 70450 CT HEAD/BRAIN W/O DYE: CPT

## 2025-02-15 PROCEDURE — 94761 N-INVAS EAR/PLS OXIMETRY MLT: CPT

## 2025-02-15 PROCEDURE — 93005 ELECTROCARDIOGRAM TRACING: CPT | Performed by: STUDENT IN AN ORGANIZED HEALTH CARE EDUCATION/TRAINING PROGRAM

## 2025-02-15 PROCEDURE — 84484 ASSAY OF TROPONIN QUANT: CPT

## 2025-02-15 PROCEDURE — 6370000000 HC RX 637 (ALT 250 FOR IP): Performed by: STUDENT IN AN ORGANIZED HEALTH CARE EDUCATION/TRAINING PROGRAM

## 2025-02-15 PROCEDURE — 6370000000 HC RX 637 (ALT 250 FOR IP)

## 2025-02-15 PROCEDURE — 99285 EMERGENCY DEPT VISIT HI MDM: CPT

## 2025-02-15 RX ORDER — LEVETIRACETAM 250 MG/1
1000 TABLET ORAL ONCE
Status: COMPLETED | OUTPATIENT
Start: 2025-02-15 | End: 2025-02-15

## 2025-02-15 RX ORDER — PANTOPRAZOLE SODIUM 40 MG/1
40 TABLET, DELAYED RELEASE ORAL ONCE
Status: COMPLETED | OUTPATIENT
Start: 2025-02-15 | End: 2025-02-15

## 2025-02-15 RX ORDER — IPRATROPIUM BROMIDE AND ALBUTEROL SULFATE 2.5; .5 MG/3ML; MG/3ML
SOLUTION RESPIRATORY (INHALATION)
Status: COMPLETED
Start: 2025-02-15 | End: 2025-02-15

## 2025-02-15 RX ORDER — IPRATROPIUM BROMIDE AND ALBUTEROL SULFATE 2.5; .5 MG/3ML; MG/3ML
1 SOLUTION RESPIRATORY (INHALATION)
Status: COMPLETED | OUTPATIENT
Start: 2025-02-15 | End: 2025-02-15

## 2025-02-15 RX ORDER — BUTALBITAL, ACETAMINOPHEN AND CAFFEINE 50; 325; 40 MG/1; MG/1; MG/1
2 TABLET ORAL
Status: COMPLETED | OUTPATIENT
Start: 2025-02-15 | End: 2025-02-15

## 2025-02-15 RX ADMIN — LEVETIRACETAM 1000 MG: 250 TABLET, FILM COATED ORAL at 08:46

## 2025-02-15 RX ADMIN — BUTALBITAL, ACETAMINOPHEN, AND CAFFEINE 2 TABLET: 325; 50; 40 TABLET ORAL at 08:46

## 2025-02-15 RX ADMIN — IPRATROPIUM BROMIDE AND ALBUTEROL SULFATE 1 DOSE: 2.5; .5 SOLUTION RESPIRATORY (INHALATION) at 08:51

## 2025-02-15 RX ADMIN — PANTOPRAZOLE SODIUM 40 MG: 40 TABLET, DELAYED RELEASE ORAL at 08:46

## 2025-02-15 ASSESSMENT — LIFESTYLE VARIABLES
HOW MANY STANDARD DRINKS CONTAINING ALCOHOL DO YOU HAVE ON A TYPICAL DAY: 3 OR 4
HOW OFTEN DO YOU HAVE A DRINK CONTAINING ALCOHOL: 2-3 TIMES A WEEK

## 2025-02-15 ASSESSMENT — HEART SCORE: ECG: NORMAL

## 2025-02-15 ASSESSMENT — PAIN SCALES - GENERAL
PAINLEVEL_OUTOF10: 7
PAINLEVEL_OUTOF10: 7

## 2025-02-15 ASSESSMENT — PAIN - FUNCTIONAL ASSESSMENT: PAIN_FUNCTIONAL_ASSESSMENT: 0-10

## 2025-02-15 ASSESSMENT — PAIN DESCRIPTION - LOCATION: LOCATION: CHEST

## 2025-02-15 NOTE — DISCHARGE INSTRUCTIONS
Thank you!    Thank you for allowing me to care for you in the emergency department.  I sincerely hope that you are satisfied with your visit today.  It is my goal to provide you with excellent care.    Below you will find a list of your labs and imaging from your visit today if applicable. Should you have any questions regarding these results please do not hesitate to call the emergency department. Please review [employer] for a more detailed result list since the below list may not be comprehensive. Instructions on how to sign up to [employer] should be provided in this packet.    Labs -     Recent Results (from the past 12 hour(s))   CBC with Auto Differential    Collection Time: 02/15/25  6:41 AM   Result Value Ref Range    WBC 5.7 3.6 - 11.0 K/uL    RBC 4.07 3.80 - 5.20 M/uL    Hemoglobin 12.2 11.5 - 16.0 g/dL    Hematocrit 36.8 35.0 - 47.0 %    MCV 90.4 80.0 - 99.0 FL    MCH 30.0 26.0 - 34.0 PG    MCHC 33.2 30.0 - 36.5 g/dL    RDW 15.0 (H) 11.5 - 14.5 %    Platelets 325 150 - 400 K/uL    MPV 9.2 8.9 - 12.9 FL    Nucleated RBCs 0.0 0.0  WBC    nRBC 0.00 0.00 - 0.01 K/uL    Neutrophils % 50.5 32.0 - 75.0 %    Lymphocytes % 37.5 12.0 - 49.0 %    Monocytes % 6.0 5.0 - 13.0 %    Eosinophils % 5.1 0.0 - 7.0 %    Basophils % 0.7 0.0 - 1.0 %    Immature Granulocytes % 0.2 0 - 0.5 %    Neutrophils Absolute 2.85 1.80 - 8.00 K/UL    Lymphocytes Absolute 2.12 0.80 - 3.50 K/UL    Monocytes Absolute 0.34 0.00 - 1.00 K/UL    Eosinophils Absolute 0.29 0.00 - 0.40 K/UL    Basophils Absolute 0.04 0.00 - 0.10 K/UL    Immature Granulocytes Absolute 0.01 0.00 - 0.04 K/UL    Differential Type AUTOMATED     Troponin    Collection Time: 02/15/25  6:41 AM   Result Value Ref Range    Troponin, High Sensitivity 5 0 - 51 ng/L   Comprehensive Metabolic Panel    Collection Time: 02/15/25  6:41 AM   Result Value Ref Range    Sodium 143 136 - 145 mmol/L    Potassium 3.3 (L) 3.5 - 5.1 mmol/L    Chloride 112 (H) 97 - 108 mmol/L    CO2 22 21

## 2025-02-15 NOTE — ED TRIAGE NOTES
Pt CC is CP with +ETOH. Lives with .  states pt had a seizure, when EMS arrived pt had another seizure like activity with a short postictal phase. Respiratory rate amnd heart rate did not change during this. Pt has hx of seizures. Received duoneb in route

## 2025-02-15 NOTE — ED PROVIDER NOTES
EMERGENCY DEPARTMENT HISTORY AND PHYSICAL EXAM      Date: 2/15/2025  Patient Name: María Amos  MRN: [id number]  YOB: 1965  Date of evaluation: 2/15/2025  Provider: Alexander Lloyd MD     History of Present Illness     Chief Complaint   Patient presents with    Chest Pain    Seizures       History Provided By: Patient    HPI: María Amos, 59 y.o. female with past medical history as listed and reviewed below presenting to the ED for evaluation of chest pain and seizures.  Patient reports that last night she was doing a lot of heavy drinking and took multiple thoughts, she does not remember how many drinks she had.  She reports that her  states she had multiple seizures today, she has not yet had her morning Keppra.  He does have a history of gastroesophageal reflux and she is having some chest pain described as feeling similar to her previous episodes of gastroesophageal reflux, she notes it is exacerbated by alcohol.  She denies any vomiting, she does note headache, it is described as diffuse without any visual symptoms.  She denies any neck pain, she is on Plavix.  She is unsure if she fell or hit her head.  She denies any numbness or tingling in extremities, she does not not recall the seizures, there was reportedly a postictal period.  She did have a recent stroke and has chronic left-sided deficits, she denies any new deficits.    Medical History     Past Medical History:  Past Medical History:   Diagnosis Date    Alcohol abuse     Anxiety attack     Cocaine abuse (HCC)     COVID-19     Depression     Diabetes mellitus (HCC)     Fracture of left wrist     Hemorrhoids     HTN (hypertension)     Ill-defined condition     hemorrhoids    Orthostatic hypotension     Perineal rash     Rectal bleeding     Rib contusion, left, initial encounter     Seizures (HCC)     Stroke (HCC)     Syncope     Toe fracture     great toe    Trichomonas infection     UTI (urinary tract infection)        Past  interpreted by the ED Provider with the below findings:    Interpreted by me as no acute process    Interpretation per the Radiologist below, if available at the time of this note:  CT HEAD WO CONTRAST   Final Result   1. No evidence of acute infarct or intracranial hemorrhage.   2. Very mild periventricular white matter disease is likely secondary to chronic   small vessel ischemic changes.         Electronically signed by Kevin De Jesus MD      XR CHEST 1 VIEW   Final Result   No acute process.         Electronically signed by Nile Cooper           Diagnosis     Clinical Impression:   1. Seizure (HCC)    2. Other chest pain        Disposition & Disposition Considerations     DISPOSITION Decision To Discharge 02/15/2025 09:43:45 AM   DISPOSITION CONDITION Stable       Additional Disposition Considerations:      Smoking Cessation:Not Applicable    DISCHARGE PLAN:  1.   Current Discharge Medication List        CONTINUE these medications which have NOT CHANGED    Details   dicyclomine (BENTYL) 10 MG capsule Take 1 capsule by mouth every 8 (eight) hours  Qty: 15 capsule, Refills: 0      ondansetron (ZOFRAN) 4 MG tablet Take 1 tablet by mouth 3 times daily as needed for Nausea or Vomiting  Qty: 15 tablet, Refills: 0      clopidogrel (PLAVIX) 75 MG tablet Take 1 tablet by mouth daily  Qty: 30 tablet, Refills: 0      atorvastatin (LIPITOR) 80 MG tablet Take 1 tablet by mouth nightly  Qty: 30 tablet, Refills: 0      metFORMIN (GLUCOPHAGE-XR) 500 MG extended release tablet Take 1 tablet by mouth daily (with breakfast)  Qty: 30 tablet, Refills: 0      blood glucose monitor kit and supplies Dispense sufficient amount for indicated testing frequency plus additional to accommodate PRN testing needs. Dispense all needed supplies to include: monitor, strips, lancing device, lancets, control solutions, alcohol swabs.  Qty: 1 kit, Refills: 0    Comments: Brand per patient preference. May round up to next available package size.

## 2025-02-16 LAB
EKG ATRIAL RATE: 62 BPM
EKG DIAGNOSIS: NORMAL
EKG P AXIS: 68 DEGREES
EKG P-R INTERVAL: 154 MS
EKG Q-T INTERVAL: 482 MS
EKG QRS DURATION: 86 MS
EKG QTC CALCULATION (BAZETT): 489 MS
EKG R AXIS: 55 DEGREES
EKG T AXIS: 44 DEGREES
EKG VENTRICULAR RATE: 62 BPM

## 2025-02-25 ENCOUNTER — APPOINTMENT (OUTPATIENT)
Facility: HOSPITAL | Age: 60
End: 2025-02-25
Payer: MEDICARE

## 2025-02-25 ENCOUNTER — HOSPITAL ENCOUNTER (EMERGENCY)
Facility: HOSPITAL | Age: 60
Discharge: HOME | End: 2025-02-25
Payer: MEDICARE

## 2025-02-25 VITALS
SYSTOLIC BLOOD PRESSURE: 133 MMHG | RESPIRATION RATE: 20 BRPM | DIASTOLIC BLOOD PRESSURE: 84 MMHG | TEMPERATURE: 98.9 F | BODY MASS INDEX: 31.71 KG/M2 | HEART RATE: 68 BPM | WEIGHT: 202 LBS | HEIGHT: 67 IN | OXYGEN SATURATION: 100 %

## 2025-02-25 DIAGNOSIS — J40 BRONCHITIS: Primary | ICD-10-CM

## 2025-02-25 DIAGNOSIS — R05.3 CHRONIC COUGH: ICD-10-CM

## 2025-02-25 LAB
ALBUMIN SERPL-MCNC: 3.3 G/DL (ref 3.5–5)
ALBUMIN/GLOB SERPL: 0.8 (ref 1.1–2.2)
ALP SERPL-CCNC: 88 U/L (ref 45–117)
ALT SERPL-CCNC: 25 U/L (ref 12–78)
ANION GAP SERPL CALC-SCNC: 7 MMOL/L (ref 2–12)
AST SERPL W P-5'-P-CCNC: ABNORMAL U/L (ref 15–37)
BASOPHILS # BLD: 0.06 K/UL (ref 0–0.1)
BASOPHILS NFR BLD: 0.9 % (ref 0–1)
BILIRUB SERPL-MCNC: 0.4 MG/DL (ref 0.2–1)
BUN SERPL-MCNC: 5 MG/DL (ref 6–20)
BUN/CREAT SERPL: 6 (ref 12–20)
CA-I BLD-MCNC: 8.5 MG/DL (ref 8.5–10.1)
CHLORIDE SERPL-SCNC: 111 MMOL/L (ref 97–108)
CO2 SERPL-SCNC: 21 MMOL/L (ref 21–32)
CREAT SERPL-MCNC: 0.87 MG/DL (ref 0.55–1.02)
DIFFERENTIAL METHOD BLD: ABNORMAL
EKG ATRIAL RATE: 65 BPM
EKG DIAGNOSIS: NORMAL
EKG P AXIS: 68 DEGREES
EKG P-R INTERVAL: 126 MS
EKG Q-T INTERVAL: 512 MS
EKG QRS DURATION: 78 MS
EKG QTC CALCULATION (BAZETT): 532 MS
EKG R AXIS: 19 DEGREES
EKG T AXIS: 95 DEGREES
EKG VENTRICULAR RATE: 65 BPM
EOSINOPHIL # BLD: 0.73 K/UL (ref 0–0.4)
EOSINOPHIL NFR BLD: 11.2 % (ref 0–7)
ERYTHROCYTE [DISTWIDTH] IN BLOOD BY AUTOMATED COUNT: 14.7 % (ref 11.5–14.5)
FLUAV RNA SPEC QL NAA+PROBE: NOT DETECTED
FLUBV RNA SPEC QL NAA+PROBE: NOT DETECTED
GLOBULIN SER CALC-MCNC: 3.9 G/DL (ref 2–4)
GLUCOSE SERPL-MCNC: 83 MG/DL (ref 65–100)
HCT VFR BLD AUTO: 38 % (ref 35–47)
HGB BLD-MCNC: 12.5 G/DL (ref 11.5–16)
IMM GRANULOCYTES # BLD AUTO: 0.01 K/UL (ref 0–0.04)
IMM GRANULOCYTES NFR BLD AUTO: 0.2 % (ref 0–0.5)
LYMPHOCYTES # BLD: 2.37 K/UL (ref 0.8–3.5)
LYMPHOCYTES NFR BLD: 36.3 % (ref 12–49)
MCH RBC QN AUTO: 30 PG (ref 26–34)
MCHC RBC AUTO-ENTMCNC: 32.9 G/DL (ref 30–36.5)
MCV RBC AUTO: 91.1 FL (ref 80–99)
MONOCYTES # BLD: 0.41 K/UL (ref 0–1)
MONOCYTES NFR BLD: 6.3 % (ref 5–13)
NEUTS SEG # BLD: 2.95 K/UL (ref 1.8–8)
NEUTS SEG NFR BLD: 45.1 % (ref 32–75)
NRBC # BLD: 0 K/UL (ref 0–0.01)
NRBC BLD-RTO: 0 PER 100 WBC
PLATELET # BLD AUTO: 312 K/UL (ref 150–400)
PMV BLD AUTO: 10.1 FL (ref 8.9–12.9)
POTASSIUM SERPL-SCNC: ABNORMAL MMOL/L (ref 3.5–5.1)
PROT SERPL-MCNC: 7.2 G/DL (ref 6.4–8.2)
RBC # BLD AUTO: 4.17 M/UL (ref 3.8–5.2)
SARS-COV-2 RNA RESP QL NAA+PROBE: NOT DETECTED
SODIUM SERPL-SCNC: 139 MMOL/L (ref 136–145)
WBC # BLD AUTO: 6.5 K/UL (ref 3.6–11)

## 2025-02-25 PROCEDURE — 85025 COMPLETE CBC W/AUTO DIFF WBC: CPT

## 2025-02-25 PROCEDURE — 96374 THER/PROPH/DIAG INJ IV PUSH: CPT

## 2025-02-25 PROCEDURE — 99285 EMERGENCY DEPT VISIT HI MDM: CPT

## 2025-02-25 PROCEDURE — 96375 TX/PRO/DX INJ NEW DRUG ADDON: CPT

## 2025-02-25 PROCEDURE — 80053 COMPREHEN METABOLIC PANEL: CPT

## 2025-02-25 PROCEDURE — 87636 SARSCOV2 & INF A&B AMP PRB: CPT

## 2025-02-25 PROCEDURE — 6360000002 HC RX W HCPCS: Performed by: NURSE PRACTITIONER

## 2025-02-25 PROCEDURE — 93005 ELECTROCARDIOGRAM TRACING: CPT | Performed by: EMERGENCY MEDICINE

## 2025-02-25 PROCEDURE — 71045 X-RAY EXAM CHEST 1 VIEW: CPT

## 2025-02-25 PROCEDURE — 6370000000 HC RX 637 (ALT 250 FOR IP): Performed by: NURSE PRACTITIONER

## 2025-02-25 RX ORDER — METHYLPREDNISOLONE SODIUM SUCCINATE 40 MG/ML
40 INJECTION INTRAMUSCULAR; INTRAVENOUS
Status: COMPLETED | OUTPATIENT
Start: 2025-02-25 | End: 2025-02-25

## 2025-02-25 RX ORDER — PREDNISONE 10 MG/1
TABLET ORAL
Qty: 21 TABLET | Refills: 0 | Status: SHIPPED | OUTPATIENT
Start: 2025-02-25 | End: 2025-03-02

## 2025-02-25 RX ORDER — IPRATROPIUM BROMIDE AND ALBUTEROL SULFATE 2.5; .5 MG/3ML; MG/3ML
1 SOLUTION RESPIRATORY (INHALATION) EVERY 4 HOURS
Qty: 360 ML | Refills: 0 | Status: SHIPPED | OUTPATIENT
Start: 2025-02-25

## 2025-02-25 RX ORDER — IPRATROPIUM BROMIDE AND ALBUTEROL SULFATE 2.5; .5 MG/3ML; MG/3ML
1 SOLUTION RESPIRATORY (INHALATION) EVERY 4 HOURS PRN
Status: DISCONTINUED | OUTPATIENT
Start: 2025-02-25 | End: 2025-02-26 | Stop reason: HOSPADM

## 2025-02-25 RX ORDER — KETOROLAC TROMETHAMINE 15 MG/ML
15 INJECTION, SOLUTION INTRAMUSCULAR; INTRAVENOUS ONCE
Status: COMPLETED | OUTPATIENT
Start: 2025-02-25 | End: 2025-02-25

## 2025-02-25 RX ADMIN — IPRATROPIUM BROMIDE AND ALBUTEROL SULFATE 1 DOSE: 2.5; .5 SOLUTION RESPIRATORY (INHALATION) at 20:03

## 2025-02-25 RX ADMIN — KETOROLAC TROMETHAMINE 15 MG: 15 INJECTION, SOLUTION INTRAMUSCULAR; INTRAVENOUS at 18:18

## 2025-02-25 RX ADMIN — METHYLPREDNISOLONE SODIUM SUCCINATE 40 MG: 40 INJECTION INTRAMUSCULAR; INTRAVENOUS at 18:20

## 2025-02-25 ASSESSMENT — PAIN - FUNCTIONAL ASSESSMENT
PAIN_FUNCTIONAL_ASSESSMENT: NONE - DENIES PAIN
PAIN_FUNCTIONAL_ASSESSMENT: 0-10

## 2025-02-25 ASSESSMENT — PAIN DESCRIPTION - LOCATION: LOCATION: GENERALIZED

## 2025-02-25 ASSESSMENT — PAIN SCALES - GENERAL: PAINLEVEL_OUTOF10: 10

## 2025-02-25 NOTE — ED TRIAGE NOTES
Per EMS, Pt has been having a cough for 2 days. Pt started vomiting today, EMS gave her 4mg of PO zofran in route.

## 2025-02-25 NOTE — ED PROVIDER NOTES
[facility] EMERGENCY DEPT  EMERGENCY DEPARTMENT HISTORY AND PHYSICAL EXAM      Date: 2/25/2025  Patient Name: María Amos  MRN: [id number]  Birthdate 1965  Date of evaluation: 2/25/2025  Provider: DRAKE Ford CNP   Note Started: 5:08 PM EST 2/25/25    HISTORY OF PRESENT ILLNESS     Chief Complaint   Patient presents with    Cold Symptoms       History Provided By: Patient    HPI: María Amos is a 60 y.o. female presents with 2 days of cough and shortness of breath.  She states that about 2 days ago she started with a cough which has gotten progressively worse over the last couple of days, she has also been wheezing and having increasing shortness of breath.  She states she does not know if she has had any fever, however reports feeling very cold.  She does have complaints of pain in her back when she coughs, states that it goes away when she is not coughing.  She has noted to be hypertensive, she states that she has taken her antihypertensive medications today.  She states that she did have 1 episode of vomiting today, likely attributed to cough.  Denies any nausea constipation or diarrhea.  Denies abdominal pain.  Patient denies any history of diagnosis of lung problems, however she states that she uses a nebulizer at home.  She was a smoker and quit smoking about 1 year ago.  States that she has had episodes like this in the past.  She denies any headache, visual changes, lightheadedness, dizziness, chest pain, palpitations, abdominal pain, urinary complaints, lower extremity swelling.  PAST MEDICAL HISTORY   Past Medical History:  Past Medical History:   Diagnosis Date    Alcohol abuse     Anxiety attack     Cocaine abuse (HCC)     COVID-19     Depression     Diabetes mellitus (HCC)     Fracture of left wrist     Hemorrhoids     HTN (hypertension)     Ill-defined condition     hemorrhoids    Orthostatic hypotension     Perineal rash     Rectal bleeding     Rib contusion, left, initial encounter     Seizures

## 2025-03-05 ENCOUNTER — APPOINTMENT (OUTPATIENT)
Facility: HOSPITAL | Age: 60
End: 2025-03-05
Payer: MEDICARE

## 2025-03-05 ENCOUNTER — HOSPITAL ENCOUNTER (EMERGENCY)
Facility: HOSPITAL | Age: 60
Discharge: HOME | End: 2025-03-05
Attending: EMERGENCY MEDICINE
Payer: MEDICARE

## 2025-03-05 VITALS
SYSTOLIC BLOOD PRESSURE: 152 MMHG | HEART RATE: 60 BPM | OXYGEN SATURATION: 98 % | HEIGHT: 67 IN | WEIGHT: 210 LBS | BODY MASS INDEX: 32.96 KG/M2 | TEMPERATURE: 98.5 F | RESPIRATION RATE: 19 BRPM | DIASTOLIC BLOOD PRESSURE: 87 MMHG

## 2025-03-05 DIAGNOSIS — R56.9 SEIZURES (HCC): Primary | ICD-10-CM

## 2025-03-05 LAB
ALBUMIN SERPL-MCNC: 3.6 G/DL (ref 3.5–5)
ALBUMIN/GLOB SERPL: 1.1 (ref 1.1–2.2)
ALP SERPL-CCNC: 84 U/L (ref 45–117)
ALT SERPL-CCNC: 25 U/L (ref 12–78)
AMPHET UR QL SCN: NEGATIVE
ANION GAP SERPL CALC-SCNC: 2 MMOL/L (ref 2–12)
APPEARANCE UR: CLEAR
AST SERPL W P-5'-P-CCNC: 25 U/L (ref 15–37)
BACTERIA URNS QL MICRO: NEGATIVE /HPF
BARBITURATES UR QL SCN: NEGATIVE
BASOPHILS # BLD: 0.04 K/UL (ref 0–0.1)
BASOPHILS NFR BLD: 0.7 % (ref 0–1)
BENZODIAZ UR QL: NEGATIVE
BILIRUB DIRECT SERPL-MCNC: <0.1 MG/DL (ref 0–0.2)
BILIRUB SERPL-MCNC: 0.3 MG/DL (ref 0.2–1)
BILIRUB UR QL: NEGATIVE
BUN SERPL-MCNC: 5 MG/DL (ref 6–20)
BUN/CREAT SERPL: 7 (ref 12–20)
CA-I BLD-MCNC: 9 MG/DL (ref 8.5–10.1)
CANNABINOIDS UR QL SCN: NEGATIVE
CHLORIDE SERPL-SCNC: 110 MMOL/L (ref 97–108)
CO2 SERPL-SCNC: 29 MMOL/L (ref 21–32)
COCAINE UR QL SCN: POSITIVE
COLOR UR: NORMAL
CREAT SERPL-MCNC: 0.7 MG/DL (ref 0.55–1.02)
DIFFERENTIAL METHOD BLD: NORMAL
EOSINOPHIL # BLD: 0.34 K/UL (ref 0–0.4)
EOSINOPHIL NFR BLD: 6 % (ref 0–7)
EPITH CASTS URNS QL MICRO: NORMAL /LPF
ERYTHROCYTE [DISTWIDTH] IN BLOOD BY AUTOMATED COUNT: 14 % (ref 11.5–14.5)
ETHANOL SERPL-MCNC: <10 MG/DL (ref 0–0.08)
GLOBULIN SER CALC-MCNC: 3.4 G/DL (ref 2–4)
GLUCOSE SERPL-MCNC: 81 MG/DL (ref 65–100)
GLUCOSE UR STRIP.AUTO-MCNC: NEGATIVE MG/DL
HCT VFR BLD AUTO: 39.3 % (ref 35–47)
HGB BLD-MCNC: 13.2 G/DL (ref 11.5–16)
HGB UR QL STRIP: NEGATIVE
IMM GRANULOCYTES # BLD AUTO: 0.02 K/UL (ref 0–0.04)
IMM GRANULOCYTES NFR BLD AUTO: 0.4 % (ref 0–0.5)
KETONES UR QL STRIP.AUTO: NEGATIVE MG/DL
LEUKOCYTE ESTERASE UR QL STRIP.AUTO: NEGATIVE
LYMPHOCYTES # BLD: 2.76 K/UL (ref 0.8–3.5)
LYMPHOCYTES NFR BLD: 48.8 % (ref 12–49)
Lab: ABNORMAL
MAGNESIUM SERPL-MCNC: 2.1 MG/DL (ref 1.6–2.4)
MCH RBC QN AUTO: 30.4 PG (ref 26–34)
MCHC RBC AUTO-ENTMCNC: 33.6 G/DL (ref 30–36.5)
MCV RBC AUTO: 90.6 FL (ref 80–99)
METHADONE UR QL: NEGATIVE
MONOCYTES # BLD: 0.45 K/UL (ref 0–1)
MONOCYTES NFR BLD: 8 % (ref 5–13)
NEUTS SEG # BLD: 2.04 K/UL (ref 1.8–8)
NEUTS SEG NFR BLD: 36.1 % (ref 32–75)
NITRITE UR QL STRIP.AUTO: NEGATIVE
NRBC # BLD: 0 K/UL (ref 0–0.01)
NRBC BLD-RTO: 0 PER 100 WBC
OPIATES UR QL: NEGATIVE
PCP UR QL: NEGATIVE
PH UR STRIP: 7 (ref 5–8)
PLATELET # BLD AUTO: 346 K/UL (ref 150–400)
PMV BLD AUTO: 9.6 FL (ref 8.9–12.9)
POTASSIUM SERPL-SCNC: ABNORMAL MMOL/L (ref 3.5–5.1)
PROT SERPL-MCNC: 7 G/DL (ref 6.4–8.2)
PROT UR STRIP-MCNC: NEGATIVE MG/DL
RBC # BLD AUTO: 4.34 M/UL (ref 3.8–5.2)
RBC #/AREA URNS HPF: NORMAL /HPF (ref 0–5)
SODIUM SERPL-SCNC: 141 MMOL/L (ref 136–145)
SP GR UR REFRACTOMETRY: 1.01 (ref 1–1.03)
URINE CULTURE IF INDICATED: NORMAL
UROBILINOGEN UR QL STRIP.AUTO: 0.1 EU/DL (ref 0.1–1)
WBC # BLD AUTO: 5.7 K/UL (ref 3.6–11)
WBC URNS QL MICRO: NORMAL /HPF (ref 0–4)

## 2025-03-05 PROCEDURE — 71045 X-RAY EXAM CHEST 1 VIEW: CPT

## 2025-03-05 PROCEDURE — 83735 ASSAY OF MAGNESIUM: CPT

## 2025-03-05 PROCEDURE — 70450 CT HEAD/BRAIN W/O DYE: CPT

## 2025-03-05 PROCEDURE — 2580000003 HC RX 258: Performed by: EMERGENCY MEDICINE

## 2025-03-05 PROCEDURE — 82077 ASSAY SPEC XCP UR&BREATH IA: CPT

## 2025-03-05 PROCEDURE — 80048 BASIC METABOLIC PNL TOTAL CA: CPT

## 2025-03-05 PROCEDURE — 72125 CT NECK SPINE W/O DYE: CPT

## 2025-03-05 PROCEDURE — 99285 EMERGENCY DEPT VISIT HI MDM: CPT

## 2025-03-05 PROCEDURE — 72170 X-RAY EXAM OF PELVIS: CPT

## 2025-03-05 PROCEDURE — 96374 THER/PROPH/DIAG INJ IV PUSH: CPT

## 2025-03-05 PROCEDURE — 81001 URINALYSIS AUTO W/SCOPE: CPT

## 2025-03-05 PROCEDURE — 80076 HEPATIC FUNCTION PANEL: CPT

## 2025-03-05 PROCEDURE — 93005 ELECTROCARDIOGRAM TRACING: CPT | Performed by: EMERGENCY MEDICINE

## 2025-03-05 PROCEDURE — 80307 DRUG TEST PRSMV CHEM ANLYZR: CPT

## 2025-03-05 PROCEDURE — 85025 COMPLETE CBC W/AUTO DIFF WBC: CPT

## 2025-03-05 PROCEDURE — 6370000000 HC RX 637 (ALT 250 FOR IP): Performed by: EMERGENCY MEDICINE

## 2025-03-05 PROCEDURE — 36415 COLL VENOUS BLD VENIPUNCTURE: CPT

## 2025-03-05 PROCEDURE — 6360000002 HC RX W HCPCS: Performed by: EMERGENCY MEDICINE

## 2025-03-05 PROCEDURE — 96361 HYDRATE IV INFUSION ADD-ON: CPT

## 2025-03-05 RX ORDER — 0.9 % SODIUM CHLORIDE 0.9 %
500 INTRAVENOUS SOLUTION INTRAVENOUS
Status: COMPLETED | OUTPATIENT
Start: 2025-03-05 | End: 2025-03-05

## 2025-03-05 RX ORDER — IPRATROPIUM BROMIDE AND ALBUTEROL SULFATE 2.5; .5 MG/3ML; MG/3ML
1 SOLUTION RESPIRATORY (INHALATION)
Status: COMPLETED | OUTPATIENT
Start: 2025-03-05 | End: 2025-03-05

## 2025-03-05 RX ORDER — LEVETIRACETAM 500 MG/5ML
1000 INJECTION, SOLUTION, CONCENTRATE INTRAVENOUS
Status: COMPLETED | OUTPATIENT
Start: 2025-03-05 | End: 2025-03-05

## 2025-03-05 RX ADMIN — SODIUM CHLORIDE 500 ML: 0.9 INJECTION, SOLUTION INTRAVENOUS at 16:48

## 2025-03-05 RX ADMIN — LEVETIRACETAM 1000 MG: 100 INJECTION INTRAVENOUS at 16:45

## 2025-03-05 RX ADMIN — IPRATROPIUM BROMIDE AND ALBUTEROL SULFATE 1 DOSE: 2.5; .5 SOLUTION RESPIRATORY (INHALATION) at 16:45

## 2025-03-05 ASSESSMENT — PAIN SCALES - GENERAL
PAINLEVEL_OUTOF10: 0

## 2025-03-05 NOTE — ED TRIAGE NOTES
Pt hx of seizures pt had a seizure infront of ems,pt took meds today, pt was found on floor with  and pt recovered refused then went into another seizure at that time.

## 2025-03-06 LAB
EKG ATRIAL RATE: 51 BPM
EKG DIAGNOSIS: NORMAL
EKG P AXIS: 44 DEGREES
EKG P-R INTERVAL: 136 MS
EKG Q-T INTERVAL: 510 MS
EKG QRS DURATION: 78 MS
EKG QTC CALCULATION (BAZETT): 470 MS
EKG R AXIS: 27 DEGREES
EKG T AXIS: 56 DEGREES
EKG VENTRICULAR RATE: 51 BPM

## 2025-03-31 ENCOUNTER — APPOINTMENT (OUTPATIENT)
Facility: HOSPITAL | Age: 60
End: 2025-03-31
Payer: MEDICARE

## 2025-03-31 ENCOUNTER — HOSPITAL ENCOUNTER (EMERGENCY)
Facility: HOSPITAL | Age: 60
Discharge: HOME | End: 2025-03-31
Attending: EMERGENCY MEDICINE
Payer: MEDICARE

## 2025-03-31 VITALS
TEMPERATURE: 98 F | SYSTOLIC BLOOD PRESSURE: 148 MMHG | DIASTOLIC BLOOD PRESSURE: 83 MMHG | RESPIRATION RATE: 21 BRPM | WEIGHT: 222 LBS | BODY MASS INDEX: 33.65 KG/M2 | HEART RATE: 69 BPM | OXYGEN SATURATION: 91 % | HEIGHT: 68 IN

## 2025-03-31 DIAGNOSIS — J44.1 COPD EXACERBATION (HCC): Primary | ICD-10-CM

## 2025-03-31 LAB
ALBUMIN SERPL-MCNC: 3.9 G/DL (ref 3.5–5)
ALBUMIN/GLOB SERPL: 1.1 (ref 1.1–2.2)
ALP SERPL-CCNC: 100 U/L (ref 45–117)
ALT SERPL-CCNC: 23 U/L (ref 12–78)
ANION GAP SERPL CALC-SCNC: 9 MMOL/L (ref 2–12)
AST SERPL W P-5'-P-CCNC: 16 U/L (ref 15–37)
BASOPHILS # BLD: 0.04 K/UL (ref 0–0.1)
BASOPHILS NFR BLD: 0.8 % (ref 0–1)
BILIRUB SERPL-MCNC: 0.4 MG/DL (ref 0.2–1)
BNP SERPL-MCNC: 288 PG/ML
BUN SERPL-MCNC: 13 MG/DL (ref 6–20)
BUN/CREAT SERPL: 18 (ref 12–20)
CA-I BLD-MCNC: 9.3 MG/DL (ref 8.5–10.1)
CHLORIDE SERPL-SCNC: 107 MMOL/L (ref 97–108)
CO2 SERPL-SCNC: 24 MMOL/L (ref 21–32)
CREAT SERPL-MCNC: 0.71 MG/DL (ref 0.55–1.02)
DIFFERENTIAL METHOD BLD: NORMAL
EOSINOPHIL # BLD: 0.33 K/UL (ref 0–0.4)
EOSINOPHIL NFR BLD: 6.5 % (ref 0–7)
ERYTHROCYTE [DISTWIDTH] IN BLOOD BY AUTOMATED COUNT: 13 % (ref 11.5–14.5)
FLUAV RNA SPEC QL NAA+PROBE: NOT DETECTED
FLUBV RNA SPEC QL NAA+PROBE: NOT DETECTED
GLOBULIN SER CALC-MCNC: 3.5 G/DL (ref 2–4)
GLUCOSE SERPL-MCNC: 86 MG/DL (ref 65–100)
HCT VFR BLD AUTO: 39.3 % (ref 35–47)
HGB BLD-MCNC: 12.9 G/DL (ref 11.5–16)
IMM GRANULOCYTES # BLD AUTO: 0.02 K/UL (ref 0–0.04)
IMM GRANULOCYTES NFR BLD AUTO: 0.4 % (ref 0–0.5)
LACTATE BLD-SCNC: 0.85 MMOL/L (ref 0.4–2)
LIPASE SERPL-CCNC: 36 U/L (ref 13–75)
LYMPHOCYTES # BLD: 2.16 K/UL (ref 0.8–3.5)
LYMPHOCYTES NFR BLD: 42.8 % (ref 12–49)
MAGNESIUM SERPL-MCNC: 1.8 MG/DL (ref 1.6–2.4)
MCH RBC QN AUTO: 29.8 PG (ref 26–34)
MCHC RBC AUTO-ENTMCNC: 32.8 G/DL (ref 30–36.5)
MCV RBC AUTO: 90.8 FL (ref 80–99)
MONOCYTES # BLD: 0.33 K/UL (ref 0–1)
MONOCYTES NFR BLD: 6.5 % (ref 5–13)
NEUTS SEG # BLD: 2.17 K/UL (ref 1.8–8)
NEUTS SEG NFR BLD: 43 % (ref 32–75)
NRBC # BLD: 0 K/UL (ref 0–0.01)
NRBC BLD-RTO: 0 PER 100 WBC
PERFORMED BY:: NORMAL
PLATELET # BLD AUTO: 301 K/UL (ref 150–400)
PMV BLD AUTO: 10.4 FL (ref 8.9–12.9)
POTASSIUM SERPL-SCNC: 3.8 MMOL/L (ref 3.5–5.1)
PROCALCITONIN SERPL-MCNC: <0.05 NG/ML
PROT SERPL-MCNC: 7.4 G/DL (ref 6.4–8.2)
RBC # BLD AUTO: 4.33 M/UL (ref 3.8–5.2)
SARS-COV-2 RNA RESP QL NAA+PROBE: NOT DETECTED
SODIUM SERPL-SCNC: 140 MMOL/L (ref 136–145)
TROPONIN I SERPL HS-MCNC: <4 NG/L (ref 0–51)
WBC # BLD AUTO: 5.1 K/UL (ref 3.6–11)

## 2025-03-31 PROCEDURE — 96365 THER/PROPH/DIAG IV INF INIT: CPT

## 2025-03-31 PROCEDURE — 96375 TX/PRO/DX INJ NEW DRUG ADDON: CPT

## 2025-03-31 PROCEDURE — 2580000003 HC RX 258: Performed by: EMERGENCY MEDICINE

## 2025-03-31 PROCEDURE — 93005 ELECTROCARDIOGRAM TRACING: CPT | Performed by: EMERGENCY MEDICINE

## 2025-03-31 PROCEDURE — 2500000003 HC RX 250 WO HCPCS: Performed by: EMERGENCY MEDICINE

## 2025-03-31 PROCEDURE — 87636 SARSCOV2 & INF A&B AMP PRB: CPT

## 2025-03-31 PROCEDURE — 96366 THER/PROPH/DIAG IV INF ADDON: CPT

## 2025-03-31 PROCEDURE — 6360000002 HC RX W HCPCS: Performed by: EMERGENCY MEDICINE

## 2025-03-31 PROCEDURE — 36415 COLL VENOUS BLD VENIPUNCTURE: CPT

## 2025-03-31 PROCEDURE — 83605 ASSAY OF LACTIC ACID: CPT

## 2025-03-31 PROCEDURE — 83690 ASSAY OF LIPASE: CPT

## 2025-03-31 PROCEDURE — 80053 COMPREHEN METABOLIC PANEL: CPT

## 2025-03-31 PROCEDURE — 71275 CT ANGIOGRAPHY CHEST: CPT

## 2025-03-31 PROCEDURE — 71045 X-RAY EXAM CHEST 1 VIEW: CPT

## 2025-03-31 PROCEDURE — 83880 ASSAY OF NATRIURETIC PEPTIDE: CPT

## 2025-03-31 PROCEDURE — 84145 PROCALCITONIN (PCT): CPT

## 2025-03-31 PROCEDURE — 99285 EMERGENCY DEPT VISIT HI MDM: CPT

## 2025-03-31 PROCEDURE — 85025 COMPLETE CBC W/AUTO DIFF WBC: CPT

## 2025-03-31 PROCEDURE — 6370000000 HC RX 637 (ALT 250 FOR IP): Performed by: EMERGENCY MEDICINE

## 2025-03-31 PROCEDURE — 84484 ASSAY OF TROPONIN QUANT: CPT

## 2025-03-31 PROCEDURE — 87040 BLOOD CULTURE FOR BACTERIA: CPT

## 2025-03-31 PROCEDURE — 6360000004 HC RX CONTRAST MEDICATION: Performed by: EMERGENCY MEDICINE

## 2025-03-31 PROCEDURE — 74177 CT ABD & PELVIS W/CONTRAST: CPT

## 2025-03-31 PROCEDURE — 83735 ASSAY OF MAGNESIUM: CPT

## 2025-03-31 RX ORDER — IPRATROPIUM BROMIDE AND ALBUTEROL SULFATE 2.5; .5 MG/3ML; MG/3ML
1 SOLUTION RESPIRATORY (INHALATION)
Status: COMPLETED | OUTPATIENT
Start: 2025-03-31 | End: 2025-03-31

## 2025-03-31 RX ORDER — IOPAMIDOL 755 MG/ML
100 INJECTION, SOLUTION INTRAVASCULAR
Status: COMPLETED | OUTPATIENT
Start: 2025-03-31 | End: 2025-03-31

## 2025-03-31 RX ORDER — AZITHROMYCIN 250 MG/1
TABLET, FILM COATED ORAL
Qty: 1 PACKET | Refills: 0 | Status: SHIPPED | OUTPATIENT
Start: 2025-03-31

## 2025-03-31 RX ORDER — PREDNISONE 50 MG/1
50 TABLET ORAL DAILY
Qty: 5 TABLET | Refills: 0 | Status: SHIPPED | OUTPATIENT
Start: 2025-03-31 | End: 2025-04-05

## 2025-03-31 RX ORDER — 0.9 % SODIUM CHLORIDE 0.9 %
500 INTRAVENOUS SOLUTION INTRAVENOUS ONCE
Status: COMPLETED | OUTPATIENT
Start: 2025-03-31 | End: 2025-03-31

## 2025-03-31 RX ORDER — IPRATROPIUM BROMIDE AND ALBUTEROL SULFATE 2.5; .5 MG/3ML; MG/3ML
1 SOLUTION RESPIRATORY (INHALATION) EVERY 6 HOURS PRN
Qty: 60 ML | Refills: 0 | Status: SHIPPED | OUTPATIENT
Start: 2025-03-31 | End: 2025-04-05

## 2025-03-31 RX ADMIN — IPRATROPIUM BROMIDE AND ALBUTEROL SULFATE 1 DOSE: 2.5; .5 SOLUTION RESPIRATORY (INHALATION) at 12:44

## 2025-03-31 RX ADMIN — AZITHROMYCIN MONOHYDRATE 500 MG: 500 INJECTION, POWDER, LYOPHILIZED, FOR SOLUTION INTRAVENOUS at 12:32

## 2025-03-31 RX ADMIN — CEFTRIAXONE SODIUM 1000 MG: 1 INJECTION, POWDER, FOR SOLUTION INTRAMUSCULAR; INTRAVENOUS at 12:17

## 2025-03-31 RX ADMIN — IPRATROPIUM BROMIDE AND ALBUTEROL SULFATE 1 DOSE: 2.5; .5 SOLUTION RESPIRATORY (INHALATION) at 12:45

## 2025-03-31 RX ADMIN — IOPAMIDOL 100 ML: 755 INJECTION, SOLUTION INTRAVENOUS at 13:46

## 2025-03-31 RX ADMIN — SODIUM CHLORIDE 500 ML: 0.9 INJECTION, SOLUTION INTRAVENOUS at 12:35

## 2025-03-31 RX ADMIN — METHYLPREDNISOLONE SODIUM SUCCINATE 125 MG: 125 INJECTION INTRAMUSCULAR; INTRAVENOUS at 12:38

## 2025-03-31 ASSESSMENT — PAIN DESCRIPTION - LOCATION: LOCATION: ABDOMEN

## 2025-03-31 ASSESSMENT — PAIN - FUNCTIONAL ASSESSMENT: PAIN_FUNCTIONAL_ASSESSMENT: 0-10

## 2025-03-31 ASSESSMENT — PAIN SCALES - GENERAL: PAINLEVEL_OUTOF10: 10

## 2025-03-31 NOTE — ED PROVIDER NOTES
budesonide-formoterol (SYMBICORT) 160-4.5 MCG/ACT AERO Inhale 2 puffs into the lungs in the morning and 2 puffs in the evening. 10.2 g 3   • albuterol sulfate HFA (VENTOLIN HFA) 108 (90 Base) MCG/ACT inhaler Inhale 2 puffs into the lungs 4 times daily as needed for Wheezing 18 g 0   • levETIRAcetam (KEPPRA) 1000 MG tablet Take 1 tablet by mouth 2 times daily 60 tablet 3   • thiamine mononitrate (THIAMINE) 100 MG tablet Take 1 tablet by mouth daily 30 tablet 0   • potassium chloride (KLOR-CON) 10 MEQ extended release tablet Take 1 tablet by mouth daily     • QUEtiapine (SEROQUEL) 50 MG tablet Take 2 tablets by mouth nightly     • vitamin D (ERGOCALCIFEROL) 1.25 MG (75405 UT) CAPS capsule Take 1 capsule by mouth once a week     • pantoprazole (PROTONIX) 40 MG tablet Take 1 tablet by mouth daily     • FLUoxetine (PROZAC) 20 MG capsule Take 1 capsule by mouth daily Indications: depression     • loratadine (CLARITIN) 10 MG capsule Take 1 capsule by mouth daily     • traZODone (DESYREL) 50 MG tablet take 1 tablet by mouth AT NIGHT IF NEEDED FOR SLEEP         Social Determinants of Health:   Social Drivers of Health     Tobacco Use: Medium Risk (3/19/2025)    Received from [facility]    Patient History    • Smoking Tobacco Use: Former    • Smokeless Tobacco Use: Never    • Passive Exposure: Past   Alcohol Use: Not At Risk (3/31/2025)    AUDIT-C    • Frequency of Alcohol Consumption: Never    • Average Number of Drinks: Patient does not drink    • Frequency of Binge Drinking: Never   Recent Concern: Alcohol Use - Alcohol Misuse (2/15/2025)    AUDIT-C    • Frequency of Alcohol Consumption: 2-3 times a week    • Average Number of Drinks: 3 or 4    • Frequency of Binge Drinking: Less than monthly   Financial Resource Strain: Low Risk  (1/12/2023)    [facility]    Overall Financial Resource Strain (CARDIA)    • Difficulty of Paying Living Expenses: Not hard at all   Food Insecurity: No Food

## 2025-03-31 NOTE — DISCHARGE INSTRUCTIONS
Diffuse abdominal pain. COMPARISON: None. TECHNIQUE: Helical thin section chest CT following intravenous administration of nonionic contrast 100 mL of isovue 370 according to departmental PE protocol. Coronal and sagittal reformats were performed. 3D post processing was performed. Sequential axial images of the abdomen and pelvis were obtained following intravenous administration of 100 cc of contrast. Coronal and sagittal reformats were performed. CT dose reduction was achieved through the use of a standardized protocol tailored for this examination and automatic exposure control for dose modulation. FINDINGS: CHEST: This is a good quality study for the evaluation of pulmonary embolism to the first subsegmental arterial level. There is no pulmonary embolism to this level. MEDIASTINUM: No mass or lymphadenopathy. CUCA: No mass or lymphadenopathy. THORACIC AORTA: No aneurysm. HEART: Normal overall size but biatrial enlargement. ESOPHAGUS: The distal esophagus shows mild prominence of wall thickness, correlate for esophagitis. TRACHEA/BRONCHI: Patent. PLEURA: No effusion or pneumothorax. LUNGS: No nodule, mass, or airspace disease. ABDOMEN:. Liver parenchyma is homogeneous. The gallbladder is normally distended with no calculi. The common bile duct is not dilated. The pancreas and spleen are unremarkable. The adrenal glands are normal. The kidneys excrete contrast symmetrically bilaterally with no hydroureteronephrosis, calcification or mass. The stomach is unremarkable. Small bowel shows no dilatation or wall thickening. The colon is unremarkable for age. The appendix is normal the reproductive organs are unremarkable for age.. There is a tiny amount of free fluid in the pelvis. No upper abdominal ascites. No retroperitoneal lymphadenopathy. The abdominal wall is unremarkable for age. No acute bony abnormality.     1.CHEST: No CT evidence of pulmonary embolus at this time. Mild distal esophageal wall thickening,

## 2025-03-31 NOTE — ED TRIAGE NOTES
Pt is complaining of abdominal pain and bloody stoolx2. Pt states the blood she is seeing is bright red and is seeing \"white pieces that look like worms\"     Pt also states she feels SOB due to her COPD

## 2025-04-01 LAB
EKG ATRIAL RATE: 51 BPM
EKG DIAGNOSIS: NORMAL
EKG P AXIS: 59 DEGREES
EKG P-R INTERVAL: 144 MS
EKG Q-T INTERVAL: 518 MS
EKG QRS DURATION: 74 MS
EKG QTC CALCULATION (BAZETT): 477 MS
EKG R AXIS: 50 DEGREES
EKG T AXIS: 74 DEGREES
EKG VENTRICULAR RATE: 51 BPM

## 2025-04-06 LAB
BACTERIA SPEC CULT: NORMAL
BACTERIA SPEC CULT: NORMAL
Lab: NORMAL
Lab: NORMAL

## 2025-04-08 ENCOUNTER — HOSPITAL ENCOUNTER (EMERGENCY)
Facility: HOSPITAL | Age: 60
Discharge: HOME | End: 2025-04-08
Attending: EMERGENCY MEDICINE
Payer: MEDICARE

## 2025-04-08 ENCOUNTER — APPOINTMENT (OUTPATIENT)
Facility: HOSPITAL | Age: 60
End: 2025-04-08
Payer: MEDICARE

## 2025-04-08 VITALS
HEART RATE: 65 BPM | TEMPERATURE: 98.1 F | RESPIRATION RATE: 20 BRPM | BODY MASS INDEX: 33.65 KG/M2 | DIASTOLIC BLOOD PRESSURE: 79 MMHG | HEIGHT: 68 IN | SYSTOLIC BLOOD PRESSURE: 123 MMHG | WEIGHT: 222 LBS | OXYGEN SATURATION: 99 %

## 2025-04-08 DIAGNOSIS — J44.1 COPD EXACERBATION (HCC): Primary | ICD-10-CM

## 2025-04-08 LAB
ANION GAP SERPL CALC-SCNC: 6 MMOL/L (ref 2–12)
BASOPHILS # BLD: 0.04 K/UL (ref 0–0.1)
BASOPHILS NFR BLD: 0.7 % (ref 0–1)
BUN SERPL-MCNC: 6 MG/DL (ref 6–20)
BUN/CREAT SERPL: 8 (ref 12–20)
CA-I BLD-MCNC: 8.2 MG/DL (ref 8.5–10.1)
CHLORIDE SERPL-SCNC: 113 MMOL/L (ref 97–108)
CO2 SERPL-SCNC: 23 MMOL/L (ref 21–32)
CREAT SERPL-MCNC: 0.76 MG/DL (ref 0.55–1.02)
DIFFERENTIAL METHOD BLD: ABNORMAL
EOSINOPHIL # BLD: 0.46 K/UL (ref 0–0.4)
EOSINOPHIL NFR BLD: 7.9 % (ref 0–7)
ERYTHROCYTE [DISTWIDTH] IN BLOOD BY AUTOMATED COUNT: 13.1 % (ref 11.5–14.5)
FLUAV RNA SPEC QL NAA+PROBE: NOT DETECTED
FLUBV RNA SPEC QL NAA+PROBE: NOT DETECTED
GLUCOSE SERPL-MCNC: 126 MG/DL (ref 65–100)
HCT VFR BLD AUTO: 34.7 % (ref 35–47)
HGB BLD-MCNC: 11.2 G/DL (ref 11.5–16)
IMM GRANULOCYTES # BLD AUTO: 0.01 K/UL (ref 0–0.04)
IMM GRANULOCYTES NFR BLD AUTO: 0.2 % (ref 0–0.5)
LYMPHOCYTES # BLD: 2.29 K/UL (ref 0.8–3.5)
LYMPHOCYTES NFR BLD: 39.4 % (ref 12–49)
MAGNESIUM SERPL-MCNC: NORMAL MG/DL (ref 1.6–2.4)
MCH RBC QN AUTO: 29.8 PG (ref 26–34)
MCHC RBC AUTO-ENTMCNC: 32.3 G/DL (ref 30–36.5)
MCV RBC AUTO: 92.3 FL (ref 80–99)
MONOCYTES # BLD: 0.29 K/UL (ref 0–1)
MONOCYTES NFR BLD: 5 % (ref 5–13)
NEUTS SEG # BLD: 2.72 K/UL (ref 1.8–8)
NEUTS SEG NFR BLD: 46.8 % (ref 32–75)
NRBC # BLD: 0 K/UL (ref 0–0.01)
NRBC BLD-RTO: 0 PER 100 WBC
PLATELET # BLD AUTO: 238 K/UL (ref 150–400)
PMV BLD AUTO: 11 FL (ref 8.9–12.9)
POTASSIUM SERPL-SCNC: ABNORMAL MMOL/L (ref 3.5–5.1)
RBC # BLD AUTO: 3.76 M/UL (ref 3.8–5.2)
SARS-COV-2 RNA RESP QL NAA+PROBE: NOT DETECTED
SODIUM SERPL-SCNC: 142 MMOL/L (ref 136–145)
TROPONIN I SERPL HS-MCNC: 5 NG/L (ref 0–51)
WBC # BLD AUTO: 5.8 K/UL (ref 3.6–11)

## 2025-04-08 PROCEDURE — 84484 ASSAY OF TROPONIN QUANT: CPT

## 2025-04-08 PROCEDURE — 71045 X-RAY EXAM CHEST 1 VIEW: CPT

## 2025-04-08 PROCEDURE — 6360000002 HC RX W HCPCS: Performed by: EMERGENCY MEDICINE

## 2025-04-08 PROCEDURE — 85025 COMPLETE CBC W/AUTO DIFF WBC: CPT

## 2025-04-08 PROCEDURE — 99285 EMERGENCY DEPT VISIT HI MDM: CPT

## 2025-04-08 PROCEDURE — 87636 SARSCOV2 & INF A&B AMP PRB: CPT

## 2025-04-08 PROCEDURE — 96374 THER/PROPH/DIAG INJ IV PUSH: CPT

## 2025-04-08 PROCEDURE — 83735 ASSAY OF MAGNESIUM: CPT

## 2025-04-08 PROCEDURE — 80048 BASIC METABOLIC PNL TOTAL CA: CPT

## 2025-04-08 PROCEDURE — 93005 ELECTROCARDIOGRAM TRACING: CPT

## 2025-04-08 RX ORDER — ALBUTEROL SULFATE 0.83 MG/ML
2.5 SOLUTION RESPIRATORY (INHALATION) EVERY 6 HOURS PRN
Qty: 120 EACH | Refills: 0 | Status: SHIPPED | OUTPATIENT
Start: 2025-04-08

## 2025-04-08 RX ORDER — KETOROLAC TROMETHAMINE 10 MG/1
10 TABLET, FILM COATED ORAL EVERY 6 HOURS PRN
Qty: 20 TABLET | Refills: 0 | Status: SHIPPED | OUTPATIENT
Start: 2025-04-08

## 2025-04-08 RX ORDER — KETOROLAC TROMETHAMINE 15 MG/ML
15 INJECTION, SOLUTION INTRAMUSCULAR; INTRAVENOUS
Status: COMPLETED | OUTPATIENT
Start: 2025-04-08 | End: 2025-04-08

## 2025-04-08 RX ORDER — PREDNISONE 20 MG/1
60 TABLET ORAL DAILY
Qty: 15 TABLET | Refills: 0 | Status: SHIPPED | OUTPATIENT
Start: 2025-04-08 | End: 2025-04-13

## 2025-04-08 RX ADMIN — KETOROLAC TROMETHAMINE 15 MG: 15 INJECTION, SOLUTION INTRAMUSCULAR; INTRAVENOUS at 20:23

## 2025-04-08 ASSESSMENT — PAIN - FUNCTIONAL ASSESSMENT: PAIN_FUNCTIONAL_ASSESSMENT: NONE - DENIES PAIN

## 2025-04-08 NOTE — ED PROVIDER NOTES
[facility] EMERGENCY DEPT  EMERGENCY DEPARTMENT HISTORY AND PHYSICAL EXAM      Date of evaluation: 4/8/2025  Patient Name: María Amos  Birthdate 1965  MRN: [id number]  ED Provider: Alexis Gordon MD   Note Started: 5:10 PM EDT 4/8/25    HISTORY OF PRESENT ILLNESS     Chief Complaint   Patient presents with    Shortness of Breath       History Provided By: Patient, EMS     HPI: María Amos is a 60 y.o. female presents for evaluation of shortness of breath worse in the last 2 days.  Patient reports associated wheezing.  EMS states they gave a DuoNeb and route and then another 1.  Patient reports overall feeling bad.  Denies fevers or chills.    PAST MEDICAL HISTORY   Past Medical History:  Past Medical History:   Diagnosis Date    Alcohol abuse     Anxiety attack     Cocaine abuse (HCC)     COVID-19     Depression     Diabetes mellitus (HCC)     Fracture of left wrist     Hemorrhoids     HTN (hypertension)     Ill-defined condition     hemorrhoids    Orthostatic hypotension     Perineal rash     Rectal bleeding     Rib contusion, left, initial encounter     Seizures (HCC)     Stroke (HCC)     Syncope     Toe fracture     great toe    Trichomonas infection     UTI (urinary tract infection)        Past Surgical History:  No past surgical history on file.    Family History:  No family history on file.    Social History:  Social History     Tobacco Use    Smoking status: Former     Current packs/day: 0.50     Types: Cigarettes    Smokeless tobacco: Former   Substance Use Topics    Alcohol use: Yes     Comment: 12 pack per day    Drug use: Not Currently     Types: Marijuana (Weed), Cocaine     Comment: socially       Allergies:  Allergies   Allergen Reactions    Peanut Oil Anaphylaxis     Per  Melecio Toth    Bee Venom Swelling    Peanut Butter Flavoring Agent (Non-Screening)        PCP: Patricia Wood NP-C    Current Meds:   No current facility-administered medications for this encounter.

## 2025-04-08 NOTE — ED TRIAGE NOTES
Pt arrives via EMS from home with a c/o shortness of breath and wheezing that started 2 days ago with worsening today.  EMS gave 1 Duo and 1 albuterol en route with improvement upon arrival   HX of asthma and COPD

## 2025-04-09 NOTE — DISCHARGE INSTRUCTIONS
Thank you for choosing our Emergency Department for your care.  It is our privilege to care for you in your time of need.  In the next several days, you may receive a survey via email or mailed to your home about your experience with our team.  We would greatly appreciate you taking a few minutes to complete the survey, as we use this information to learn what we have done well and what we could be doing better. Thank you for trusting us with your care!    Below you will find a list of your tests from today's visit.   Labs and Radiology Studies  Recent Results (from the past 12 hours)   COVID-19 & Influenza Combo    Collection Time: 04/08/25  6:02 PM    Specimen: Nasopharyngeal   Result Value Ref Range    SARS-CoV-2, PCR Not Detected Not Detected      Rapid Influenza A By PCR Not Detected Not Detected      Rapid Influenza B By PCR Not Detected Not Detected     CBC with Auto Differential    Collection Time: 04/08/25  6:02 PM   Result Value Ref Range    WBC 5.8 3.6 - 11.0 K/uL    RBC 3.76 (L) 3.80 - 5.20 M/uL    Hemoglobin 11.2 (L) 11.5 - 16.0 g/dL    Hematocrit 34.7 (L) 35.0 - 47.0 %    MCV 92.3 80.0 - 99.0 FL    MCH 29.8 26.0 - 34.0 PG    MCHC 32.3 30.0 - 36.5 g/dL    RDW 13.1 11.5 - 14.5 %    Platelets 238 150 - 400 K/uL    MPV 11.0 8.9 - 12.9 FL    Nucleated RBCs 0.0 0.0  WBC    nRBC 0.00 0.00 - 0.01 K/uL    Neutrophils % 46.8 32.0 - 75.0 %    Lymphocytes % 39.4 12.0 - 49.0 %    Monocytes % 5.0 5.0 - 13.0 %    Eosinophils % 7.9 (H) 0.0 - 7.0 %    Basophils % 0.7 0.0 - 1.0 %    Immature Granulocytes % 0.2 0 - 0.5 %    Neutrophils Absolute 2.72 1.80 - 8.00 K/UL    Lymphocytes Absolute 2.29 0.80 - 3.50 K/UL    Monocytes Absolute 0.29 0.00 - 1.00 K/UL    Eosinophils Absolute 0.46 (H) 0.00 - 0.40 K/UL    Basophils Absolute 0.04 0.00 - 0.10 K/UL    Immature Granulocytes Absolute 0.01 0.00 - 0.04 K/UL    Differential Type AUTOMATED     BMP    Collection Time: 04/08/25  6:02 PM   Result Value Ref Range

## 2025-04-15 ENCOUNTER — APPOINTMENT (OUTPATIENT)
Facility: HOSPITAL | Age: 60
End: 2025-04-15
Payer: MEDICARE

## 2025-04-15 ENCOUNTER — HOSPITAL ENCOUNTER (EMERGENCY)
Facility: HOSPITAL | Age: 60
Discharge: HOME | End: 2025-04-15
Payer: MEDICARE

## 2025-04-15 VITALS
OXYGEN SATURATION: 97 % | HEART RATE: 66 BPM | HEIGHT: 68 IN | RESPIRATION RATE: 13 BRPM | SYSTOLIC BLOOD PRESSURE: 96 MMHG | BODY MASS INDEX: 33.65 KG/M2 | WEIGHT: 222 LBS | TEMPERATURE: 98.2 F | DIASTOLIC BLOOD PRESSURE: 54 MMHG

## 2025-04-15 DIAGNOSIS — G89.29 CHRONIC LOW BACK PAIN WITHOUT SCIATICA, UNSPECIFIED BACK PAIN LATERALITY: Primary | ICD-10-CM

## 2025-04-15 DIAGNOSIS — M54.50 CHRONIC LOW BACK PAIN WITHOUT SCIATICA, UNSPECIFIED BACK PAIN LATERALITY: Primary | ICD-10-CM

## 2025-04-15 LAB
ALBUMIN SERPL-MCNC: 3.8 G/DL (ref 3.5–5)
ALBUMIN/GLOB SERPL: 1 (ref 1.1–2.2)
ALP SERPL-CCNC: 102 U/L (ref 45–117)
ALT SERPL-CCNC: 23 U/L (ref 12–78)
ANION GAP SERPL CALC-SCNC: 7 MMOL/L (ref 2–12)
AST SERPL W P-5'-P-CCNC: 18 U/L (ref 15–37)
BASOPHILS # BLD: 0.09 K/UL (ref 0–0.1)
BASOPHILS NFR BLD: 1.2 % (ref 0–1)
BILIRUB SERPL-MCNC: 0.3 MG/DL (ref 0.2–1)
BUN SERPL-MCNC: 8 MG/DL (ref 6–20)
BUN/CREAT SERPL: 9 (ref 12–20)
CA-I BLD-MCNC: 8.9 MG/DL (ref 8.5–10.1)
CHLORIDE SERPL-SCNC: 108 MMOL/L (ref 97–108)
CO2 SERPL-SCNC: 25 MMOL/L (ref 21–32)
CREAT SERPL-MCNC: 0.88 MG/DL (ref 0.55–1.02)
DIFFERENTIAL METHOD BLD: ABNORMAL
EOSINOPHIL # BLD: 1.06 K/UL (ref 0–0.4)
EOSINOPHIL NFR BLD: 13.8 % (ref 0–7)
ERYTHROCYTE [DISTWIDTH] IN BLOOD BY AUTOMATED COUNT: 12.8 % (ref 11.5–14.5)
FLUAV RNA SPEC QL NAA+PROBE: NOT DETECTED
FLUBV RNA SPEC QL NAA+PROBE: NOT DETECTED
GLOBULIN SER CALC-MCNC: 3.9 G/DL (ref 2–4)
GLUCOSE SERPL-MCNC: 87 MG/DL (ref 65–100)
HCT VFR BLD AUTO: 39 % (ref 35–47)
HGB BLD-MCNC: 13.2 G/DL (ref 11.5–16)
IMM GRANULOCYTES # BLD AUTO: 0.03 K/UL (ref 0–0.04)
IMM GRANULOCYTES NFR BLD AUTO: 0.4 % (ref 0–0.5)
LIPASE SERPL-CCNC: 34 U/L (ref 13–75)
LYMPHOCYTES # BLD: 2.88 K/UL (ref 0.8–3.5)
LYMPHOCYTES NFR BLD: 37.4 % (ref 12–49)
MCH RBC QN AUTO: 29.7 PG (ref 26–34)
MCHC RBC AUTO-ENTMCNC: 33.8 G/DL (ref 30–36.5)
MCV RBC AUTO: 87.8 FL (ref 80–99)
MONOCYTES # BLD: 0.59 K/UL (ref 0–1)
MONOCYTES NFR BLD: 7.7 % (ref 5–13)
NEUTS SEG # BLD: 3.05 K/UL (ref 1.8–8)
NEUTS SEG NFR BLD: 39.5 % (ref 32–75)
NRBC # BLD: 0 K/UL (ref 0–0.01)
NRBC BLD-RTO: 0 PER 100 WBC
PLATELET # BLD AUTO: 207 K/UL (ref 150–400)
PMV BLD AUTO: 11.8 FL (ref 8.9–12.9)
POTASSIUM SERPL-SCNC: 3.4 MMOL/L (ref 3.5–5.1)
PROT SERPL-MCNC: 7.7 G/DL (ref 6.4–8.2)
RBC # BLD AUTO: 4.44 M/UL (ref 3.8–5.2)
RBC MORPH BLD: ABNORMAL
SARS-COV-2 RNA RESP QL NAA+PROBE: NOT DETECTED
SODIUM SERPL-SCNC: 140 MMOL/L (ref 136–145)
WBC # BLD AUTO: 7.7 K/UL (ref 3.6–11)

## 2025-04-15 PROCEDURE — 96374 THER/PROPH/DIAG INJ IV PUSH: CPT

## 2025-04-15 PROCEDURE — 36415 COLL VENOUS BLD VENIPUNCTURE: CPT

## 2025-04-15 PROCEDURE — 99285 EMERGENCY DEPT VISIT HI MDM: CPT

## 2025-04-15 PROCEDURE — 80053 COMPREHEN METABOLIC PANEL: CPT

## 2025-04-15 PROCEDURE — 87636 SARSCOV2 & INF A&B AMP PRB: CPT

## 2025-04-15 PROCEDURE — 85025 COMPLETE CBC W/AUTO DIFF WBC: CPT

## 2025-04-15 PROCEDURE — 6360000002 HC RX W HCPCS

## 2025-04-15 PROCEDURE — 6370000000 HC RX 637 (ALT 250 FOR IP)

## 2025-04-15 PROCEDURE — 71045 X-RAY EXAM CHEST 1 VIEW: CPT

## 2025-04-15 PROCEDURE — 83690 ASSAY OF LIPASE: CPT

## 2025-04-15 RX ORDER — METHOCARBAMOL 750 MG/1
1500 TABLET, FILM COATED ORAL ONCE
Status: COMPLETED | OUTPATIENT
Start: 2025-04-15 | End: 2025-04-15

## 2025-04-15 RX ORDER — KETOROLAC TROMETHAMINE 15 MG/ML
15 INJECTION, SOLUTION INTRAMUSCULAR; INTRAVENOUS ONCE
Status: COMPLETED | OUTPATIENT
Start: 2025-04-15 | End: 2025-04-15

## 2025-04-15 RX ORDER — METHOCARBAMOL 1000 MG/1
1000 TABLET, FILM COATED ORAL 3 TIMES DAILY
Qty: 30 TABLET | Refills: 0 | Status: SHIPPED | OUTPATIENT
Start: 2025-04-15 | End: 2025-04-25

## 2025-04-15 RX ORDER — KETOROLAC TROMETHAMINE 10 MG/1
10 TABLET, FILM COATED ORAL EVERY 6 HOURS PRN
Qty: 20 TABLET | Refills: 0 | Status: SHIPPED | OUTPATIENT
Start: 2025-04-15

## 2025-04-15 RX ADMIN — METHOCARBAMOL 1500 MG: 750 TABLET ORAL at 19:47

## 2025-04-15 RX ADMIN — KETOROLAC TROMETHAMINE 15 MG: 15 INJECTION, SOLUTION INTRAMUSCULAR; INTRAVENOUS at 19:49

## 2025-04-15 ASSESSMENT — PAIN SCALES - GENERAL
PAINLEVEL_OUTOF10: 10
PAINLEVEL_OUTOF10: 0

## 2025-04-15 ASSESSMENT — PAIN DESCRIPTION - LOCATION
LOCATION: BACK

## 2025-04-15 ASSESSMENT — PAIN - FUNCTIONAL ASSESSMENT: PAIN_FUNCTIONAL_ASSESSMENT: 0-10

## 2025-04-15 NOTE — ED TRIAGE NOTES
C/o cough, back pain, and generalized all over pain.     Believes if she continues to cough this way, it will bring on a seizure

## 2025-04-16 NOTE — DISCHARGE INSTRUCTIONS
Thank you for choosing our Emergency Department for your care.  It is our privilege to care for you in your time of need.  In the next several days, you may receive a survey via email or mailed to your home about your experience with our team.  We would greatly appreciate you taking a few minutes to complete the survey, as we use this information to learn what we have done well and what we could be doing better. Thank you for trusting us with your care!    Below you will find a list of your tests from today's visit.   Labs and Radiology Studies  Recent Results (from the past 12 hours)   CBC with Auto Differential    Collection Time: 04/15/25  7:35 PM   Result Value Ref Range    WBC 7.7 3.6 - 11.0 K/uL    RBC 4.44 3.80 - 5.20 M/uL    Hemoglobin 13.2 11.5 - 16.0 g/dL    Hematocrit 39.0 35.0 - 47.0 %    MCV 87.8 80.0 - 99.0 FL    MCH 29.7 26.0 - 34.0 PG    MCHC 33.8 30.0 - 36.5 g/dL    RDW 12.8 11.5 - 14.5 %    Platelets 207 150 - 400 K/uL    MPV 11.8 8.9 - 12.9 FL    Nucleated RBCs 0.0 0.0  WBC    nRBC 0.00 0.00 - 0.01 K/uL    Neutrophils % 39.5 32.0 - 75.0 %    Lymphocytes % 37.4 12.0 - 49.0 %    Monocytes % 7.7 5.0 - 13.0 %    Eosinophils % 13.8 (H) 0.0 - 7.0 %    Basophils % 1.2 (H) 0.0 - 1.0 %    Immature Granulocytes % 0.4 0 - 0.5 %    Neutrophils Absolute 3.05 1.80 - 8.00 K/UL    Lymphocytes Absolute 2.88 0.80 - 3.50 K/UL    Monocytes Absolute 0.59 0.00 - 1.00 K/UL    Eosinophils Absolute 1.06 (H) 0.00 - 0.40 K/UL    Basophils Absolute 0.09 0.00 - 0.10 K/UL    Immature Granulocytes Absolute 0.03 0.00 - 0.04 K/UL    Differential Type Smear Scanned      RBC Comment Normocytic, Normochromic     Comprehensive Metabolic Panel    Collection Time: 04/15/25  7:35 PM   Result Value Ref Range    Sodium 140 136 - 145 mmol/L    Potassium 3.4 (L) 3.5 - 5.1 mmol/L    Chloride 108 97 - 108 mmol/L    CO2 25 21 - 32 mmol/L    Anion Gap 7 2 - 12 mmol/L    Glucose 87 65 - 100 mg/dL    BUN 8 6 - 20 mg/dL    Creatinine

## 2025-04-16 NOTE — ED PROVIDER NOTES
[facility] EMERGENCY DEPT  EMERGENCY DEPARTMENT HISTORY AND PHYSICAL EXAM      Date of evaluation: 4/15/2025  Patient Name: María Amos  Birthdate 1965  MRN: [id number]  ED Provider: Kathy Storm MD   Note Started: 1:40 PM EDT 4/16/25    HISTORY OF PRESENT ILLNESS     Chief Complaint   Patient presents with   • Cough       History Provided By: Patient     HPI: María Amos is a 60 y.o. female who presents with low back pain x 1 month.  Patient states that she has had a cough and some bodyaches for the past week, however at this time she is mainly concerned about lower back pain that she says has been present for at least a month, possibly longer.  She states that she has been to the ER several times for this and has had extensive testing done which has been negative, however she states that she is still worried there is \"something else going on.\"  She denies any fevers, vomiting, diarrhea, dysuria, hematuria.  She has a history of a prior stroke with residual left-sided deficits, these are unchanged.    PAST MEDICAL HISTORY   Past Medical History:  Past Medical History:   Diagnosis Date   • Alcohol abuse    • Anxiety attack    • Cocaine abuse    • COVID-19    • Depression    • Diabetes mellitus (HCC)    • Fracture of left wrist    • Hemorrhoids    • HTN (hypertension)    • Ill-defined condition     hemorrhoids   • Orthostatic hypotension    • Perineal rash    • Rectal bleeding    • Rib contusion, left, initial encounter    • Seizures (HCC)    • Stroke (HCC)    • Syncope    • Toe fracture     great toe   • Trichomonas infection    • UTI (urinary tract infection)        Past Surgical History:  History reviewed. No pertinent surgical history.    Family History:  History reviewed. No pertinent family history.    Social History:  Social History     Tobacco Use   • Smoking status: Former     Current packs/day: 0.50     Types: Cigarettes   • Smokeless tobacco: Former   Substance Use Topics   • Alcohol use: Yes

## 2025-04-17 NOTE — ED NOTES
Patient begins to shake like she did just before first seizure occurred, 1mg Ativan administered per Dr. Thompson Necessary verbal order. Summary:  Dyspnea on exertion,Acute CHF, [place] pleural effusion  DATE & TIME: 4/17/25 6984-0440                   Cognitive Concerns/ Orientation : Alert and Oriented x4, pleasant   BEHAVIOR & AGGRESSION TOOL COLOR: Green  ABNL VS/O2: Vss on RA except running soft BP's  MOBILITY: Assist x2 with gait belt walker, up in the chair this shift  PAIN MANAGMENT: Denies  DIET: Regular  BOWEL/BLADDER: Purewick in place  ABNL LAB/BG: INR 2.42, BUN 36.4, Creat 1.66  DRAIN/DEVICES: L PIV SL  TELEMETRY RHYTHM: NSR  SKIN: Abd. folds and under L breast rash/redness, scattered scabs/bruising, calf folds - on scheduled miconazole  TESTS/PROCEDURES: Echo- completed , Thoracentesis completed today 1.1 L removed  D/C DAY/GOALS/PLACE: Pending  OTHER IMPORTANT INFO: On lasix daily, on scheduled Robitussin

## 2025-05-24 ENCOUNTER — HOSPITAL ENCOUNTER (EMERGENCY)
Facility: HOSPITAL | Age: 60
Discharge: HOME | End: 2025-05-24
Payer: MEDICARE

## 2025-05-24 ENCOUNTER — APPOINTMENT (OUTPATIENT)
Facility: HOSPITAL | Age: 60
End: 2025-05-24
Payer: MEDICARE

## 2025-05-24 VITALS
WEIGHT: 220 LBS | HEIGHT: 68 IN | OXYGEN SATURATION: 99 % | SYSTOLIC BLOOD PRESSURE: 142 MMHG | RESPIRATION RATE: 24 BRPM | TEMPERATURE: 97.6 F | HEART RATE: 60 BPM | BODY MASS INDEX: 33.34 KG/M2 | DIASTOLIC BLOOD PRESSURE: 73 MMHG

## 2025-05-24 DIAGNOSIS — R07.9 CHEST PAIN, UNSPECIFIED TYPE: Primary | ICD-10-CM

## 2025-05-24 DIAGNOSIS — G89.29 CHRONIC LOW BACK PAIN, UNSPECIFIED BACK PAIN LATERALITY, UNSPECIFIED WHETHER SCIATICA PRESENT: ICD-10-CM

## 2025-05-24 DIAGNOSIS — M54.50 CHRONIC LOW BACK PAIN, UNSPECIFIED BACK PAIN LATERALITY, UNSPECIFIED WHETHER SCIATICA PRESENT: ICD-10-CM

## 2025-05-24 LAB
ALBUMIN SERPL-MCNC: 2.5 G/DL (ref 3.5–5)
ALBUMIN/GLOB SERPL: 1.1 (ref 1.1–2.2)
ALP SERPL-CCNC: 61 U/L (ref 45–117)
ALT SERPL-CCNC: 16 U/L (ref 12–78)
ANION GAP SERPL CALC-SCNC: 7 MMOL/L (ref 2–12)
AST SERPL W P-5'-P-CCNC: 10 U/L (ref 15–37)
BASOPHILS # BLD: 0.04 K/UL (ref 0–0.1)
BASOPHILS NFR BLD: 0.8 % (ref 0–1)
BILIRUB SERPL-MCNC: 0.2 MG/DL (ref 0.2–1)
BUN SERPL-MCNC: 12 MG/DL (ref 6–20)
BUN/CREAT SERPL: 17 (ref 12–20)
CA-I BLD-MCNC: 6.7 MG/DL (ref 8.5–10.1)
CHLORIDE SERPL-SCNC: 119 MMOL/L (ref 97–108)
CO2 SERPL-SCNC: 22 MMOL/L (ref 21–32)
CREAT SERPL-MCNC: 0.7 MG/DL (ref 0.55–1.02)
DIFFERENTIAL METHOD BLD: ABNORMAL
EOSINOPHIL # BLD: 0.32 K/UL (ref 0–0.4)
EOSINOPHIL NFR BLD: 6.3 % (ref 0–7)
ERYTHROCYTE [DISTWIDTH] IN BLOOD BY AUTOMATED COUNT: 13.6 % (ref 11.5–14.5)
GLOBULIN SER CALC-MCNC: 2.3 G/DL (ref 2–4)
GLUCOSE SERPL-MCNC: 84 MG/DL (ref 65–100)
HCT VFR BLD AUTO: 34.3 % (ref 35–47)
HGB BLD-MCNC: 11.4 G/DL (ref 11.5–16)
IMM GRANULOCYTES # BLD AUTO: 0.02 K/UL (ref 0–0.04)
IMM GRANULOCYTES NFR BLD AUTO: 0.4 % (ref 0–0.5)
LYMPHOCYTES # BLD: 2.41 K/UL (ref 0.8–3.5)
LYMPHOCYTES NFR BLD: 47.1 % (ref 12–49)
MCH RBC QN AUTO: 29.1 PG (ref 26–34)
MCHC RBC AUTO-ENTMCNC: 33.2 G/DL (ref 30–36.5)
MCV RBC AUTO: 87.5 FL (ref 80–99)
MONOCYTES # BLD: 0.24 K/UL (ref 0–1)
MONOCYTES NFR BLD: 4.7 % (ref 5–13)
NEUTS SEG # BLD: 2.09 K/UL (ref 1.8–8)
NEUTS SEG NFR BLD: 40.7 % (ref 32–75)
NRBC # BLD: 0 K/UL (ref 0–0.01)
NRBC BLD-RTO: 0 PER 100 WBC
PLATELET # BLD AUTO: 246 K/UL (ref 150–400)
PMV BLD AUTO: 9.3 FL (ref 8.9–12.9)
POTASSIUM SERPL-SCNC: 3 MMOL/L (ref 3.5–5.1)
PROT SERPL-MCNC: 4.8 G/DL (ref 6.4–8.2)
RBC # BLD AUTO: 3.92 M/UL (ref 3.8–5.2)
SODIUM SERPL-SCNC: 148 MMOL/L (ref 136–145)
TROPONIN I SERPL HS-MCNC: <4 NG/L (ref 0–51)
TROPONIN I SERPL HS-MCNC: <4 NG/L (ref 0–51)
WBC # BLD AUTO: 5.1 K/UL (ref 3.6–11)

## 2025-05-24 PROCEDURE — 93005 ELECTROCARDIOGRAM TRACING: CPT

## 2025-05-24 PROCEDURE — 80053 COMPREHEN METABOLIC PANEL: CPT

## 2025-05-24 PROCEDURE — 96374 THER/PROPH/DIAG INJ IV PUSH: CPT

## 2025-05-24 PROCEDURE — 6360000002 HC RX W HCPCS

## 2025-05-24 PROCEDURE — 71045 X-RAY EXAM CHEST 1 VIEW: CPT

## 2025-05-24 PROCEDURE — 84484 ASSAY OF TROPONIN QUANT: CPT

## 2025-05-24 PROCEDURE — 85025 COMPLETE CBC W/AUTO DIFF WBC: CPT

## 2025-05-24 PROCEDURE — 36415 COLL VENOUS BLD VENIPUNCTURE: CPT

## 2025-05-24 PROCEDURE — 6370000000 HC RX 637 (ALT 250 FOR IP)

## 2025-05-24 PROCEDURE — 99285 EMERGENCY DEPT VISIT HI MDM: CPT

## 2025-05-24 RX ORDER — POTASSIUM CHLORIDE 750 MG/1
40 TABLET, EXTENDED RELEASE ORAL ONCE
Status: COMPLETED | OUTPATIENT
Start: 2025-05-24 | End: 2025-05-24

## 2025-05-24 RX ORDER — KETOROLAC TROMETHAMINE 10 MG/1
10 TABLET, FILM COATED ORAL EVERY 8 HOURS PRN
Qty: 20 TABLET | Refills: 0 | Status: SHIPPED | OUTPATIENT
Start: 2025-05-24

## 2025-05-24 RX ORDER — LIDOCAINE 50 MG/G
1 PATCH TOPICAL DAILY
Qty: 10 PATCH | Refills: 0 | Status: SHIPPED | OUTPATIENT
Start: 2025-05-24 | End: 2025-06-03

## 2025-05-24 RX ORDER — KETOROLAC TROMETHAMINE 15 MG/ML
15 INJECTION, SOLUTION INTRAMUSCULAR; INTRAVENOUS
Status: COMPLETED | OUTPATIENT
Start: 2025-05-24 | End: 2025-05-24

## 2025-05-24 RX ADMIN — POTASSIUM CHLORIDE 40 MEQ: 750 TABLET, FILM COATED, EXTENDED RELEASE ORAL at 22:17

## 2025-05-24 RX ADMIN — KETOROLAC TROMETHAMINE 15 MG: 15 INJECTION, SOLUTION INTRAMUSCULAR; INTRAVENOUS at 19:27

## 2025-05-24 ASSESSMENT — PAIN DESCRIPTION - ORIENTATION
ORIENTATION: LOWER
ORIENTATION: LOWER

## 2025-05-24 ASSESSMENT — PAIN DESCRIPTION - LOCATION
LOCATION: BACK
LOCATION: BACK

## 2025-05-24 ASSESSMENT — PAIN SCALES - GENERAL
PAINLEVEL_OUTOF10: 5
PAINLEVEL_OUTOF10: 10

## 2025-05-24 ASSESSMENT — PAIN - FUNCTIONAL ASSESSMENT: PAIN_FUNCTIONAL_ASSESSMENT: NONE - DENIES PAIN

## 2025-05-24 ASSESSMENT — HEART SCORE: ECG: NON-SPECIFC REPOLARIZATION DISTURBANCE/LBTB/PM

## 2025-05-24 NOTE — ED NOTES
Bedside and Verbal shift change report received from Ann (offgoing nurse) to Misael  (oncoming nurse) . Report included the following information ED Encounter Summary, ED SBAR, MAR, and Recent Results.

## 2025-05-25 LAB
EKG ATRIAL RATE: 52 BPM
EKG DIAGNOSIS: NORMAL
EKG P AXIS: 51 DEGREES
EKG P-R INTERVAL: 134 MS
EKG Q-T INTERVAL: 488 MS
EKG QRS DURATION: 82 MS
EKG QTC CALCULATION (BAZETT): 453 MS
EKG R AXIS: 39 DEGREES
EKG T AXIS: 58 DEGREES
EKG VENTRICULAR RATE: 52 BPM

## 2025-05-25 PROCEDURE — 93010 ELECTROCARDIOGRAM REPORT: CPT | Performed by: SPECIALIST

## 2025-05-25 NOTE — ED NOTES
Significant other expressed concerns with reoccurring visits with today's presenting s/s. Provider spoke to significant other at this time in regards to treatment plan and outpatient follow up.

## 2025-05-25 NOTE — DISCHARGE INSTRUCTIONS
Thank you for choosing our Emergency Department for your care.  It is our privilege to care for you in your time of need.  In the next several days, you may receive a survey via email or mailed to your home about your experience with our team.  We would greatly appreciate you taking a few minutes to complete the survey, as we use this information to learn what we have done well and what we could be doing better. Thank you for trusting us with your care!    Below you will find a list of your tests from today's visit.   Labs and Radiology Studies  Recent Results (from the past 12 hours)   CBC with Auto Differential    Collection Time: 05/24/25  5:41 PM   Result Value Ref Range    WBC 5.1 3.6 - 11.0 K/uL    RBC 3.92 3.80 - 5.20 M/uL    Hemoglobin 11.4 (L) 11.5 - 16.0 g/dL    Hematocrit 34.3 (L) 35.0 - 47.0 %    MCV 87.5 80.0 - 99.0 FL    MCH 29.1 26.0 - 34.0 PG    MCHC 33.2 30.0 - 36.5 g/dL    RDW 13.6 11.5 - 14.5 %    Platelets 246 150 - 400 K/uL    MPV 9.3 8.9 - 12.9 FL    Nucleated RBCs 0.0 0.0  WBC    nRBC 0.00 0.00 - 0.01 K/uL    Neutrophils % 40.7 32.0 - 75.0 %    Lymphocytes % 47.1 12.0 - 49.0 %    Monocytes % 4.7 (L) 5.0 - 13.0 %    Eosinophils % 6.3 0.0 - 7.0 %    Basophils % 0.8 0.0 - 1.0 %    Immature Granulocytes % 0.4 0 - 0.5 %    Neutrophils Absolute 2.09 1.80 - 8.00 K/UL    Lymphocytes Absolute 2.41 0.80 - 3.50 K/UL    Monocytes Absolute 0.24 0.00 - 1.00 K/UL    Eosinophils Absolute 0.32 0.00 - 0.40 K/UL    Basophils Absolute 0.04 0.00 - 0.10 K/UL    Immature Granulocytes Absolute 0.02 0.00 - 0.04 K/UL    Differential Type AUTOMATED     Comprehensive Metabolic Panel    Collection Time: 05/24/25  5:41 PM   Result Value Ref Range    Sodium 148 (H) 136 - 145 mmol/L    Potassium 3.0 (L) 3.5 - 5.1 mmol/L    Chloride 119 (H) 97 - 108 mmol/L    CO2 22 21 - 32 mmol/L    Anion Gap 7 2 - 12 mmol/L    Glucose 84 65 - 100 mg/dL    BUN 12 6 - 20 mg/dL    Creatinine 0.70 0.55 - 1.02 mg/dL    BUN/Creatinine

## 2025-05-27 ENCOUNTER — HOSPITAL ENCOUNTER (EMERGENCY)
Facility: HOSPITAL | Age: 60
Discharge: HOME | End: 2025-05-27
Attending: EMERGENCY MEDICINE
Payer: MEDICARE

## 2025-05-27 ENCOUNTER — APPOINTMENT (OUTPATIENT)
Facility: HOSPITAL | Age: 60
End: 2025-05-27
Payer: MEDICARE

## 2025-05-27 VITALS
BODY MASS INDEX: 31.37 KG/M2 | TEMPERATURE: 98.3 F | OXYGEN SATURATION: 96 % | RESPIRATION RATE: 16 BRPM | HEIGHT: 68 IN | HEART RATE: 64 BPM | SYSTOLIC BLOOD PRESSURE: 130 MMHG | WEIGHT: 207.01 LBS | DIASTOLIC BLOOD PRESSURE: 80 MMHG

## 2025-05-27 DIAGNOSIS — J44.1 COPD WITH ACUTE EXACERBATION (HCC): ICD-10-CM

## 2025-05-27 DIAGNOSIS — G40.919 BREAKTHROUGH SEIZURE (HCC): Primary | ICD-10-CM

## 2025-05-27 LAB
ALBUMIN SERPL-MCNC: 3.7 G/DL (ref 3.5–5)
ALBUMIN/GLOB SERPL: 1 (ref 1.1–2.2)
ALP SERPL-CCNC: 90 U/L (ref 45–117)
ALT SERPL-CCNC: 24 U/L (ref 12–78)
ANION GAP SERPL CALC-SCNC: 8 MMOL/L (ref 2–12)
AST SERPL-CCNC: 38 U/L (ref 15–37)
BASOPHILS # BLD: 0.04 K/UL (ref 0–0.1)
BASOPHILS NFR BLD: 0.8 % (ref 0–1)
BILIRUB SERPL-MCNC: 0.5 MG/DL (ref 0.2–1)
BUN SERPL-MCNC: 15 MG/DL (ref 6–20)
BUN/CREAT SERPL: 16 (ref 12–20)
CALCIUM SERPL-MCNC: 9.5 MG/DL (ref 8.5–10.1)
CHLORIDE SERPL-SCNC: 106 MMOL/L (ref 97–108)
CO2 SERPL-SCNC: 23 MMOL/L (ref 21–32)
COMMENT:: NORMAL
CREAT SERPL-MCNC: 0.96 MG/DL (ref 0.55–1.02)
DIFFERENTIAL METHOD BLD: ABNORMAL
EKG ATRIAL RATE: 62 BPM
EKG DIAGNOSIS: NORMAL
EKG P AXIS: 55 DEGREES
EKG P-R INTERVAL: 130 MS
EKG Q-T INTERVAL: 450 MS
EKG QRS DURATION: 78 MS
EKG QTC CALCULATION (BAZETT): 456 MS
EKG R AXIS: 57 DEGREES
EKG T AXIS: 79 DEGREES
EKG VENTRICULAR RATE: 62 BPM
EOSINOPHIL # BLD: 0.23 K/UL (ref 0–0.4)
EOSINOPHIL NFR BLD: 4.8 % (ref 0–7)
ERYTHROCYTE [DISTWIDTH] IN BLOOD BY AUTOMATED COUNT: 13.6 % (ref 11.5–14.5)
FLUAV RNA SPEC QL NAA+PROBE: NOT DETECTED
FLUBV RNA SPEC QL NAA+PROBE: NOT DETECTED
GLOBULIN SER CALC-MCNC: 3.6 G/DL (ref 2–4)
GLUCOSE BLD STRIP.AUTO-MCNC: 100 MG/DL (ref 65–117)
GLUCOSE SERPL-MCNC: 87 MG/DL (ref 65–100)
HCT VFR BLD AUTO: 40 % (ref 35–47)
HGB BLD-MCNC: 12.6 G/DL (ref 11.5–16)
IMM GRANULOCYTES # BLD AUTO: 0.01 K/UL (ref 0–0.04)
IMM GRANULOCYTES NFR BLD AUTO: 0.2 % (ref 0–0.5)
LYMPHOCYTES # BLD: 2.43 K/UL (ref 0.8–3.5)
LYMPHOCYTES NFR BLD: 50.5 % (ref 12–49)
MAGNESIUM SERPL-MCNC: 2.1 MG/DL (ref 1.6–2.4)
MCH RBC QN AUTO: 28.5 PG (ref 26–34)
MCHC RBC AUTO-ENTMCNC: 31.5 G/DL (ref 30–36.5)
MCV RBC AUTO: 90.5 FL (ref 80–99)
MONOCYTES # BLD: 0.27 K/UL (ref 0–1)
MONOCYTES NFR BLD: 5.6 % (ref 5–13)
NEUTS SEG # BLD: 1.83 K/UL (ref 1.8–8)
NEUTS SEG NFR BLD: 38.1 % (ref 32–75)
NRBC # BLD: 0 K/UL (ref 0–0.01)
NRBC BLD-RTO: 0 PER 100 WBC
PLATELET # BLD AUTO: 264 K/UL (ref 150–400)
PMV BLD AUTO: 11.4 FL (ref 8.9–12.9)
POTASSIUM SERPL-SCNC: 5 MMOL/L (ref 3.5–5.1)
PROT SERPL-MCNC: 7.3 G/DL (ref 6.4–8.2)
RBC # BLD AUTO: 4.42 M/UL (ref 3.8–5.2)
SARS-COV-2 RNA RESP QL NAA+PROBE: NOT DETECTED
SERVICE CMNT-IMP: NORMAL
SODIUM SERPL-SCNC: 137 MMOL/L (ref 136–145)
SOURCE: NORMAL
SPECIMEN HOLD: NORMAL
TROPONIN I SERPL HS-MCNC: <4 NG/L (ref 0–51)
WBC # BLD AUTO: 4.8 K/UL (ref 3.6–11)

## 2025-05-27 PROCEDURE — 84484 ASSAY OF TROPONIN QUANT: CPT

## 2025-05-27 PROCEDURE — 6360000002 HC RX W HCPCS: Performed by: EMERGENCY MEDICINE

## 2025-05-27 PROCEDURE — 99285 EMERGENCY DEPT VISIT HI MDM: CPT

## 2025-05-27 PROCEDURE — 36415 COLL VENOUS BLD VENIPUNCTURE: CPT

## 2025-05-27 PROCEDURE — 80053 COMPREHEN METABOLIC PANEL: CPT

## 2025-05-27 PROCEDURE — 83735 ASSAY OF MAGNESIUM: CPT

## 2025-05-27 PROCEDURE — 94640 AIRWAY INHALATION TREATMENT: CPT

## 2025-05-27 PROCEDURE — 71045 X-RAY EXAM CHEST 1 VIEW: CPT

## 2025-05-27 PROCEDURE — 87636 SARSCOV2 & INF A&B AMP PRB: CPT

## 2025-05-27 PROCEDURE — 82962 GLUCOSE BLOOD TEST: CPT

## 2025-05-27 PROCEDURE — 93005 ELECTROCARDIOGRAM TRACING: CPT | Performed by: EMERGENCY MEDICINE

## 2025-05-27 PROCEDURE — 85025 COMPLETE CBC W/AUTO DIFF WBC: CPT

## 2025-05-27 RX ORDER — DOXYCYCLINE HYCLATE 100 MG
100 TABLET ORAL 2 TIMES DAILY
Qty: 14 TABLET | Refills: 0 | Status: SHIPPED | OUTPATIENT
Start: 2025-05-27 | End: 2025-06-03

## 2025-05-27 RX ORDER — DOXYCYCLINE HYCLATE 100 MG
100 TABLET ORAL 2 TIMES DAILY
Qty: 14 TABLET | Refills: 0 | Status: SHIPPED | OUTPATIENT
Start: 2025-05-27 | End: 2025-05-27

## 2025-05-27 RX ORDER — ALBUTEROL SULFATE 0.83 MG/ML
2.5 SOLUTION RESPIRATORY (INHALATION)
Status: COMPLETED | OUTPATIENT
Start: 2025-05-27 | End: 2025-05-27

## 2025-05-27 RX ORDER — ALBUTEROL SULFATE 0.83 MG/ML
5 SOLUTION RESPIRATORY (INHALATION)
Status: COMPLETED | OUTPATIENT
Start: 2025-05-27 | End: 2025-05-27

## 2025-05-27 RX ADMIN — ALBUTEROL SULFATE 5 MG: 2.5 SOLUTION RESPIRATORY (INHALATION) at 15:27

## 2025-05-27 RX ADMIN — ALBUTEROL SULFATE 2.5 MG: 2.5 SOLUTION RESPIRATORY (INHALATION) at 17:23

## 2025-05-27 ASSESSMENT — PAIN DESCRIPTION - PAIN TYPE: TYPE: ACUTE PAIN

## 2025-05-27 ASSESSMENT — PAIN DESCRIPTION - DESCRIPTORS: DESCRIPTORS: SHARP

## 2025-05-27 ASSESSMENT — LIFESTYLE VARIABLES
HOW MANY STANDARD DRINKS CONTAINING ALCOHOL DO YOU HAVE ON A TYPICAL DAY: 5 OR 6
HOW OFTEN DO YOU HAVE A DRINK CONTAINING ALCOHOL: 2-4 TIMES A MONTH

## 2025-05-27 ASSESSMENT — PAIN DESCRIPTION - LOCATION: LOCATION: CHEST

## 2025-05-27 ASSESSMENT — PAIN - FUNCTIONAL ASSESSMENT
PAIN_FUNCTIONAL_ASSESSMENT: ACTIVITIES ARE NOT PREVENTED
PAIN_FUNCTIONAL_ASSESSMENT: 0-10

## 2025-05-27 ASSESSMENT — PAIN SCALES - GENERAL: PAINLEVEL_OUTOF10: 8

## 2025-05-27 ASSESSMENT — PAIN DESCRIPTION - ORIENTATION: ORIENTATION: MID

## 2025-05-27 ASSESSMENT — PAIN DESCRIPTION - FREQUENCY: FREQUENCY: INTERMITTENT

## 2025-05-27 NOTE — ED PROVIDER NOTES
[facility] EMERGENCY DEPT  EMERGENCY DEPARTMENT HISTORY AND PHYSICAL EXAM      Date of evaluation: 5/24/2025  Patient Name: María Amos  Birthdate 1965  MRN: [id number]  ED Provider: Kathy Storm MD   Note Started: 1:34 PM EDT 5/27/25    HISTORY OF PRESENT ILLNESS     Chief Complaint   Patient presents with    Chest Pain           COPD    Headache       History Provided By: Patient     HPI: María Amos is a 60 y.o. female who presents with chest pain that started today.  Pain is substernal, nonradiating.  Today she also complains of lower back pain that she states is chronic but has been bothering her more today.  Denies any other symptoms.    PAST MEDICAL HISTORY   Past Medical History:  Past Medical History:   Diagnosis Date    Alcohol abuse     Anxiety attack     Cocaine abuse (HCC)     COVID-19     Depression     Diabetes mellitus (HCC)     Fracture of left wrist     Hemorrhoids     HTN (hypertension)     Ill-defined condition     hemorrhoids    Orthostatic hypotension     Perineal rash     Rectal bleeding     Rib contusion, left, initial encounter     Seizures (HCC)     Stroke (HCC)     Syncope     Toe fracture     great toe    Trichomonas infection     UTI (urinary tract infection)        Past Surgical History:  No past surgical history on file.    Family History:  No family history on file.    Social History:  Social History     Tobacco Use    Smoking status: Former     Current packs/day: 0.50     Types: Cigarettes    Smokeless tobacco: Former   Substance Use Topics    Alcohol use: Yes     Comment: 12 pack per day    Drug use: Not Currently     Types: Marijuana (Weed), Cocaine     Comment: socially       Allergies:  Allergies   Allergen Reactions    Peanut Oil Anaphylaxis     Per  Melecio Navneet    Bee Venom Swelling    Peanut Butter Flavoring Agent (Non-Screening)        PCP: None, None    Current Meds:   No current facility-administered medications for this encounter.     Current

## 2025-05-27 NOTE — ED PROVIDER NOTES
[facility] EMERGENCY DEPARTMENT  EMERGENCY DEPARTMENT ENCOUNTER       Pt Name: María Amos  MRN: [id number]  Birthdate 1965  Date of evaluation: 5/27/2025  Provider: Fletcher Cardona DO   PCP: None, None  Note Started: 7:16 PM EDT 5/27/25     CHIEF COMPLAINT       Chief Complaint   Patient presents with    Seizures     Pt [facility] from [facility] with a cc of 2 witnessed seizures today that were less than a minute each per EMS. EMS states patient was lowered to ground and no head strike present. Pt postictal upon EMS arrival. Hx seizure, alcohol abuse, T2DM, htn, left sided weakness from previous CVA. Pt A&O x1 while postictal for EMS. Per EMS, unsure of patient's baseline orientation. Patient vomiting for EMS and alert and will follow some commands. EMS vitals /82, HR 62, GCS 3, SPO2 99% RA, bg 85        HISTORY OF PRESENT ILLNESS: 1 or more elements      History From: Patient, History limited by: none     María Amos is a 60 y.o. female presents to the emergency department for evaluation of seizure.       Please See MDM for Additional Details of the HPI/PMH  Nursing Notes were all reviewed and agreed with or any disagreements were addressed in the HPI.     REVIEW OF SYSTEMS        Positives and Pertinent negatives as per HPI.    PAST HISTORY     Past Medical History:  Past Medical History:   Diagnosis Date    Alcohol abuse     Anxiety attack     Cocaine abuse (HCC)     COVID-19     Depression     Diabetes mellitus (HCC)     Fracture of left wrist     Hemorrhoids     HTN (hypertension)     Ill-defined condition     hemorrhoids    Orthostatic hypotension     Perineal rash     Rectal bleeding     Rib contusion, left, initial encounter     Seizures (HCC)     Stroke (HCC)     Syncope     Toe fracture     great toe    Trichomonas infection     UTI (urinary tract infection)        Past Surgical History:  History reviewed. No pertinent surgical history.    Family History:  History reviewed. No

## 2025-05-28 NOTE — ED NOTES
This RN has reviewed discharge instructions with the patient at this time. The Patient verbalized understanding and denies any further questions. The patient has been made aware of prescription(s) called into pharmacy for . Patient wheeled out on [facility] stretcher at this time.

## 2025-07-29 ENCOUNTER — HOSPITAL ENCOUNTER (EMERGENCY)
Facility: HOSPITAL | Age: 60
Discharge: HOME | End: 2025-07-29
Attending: EMERGENCY MEDICINE
Payer: MEDICARE

## 2025-07-29 VITALS
WEIGHT: 197 LBS | SYSTOLIC BLOOD PRESSURE: 108 MMHG | BODY MASS INDEX: 29.86 KG/M2 | OXYGEN SATURATION: 98 % | DIASTOLIC BLOOD PRESSURE: 39 MMHG | HEART RATE: 66 BPM | HEIGHT: 68 IN | RESPIRATION RATE: 17 BRPM | TEMPERATURE: 97.6 F

## 2025-07-29 DIAGNOSIS — G40.919 BREAKTHROUGH SEIZURE (HCC): Primary | ICD-10-CM

## 2025-07-29 DIAGNOSIS — F14.10 NONDEPENDENT COCAINE ABUSE (HCC): ICD-10-CM

## 2025-07-29 LAB
ALBUMIN SERPL-MCNC: 4 G/DL (ref 3.5–5.2)
ALBUMIN/GLOB SERPL: 1.2 (ref 1.1–2.2)
ALP SERPL-CCNC: 96 U/L (ref 35–104)
ALT SERPL-CCNC: 15 U/L (ref 10–35)
AMPHET UR QL SCN: NEGATIVE
ANION GAP SERPL CALC-SCNC: 16 MMOL/L (ref 2–14)
APPEARANCE UR: CLEAR
AST SERPL-CCNC: 23 U/L (ref 10–35)
BACTERIA URNS QL MICRO: NEGATIVE /HPF
BARBITURATES UR QL SCN: NEGATIVE
BASOPHILS # BLD: 0.02 K/UL (ref 0–0.1)
BASOPHILS NFR BLD: 0.4 % (ref 0–1)
BENZODIAZ UR QL: POSITIVE
BILIRUB SERPL-MCNC: 0.3 MG/DL (ref 0–1.2)
BILIRUB UR QL: NEGATIVE
BUN SERPL-MCNC: 11 MG/DL (ref 8–23)
BUN/CREAT SERPL: 10 (ref 12–20)
CALCIUM SERPL-MCNC: 9.5 MG/DL (ref 8.8–10.2)
CANNABINOIDS UR QL SCN: NEGATIVE
CHLORIDE SERPL-SCNC: 104 MMOL/L (ref 98–107)
CO2 SERPL-SCNC: 20 MMOL/L (ref 20–29)
COCAINE UR QL SCN: POSITIVE
COLOR UR: NORMAL
CREAT SERPL-MCNC: 1.05 MG/DL (ref 0.6–1)
DIFFERENTIAL METHOD BLD: NORMAL
EOSINOPHIL # BLD: 0.17 K/UL (ref 0–0.4)
EOSINOPHIL NFR BLD: 3 % (ref 0–7)
EPITH CASTS URNS QL MICRO: NORMAL /LPF
ERYTHROCYTE [DISTWIDTH] IN BLOOD BY AUTOMATED COUNT: 14.2 % (ref 11.5–14.5)
FENTANYL: NEGATIVE
GLOBULIN SER CALC-MCNC: 3.4 G/DL (ref 2–4)
GLUCOSE SERPL-MCNC: 92 MG/DL (ref 65–100)
GLUCOSE UR STRIP.AUTO-MCNC: NEGATIVE MG/DL
HCT VFR BLD AUTO: 41.4 % (ref 35–47)
HGB BLD-MCNC: 13.3 G/DL (ref 11.5–16)
HGB UR QL STRIP: NEGATIVE
HYALINE CASTS URNS QL MICRO: NORMAL /LPF (ref 0–2)
IMM GRANULOCYTES # BLD AUTO: 0.01 K/UL (ref 0–0.04)
IMM GRANULOCYTES NFR BLD AUTO: 0.2 % (ref 0–0.5)
KETONES UR QL STRIP.AUTO: NEGATIVE MG/DL
LEUKOCYTE ESTERASE UR QL STRIP.AUTO: NEGATIVE
LYMPHOCYTES # BLD: 2.43 K/UL (ref 0.8–3.5)
LYMPHOCYTES NFR BLD: 43.2 % (ref 12–49)
Lab: ABNORMAL
MCH RBC QN AUTO: 29.4 PG (ref 26–34)
MCHC RBC AUTO-ENTMCNC: 32.1 G/DL (ref 30–36.5)
MCV RBC AUTO: 91.4 FL (ref 80–99)
METHADONE UR QL: NEGATIVE
MONOCYTES # BLD: 0.28 K/UL (ref 0–1)
MONOCYTES NFR BLD: 5 % (ref 5–13)
NEUTS SEG # BLD: 2.71 K/UL (ref 1.8–8)
NEUTS SEG NFR BLD: 48.2 % (ref 32–75)
NITRITE UR QL STRIP.AUTO: NEGATIVE
NRBC # BLD: 0 K/UL (ref 0–0.01)
NRBC BLD-RTO: 0 PER 100 WBC
OPIATES UR QL: NEGATIVE
PCP UR QL: NEGATIVE
PH UR STRIP: 6.5 (ref 5–8)
PLATELET # BLD AUTO: 271 K/UL (ref 150–400)
PMV BLD AUTO: 9.6 FL (ref 8.9–12.9)
POTASSIUM SERPL-SCNC: 4.2 MMOL/L (ref 3.5–5.1)
PROT SERPL-MCNC: 7.3 G/DL (ref 6.4–8.3)
PROT UR STRIP-MCNC: NEGATIVE MG/DL
RBC # BLD AUTO: 4.53 M/UL (ref 3.8–5.2)
RBC #/AREA URNS HPF: NORMAL /HPF (ref 0–5)
SODIUM SERPL-SCNC: 139 MMOL/L (ref 136–145)
SP GR UR REFRACTOMETRY: 1.01
TROPONIN T SERPL HS-MCNC: <6 NG/L (ref 0–14)
URINE CULTURE IF INDICATED: NORMAL
UROBILINOGEN UR QL STRIP.AUTO: 0.2 EU/DL (ref 0.2–1)
WBC # BLD AUTO: 5.6 K/UL (ref 3.6–11)
WBC URNS QL MICRO: NORMAL /HPF (ref 0–4)

## 2025-07-29 PROCEDURE — 80053 COMPREHEN METABOLIC PANEL: CPT

## 2025-07-29 PROCEDURE — 2500000003 HC RX 250 WO HCPCS: Performed by: EMERGENCY MEDICINE

## 2025-07-29 PROCEDURE — 84484 ASSAY OF TROPONIN QUANT: CPT

## 2025-07-29 PROCEDURE — 99284 EMERGENCY DEPT VISIT MOD MDM: CPT

## 2025-07-29 PROCEDURE — 36415 COLL VENOUS BLD VENIPUNCTURE: CPT

## 2025-07-29 PROCEDURE — 6360000002 HC RX W HCPCS: Performed by: EMERGENCY MEDICINE

## 2025-07-29 PROCEDURE — 85025 COMPLETE CBC W/AUTO DIFF WBC: CPT

## 2025-07-29 PROCEDURE — 96374 THER/PROPH/DIAG INJ IV PUSH: CPT

## 2025-07-29 PROCEDURE — 96375 TX/PRO/DX INJ NEW DRUG ADDON: CPT

## 2025-07-29 PROCEDURE — 81001 URINALYSIS AUTO W/SCOPE: CPT

## 2025-07-29 RX ORDER — ONDANSETRON 2 MG/ML
4 INJECTION INTRAMUSCULAR; INTRAVENOUS
Status: COMPLETED | OUTPATIENT
Start: 2025-07-29 | End: 2025-07-29

## 2025-07-29 RX ORDER — LEVETIRACETAM 500 MG/5ML
1000 INJECTION, SOLUTION, CONCENTRATE INTRAVENOUS ONCE
Status: COMPLETED | OUTPATIENT
Start: 2025-07-29 | End: 2025-07-29

## 2025-07-29 RX ADMIN — LEVETIRACETAM 1000 MG: 100 INJECTION INTRAVENOUS at 18:45

## 2025-07-29 RX ADMIN — ONDANSETRON 4 MG: 2 INJECTION, SOLUTION INTRAMUSCULAR; INTRAVENOUS at 16:58

## 2025-07-29 RX ADMIN — FAMOTIDINE 20 MG: 10 INJECTION, SOLUTION INTRAVENOUS at 16:59

## 2025-07-29 ASSESSMENT — PAIN SCALES - GENERAL: PAINLEVEL_OUTOF10: 0

## 2025-07-29 ASSESSMENT — LIFESTYLE VARIABLES
HOW OFTEN DO YOU HAVE A DRINK CONTAINING ALCOHOL: 2-4 TIMES A MONTH
HOW MANY STANDARD DRINKS CONTAINING ALCOHOL DO YOU HAVE ON A TYPICAL DAY: 5 OR 6

## 2025-07-29 ASSESSMENT — PAIN - FUNCTIONAL ASSESSMENT: PAIN_FUNCTIONAL_ASSESSMENT: 0-10

## 2025-07-29 NOTE — CARE COORDINATION
CM consulted by ED MD for primary care follow-up. CM met with pt at bedside to discuss. Pt reports that she was previously seen in May 2025 by [facility] in [place], [place]. Pt resides in [place], and attended appts with this provider in office. As offices are closed this evening, CM to place call to office on tomorrow 7/30/25 and request for office to schedule hospital follow-up appt with pt. Pt reports that her phone is not working, and will be working again on Friday 8/1/25. CM will relay this information to [facility] with request for call to pt directly on Friday.    No further CM needs. Pt is clear for d/c from CM standpoint.      SANTIAGO Godwin.  Care Manager, [facility]  [phone]/Available on [email or web address] Serve

## 2025-07-29 NOTE — ED PROVIDER NOTES
[facility] EMERGENCY DEPARTMENT  EMERGENCY DEPARTMENT ENCOUNTER    Patient Name: María Amos  MRN: [id number]  YOB: 1965  Provider: Carroll Lucero MD  PCP: None, None  Time/Date of evaluation: 3:41 PM EDT on 7/29/25    History of Presenting Illness     Chief Complaint   Patient presents with    Seizures     BIBEMS from [facility] for breakthrough seizure x  2 today. Hx of epilepsy and is taking her medications as prescribed. Last breakthrough seizure 1 month ago.      History Provided by: EMS and Patient   History is limited by: Acuity of Condition    HISTORY (Narrative):   María Amos is a 60 y.o. female with a PMHX of alcohol abuse, cocaine abuse, diabetes, hypertension, seizure disorder, and CVAwho presents to the emergency department (room 25) by EMS following two witnessed seizures at [facility]. She has a history of stroke and psychiatric issues.    The patient experienced two seizures witnessed by staff at [facility]. Her last breakthrough seizure occurred last month, witnessed by the same individuals. She has been taking her prescribed medication, Keppra, for seizure management. Following the seizures, the patient reports chest pain, which is not typical for her post-ictal state. She also experiences nausea and dry heaving. The patient denies seeing a neurologist for her seizure management.     Nursing Notes were all reviewed and agreed with or any disagreements were addressed in the HPI.    Past History     PAST MEDICAL HISTORY:  Past Medical History:   Diagnosis Date    Alcohol abuse     Anxiety attack     Cocaine abuse (HCC)     COVID-19     Depression     Diabetes mellitus (HCC)     Fracture of left wrist     Hemorrhoids     HTN (hypertension)     Ill-defined condition     hemorrhoids    Orthostatic hypotension     Perineal rash     Rectal bleeding     Rib contusion, left, initial encounter     Seizures (HCC)     Stroke (HCC)     Syncope     Toe

## 2025-08-17 ENCOUNTER — HOSPITAL ENCOUNTER (EMERGENCY)
Facility: HOSPITAL | Age: 60
Discharge: HOME | End: 2025-08-18
Attending: EMERGENCY MEDICINE
Payer: MEDICAID

## 2025-08-17 ENCOUNTER — APPOINTMENT (OUTPATIENT)
Facility: HOSPITAL | Age: 60
End: 2025-08-17
Payer: MEDICAID

## 2025-08-17 DIAGNOSIS — J06.9 ACUTE UPPER RESPIRATORY INFECTION: ICD-10-CM

## 2025-08-17 DIAGNOSIS — R11.2 NAUSEA AND VOMITING, UNSPECIFIED VOMITING TYPE: Primary | ICD-10-CM

## 2025-08-17 DIAGNOSIS — E86.0 DEHYDRATION: ICD-10-CM

## 2025-08-17 LAB
APPEARANCE UR: CLEAR
BACTERIA URNS QL MICRO: NEGATIVE /HPF
BILIRUB UR QL: NEGATIVE
COLOR UR: YELLOW
EPITH CASTS URNS QL MICRO: ABNORMAL /LPF
FLUAV RNA SPEC QL NAA+PROBE: NOT DETECTED
FLUBV RNA SPEC QL NAA+PROBE: NOT DETECTED
GLUCOSE UR STRIP.AUTO-MCNC: NEGATIVE MG/DL
HGB UR QL STRIP: NEGATIVE
KETONES UR QL STRIP.AUTO: NEGATIVE MG/DL
LEUKOCYTE ESTERASE UR QL STRIP.AUTO: NEGATIVE
MUCOUS THREADS URNS QL MICRO: ABNORMAL /LPF
NITRITE UR QL STRIP.AUTO: NEGATIVE
PH UR STRIP: 5 (ref 5–8)
PROT UR STRIP-MCNC: NEGATIVE MG/DL
RBC #/AREA URNS HPF: ABNORMAL /HPF (ref 0–5)
SARS-COV-2 RNA RESP QL NAA+PROBE: NOT DETECTED
SP GR UR REFRACTOMETRY: 1.01 (ref 1–1.03)
URINE CULTURE IF INDICATED: ABNORMAL
UROBILINOGEN UR QL STRIP.AUTO: 2 EU/DL (ref 0.1–1)
WBC URNS QL MICRO: ABNORMAL /HPF (ref 0–4)

## 2025-08-17 PROCEDURE — 99284 EMERGENCY DEPT VISIT MOD MDM: CPT

## 2025-08-17 PROCEDURE — 96361 HYDRATE IV INFUSION ADD-ON: CPT

## 2025-08-17 PROCEDURE — 6370000000 HC RX 637 (ALT 250 FOR IP): Performed by: EMERGENCY MEDICINE

## 2025-08-17 PROCEDURE — 96360 HYDRATION IV INFUSION INIT: CPT

## 2025-08-17 PROCEDURE — 81001 URINALYSIS AUTO W/SCOPE: CPT

## 2025-08-17 PROCEDURE — 2580000003 HC RX 258: Performed by: EMERGENCY MEDICINE

## 2025-08-17 PROCEDURE — 87636 SARSCOV2 & INF A&B AMP PRB: CPT

## 2025-08-17 PROCEDURE — 71046 X-RAY EXAM CHEST 2 VIEWS: CPT

## 2025-08-17 RX ORDER — 0.9 % SODIUM CHLORIDE 0.9 %
500 INTRAVENOUS SOLUTION INTRAVENOUS ONCE
Status: COMPLETED | OUTPATIENT
Start: 2025-08-17 | End: 2025-08-17

## 2025-08-17 RX ORDER — ONDANSETRON 4 MG/1
4 TABLET, ORALLY DISINTEGRATING ORAL 3 TIMES DAILY PRN
Qty: 21 TABLET | Refills: 0 | Status: SHIPPED | OUTPATIENT
Start: 2025-08-17

## 2025-08-17 RX ORDER — HYDROCODONE BITARTRATE AND ACETAMINOPHEN 5; 325 MG/1; MG/1
1 TABLET ORAL
Refills: 0 | Status: COMPLETED | OUTPATIENT
Start: 2025-08-17 | End: 2025-08-17

## 2025-08-17 RX ORDER — 0.9 % SODIUM CHLORIDE 0.9 %
1000 INTRAVENOUS SOLUTION INTRAVENOUS ONCE
Status: COMPLETED | OUTPATIENT
Start: 2025-08-17 | End: 2025-08-17

## 2025-08-17 RX ORDER — ONDANSETRON 4 MG/1
4 TABLET, ORALLY DISINTEGRATING ORAL ONCE
Status: COMPLETED | OUTPATIENT
Start: 2025-08-17 | End: 2025-08-17

## 2025-08-17 RX ADMIN — SODIUM CHLORIDE 500 ML: 9 INJECTION, SOLUTION INTRAVENOUS at 17:02

## 2025-08-17 RX ADMIN — SODIUM CHLORIDE 1000 ML: 9 INJECTION, SOLUTION INTRAVENOUS at 18:16

## 2025-08-17 RX ADMIN — HYDROCODONE BITARTRATE AND ACETAMINOPHEN 1 TABLET: 5; 325 TABLET ORAL at 15:51

## 2025-08-17 RX ADMIN — ONDANSETRON 4 MG: 4 TABLET, ORALLY DISINTEGRATING ORAL at 14:25

## 2025-08-17 ASSESSMENT — PAIN DESCRIPTION - LOCATION: LOCATION: LEG

## 2025-08-17 ASSESSMENT — PAIN DESCRIPTION - ORIENTATION: ORIENTATION: RIGHT;LEFT

## 2025-08-17 ASSESSMENT — PAIN SCALES - GENERAL: PAINLEVEL_OUTOF10: 10

## 2025-08-18 VITALS
SYSTOLIC BLOOD PRESSURE: 110 MMHG | RESPIRATION RATE: 17 BRPM | HEART RATE: 62 BPM | OXYGEN SATURATION: 95 % | DIASTOLIC BLOOD PRESSURE: 62 MMHG | TEMPERATURE: 98.9 F

## 2025-08-27 ENCOUNTER — APPOINTMENT (OUTPATIENT)
Facility: HOSPITAL | Age: 60
End: 2025-08-27
Payer: MEDICARE

## 2025-08-27 ENCOUNTER — HOSPITAL ENCOUNTER (EMERGENCY)
Facility: HOSPITAL | Age: 60
Discharge: HOME | End: 2025-08-27
Payer: MEDICARE

## 2025-08-27 VITALS
TEMPERATURE: 97.9 F | RESPIRATION RATE: 18 BRPM | OXYGEN SATURATION: 99 % | WEIGHT: 202 LBS | HEIGHT: 68 IN | DIASTOLIC BLOOD PRESSURE: 68 MMHG | HEART RATE: 50 BPM | SYSTOLIC BLOOD PRESSURE: 111 MMHG | BODY MASS INDEX: 30.62 KG/M2

## 2025-08-27 DIAGNOSIS — R10.84 GENERALIZED ABDOMINAL PAIN: Primary | ICD-10-CM

## 2025-08-27 DIAGNOSIS — B37.31 YEAST VAGINITIS: ICD-10-CM

## 2025-08-27 LAB
ALBUMIN SERPL-MCNC: 3.6 G/DL (ref 3.5–5)
ALBUMIN/GLOB SERPL: 1 (ref 1.1–2.2)
ALP SERPL-CCNC: 81 U/L (ref 45–117)
ALT SERPL-CCNC: 21 U/L (ref 12–78)
ANION GAP SERPL CALC-SCNC: 3 MMOL/L (ref 2–12)
AST SERPL W P-5'-P-CCNC: 12 U/L (ref 15–37)
BASOPHILS # BLD: 0.02 K/UL (ref 0–0.1)
BASOPHILS NFR BLD: 0.4 % (ref 0–1)
BILIRUB SERPL-MCNC: 0.4 MG/DL (ref 0.2–1)
BUN SERPL-MCNC: 10 MG/DL (ref 6–20)
BUN/CREAT SERPL: 14 (ref 12–20)
CA-I BLD-MCNC: 9.3 MG/DL (ref 8.5–10.1)
CHLORIDE SERPL-SCNC: 109 MMOL/L (ref 97–108)
CO2 SERPL-SCNC: 28 MMOL/L (ref 21–32)
CREAT SERPL-MCNC: 0.74 MG/DL (ref 0.55–1.02)
DIFFERENTIAL METHOD BLD: NORMAL
EKG ATRIAL RATE: 50 BPM
EKG DIAGNOSIS: NORMAL
EKG P AXIS: 57 DEGREES
EKG P-R INTERVAL: 142 MS
EKG Q-T INTERVAL: 570 MS
EKG QRS DURATION: 74 MS
EKG QTC CALCULATION (BAZETT): 487 MS
EKG R AXIS: 41 DEGREES
EKG T AXIS: 63 DEGREES
EKG VENTRICULAR RATE: 44 BPM
EOSINOPHIL # BLD: 0.21 K/UL (ref 0–0.4)
EOSINOPHIL NFR BLD: 4.1 % (ref 0–7)
ERYTHROCYTE [DISTWIDTH] IN BLOOD BY AUTOMATED COUNT: 13.2 % (ref 11.5–14.5)
GLOBULIN SER CALC-MCNC: 3.5 G/DL (ref 2–4)
GLUCOSE SERPL-MCNC: 72 MG/DL (ref 65–100)
HCT VFR BLD AUTO: 38.3 % (ref 35–47)
HGB BLD-MCNC: 12.8 G/DL (ref 11.5–16)
IMM GRANULOCYTES # BLD AUTO: 0.01 K/UL (ref 0–0.04)
IMM GRANULOCYTES NFR BLD AUTO: 0.2 % (ref 0–0.5)
LIPASE SERPL-CCNC: 37 U/L (ref 13–75)
LYMPHOCYTES # BLD: 1.98 K/UL (ref 0.8–3.5)
LYMPHOCYTES NFR BLD: 39.1 % (ref 12–49)
MCH RBC QN AUTO: 29.2 PG (ref 26–34)
MCHC RBC AUTO-ENTMCNC: 33.4 G/DL (ref 30–36.5)
MCV RBC AUTO: 87.2 FL (ref 80–99)
MONOCYTES # BLD: 0.33 K/UL (ref 0–1)
MONOCYTES NFR BLD: 6.5 % (ref 5–13)
NEUTS SEG # BLD: 2.52 K/UL (ref 1.8–8)
NEUTS SEG NFR BLD: 49.7 % (ref 32–75)
NRBC # BLD: 0 K/UL (ref 0–0.01)
NRBC BLD-RTO: 0 PER 100 WBC
PLATELET # BLD AUTO: 294 K/UL (ref 150–400)
PMV BLD AUTO: 9.4 FL (ref 8.9–12.9)
POTASSIUM SERPL-SCNC: 3.6 MMOL/L (ref 3.5–5.1)
PROT SERPL-MCNC: 7.1 G/DL (ref 6.4–8.2)
RBC # BLD AUTO: 4.39 M/UL (ref 3.8–5.2)
SODIUM SERPL-SCNC: 140 MMOL/L (ref 136–145)
WBC # BLD AUTO: 5.1 K/UL (ref 3.6–11)

## 2025-08-27 PROCEDURE — 36415 COLL VENOUS BLD VENIPUNCTURE: CPT

## 2025-08-27 PROCEDURE — 80053 COMPREHEN METABOLIC PANEL: CPT

## 2025-08-27 PROCEDURE — 96374 THER/PROPH/DIAG INJ IV PUSH: CPT

## 2025-08-27 PROCEDURE — 96361 HYDRATE IV INFUSION ADD-ON: CPT

## 2025-08-27 PROCEDURE — 93010 ELECTROCARDIOGRAM REPORT: CPT | Performed by: SPECIALIST

## 2025-08-27 PROCEDURE — 93005 ELECTROCARDIOGRAM TRACING: CPT

## 2025-08-27 PROCEDURE — 74177 CT ABD & PELVIS W/CONTRAST: CPT

## 2025-08-27 PROCEDURE — 6360000004 HC RX CONTRAST MEDICATION

## 2025-08-27 PROCEDURE — 99285 EMERGENCY DEPT VISIT HI MDM: CPT

## 2025-08-27 PROCEDURE — 83690 ASSAY OF LIPASE: CPT

## 2025-08-27 PROCEDURE — 6360000002 HC RX W HCPCS

## 2025-08-27 PROCEDURE — 2580000003 HC RX 258

## 2025-08-27 PROCEDURE — 85025 COMPLETE CBC W/AUTO DIFF WBC: CPT

## 2025-08-27 PROCEDURE — 96375 TX/PRO/DX INJ NEW DRUG ADDON: CPT

## 2025-08-27 RX ORDER — IOPAMIDOL 755 MG/ML
100 INJECTION, SOLUTION INTRAVASCULAR
Status: COMPLETED | OUTPATIENT
Start: 2025-08-27 | End: 2025-08-27

## 2025-08-27 RX ORDER — 0.9 % SODIUM CHLORIDE 0.9 %
1000 INTRAVENOUS SOLUTION INTRAVENOUS
Status: COMPLETED | OUTPATIENT
Start: 2025-08-27 | End: 2025-08-27

## 2025-08-27 RX ORDER — FLUCONAZOLE 150 MG/1
150 TABLET ORAL ONCE
Qty: 1 TABLET | Refills: 0 | Status: SHIPPED | OUTPATIENT
Start: 2025-08-27 | End: 2025-08-27

## 2025-08-27 RX ORDER — MORPHINE SULFATE 4 MG/ML
4 INJECTION, SOLUTION INTRAMUSCULAR; INTRAVENOUS
Status: COMPLETED | OUTPATIENT
Start: 2025-08-27 | End: 2025-08-27

## 2025-08-27 RX ORDER — ONDANSETRON 2 MG/ML
4 INJECTION INTRAMUSCULAR; INTRAVENOUS EVERY 6 HOURS PRN
Status: DISCONTINUED | OUTPATIENT
Start: 2025-08-27 | End: 2025-08-27 | Stop reason: HOSPADM

## 2025-08-27 RX ADMIN — ONDANSETRON 4 MG: 2 INJECTION, SOLUTION INTRAMUSCULAR; INTRAVENOUS at 11:16

## 2025-08-27 RX ADMIN — MORPHINE SULFATE 4 MG: 4 INJECTION, SOLUTION INTRAMUSCULAR; INTRAVENOUS at 11:18

## 2025-08-27 RX ADMIN — SODIUM CHLORIDE 1000 ML: 0.9 INJECTION, SOLUTION INTRAVENOUS at 11:15

## 2025-08-27 RX ADMIN — IOPAMIDOL 100 ML: 755 INJECTION, SOLUTION INTRAVENOUS at 11:58

## 2025-08-27 ASSESSMENT — PAIN SCALES - GENERAL
PAINLEVEL_OUTOF10: 10
PAINLEVEL_OUTOF10: 0
PAINLEVEL_OUTOF10: 7
PAINLEVEL_OUTOF10: 4

## 2025-08-27 ASSESSMENT — PAIN - FUNCTIONAL ASSESSMENT: PAIN_FUNCTIONAL_ASSESSMENT: 0-10

## 2025-08-29 ENCOUNTER — HOSPITAL ENCOUNTER (EMERGENCY)
Facility: HOSPITAL | Age: 60
Discharge: HOME | End: 2025-08-29
Attending: EMERGENCY MEDICINE
Payer: MEDICARE

## 2025-08-29 VITALS
RESPIRATION RATE: 17 BRPM | OXYGEN SATURATION: 97 % | SYSTOLIC BLOOD PRESSURE: 112 MMHG | WEIGHT: 202 LBS | TEMPERATURE: 98 F | HEIGHT: 68 IN | BODY MASS INDEX: 30.62 KG/M2 | HEART RATE: 68 BPM | DIASTOLIC BLOOD PRESSURE: 70 MMHG

## 2025-08-29 DIAGNOSIS — K62.5 RECTAL BLEEDING: Primary | ICD-10-CM

## 2025-08-29 DIAGNOSIS — K64.8 INTERNAL HEMORRHOIDS: ICD-10-CM

## 2025-08-29 LAB
ALBUMIN SERPL-MCNC: 3.7 G/DL (ref 3.5–5)
ALBUMIN/GLOB SERPL: 1.1 (ref 1.1–2.2)
ALP SERPL-CCNC: 85 U/L (ref 45–117)
ALT SERPL-CCNC: 18 U/L (ref 12–78)
ANION GAP SERPL CALC-SCNC: 6 MMOL/L (ref 2–12)
AST SERPL W P-5'-P-CCNC: 22 U/L (ref 15–37)
BASOPHILS # BLD: 0.03 K/UL (ref 0–0.1)
BASOPHILS NFR BLD: 0.5 % (ref 0–1)
BILIRUB SERPL-MCNC: 0.4 MG/DL (ref 0.2–1)
BUN SERPL-MCNC: 8 MG/DL (ref 6–20)
BUN/CREAT SERPL: 10 (ref 12–20)
CA-I BLD-MCNC: 9.2 MG/DL (ref 8.5–10.1)
CHLORIDE SERPL-SCNC: 109 MMOL/L (ref 97–108)
CO2 SERPL-SCNC: 27 MMOL/L (ref 21–32)
CREAT SERPL-MCNC: 0.83 MG/DL (ref 0.55–1.02)
DIFFERENTIAL METHOD BLD: ABNORMAL
EOSINOPHIL # BLD: 0.14 K/UL (ref 0–0.4)
EOSINOPHIL NFR BLD: 2.3 % (ref 0–7)
ERYTHROCYTE [DISTWIDTH] IN BLOOD BY AUTOMATED COUNT: 13.6 % (ref 11.5–14.5)
GLOBULIN SER CALC-MCNC: 3.4 G/DL (ref 2–4)
GLUCOSE SERPL-MCNC: 73 MG/DL (ref 65–100)
HCT VFR BLD AUTO: 34.7 % (ref 35–47)
HGB BLD-MCNC: 11.7 G/DL (ref 11.5–16)
IMM GRANULOCYTES # BLD AUTO: 0.01 K/UL (ref 0–0.04)
IMM GRANULOCYTES NFR BLD AUTO: 0.2 % (ref 0–0.5)
LYMPHOCYTES # BLD: 2.91 K/UL (ref 0.8–3.5)
LYMPHOCYTES NFR BLD: 48 % (ref 12–49)
MCH RBC QN AUTO: 29 PG (ref 26–34)
MCHC RBC AUTO-ENTMCNC: 33.7 G/DL (ref 30–36.5)
MCV RBC AUTO: 85.9 FL (ref 80–99)
MONOCYTES # BLD: 0.37 K/UL (ref 0–1)
MONOCYTES NFR BLD: 6.1 % (ref 5–13)
NEUTS SEG # BLD: 2.6 K/UL (ref 1.8–8)
NEUTS SEG NFR BLD: 42.9 % (ref 32–75)
NRBC # BLD: 0 K/UL (ref 0–0.01)
NRBC BLD-RTO: 0 PER 100 WBC
PLATELET # BLD AUTO: 286 K/UL (ref 150–400)
PMV BLD AUTO: 9.2 FL (ref 8.9–12.9)
POTASSIUM SERPL-SCNC: 3.6 MMOL/L (ref 3.5–5.1)
PROT SERPL-MCNC: 7.1 G/DL (ref 6.4–8.2)
RBC # BLD AUTO: 4.04 M/UL (ref 3.8–5.2)
SODIUM SERPL-SCNC: 142 MMOL/L (ref 136–145)
WBC # BLD AUTO: 6.1 K/UL (ref 3.6–11)

## 2025-08-29 PROCEDURE — 96374 THER/PROPH/DIAG INJ IV PUSH: CPT

## 2025-08-29 PROCEDURE — 85025 COMPLETE CBC W/AUTO DIFF WBC: CPT

## 2025-08-29 PROCEDURE — 99284 EMERGENCY DEPT VISIT MOD MDM: CPT

## 2025-08-29 PROCEDURE — 80053 COMPREHEN METABOLIC PANEL: CPT

## 2025-08-29 PROCEDURE — 96376 TX/PRO/DX INJ SAME DRUG ADON: CPT

## 2025-08-29 PROCEDURE — 6360000002 HC RX W HCPCS: Performed by: EMERGENCY MEDICINE

## 2025-08-29 PROCEDURE — 36415 COLL VENOUS BLD VENIPUNCTURE: CPT

## 2025-08-29 PROCEDURE — 6360000002 HC RX W HCPCS: Performed by: STUDENT IN AN ORGANIZED HEALTH CARE EDUCATION/TRAINING PROGRAM

## 2025-08-29 RX ORDER — DICYCLOMINE HCL 20 MG
20 TABLET ORAL 4 TIMES DAILY
Qty: 20 TABLET | Refills: 0 | Status: SHIPPED | OUTPATIENT
Start: 2025-08-29

## 2025-08-29 RX ORDER — MORPHINE SULFATE 4 MG/ML
4 INJECTION, SOLUTION INTRAMUSCULAR; INTRAVENOUS
Refills: 0 | Status: COMPLETED | OUTPATIENT
Start: 2025-08-29 | End: 2025-08-29

## 2025-08-29 RX ADMIN — MORPHINE SULFATE 4 MG: 4 INJECTION, SOLUTION INTRAMUSCULAR; INTRAVENOUS at 19:26

## 2025-08-29 RX ADMIN — MORPHINE SULFATE 4 MG: 4 INJECTION, SOLUTION INTRAMUSCULAR; INTRAVENOUS at 14:02

## 2025-08-29 ASSESSMENT — PAIN SCALES - GENERAL
PAINLEVEL_OUTOF10: 3
PAINLEVEL_OUTOF10: 8
PAINLEVEL_OUTOF10: 10

## 2025-08-29 ASSESSMENT — PAIN - FUNCTIONAL ASSESSMENT
PAIN_FUNCTIONAL_ASSESSMENT: 0-10
PAIN_FUNCTIONAL_ASSESSMENT: 0-10

## 2025-08-29 ASSESSMENT — PAIN DESCRIPTION - PAIN TYPE: TYPE: ACUTE PAIN

## 2025-08-29 ASSESSMENT — PAIN DESCRIPTION - LOCATION: LOCATION: RECTUM
